# Patient Record
Sex: FEMALE | Race: WHITE | NOT HISPANIC OR LATINO | Employment: OTHER | URBAN - METROPOLITAN AREA
[De-identification: names, ages, dates, MRNs, and addresses within clinical notes are randomized per-mention and may not be internally consistent; named-entity substitution may affect disease eponyms.]

---

## 2017-01-24 DIAGNOSIS — M81.0 OP (OSTEOPOROSIS): ICD-10-CM

## 2017-01-24 RX ORDER — ALENDRONATE SODIUM 70 MG/1
TABLET ORAL
Qty: 12 TABLET | Refills: 3 | Status: SHIPPED | OUTPATIENT
Start: 2017-01-24 | End: 2017-10-17

## 2017-01-25 DIAGNOSIS — I10 ESSENTIAL HYPERTENSION: ICD-10-CM

## 2017-01-25 RX ORDER — TRIAMTERENE AND HYDROCHLOROTHIAZIDE 37.5; 25 MG/1; MG/1
CAPSULE ORAL
Qty: 90 CAPSULE | Refills: 3 | Status: SHIPPED | OUTPATIENT
Start: 2017-01-25 | End: 2018-03-14 | Stop reason: SDUPTHER

## 2017-02-12 DIAGNOSIS — G45.8 OTHER SPECIFIED TRANSIENT CEREBRAL ISCHEMIAS: ICD-10-CM

## 2017-02-12 RX ORDER — CLOPIDOGREL BISULFATE 75 MG/1
TABLET ORAL
Qty: 90 TABLET | Refills: 3 | Status: SHIPPED | OUTPATIENT
Start: 2017-02-12 | End: 2018-02-03 | Stop reason: SDUPTHER

## 2017-03-02 ENCOUNTER — OFFICE VISIT (OUTPATIENT)
Dept: INTERNAL MEDICINE | Facility: CLINIC | Age: 77
End: 2017-03-02
Payer: MEDICARE

## 2017-03-02 VITALS
DIASTOLIC BLOOD PRESSURE: 68 MMHG | HEIGHT: 62 IN | SYSTOLIC BLOOD PRESSURE: 106 MMHG | WEIGHT: 168.63 LBS | TEMPERATURE: 98 F | OXYGEN SATURATION: 97 % | BODY MASS INDEX: 31.03 KG/M2 | HEART RATE: 62 BPM

## 2017-03-02 DIAGNOSIS — J00 ACUTE CORYZA: Primary | ICD-10-CM

## 2017-03-02 LAB
FLUAV AG SPEC QL IA: NEGATIVE
FLUBV AG SPEC QL IA: NEGATIVE
SPECIMEN SOURCE: NORMAL

## 2017-03-02 PROCEDURE — 1159F MED LIST DOCD IN RCRD: CPT | Mod: S$GLB,,, | Performed by: PHYSICIAN ASSISTANT

## 2017-03-02 PROCEDURE — 87400 INFLUENZA A/B EACH AG IA: CPT | Mod: 59,PO

## 2017-03-02 PROCEDURE — 99999 PR PBB SHADOW E&M-EST. PATIENT-LVL III: CPT | Mod: PBBFAC,,, | Performed by: PHYSICIAN ASSISTANT

## 2017-03-02 PROCEDURE — 3078F DIAST BP <80 MM HG: CPT | Mod: S$GLB,,, | Performed by: PHYSICIAN ASSISTANT

## 2017-03-02 PROCEDURE — 1125F AMNT PAIN NOTED PAIN PRSNT: CPT | Mod: S$GLB,,, | Performed by: PHYSICIAN ASSISTANT

## 2017-03-02 PROCEDURE — 1157F ADVNC CARE PLAN IN RCRD: CPT | Mod: S$GLB,,, | Performed by: PHYSICIAN ASSISTANT

## 2017-03-02 PROCEDURE — 1160F RVW MEDS BY RX/DR IN RCRD: CPT | Mod: S$GLB,,, | Performed by: PHYSICIAN ASSISTANT

## 2017-03-02 PROCEDURE — 99213 OFFICE O/P EST LOW 20 MIN: CPT | Mod: S$GLB,,, | Performed by: PHYSICIAN ASSISTANT

## 2017-03-02 PROCEDURE — 3074F SYST BP LT 130 MM HG: CPT | Mod: S$GLB,,, | Performed by: PHYSICIAN ASSISTANT

## 2017-03-02 NOTE — PROGRESS NOTES
Subjective:      Patient ID: Lorenzo Ospina is a 76 y.o. female.    Chief Complaint: Influenza and Generalized Body Aches    Influenza   This is a new problem. Episode onset: 2 days. The problem occurs constantly. The problem has been gradually worsening. Associated symptoms include arthralgias, congestion, coughing, fatigue, headaches, myalgias, a sore throat, urinary symptoms (frequency) and weakness. Pertinent negatives include no abdominal pain, anorexia, change in bowel habit, chest pain, chills, diaphoresis, fever, joint swelling, nausea, neck pain, numbness, rash, swollen glands, visual change or vomiting. Nothing aggravates the symptoms. Treatments tried: Coricidin HBP. The treatment provided moderate relief.       Review of Systems   Constitutional: Positive for activity change and fatigue. Negative for appetite change, chills, diaphoresis, fever and unexpected weight change.   HENT: Positive for congestion, postnasal drip, rhinorrhea, sinus pressure and sore throat. Negative for dental problem, drooling, ear discharge, ear pain, facial swelling, hearing loss, mouth sores, nosebleeds, sneezing and trouble swallowing.    Eyes: Negative for pain, discharge, redness, itching and visual disturbance.   Respiratory: Positive for cough. Negative for chest tightness, shortness of breath and wheezing.    Cardiovascular: Negative for chest pain, palpitations and leg swelling.   Gastrointestinal: Negative for abdominal pain, anorexia, change in bowel habit, constipation, diarrhea, nausea and vomiting.   Endocrine: Negative.    Genitourinary: Negative.  Negative for difficulty urinating.   Musculoskeletal: Positive for arthralgias and myalgias. Negative for joint swelling, neck pain and neck stiffness.   Skin: Negative for rash.   Allergic/Immunologic: Negative for environmental allergies, food allergies and immunocompromised state.   Neurological: Positive for weakness and headaches. Negative for dizziness and  "numbness.     Objective:   /68  Pulse 62  Temp 97.5 °F (36.4 °C) (Tympanic)   Ht 5' 2" (1.575 m)  Wt 76.5 kg (168 lb 10.4 oz)  SpO2 97%  BMI 30.85 kg/m2    Physical Exam   Constitutional: She is oriented to person, place, and time. She appears well-developed and well-nourished.   HENT:   Head: Normocephalic and atraumatic.   Right Ear: Hearing, tympanic membrane, external ear and ear canal normal. No tenderness.   Left Ear: Hearing, tympanic membrane, external ear and ear canal normal. No tenderness.   Nose: Mucosal edema and rhinorrhea present. No sinus tenderness. Right sinus exhibits maxillary sinus tenderness and frontal sinus tenderness. Left sinus exhibits maxillary sinus tenderness and frontal sinus tenderness.   Mouth/Throat: Uvula is midline and mucous membranes are normal. No oral lesions. Normal dentition. No dental abscesses, uvula swelling or dental caries. Oropharyngeal exudate and posterior oropharyngeal erythema present. No posterior oropharyngeal edema or tonsillar abscesses. No tonsillar exudate.   Eyes: Conjunctivae and EOM are normal. Pupils are equal, round, and reactive to light. Right eye exhibits no discharge. Left eye exhibits no discharge.   Neck: Normal range of motion. Neck supple.   Cardiovascular: Normal rate, regular rhythm and normal heart sounds.  Exam reveals no gallop and no friction rub.    No murmur heard.  Pulmonary/Chest: Effort normal and breath sounds normal. No respiratory distress. She has no wheezes. She has no rales.   Lymphadenopathy:     She has no cervical adenopathy.   Neurological: She is alert and oriented to person, place, and time.   Skin: Skin is warm. No rash noted. No erythema. No pallor.   Psychiatric: She has a normal mood and affect. Her behavior is normal. Judgment and thought content normal.   Nursing note and vitals reviewed.      Assessment:     1. Acute coryza      Plan:   Acute coryza  -     Influenza antigen Nasopharyngeal Swab  -If " negative symptomatic relief. Continue coricidin HBP, do not exceed more than 12 tablets a day. Gargles, rest, fluids, soups.  -if symptoms worsen call clinic, if develop fever call clinic or RTC.     Return if symptoms worsen or fail to improve.

## 2017-03-02 NOTE — MR AVS SNAPSHOT
[facility]  [address] [facility] [place] [address]  Phone: [phone]  Fax: [phone]                  Lorenzo Ospina   3/2/2017 10:40 AM   Office Visit    Description:  Female : 1940   Provider:  Nohemi Hastings PA-C   Department:  [facility]           Reason for Visit     Influenza     Generalized Body Aches           Diagnoses this Visit        Comments    Acute coryza    -  Primary            To Do List           Goals (5 Years of Data)     None      Follow-Up and Disposition     Return if symptoms worsen or fail to improve.      [facility]     [facility] -  Assistance  Registered nurses in the [facility] provide clinical advisement, health education, appointment booking, and other advisory services.  Call for this free service at [phone].             Medications           Message regarding Medications     Verify the changes and/or additions to your medication regime listed below are the same as discussed with your clinician today.  If any of these changes or additions are incorrect, please notify your healthcare provider.             Verify that the below list of medications is an accurate representation of the medications you are currently taking.  If none reported, the list may be blank. If incorrect, please contact your healthcare provider. Carry this list with you in case of emergency.           Current Medications     alendronate (FOSAMAX) 70 MG tablet TAKE 1 TABLET (70 MG TOTAL) BY MOUTH EVERY 7 DAYS.    atorvastatin (LIPITOR) 20 MG tablet Take 1 tablet (20 mg total) by mouth once daily.    clopidogrel (PLAVIX) 75 mg tablet TAKE 1 TABLET (75 MG TOTAL) BY MOUTH ONCE DAILY.    fish oil-omega-3 fatty acids 300-1,000 mg capsule Take 2 g by mouth once daily.    MULTIVIT-IRON-MIN-FOLIC ACID 3,500-18-0.4 UNIT-MG-MG ORAL CHEW Take by mouth.    triamterene-hydrochlorothiazide 37.5-25 mg (DYAZIDE) 37.5-25  mg per capsule TAKE 1 CAPSULE BY MOUTH EVERY MORNING.           Clinical Reference Information           Your Vitals Were     BP                   106/68           Blood Pressure          Most Recent Value    BP  106/68      Allergies as of 3/2/2017     Medrol [Methylprednisolone]      Immunizations Administered on Date of Encounter - 3/2/2017     None      Orders Placed During Today's Visit      Normal Orders This Visit    Influenza antigen Nasopharyngeal Swab       [facility] Sign-Up     Activating your [facility] account is as easy as 1-2-3!     1) Visit [email or web address], select Sign Up Now, enter this activation code and your date of birth, then select Next.  [email or web address]  Expires: 4/16/2017 11:21 AM      2) Create a username and password to use when you visit [facility] in the future and select a security question in case you lose your password and select Next.    3) Enter your e-mail address and click Sign Up!    Additional Information  If you have questions, please e-mail [email or web address] or call [phone] to talk to our [facility] staff. Remember, [facility] is NOT to be used for urgent needs. For medical emergencies, dial 911.         Instructions      Adult Self-Care for Colds  Colds are caused by viruses. They cant be cured with antibiotics. However, you can relieve symptoms and support your bodys efforts to heal itself. No matter which symptoms you have, be sure to drink plenty of fluids (water or clear soup); stop smoking and drinking alcohol; and get plenty of rest.   Understand a fever  · Take your temperature several times a day. If your fever is 100.4°F (38.0°C) for more than a day, call your doctor.  · Relax, lie down. Go to bed if you want. Just get off your feet and rest. Also, drink plenty of fluids to avoid dehydration.  · Take acetaminophen or a nonsteroidal anti-inflammatory agent (NSAID), such as ibuprofen.  Treat a troubled nose kindly  · Breathe steam or heated humidified air to  open blocked nasal passages.  a hot shower or use a vaporizer. Be careful not to get burned by the steam.  · Saline nasal sprays and decongestant tablets help open a stuffy nose. Antihistamines can also help, but they can cause side effects such as drowsiness and drying of the eyes, nose, and mouth.  Soothe a sore throat and cough  · Gargle every 2 hours with 1/4 teaspoon of salt dissolved in 1/2 cup of warm water. Suck on throat lozenges and cough drops to moisten your throat.  · Cough medicines are available but it is unclear how effective they actually are.  · Take acetaminophen or an NSAID, such as ibuprofen to ease throat pain  Ease digestive problems  · Put fluid back into your body. Take frequent sips of clear liquids such as water or broth. Do not drink beverages with a lot of sugar in them, such as juices and sodas. These can make diarrhea worse. Older children and adults can drink sports drinks.  · As your appetite returns, you can resume your normal diet. Ask your doctor whether there are any foods you should avoid.     When to seek medical care  When you first notice symptoms, ask your health care provider if antiviral medications are appropriate. Antibiotics should not be taken for colds or flu. Also, call your doctor if you have any of the following symptoms or if you arent feeling better after 7 days:  · Shortness of breath  · Pain or pressure in the chest or abdomen  · Worsening symptoms, especially after a period of improvement  · Fever of 100.4°F  (38.0°C) or higher, or fever that doesnt go down with medication  · Sudden dizziness or confusion  · Severe or continued vomiting  · Signs of dehydration, including extreme thirst, dark urine, infrequent urination, dry mouth  · Spotted, red, or very sore throat   Date Last Reviewed: 6/19/2014  © 1037-3875 [employer]. [address] [place] [address], [place] [address]. All rights reserved. This information is not intended as a substitute  for professional medical care. Always follow your healthcare professional's instructions.        Understanding the Cold Virus  Colds are the most common illness that people get. Most adults get 2 or 3 colds per year, and most children get 5 to 7 colds per year. Colds may be caused by over 200 types of viruses. The most common of these are rhinoviruses (rhino refers to the nose).  What causes a cold virus?  All colds start with infection by a virus. You can be infected by more than one cold virus at a time. Infection with cold viruses happens when:  · You breathe in a virus from the air. This can happen when someone with a cold sneezes or coughs near you.  · You touch your eyes, nose, or mouth when your hand has a cold virus on it. This can happen if you touch an object that has the cold virus on it.  What are the symptoms of a cold virus?  Almost all colds involve a stuffy nose. Other common symptoms include:  · Runny nose  · Sneezing  · Sore throat  · Headache  · Cough  How is a cold treated?  Colds usually last 5 to 10 days. Treatment focuses on relieving symptoms. Treatments may include:  · Decongestant medicines. Several types of decongestants are available without prescription. These may help reduce stuffy or runny nose symptoms.  · Prescription or over-the-counter nasal sprays. These may help reduce nasal symptoms, including stuffiness.  · Prescription or over-the-counter pain medicines. These can help with headaches and sore throat.  · Self-care. This includes extra rest, using humidifiers, and drinking more fluids. These help you feel better while you are getting over a cold.  Antibiotics are not helpful for a cold. They do not make a cold shorter or relieve symptoms. Taking antibiotics when you dont need them can make them work less well when you need them for another illness.  Follow all directions for using medicines, especially when giving them to children. Contact your healthcare provider if you have  any questions about using cold medicines safely.  Can a cold be prevented?  You can help reduce the spread of cold viruses. This can help both you and others avoid getting colds. Follow these tips:  · Wash your hands well anytime you may have come into contact with cold viruses. Wash your hands for at least 20 seconds. When you cant wash with soap and water, use an alcohol-based hand .  · Dont touch your nose, eyes, or mouth, especially after touching something that may have a cold virus on it.  · Cover your mouth and nose when you cough or sneeze. Throw away tissues after using them.  · Disinfect things you touch often, such as phones and keyboards.    · Stay home when you have a cold.  What are the possible complications of a cold virus?  Colds usually go away by themselves. But its not unusual to get another type of infection while you have a cold. These can include:  · Sinus infection  · Lung infection, such as bronchitis or pneumonia  · Ear infection  If you have asthma or chronic bronchitis, a cold can make your condition worse.     When should I call my healthcare provider?  Call your healthcare provider right away if you have any of these:  · Fever of 100.4°F (38°C) or higher, or as directed  · Cough, chest pain, or shortness of breath that gets worse  · Symptoms dont get better or get worse after about 10 days  · Headache, sleepiness, or confusion that gets worse   Date Last Reviewed: 3/28/2016  © 7702-2401 [email or web address]. [address] [place] [address]. All rights reserved. This information is not intended as a substitute for professional medical care. Always follow your healthcare professional's instructions.        Viral Upper Respiratory Illness (Adult)  You have a viral upper respiratory illness (URI), which is another term for the common cold. This illness is contagious during the first few days. It is spread through the air by coughing and sneezing. It may also be  spread by direct contact (touching the sick person and then touching your own eyes, nose, or mouth). Frequent handwashing will decrease risk of spread. Most viral illnesses go away within 7 to 10 days with rest and simple home remedies. Sometimes the illness may last for several weeks. Antibiotics will not kill a virus, and they are generally not prescribed for this condition.    Home care  · If symptoms are severe, rest at home for the first 2 to 3 days. When you resume activity, don't let yourself get too tired.  · Avoid being exposed to cigarette smoke (yours or others).  · You may use acetaminophen or ibuprofen to control pain and fever, unless another medicine was prescribed. (Note: If you have chronic liver or kidney disease, have ever had a stomach ulcer or gastrointestinal bleeding, or are taking blood-thinning medicines, talk with your healthcare provider before using these medicines.) Aspirin should never be given to anyone under 18 years of age who is ill with a viral infection or fever. It may cause severe liver or brain damage.  · Your appetite may be poor, so a light diet is fine. Avoid dehydration by drinking 6 to 8 glasses of fluids per day (water, soft drinks, juices, tea, or soup). Extra fluids will help loosen secretions in the nose and lungs.  · Over-the-counter cold medicines will not shorten the length of time youre sick, but they may be helpful for the following symptoms: cough, sore throat, and nasal and sinus congestion. (Note: Do not use decongestants if you have high blood pressure.)  Follow-up care  Follow up with your healthcare provider, or as advised.  When to seek medical advice  Call your healthcare provider right away if any of these occur:  · Cough with lots of colored sputum (mucus)  · Severe headache; face, neck, or ear pain  · Difficulty swallowing due to throat pain  · Fever of 100.4°F (38°C)  Call 911, or get immediate medical care  Call emergency services right away if any  of these occur:  · Chest pain, shortness of breath, wheezing, or difficulty breathing  · Coughing up blood  · Inability to swallow due to throat pain  Date Last Reviewed: 9/13/2015  © 7923-7560 [employer] [address] [place] [address], [place] [address]. All rights reserved. This information is not intended as a substitute for professional medical care. Always follow your healthcare professional's instructions.             Language Assistance Services     ATTENTION: Language assistance services are available, free of charge. Please call [phone].      ATENCIÓN: Si habla español, tiene a rai disposición servicios gratuitos de asistencia lingüística. Llame al [phone].     CHÚ Ý: N?u b?n nói Ti?ng Vi?t, có các d?ch v? h? tr? ngôn ng? mi?n phí dành cho b?n. G?i s? [phone].         [facility] complies with applicable Federal civil rights laws and does not discriminate on the basis of race, color, national origin, age, disability, or sex.

## 2017-03-02 NOTE — PATIENT INSTRUCTIONS
Adult Self-Care for Colds  Colds are caused by viruses. They cant be cured with antibiotics. However, you can relieve symptoms and support your bodys efforts to heal itself. No matter which symptoms you have, be sure to drink plenty of fluids (water or clear soup); stop smoking and drinking alcohol; and get plenty of rest.   Understand a fever  · Take your temperature several times a day. If your fever is 100.4°F (38.0°C) for more than a day, call your doctor.  · Relax, lie down. Go to bed if you want. Just get off your feet and rest. Also, drink plenty of fluids to avoid dehydration.  · Take acetaminophen or a nonsteroidal anti-inflammatory agent (NSAID), such as ibuprofen.  Treat a troubled nose kindly  · Breathe steam or heated humidified air to open blocked nasal passages.  a hot shower or use a vaporizer. Be careful not to get burned by the steam.  · Saline nasal sprays and decongestant tablets help open a stuffy nose. Antihistamines can also help, but they can cause side effects such as drowsiness and drying of the eyes, nose, and mouth.  Soothe a sore throat and cough  · Gargle every 2 hours with 1/4 teaspoon of salt dissolved in 1/2 cup of warm water. Suck on throat lozenges and cough drops to moisten your throat.  · Cough medicines are available but it is unclear how effective they actually are.  · Take acetaminophen or an NSAID, such as ibuprofen to ease throat pain  Ease digestive problems  · Put fluid back into your body. Take frequent sips of clear liquids such as water or broth. Do not drink beverages with a lot of sugar in them, such as juices and sodas. These can make diarrhea worse. Older children and adults can drink sports drinks.  · As your appetite returns, you can resume your normal diet. Ask your doctor whether there are any foods you should avoid.     When to seek medical care  When you first notice symptoms, ask your health care provider if antiviral medications are  appropriate. Antibiotics should not be taken for colds or flu. Also, call your doctor if you have any of the following symptoms or if you arent feeling better after 7 days:  · Shortness of breath  · Pain or pressure in the chest or abdomen  · Worsening symptoms, especially after a period of improvement  · Fever of 100.4°F  (38.0°C) or higher, or fever that doesnt go down with medication  · Sudden dizziness or confusion  · Severe or continued vomiting  · Signs of dehydration, including extreme thirst, dark urine, infrequent urination, dry mouth  · Spotted, red, or very sore throat   Date Last Reviewed: 6/19/2014 © 2000-2016 [employer]. [address] [place] [address], [place] [address]. All rights reserved. This information is not intended as a substitute for professional medical care. Always follow your healthcare professional's instructions.        Understanding the Cold Virus  Colds are the most common illness that people get. Most adults get 2 or 3 colds per year, and most children get 5 to 7 colds per year. Colds may be caused by over 200 types of viruses. The most common of these are rhinoviruses (rhino refers to the nose).  What causes a cold virus?  All colds start with infection by a virus. You can be infected by more than one cold virus at a time. Infection with cold viruses happens when:  · You breathe in a virus from the air. This can happen when someone with a cold sneezes or coughs near you.  · You touch your eyes, nose, or mouth when your hand has a cold virus on it. This can happen if you touch an object that has the cold virus on it.  What are the symptoms of a cold virus?  Almost all colds involve a stuffy nose. Other common symptoms include:  · Runny nose  · Sneezing  · Sore throat  · Headache  · Cough  How is a cold treated?  Colds usually last 5 to 10 days. Treatment focuses on relieving symptoms. Treatments may include:  · Decongestant medicines. Several types of decongestants are  available without prescription. These may help reduce stuffy or runny nose symptoms.  · Prescription or over-the-counter nasal sprays. These may help reduce nasal symptoms, including stuffiness.  · Prescription or over-the-counter pain medicines. These can help with headaches and sore throat.  · Self-care. This includes extra rest, using humidifiers, and drinking more fluids. These help you feel better while you are getting over a cold.  Antibiotics are not helpful for a cold. They do not make a cold shorter or relieve symptoms. Taking antibiotics when you dont need them can make them work less well when you need them for another illness.  Follow all directions for using medicines, especially when giving them to children. Contact your healthcare provider if you have any questions about using cold medicines safely.  Can a cold be prevented?  You can help reduce the spread of cold viruses. This can help both you and others avoid getting colds. Follow these tips:  · Wash your hands well anytime you may have come into contact with cold viruses. Wash your hands for at least 20 seconds. When you cant wash with soap and water, use an alcohol-based hand .  · Dont touch your nose, eyes, or mouth, especially after touching something that may have a cold virus on it.  · Cover your mouth and nose when you cough or sneeze. Throw away tissues after using them.  · Disinfect things you touch often, such as phones and keyboards.    · Stay home when you have a cold.  What are the possible complications of a cold virus?  Colds usually go away by themselves. But its not unusual to get another type of infection while you have a cold. These can include:  · Sinus infection  · Lung infection, such as bronchitis or pneumonia  · Ear infection  If you have asthma or chronic bronchitis, a cold can make your condition worse.     When should I call my healthcare provider?  Call your healthcare provider right away if you have any of  these:  · Fever of 100.4°F (38°C) or higher, or as directed  · Cough, chest pain, or shortness of breath that gets worse  · Symptoms dont get better or get worse after about 10 days  · Headache, sleepiness, or confusion that gets worse   Date Last Reviewed: 3/28/2016  © 1818-1849 [employer]. [address] [place] [address]. All rights reserved. This information is not intended as a substitute for professional medical care. Always follow your healthcare professional's instructions.        Viral Upper Respiratory Illness (Adult)  You have a viral upper respiratory illness (URI), which is another term for the common cold. This illness is contagious during the first few days. It is spread through the air by coughing and sneezing. It may also be spread by direct contact (touching the sick person and then touching your own eyes, nose, or mouth). Frequent handwashing will decrease risk of spread. Most viral illnesses go away within 7 to 10 days with rest and simple home remedies. Sometimes the illness may last for several weeks. Antibiotics will not kill a virus, and they are generally not prescribed for this condition.    Home care  · If symptoms are severe, rest at home for the first 2 to 3 days. When you resume activity, don't let yourself get too tired.  · Avoid being exposed to cigarette smoke (yours or others).  · You may use acetaminophen or ibuprofen to control pain and fever, unless another medicine was prescribed. (Note: If you have chronic liver or kidney disease, have ever had a stomach ulcer or gastrointestinal bleeding, or are taking blood-thinning medicines, talk with your healthcare provider before using these medicines.) Aspirin should never be given to anyone under 18 years of age who is ill with a viral infection or fever. It may cause severe liver or brain damage.  · Your appetite may be poor, so a light diet is fine. Avoid dehydration by drinking 6 to 8 glasses of fluids per  day (water, soft drinks, juices, tea, or soup). Extra fluids will help loosen secretions in the nose and lungs.  · Over-the-counter cold medicines will not shorten the length of time youre sick, but they may be helpful for the following symptoms: cough, sore throat, and nasal and sinus congestion. (Note: Do not use decongestants if you have high blood pressure.)  Follow-up care  Follow up with your healthcare provider, or as advised.  When to seek medical advice  Call your healthcare provider right away if any of these occur:  · Cough with lots of colored sputum (mucus)  · Severe headache; face, neck, or ear pain  · Difficulty swallowing due to throat pain  · Fever of 100.4°F (38°C)  Call 911, or get immediate medical care  Call emergency services right away if any of these occur:  · Chest pain, shortness of breath, wheezing, or difficulty breathing  · Coughing up blood  · Inability to swallow due to throat pain  Date Last Reviewed: 9/13/2015  © 5889-4500 [employer]. [address] [place] [address], [place] [address]. All rights reserved. This information is not intended as a substitute for professional medical care. Always follow your healthcare professional's instructions.

## 2017-03-08 ENCOUNTER — OFFICE VISIT (OUTPATIENT)
Dept: INTERNAL MEDICINE | Facility: CLINIC | Age: 77
End: 2017-03-08
Payer: MEDICARE

## 2017-03-08 ENCOUNTER — HOSPITAL ENCOUNTER (OUTPATIENT)
Dept: RADIOLOGY | Facility: HOSPITAL | Age: 77
Discharge: HOME | End: 2017-03-08
Attending: INTERNAL MEDICINE
Payer: MEDICARE

## 2017-03-08 VITALS
WEIGHT: 172.38 LBS | OXYGEN SATURATION: 96 % | DIASTOLIC BLOOD PRESSURE: 90 MMHG | HEIGHT: 62 IN | TEMPERATURE: 98 F | BODY MASS INDEX: 31.72 KG/M2 | SYSTOLIC BLOOD PRESSURE: 158 MMHG | HEART RATE: 80 BPM

## 2017-03-08 DIAGNOSIS — J01.00 ACUTE NON-RECURRENT MAXILLARY SINUSITIS: ICD-10-CM

## 2017-03-08 DIAGNOSIS — E55.9 VITAMIN D DEFICIENCY: ICD-10-CM

## 2017-03-08 DIAGNOSIS — R05.9 COUGH: ICD-10-CM

## 2017-03-08 DIAGNOSIS — E83.52 HYPERCALCEMIA: Primary | ICD-10-CM

## 2017-03-08 DIAGNOSIS — E83.52 HYPERCALCEMIA: ICD-10-CM

## 2017-03-08 DIAGNOSIS — I10 ESSENTIAL HYPERTENSION: ICD-10-CM

## 2017-03-08 DIAGNOSIS — E21.3 HYPERPARATHYROIDISM: ICD-10-CM

## 2017-03-08 DIAGNOSIS — M81.0 OP (OSTEOPOROSIS): Primary | ICD-10-CM

## 2017-03-08 PROCEDURE — 99999 PR PBB SHADOW E&M-EST. PATIENT-LVL III: CPT | Mod: PBBFAC,,, | Performed by: INTERNAL MEDICINE

## 2017-03-08 PROCEDURE — 71020 XR CHEST PA AND LATERAL: CPT | Mod: TC,PO

## 2017-03-08 PROCEDURE — 1159F MED LIST DOCD IN RCRD: CPT | Mod: S$GLB,,, | Performed by: INTERNAL MEDICINE

## 2017-03-08 PROCEDURE — 71020 XR CHEST PA AND LATERAL: CPT | Mod: 26,,, | Performed by: RADIOLOGY

## 2017-03-08 PROCEDURE — 74000 XR ABDOMEN AP 1 VIEW: CPT | Mod: TC,PO

## 2017-03-08 PROCEDURE — 1160F RVW MEDS BY RX/DR IN RCRD: CPT | Mod: S$GLB,,, | Performed by: INTERNAL MEDICINE

## 2017-03-08 PROCEDURE — 99214 OFFICE O/P EST MOD 30 MIN: CPT | Mod: S$GLB,,, | Performed by: INTERNAL MEDICINE

## 2017-03-08 PROCEDURE — 1157F ADVNC CARE PLAN IN RCRD: CPT | Mod: S$GLB,,, | Performed by: INTERNAL MEDICINE

## 2017-03-08 PROCEDURE — 3077F SYST BP >= 140 MM HG: CPT | Mod: S$GLB,,, | Performed by: INTERNAL MEDICINE

## 2017-03-08 PROCEDURE — 74000 XR ABDOMEN AP 1 VIEW: CPT | Mod: 26,,, | Performed by: RADIOLOGY

## 2017-03-08 PROCEDURE — 3080F DIAST BP >= 90 MM HG: CPT | Mod: S$GLB,,, | Performed by: INTERNAL MEDICINE

## 2017-03-08 PROCEDURE — 99499 UNLISTED E&M SERVICE: CPT | Mod: S$GLB,,, | Performed by: INTERNAL MEDICINE

## 2017-03-08 RX ORDER — AMOXICILLIN 875 MG/1
875 TABLET, FILM COATED ORAL 2 TIMES DAILY
Qty: 20 TABLET | Refills: 0 | Status: SHIPPED | OUTPATIENT
Start: 2017-03-08 | End: 2017-03-18

## 2017-03-08 NOTE — MR AVS SNAPSHOT
[facility]  [address] [facility] [address]  [place] [address]  Phone: [phone]  Fax: [phone]                  Lorenzo Ospina   3/8/2017 2:40 PM   Office Visit    Description:  Female : 1940   Provider:  Lorena Fiore MD   Department:  [facility] - Internal Medicine           Reason for Visit     URI           Diagnoses this Visit        Comments    OP (osteoporosis)    -  Primary     Essential hypertension         Hypercalcemia         Hyperparathyroidism         Vitamin D deficiency         Acute non-recurrent maxillary sinusitis         Cough                To Do List           Future Appointments        Provider Department Dept Phone    3/8/2017 1:00 PM [facility] XR2 [facility] [phone]    3/8/2017 1:45 PM LAB, SAME DAY [facility] [phone]    3/8/2017 2:00 PM LABORATORY, [facility] [phone]    3/8/2017 2:40 PM Lorena Fiore MD [facility] [phone]    2017 3:00 PM Lorena Fiore MD [facility] [phone]      Goals (5 Years of Data)     None      Follow-Up and Disposition     Return in about 1 month (around 2017).    Follow-up and Disposition History       These Medications        Disp Refills Start End    amoxicillin (AMOXIL) 875 MG tablet 20 tablet 0 3/8/2017 3/18/2017    Take 1 tablet (875 mg total) by mouth 2 (two) times daily. - Oral    Pharmacy: [facility]/pharmacy #[id number] - [place], [place] - [address] AT [facility] Ph #: [phone]         [facility] On [facility]     [facility] Care Line -  Assistance  Registered nurses in the [facility] provide clinical advisement, health education, appointment booking, and other advisory services.  Call for this free service at [phone].             Medications           Message regarding Medications     Verify the changes and/or additions to your medication  "regime listed below are the same as discussed with your clinician today.  If any of these changes or additions are incorrect, please notify your healthcare provider.        START taking these NEW medications        Refills    amoxicillin (AMOXIL) 875 MG tablet 0    Sig: Take 1 tablet (875 mg total) by mouth 2 (two) times daily.    Class: Normal    Route: Oral           Verify that the below list of medications is an accurate representation of the medications you are currently taking.  If none reported, the list may be blank. If incorrect, please contact your healthcare provider. Carry this list with you in case of emergency.           Current Medications     alendronate (FOSAMAX) 70 MG tablet TAKE 1 TABLET (70 MG TOTAL) BY MOUTH EVERY 7 DAYS.    amoxicillin (AMOXIL) 875 MG tablet Take 1 tablet (875 mg total) by mouth 2 (two) times daily.    atorvastatin (LIPITOR) 20 MG tablet Take 1 tablet (20 mg total) by mouth once daily.    clopidogrel (PLAVIX) 75 mg tablet TAKE 1 TABLET (75 MG TOTAL) BY MOUTH ONCE DAILY.    fish oil-omega-3 fatty acids 300-1,000 mg capsule Take 2 g by mouth once daily.    MULTIVIT-IRON-MIN-FOLIC ACID 3,500-18-0.4 UNIT-MG-MG ORAL CHEW Take by mouth.    triamterene-hydrochlorothiazide 37.5-25 mg (DYAZIDE) 37.5-25 mg per capsule TAKE 1 CAPSULE BY MOUTH EVERY MORNING.           Clinical Reference Information           Your Vitals Were     BP Pulse Temp Height Weight SpO2    158/90 (BP Location: Right arm) 80 97.7 °F (36.5 °C) (Tympanic) 5' 2" (1.575 m) 78.2 kg (172 lb 6.4 oz) 96%    BMI                31.53 kg/m2          Blood Pressure          Most Recent Value    BP  (!)  158/90      Allergies as of 3/8/2017     Medrol [Methylprednisolone]      Immunizations Administered on Date of Encounter - 3/8/2017     None      Orders Placed During Today's Visit     Future Labs/Procedures Expected by Expires    Comprehensive metabolic panel  3/8/2017 3/8/2018    PTH, intact  3/8/2017 5/7/2018    X-Ray " Abdomen AP 1 View  3/8/2017 3/8/2018    X-Ray Chest PA And Lateral  3/8/2017 3/8/2018    Calcium, Timed Urine  As directed 3/8/2018    Vitamin D  As directed 3/8/2018      [facility] Sign-Up     Activating your [facility] account is as easy as 1-2-3!     1) Visit [email or web address], select Sign Up Now, enter this activation code and your date of birth, then select Next.  [email or web address]  Expires: 4/16/2017 11:21 AM      2) Create a username and password to use when you visit [facility] in the future and select a security question in case you lose your password and select Next.    3) Enter your e-mail address and click Sign Up!    Additional Information  If you have questions, please e-mail [email or web address] or call [phone] to talk to our [facility] staff. Remember, [facility] is NOT to be used for urgent needs. For medical emergencies, dial 911.         Language Assistance Services     ATTENTION: Language assistance services are available, free of charge. Please call [phone].      ATENCIÓN: Si habla español, tiene a rai disposición servicios gratuitos de asistencia lingüística. Llame al [phone].     CHÚ Ý: N?u b?n nói Ti?ng Vi?t, có các d?ch v? h? tr? ngôn ng? mi?n phí dành cho b?n. G?i s? [phone].         [facility] - [facility] Medicine complies with applicable Federal civil rights laws and does not discriminate on the basis of race, color, national origin, age, disability, or sex.

## 2017-03-08 NOTE — PROGRESS NOTES
"Subjective:       Patient ID: Lorenzo Ospina is a 77 y.o. female.    Chief Complaint: URI    HPI Comments: Here for follow up of medical problems.  Taking fosamax.  Not taking vit D.  Continues with aches and coughing up green material, head congestion not better with coricidin.  No n/v/d.  No cp/palp.  No sob.    HM: 10/15 fluvax, ref other vaccines, 3/15 MMG and breast doctor, 4/16 BMD rHI FX RISK rec bisphos, Cscope 4y ago.        Review of Systems   Constitutional: Negative for chills, diaphoresis and fever.   Respiratory: Negative for cough and shortness of breath.    Cardiovascular: Negative for chest pain, palpitations and leg swelling.   Gastrointestinal: Negative for blood in stool, constipation, diarrhea, nausea and vomiting.   Genitourinary: Negative for dysuria, frequency and hematuria.   Psychiatric/Behavioral: The patient is not nervous/anxious.        Objective:   BP (!) 158/90 (BP Location: Right arm)  Pulse 80  Temp 97.7 °F (36.5 °C) (Tympanic)   Ht 5' 2" (1.575 m)  Wt 78.2 kg (172 lb 6.4 oz)  SpO2 96%  BMI 31.53 kg/m2    Physical Exam   Constitutional: She is oriented to person, place, and time. She appears well-developed.   HENT:   Mouth/Throat: Oropharynx is clear and moist.   Neck: Neck supple. Carotid bruit is not present. No thyroid mass present.   Cardiovascular: Normal rate, regular rhythm and intact distal pulses.  Exam reveals no gallop and no friction rub.    No murmur heard.  Pulmonary/Chest: Effort normal and breath sounds normal. She has no wheezes. She has no rales.   Abdominal: Soft. Bowel sounds are normal. She exhibits no mass. There is no hepatosplenomegaly. There is no tenderness.   Musculoskeletal: She exhibits no edema.   Lymphadenopathy:     She has no cervical adenopathy.   Neurological: She is alert and oriented to person, place, and time.   Psychiatric: She has a normal mood and affect.       Assessment:       1. OP (osteoporosis)    2. Essential hypertension  "   3. Hypercalcemia    4. Hyperparathyroidism    5. Vitamin D deficiency    6. Acute non-recurrent maxillary sinusitis    7. Cough        Plan:       Lorenzo was seen today for uri.    Diagnoses and all orders for this visit:    OP (osteoporosis)- on fosamax- check labs now.    Essential hypertension- will follow, has been stable.    Hypercalcemia  -     X-Ray Abdomen AP 1 View; Future  -     Comprehensive metabolic panel; Future  -     PTH, intact; Future  -     Calcium, Timed Urine; Future    Hyperparathyroidism    Vitamin D deficiency  -     Vitamin D; Future    Acute non-recurrent maxillary sinusitis, r/o pneumonia.  -     amoxicillin (AMOXIL) 875 MG tablet; Take 1 tablet (875 mg total) by mouth 2 (two) times daily.  -     X-Ray Chest PA And Lateral; Future    RTC 1 mo.

## 2017-03-10 ENCOUNTER — TELEPHONE (OUTPATIENT)
Dept: INTERNAL MEDICINE | Facility: CLINIC | Age: 77
End: 2017-03-10

## 2017-03-10 DIAGNOSIS — E83.52 HYPERCALCEMIA: Primary | ICD-10-CM

## 2017-03-13 ENCOUNTER — LAB VISIT (OUTPATIENT)
Dept: LAB | Facility: HOSPITAL | Age: 77
End: 2017-03-13
Attending: INTERNAL MEDICINE
Payer: MEDICARE

## 2017-03-13 DIAGNOSIS — E83.52 HYPERCALCEMIA: Primary | ICD-10-CM

## 2017-03-13 DIAGNOSIS — E83.52 HYPERCALCEMIA: ICD-10-CM

## 2017-03-13 PROCEDURE — 82340 ASSAY OF CALCIUM IN URINE: CPT

## 2017-03-14 LAB
CALCIUM 24H UR-MRATE: 7 MG/HR
CALCIUM UR-MCNC: 7 MG/DL
CALCIUM URINE (MG/SPEC): 166 MG/SPEC
URINE COLLECTION DURATION: 24 HR
URINE VOLUME: 2375 ML

## 2017-03-15 DIAGNOSIS — E78.5 HYPERLIPIDEMIA: ICD-10-CM

## 2017-03-15 RX ORDER — ATORVASTATIN CALCIUM 20 MG/1
TABLET, FILM COATED ORAL
Qty: 90 TABLET | Refills: 3 | Status: SHIPPED | OUTPATIENT
Start: 2017-03-15 | End: 2018-03-01 | Stop reason: SDUPTHER

## 2017-03-20 ENCOUNTER — TELEPHONE (OUTPATIENT)
Dept: INTERNAL MEDICINE | Facility: CLINIC | Age: 77
End: 2017-03-20

## 2017-03-20 NOTE — TELEPHONE ENCOUNTER
PC with pt's daughter in law.  Pt has difficulty understanding English.  She said that pt reports pt is c/o sore throat and productive cough. Pt completed abx for URI last week and now the s/s are back.  She would like to see someone today to possible get a different abx.  Scheduled appt with Cinthia Thomas this afternoon./[email or web address]

## 2017-03-20 NOTE — TELEPHONE ENCOUNTER
----- Message from Rachel Santo sent at 3/20/2017 10:43 AM CDT -----  Contact: Amy (pt's daughter-in-law)   Amy called and stated she needed to speak to the nurse. She stated that the medication that the doctor prescribed las week is not working for the pt  and she needs something else called into the pharmacy. She also stated that the pt needs her test results from last week as well. She can be reached at [phone].    Thanks,  TF

## 2017-03-30 ENCOUNTER — PATIENT OUTREACH (OUTPATIENT)
Dept: ADMINISTRATIVE | Facility: HOSPITAL | Age: 77
End: 2017-03-30

## 2017-03-30 DIAGNOSIS — E78.00 PURE HYPERCHOLESTEROLEMIA: Primary | ICD-10-CM

## 2017-04-18 ENCOUNTER — TELEPHONE (OUTPATIENT)
Dept: INTERNAL MEDICINE | Facility: CLINIC | Age: 77
End: 2017-04-18

## 2017-04-18 DIAGNOSIS — E78.00 PURE HYPERCHOLESTEROLEMIA: Primary | ICD-10-CM

## 2017-04-18 DIAGNOSIS — E55.9 VITAMIN D DEFICIENCY: ICD-10-CM

## 2017-04-18 DIAGNOSIS — E21.3 HYPERPARATHYROIDISM: Primary | ICD-10-CM

## 2017-05-30 ENCOUNTER — LAB VISIT (OUTPATIENT)
Dept: LAB | Facility: HOSPITAL | Age: 77
End: 2017-05-30
Attending: INTERNAL MEDICINE
Payer: MEDICARE

## 2017-05-30 DIAGNOSIS — E21.3 HYPERPARATHYROIDISM: ICD-10-CM

## 2017-05-30 DIAGNOSIS — E55.9 VITAMIN D DEFICIENCY: ICD-10-CM

## 2017-05-30 DIAGNOSIS — E78.00 PURE HYPERCHOLESTEROLEMIA: ICD-10-CM

## 2017-05-30 LAB
25(OH)D3+25(OH)D2 SERPL-MCNC: 27 NG/ML
ANION GAP SERPL CALC-SCNC: 6 MMOL/L
BUN SERPL-MCNC: 12 MG/DL
CALCIUM SERPL-MCNC: 10.2 MG/DL
CHLORIDE SERPL-SCNC: 102 MMOL/L
CHOLEST/HDLC SERPL: 2.4 {RATIO}
CO2 SERPL-SCNC: 28 MMOL/L
CREAT SERPL-MCNC: 0.7 MG/DL
EST. GFR  (AFRICAN AMERICAN): >60 ML/MIN/1.73 M^2
EST. GFR  (NON AFRICAN AMERICAN): >60 ML/MIN/1.73 M^2
GLUCOSE SERPL-MCNC: 107 MG/DL
HDL/CHOLESTEROL RATIO: 41.6 %
HDLC SERPL-MCNC: 137 MG/DL
HDLC SERPL-MCNC: 57 MG/DL
LDLC SERPL CALC-MCNC: 60 MG/DL
NONHDLC SERPL-MCNC: 80 MG/DL
POTASSIUM SERPL-SCNC: 3.8 MMOL/L
PTH-INTACT SERPL-MCNC: 161 PG/ML
SODIUM SERPL-SCNC: 136 MMOL/L
TRIGL SERPL-MCNC: 100 MG/DL
TSH SERPL DL<=0.005 MIU/L-ACNC: 0.63 UIU/ML

## 2017-05-30 PROCEDURE — 80048 BASIC METABOLIC PNL TOTAL CA: CPT

## 2017-05-30 PROCEDURE — 84443 ASSAY THYROID STIM HORMONE: CPT

## 2017-05-30 PROCEDURE — 36415 COLL VENOUS BLD VENIPUNCTURE: CPT | Mod: PO

## 2017-05-30 PROCEDURE — 82306 VITAMIN D 25 HYDROXY: CPT

## 2017-05-30 PROCEDURE — 83970 ASSAY OF PARATHORMONE: CPT

## 2017-05-30 PROCEDURE — 80061 LIPID PANEL: CPT

## 2017-06-06 ENCOUNTER — OFFICE VISIT (OUTPATIENT)
Dept: INTERNAL MEDICINE | Facility: CLINIC | Age: 77
End: 2017-06-06
Payer: MEDICARE

## 2017-06-06 ENCOUNTER — HOSPITAL ENCOUNTER (OUTPATIENT)
Dept: RADIOLOGY | Facility: HOSPITAL | Age: 77
Discharge: HOME | End: 2017-06-06
Attending: INTERNAL MEDICINE
Payer: MEDICARE

## 2017-06-06 VITALS
BODY MASS INDEX: 31.16 KG/M2 | SYSTOLIC BLOOD PRESSURE: 138 MMHG | OXYGEN SATURATION: 97 % | WEIGHT: 169.31 LBS | DIASTOLIC BLOOD PRESSURE: 72 MMHG | HEART RATE: 76 BPM | HEIGHT: 62 IN | TEMPERATURE: 97 F

## 2017-06-06 DIAGNOSIS — E55.9 VITAMIN D DEFICIENCY: ICD-10-CM

## 2017-06-06 DIAGNOSIS — M25.552 LEFT HIP PAIN: ICD-10-CM

## 2017-06-06 DIAGNOSIS — M81.0 OSTEOPOROSIS, UNSPECIFIED OSTEOPOROSIS TYPE, UNSPECIFIED PATHOLOGICAL FRACTURE PRESENCE: ICD-10-CM

## 2017-06-06 DIAGNOSIS — Z00.00 ENCOUNTER FOR PREVENTIVE HEALTH EXAMINATION: ICD-10-CM

## 2017-06-06 DIAGNOSIS — I10 ESSENTIAL HYPERTENSION: Primary | ICD-10-CM

## 2017-06-06 DIAGNOSIS — E21.3 HYPERPARATHYROIDISM: ICD-10-CM

## 2017-06-06 DIAGNOSIS — C50.011 MALIGNANT NEOPLASM INVOLVING BOTH NIPPLE AND AREOLA OF RIGHT BREAST IN FEMALE: ICD-10-CM

## 2017-06-06 DIAGNOSIS — E78.00 PURE HYPERCHOLESTEROLEMIA: ICD-10-CM

## 2017-06-06 DIAGNOSIS — R39.15 URINARY URGENCY: ICD-10-CM

## 2017-06-06 PROCEDURE — 73502 X-RAY EXAM HIP UNI 2-3 VIEWS: CPT | Mod: TC,PO,LT

## 2017-06-06 PROCEDURE — 73502 X-RAY EXAM HIP UNI 2-3 VIEWS: CPT | Mod: 26,LT,, | Performed by: RADIOLOGY

## 2017-06-06 PROCEDURE — 99397 PER PM REEVAL EST PAT 65+ YR: CPT | Mod: 25,S$GLB,, | Performed by: INTERNAL MEDICINE

## 2017-06-06 PROCEDURE — 99499 UNLISTED E&M SERVICE: CPT | Mod: S$GLB,,, | Performed by: INTERNAL MEDICINE

## 2017-06-06 PROCEDURE — 99999 PR PBB SHADOW E&M-EST. PATIENT-LVL III: CPT | Mod: PBBFAC,,, | Performed by: INTERNAL MEDICINE

## 2017-06-06 PROCEDURE — 90670 PCV13 VACCINE IM: CPT | Mod: S$GLB,,, | Performed by: INTERNAL MEDICINE

## 2017-06-06 PROCEDURE — G0009 ADMIN PNEUMOCOCCAL VACCINE: HCPCS | Mod: S$GLB,,, | Performed by: INTERNAL MEDICINE

## 2017-06-06 RX ORDER — CHOLECALCIFEROL (VITAMIN D3) 25 MCG
2000 TABLET ORAL DAILY
COMMUNITY

## 2017-06-06 NOTE — PROGRESS NOTES
"Subjective:       Patient ID: Lorenzo Ospina is a 77 y.o. female.    Chief Complaint: Annual Exam    Here for f/u medical problems and preventive exam.  Having urinary frequency and urgency for a few months.  Tolerating fosamax weekly.  No f/c/sw/cough.  No cp/sob/palp.  BMs normal with metamucil.  Taking vit D.    HM: 10/15 fluvax ref more, 6/17 today [id number], 6/17 sched MMG/ breast Dr. Carrilol, 4/16 BMD rep 2y,  2012 Cscope sched for repeat.        Review of Systems   Constitutional: Negative for appetite change, chills, diaphoresis and fever.   HENT: Negative for congestion, ear pain, rhinorrhea, sinus pressure and sore throat.    Respiratory: Negative for cough, chest tightness and shortness of breath.    Cardiovascular: Negative for chest pain and palpitations.   Gastrointestinal: Negative for blood in stool, constipation, diarrhea, nausea and vomiting.   Genitourinary: Negative for dysuria, frequency, hematuria, menstrual problem, urgency and vaginal discharge.   Musculoskeletal: Negative for arthralgias.   Skin: Negative for rash.   Neurological: Negative for dizziness and headaches.   Psychiatric/Behavioral: Negative for sleep disturbance. The patient is not nervous/anxious.        Objective:   /72 (BP Location: Right arm)   Pulse 76   Temp 97.2 °F (36.2 °C) (Tympanic)   Ht 5' 2" (1.575 m)   Wt 76.8 kg (169 lb 5 oz)   SpO2 97%   BMI 30.97 kg/m²     Physical Exam   Constitutional: She is oriented to person, place, and time. She appears well-developed and well-nourished.   HENT:   Right Ear: External ear normal. Tympanic membrane is not injected.   Left Ear: External ear normal. Tympanic membrane is not injected.   Mouth/Throat: Oropharynx is clear and moist.   Eyes: Conjunctivae are normal.   Neck: Normal range of motion. Neck supple. No thyromegaly present.   Cardiovascular: Normal rate, regular rhythm and intact distal pulses.  Exam reveals no gallop and no friction rub.    No murmur " heard.  Pulmonary/Chest: Effort normal and breath sounds normal. She has no wheezes. She has no rales.   Abdominal: Soft. Bowel sounds are normal. She exhibits no mass. There is no tenderness.   Musculoskeletal: She exhibits no edema.        Left hip: She exhibits normal range of motion, normal strength and no tenderness.   Pain with int or ext rotation, mild.   Lymphadenopathy:     She has no cervical adenopathy.   Neurological: She is alert and oriented to person, place, and time.   Skin: Skin is warm. No rash noted.   Psychiatric: She has a normal mood and affect.       Results for orders placed or performed in visit on 05/30/17   Basic metabolic panel   Result Value Ref Range    Sodium 136 136 - 145 mmol/L    Potassium 3.8 3.5 - 5.1 mmol/L    Chloride 102 95 - 110 mmol/L    CO2 28 23 - 29 mmol/L    Glucose 107 70 - 110 mg/dL    BUN, Bld 12 8 - 23 mg/dL    Creatinine 0.7 0.5 - 1.4 mg/dL    Calcium 10.2 8.7 - 10.5 mg/dL    Anion Gap 6 (L) 8 - 16 mmol/L    eGFR if African American >60.0 >60 mL/min/1.73 m^2    eGFR if non African American >60.0 >60 mL/min/1.73 m^2   PTH, intact   Result Value Ref Range    PTH, Intact 161.0 (H) 9.0 - 77.0 pg/mL   TSH   Result Value Ref Range    TSH 0.633 0.400 - 4.000 uIU/mL   Vitamin D   Result Value Ref Range    Vit D, 25-Hydroxy 27 (L) 30 - 96 ng/mL   Lipid panel   Result Value Ref Range    Cholesterol 137 120 - 199 mg/dL    Triglycerides 100 30 - 150 mg/dL    HDL 57 40 - 75 mg/dL    LDL Cholesterol 60.0 (L) 63.0 - 159.0 mg/dL    HDL/Chol Ratio 41.6 20.0 - 50.0 %    Total Cholesterol/HDL Ratio 2.4 2.0 - 5.0    Non-HDL Cholesterol 80 mg/dL     Results for ALEXEY KENDALL (MRN [id number]) as of 6/6/2017 16:59   Ref. Range 3/13/2017 14:16   Calcium, Urine Latest Ref Range: 0.0 - 15.0 mg/dL 7.0   Calcium, 24H Urine Latest Ref Range: 4 - 12 mg/Hr 7   CA Urine (mg/Spec) Latest Units: mg/Spec 166     Assessment:       1. Essential hypertension    2. Malignant neoplasm involving both  nipple and areola of right breast in female    3. Osteoporosis, unspecified osteoporosis type, unspecified pathological fracture presence    4. Pure hypercholesterolemia    5. Encounter for preventive health examination    6. Vitamin D deficiency    7. Urinary urgency    8. Hyperparathyroidism    9. Left hip pain        Plan:       Lorenzo was seen today for annual exam.    Diagnoses and all orders for this visit:    Essential hypertension- adeq control, but may want to change rx due to urination c/o.    Malignant neoplasm involving both nipple and areola of right breast in female    Osteoporosis, unspecified osteoporosis type, unspecified pathological fracture presence- cont vit D, cont fosamax.    Pure hypercholesterolemia- stable on statin.    Encounter for preventive health examination- [id number] now.    Left hip pain, xray- cont voltaren topical, tylenol not helping.  May try nsaids orally.    Vitamin D deficiency    Urinary urgency- lab now with MN.  -     Urinalysis; Future  -     Urine culture; Future    Hyperparathyroidism- recheck 4mo.  -     Basic metabolic panel; Future  -     PTH, intact; Future

## 2017-06-07 ENCOUNTER — TELEPHONE (OUTPATIENT)
Dept: INTERNAL MEDICINE | Facility: CLINIC | Age: 77
End: 2017-06-07

## 2017-06-07 NOTE — TELEPHONE ENCOUNTER
Please inform pt's daughter that urine is clear, without infection, and hip xray is normal.  Let me know if hip pain persists and wants to see Ortho.  SM

## 2017-06-07 NOTE — TELEPHONE ENCOUNTER
Spoke with pt's son.  Informed urine and hip xray normal.  Instructed to have pt call if the hip pain persists, to see Ortho. Verbalized understanding and stated he will get the message to pt and daughter./rpr

## 2017-06-12 ENCOUNTER — TELEPHONE (OUTPATIENT)
Dept: INTERNAL MEDICINE | Facility: CLINIC | Age: 77
End: 2017-06-12

## 2017-06-12 NOTE — TELEPHONE ENCOUNTER
----- Message from Tish Moyer sent at 6/12/2017  8:28 AM CDT -----  Contact: ms kumar-daughternlaw  pneumonia shot given 5/6 is brusied up/pinkish/sore (will be here for another appt at 9:30). pls adv..[phone]

## 2017-06-12 NOTE — TELEPHONE ENCOUNTER
Called patient daughter informed her that was a normal reaction to vaccine and she can tell her to use cold compresses and she may have tylenol or iburprofen. Understanding verbalized.

## 2017-09-26 ENCOUNTER — HOSPITAL ENCOUNTER (OUTPATIENT)
Dept: RADIOLOGY | Facility: HOSPITAL | Age: 77
Discharge: HOME | End: 2017-09-26
Attending: INTERNAL MEDICINE
Payer: MEDICARE

## 2017-09-26 ENCOUNTER — TELEPHONE (OUTPATIENT)
Dept: INTERNAL MEDICINE | Facility: CLINIC | Age: 77
End: 2017-09-26

## 2017-09-26 ENCOUNTER — OFFICE VISIT (OUTPATIENT)
Dept: INTERNAL MEDICINE | Facility: CLINIC | Age: 77
End: 2017-09-26
Payer: MEDICARE

## 2017-09-26 VITALS
SYSTOLIC BLOOD PRESSURE: 126 MMHG | WEIGHT: 169.56 LBS | HEART RATE: 74 BPM | BODY MASS INDEX: 31.2 KG/M2 | TEMPERATURE: 98 F | HEIGHT: 62 IN | DIASTOLIC BLOOD PRESSURE: 74 MMHG | OXYGEN SATURATION: 97 %

## 2017-09-26 DIAGNOSIS — R05.9 COUGH: ICD-10-CM

## 2017-09-26 DIAGNOSIS — I10 ESSENTIAL HYPERTENSION: Primary | ICD-10-CM

## 2017-09-26 PROCEDURE — 1126F AMNT PAIN NOTED NONE PRSNT: CPT | Mod: S$GLB,,, | Performed by: INTERNAL MEDICINE

## 2017-09-26 PROCEDURE — 1159F MED LIST DOCD IN RCRD: CPT | Mod: S$GLB,,, | Performed by: INTERNAL MEDICINE

## 2017-09-26 PROCEDURE — 99499 UNLISTED E&M SERVICE: CPT | Mod: S$GLB,,, | Performed by: INTERNAL MEDICINE

## 2017-09-26 PROCEDURE — 71020 XR CHEST PA AND LATERAL: CPT | Mod: TC,PO

## 2017-09-26 PROCEDURE — 99999 PR PBB SHADOW E&M-EST. PATIENT-LVL III: CPT | Mod: PBBFAC,,, | Performed by: INTERNAL MEDICINE

## 2017-09-26 PROCEDURE — 3008F BODY MASS INDEX DOCD: CPT | Mod: S$GLB,,, | Performed by: INTERNAL MEDICINE

## 2017-09-26 PROCEDURE — 71020 XR CHEST PA AND LATERAL: CPT | Mod: 26,,, | Performed by: RADIOLOGY

## 2017-09-26 PROCEDURE — 99213 OFFICE O/P EST LOW 20 MIN: CPT | Mod: S$GLB,,, | Performed by: INTERNAL MEDICINE

## 2017-09-26 PROCEDURE — 3078F DIAST BP <80 MM HG: CPT | Mod: S$GLB,,, | Performed by: INTERNAL MEDICINE

## 2017-09-26 PROCEDURE — 3074F SYST BP LT 130 MM HG: CPT | Mod: S$GLB,,, | Performed by: INTERNAL MEDICINE

## 2017-09-26 RX ORDER — MONTELUKAST SODIUM 10 MG/1
TABLET ORAL
COMMUNITY
Start: 2017-09-14 | End: 2017-10-17

## 2017-09-26 RX ORDER — AZITHROMYCIN 500 MG/1
500 TABLET, FILM COATED ORAL DAILY
Qty: 5 TABLET | Refills: 0 | Status: SHIPPED | OUTPATIENT
Start: 2017-09-26 | End: 2017-10-06

## 2017-09-26 NOTE — PROGRESS NOTES
"Subjective:       Patient ID: Lorenzo Ospina is a 77 y.o. female.    Chief Complaint: Cough and Chest Congestion    Here for cough and head congestion x 2-3 weeks.  Lots thick sputum.  No f/c/n/v.  No myalgias.  No diarrhea.  2 weeks ago came to , given cefdinir.  No better.    Updated/ annual due 6/18:  HM: 10/15 fluvax ref more, 6/17 [id number], 6/17 sched MMG/ breast Dr. Carrillo, 4/16 BMD rep 2y,  2012 Cscope sched for repeat.          Review of Systems   Constitutional: Negative for chills, diaphoresis and fever.   Respiratory: Positive for cough. Negative for shortness of breath.    Cardiovascular: Negative for chest pain, palpitations and leg swelling.   Gastrointestinal: Negative for blood in stool, constipation, diarrhea, nausea and vomiting.   Genitourinary: Negative for dysuria, frequency and hematuria.   Psychiatric/Behavioral: The patient is not nervous/anxious.        Objective:   /74 (BP Location: Right arm, Patient Position: Sitting)   Pulse 74   Temp 97.5 °F (36.4 °C) (Tympanic)   Ht 5' 2" (1.575 m)   Wt 76.9 kg (169 lb 8.5 oz)   SpO2 97%   BMI 31.01 kg/m²     Physical Exam   Constitutional: She is oriented to person, place, and time. She appears well-developed.   HENT:   Right Ear: External ear normal. Tympanic membrane is not injected.   Left Ear: External ear normal. Tympanic membrane is not injected.   Mouth/Throat: Oropharynx is clear and moist.   Eyes: Conjunctivae are normal.   Neck: Neck supple. Carotid bruit is not present. No thyroid mass and no thyromegaly present.   Cardiovascular: Normal rate, regular rhythm and intact distal pulses.  Exam reveals no gallop and no friction rub.    No murmur heard.  Pulmonary/Chest: Effort normal and breath sounds normal. She has no wheezes. She has no rales.   Abdominal: Soft. Bowel sounds are normal. She exhibits no mass. There is no hepatosplenomegaly. There is no tenderness.   Musculoskeletal: She exhibits no edema.   Lymphadenopathy: "     She has no cervical adenopathy.   Neurological: She is alert and oriented to person, place, and time.   Psychiatric: She has a normal mood and affect.       Assessment:       1. Essential hypertension    2. Cough        Plan:       Lorenzo was seen today for cough and chest congestion.    Diagnoses and all orders for this visit:    Essential hypertension- stable.    Cough x 3 weeks- Amy [phone] cell for NH, daughter in law.  -     X-Ray Chest PA And Lateral; Future  -     azithromycin (ZITHROMAX) 500 MG tablet; Take 1 tablet (500 mg total) by mouth once daily.

## 2017-10-05 ENCOUNTER — TELEPHONE (OUTPATIENT)
Dept: INTERNAL MEDICINE | Facility: CLINIC | Age: 77
End: 2017-10-05

## 2017-10-05 NOTE — TELEPHONE ENCOUNTER
----- Message from Ember Blair sent at 10/5/2017  2:08 PM CDT -----  Call pt dght in law Amy at [phone]//pt still coughing and sweating but no fever and feel tired //thks ht

## 2017-10-05 NOTE — TELEPHONE ENCOUNTER
Spoke with pt daughter and informed her pt PCP is not in the office.  Pt offered another provider but declined and stated she will call back.  christine

## 2017-10-10 ENCOUNTER — LAB VISIT (OUTPATIENT)
Dept: LAB | Facility: HOSPITAL | Age: 77
End: 2017-10-10
Attending: INTERNAL MEDICINE
Payer: MEDICARE

## 2017-10-10 DIAGNOSIS — E21.3 HYPERPARATHYROIDISM: ICD-10-CM

## 2017-10-10 LAB
ANION GAP SERPL CALC-SCNC: 7 MMOL/L
BUN SERPL-MCNC: 12 MG/DL
CALCIUM SERPL-MCNC: 10.5 MG/DL
CHLORIDE SERPL-SCNC: 104 MMOL/L
CO2 SERPL-SCNC: 31 MMOL/L
CREAT SERPL-MCNC: 0.7 MG/DL
EST. GFR  (AFRICAN AMERICAN): >60 ML/MIN/1.73 M^2
EST. GFR  (NON AFRICAN AMERICAN): >60 ML/MIN/1.73 M^2
GLUCOSE SERPL-MCNC: 113 MG/DL
POTASSIUM SERPL-SCNC: 4.5 MMOL/L
PTH-INTACT SERPL-MCNC: 158 PG/ML
SODIUM SERPL-SCNC: 142 MMOL/L

## 2017-10-10 PROCEDURE — 36415 COLL VENOUS BLD VENIPUNCTURE: CPT | Mod: PO

## 2017-10-10 PROCEDURE — 80048 BASIC METABOLIC PNL TOTAL CA: CPT

## 2017-10-10 PROCEDURE — 83970 ASSAY OF PARATHORMONE: CPT

## 2017-10-17 ENCOUNTER — OFFICE VISIT (OUTPATIENT)
Dept: INTERNAL MEDICINE | Facility: CLINIC | Age: 77
End: 2017-10-17
Payer: MEDICARE

## 2017-10-17 VITALS
HEIGHT: 62 IN | SYSTOLIC BLOOD PRESSURE: 116 MMHG | WEIGHT: 169.31 LBS | OXYGEN SATURATION: 95 % | HEART RATE: 84 BPM | TEMPERATURE: 97 F | DIASTOLIC BLOOD PRESSURE: 72 MMHG | BODY MASS INDEX: 31.16 KG/M2

## 2017-10-17 DIAGNOSIS — E21.3 HYPERPARATHYROIDISM: ICD-10-CM

## 2017-10-17 DIAGNOSIS — I10 ESSENTIAL HYPERTENSION: Primary | ICD-10-CM

## 2017-10-17 DIAGNOSIS — M54.9 MUSCULOSKELETAL BACK PAIN: ICD-10-CM

## 2017-10-17 DIAGNOSIS — R05.9 COUGH: ICD-10-CM

## 2017-10-17 DIAGNOSIS — E55.9 VITAMIN D DEFICIENCY: ICD-10-CM

## 2017-10-17 PROCEDURE — 99999 PR PBB SHADOW E&M-EST. PATIENT-LVL III: CPT | Mod: PBBFAC,,, | Performed by: INTERNAL MEDICINE

## 2017-10-17 PROCEDURE — 99499 UNLISTED E&M SERVICE: CPT | Mod: S$GLB,,, | Performed by: INTERNAL MEDICINE

## 2017-10-17 PROCEDURE — 99214 OFFICE O/P EST MOD 30 MIN: CPT | Mod: S$GLB,,, | Performed by: INTERNAL MEDICINE

## 2017-10-17 NOTE — PROGRESS NOTES
"Subjective:       Patient ID: Lorenzo Ospina is a 77 y.o. female.    Chief Complaint: Follow-up and Cough    Here for follow up of medical problems.  Still coughing, spastic and can't stop coughing.  Lots nasal drainage and head fullness.  No f/c/n/v.  Getting worn out due to the coughing and feels sore abdominals.  S/p cephalosporin and zithro 500mg x 5d.  Also with morning achiness low back and upper legs.  Ca normal now, PTH still elevated.    Updated/ annual due 6/18:  HM: 10/15 fluvax ref more, 6/17 [id number], 6/17 sched MMG/ breast Dr. Carrillo, 4/16 BMD rep 2y,  2012 Cscope sched for repeat.          Review of Systems   Constitutional: Negative for chills, diaphoresis and fever.   HENT: Positive for postnasal drip and sinus pain. Negative for ear pain.    Respiratory: Positive for cough. Negative for shortness of breath.    Cardiovascular: Negative for chest pain, palpitations and leg swelling.   Gastrointestinal: Negative for blood in stool, constipation, diarrhea, nausea and vomiting.   Genitourinary: Negative for dysuria, frequency and hematuria.   Psychiatric/Behavioral: The patient is not nervous/anxious.        Objective:   /72 (BP Location: Right arm, Patient Position: Sitting)   Pulse 84   Temp 97.4 °F (36.3 °C) (Tympanic)   Ht 5' 2" (1.575 m)   Wt 76.8 kg (169 lb 5 oz)   SpO2 95%   BMI 30.97 kg/m²     Physical Exam   Constitutional: She is oriented to person, place, and time. She appears well-developed.   HENT:   Right Ear: External ear normal. Tympanic membrane is not injected.   Left Ear: External ear normal. Tympanic membrane is not injected.   Mouth/Throat: Oropharynx is clear and moist.   Eyes: Conjunctivae are normal.   Neck: Neck supple. Carotid bruit is not present. No thyroid mass and no thyromegaly present.   Cardiovascular: Normal rate, regular rhythm and intact distal pulses.  Exam reveals no gallop and no friction rub.    No murmur heard.  Pulmonary/Chest: Effort normal and " breath sounds normal. She has no wheezes. She has no rales.   Abdominal: Soft. Bowel sounds are normal. She exhibits no mass. There is no hepatosplenomegaly. There is no tenderness.   Musculoskeletal: She exhibits no edema.   Lymphadenopathy:     She has no cervical adenopathy.   Neurological: She is alert and oriented to person, place, and time.   Psychiatric: She has a normal mood and affect.       Results for orders placed or performed in visit on 10/10/17   Basic metabolic panel   Result Value Ref Range    Sodium 142 136 - 145 mmol/L    Potassium 4.5 3.5 - 5.1 mmol/L    Chloride 104 95 - 110 mmol/L    CO2 31 (H) 23 - 29 mmol/L    Glucose 113 (H) 70 - 110 mg/dL    BUN, Bld 12 8 - 23 mg/dL    Creatinine 0.7 0.5 - 1.4 mg/dL    Calcium 10.5 8.7 - 10.5 mg/dL    Anion Gap 7 (L) 8 - 16 mmol/L    eGFR if African American >60.0 >60 mL/min/1.73 m^2    eGFR if non African American >60.0 >60 mL/min/1.73 m^2   PTH, intact   Result Value Ref Range    PTH, Intact 158.0 (H) 9.0 - 77.0 pg/mL       Assessment:       1. Essential hypertension    2. Hyperparathyroidism    3. Vitamin D deficiency    4. Cough    5. Musculoskeletal back pain        Plan:       Lorenzo was seen today for follow-up and cough.    Diagnoses and all orders for this visit:    Essential hypertension- stable on rx.    Hyperparathyroidism- calcium now normal, will follow.      Vitamin D deficiency    Muscle pain likely due to bisphos.  Hold and reeval 2mo.    Cough x 6-8mo.  R/o sinusitis to treat.  -     CT Sinuses without Contrast; Future

## 2017-10-18 ENCOUNTER — TELEPHONE (OUTPATIENT)
Dept: RADIOLOGY | Facility: HOSPITAL | Age: 77
End: 2017-10-18

## 2017-10-19 ENCOUNTER — HOSPITAL ENCOUNTER (OUTPATIENT)
Dept: RADIOLOGY | Facility: HOSPITAL | Age: 77
Discharge: HOME | End: 2017-10-19
Attending: INTERNAL MEDICINE
Payer: MEDICARE

## 2017-10-19 DIAGNOSIS — R05.9 COUGH: ICD-10-CM

## 2017-10-19 PROCEDURE — 70486 CT MAXILLOFACIAL W/O DYE: CPT | Mod: 26,,, | Performed by: RADIOLOGY

## 2017-10-19 PROCEDURE — 70486 CT MAXILLOFACIAL W/O DYE: CPT | Mod: TC,PO

## 2017-10-20 ENCOUNTER — TELEPHONE (OUTPATIENT)
Dept: INTERNAL MEDICINE | Facility: CLINIC | Age: 77
End: 2017-10-20

## 2017-10-20 RX ORDER — PREDNISONE 20 MG/1
20 TABLET ORAL DAILY
Qty: 5 TABLET | Refills: 0 | Status: SHIPPED | OUTPATIENT
Start: 2017-10-20 | End: 2017-10-25

## 2017-10-20 RX ORDER — LEVOFLOXACIN 500 MG/1
500 TABLET, FILM COATED ORAL DAILY
Qty: 10 TABLET | Refills: 0 | Status: SHIPPED | OUTPATIENT
Start: 2017-10-20 | End: 2017-10-30

## 2017-10-20 NOTE — TELEPHONE ENCOUNTER
Please contact pt's daughter- look at my last note at the bottom.  Inform no severe sinus disease.  I sent in mild steroid and strong antibiotic to start taking daily.  Please schedule with Cinthia 1 week for f/u.  SM

## 2017-10-23 NOTE — TELEPHONE ENCOUNTER
----- Message from Meredith Abdi sent at 10/23/2017  2:37 PM CDT -----  Contact: Amy/daughter in law  Amy called to speak with the nurse; she is looking for CT results. She can be contacted at [phone].    Thanks,  Meredith

## 2017-10-23 NOTE — TELEPHONE ENCOUNTER
PC with pt's daughter in law, Amy.  Informed per Dr. Fiore no severe sinus disease.  A mild steroid and strong antibiotic to start taking daily has been sent to pharmacy.  F/U with Cinthia scheduled in 1 wk.  Amy stated she will give all of this information to pt./rpr

## 2017-12-20 ENCOUNTER — OFFICE VISIT (OUTPATIENT)
Dept: INTERNAL MEDICINE | Facility: CLINIC | Age: 77
End: 2017-12-20
Payer: MEDICARE

## 2017-12-20 VITALS
WEIGHT: 172.63 LBS | BODY MASS INDEX: 31.77 KG/M2 | DIASTOLIC BLOOD PRESSURE: 72 MMHG | TEMPERATURE: 97 F | HEIGHT: 62 IN | OXYGEN SATURATION: 98 % | SYSTOLIC BLOOD PRESSURE: 142 MMHG | HEART RATE: 88 BPM

## 2017-12-20 DIAGNOSIS — J30.1 ACUTE SEASONAL ALLERGIC RHINITIS DUE TO POLLEN: ICD-10-CM

## 2017-12-20 DIAGNOSIS — Z29.9 PREVENTIVE MEASURE: ICD-10-CM

## 2017-12-20 DIAGNOSIS — M81.0 AGE-RELATED OSTEOPOROSIS WITHOUT CURRENT PATHOLOGICAL FRACTURE: ICD-10-CM

## 2017-12-20 DIAGNOSIS — R05.9 COUGH: ICD-10-CM

## 2017-12-20 DIAGNOSIS — I10 ESSENTIAL HYPERTENSION: Primary | ICD-10-CM

## 2017-12-20 DIAGNOSIS — E21.3 HYPERPARATHYROIDISM: ICD-10-CM

## 2017-12-20 PROCEDURE — 99999 PR PBB SHADOW E&M-EST. PATIENT-LVL III: CPT | Mod: PBBFAC,,, | Performed by: INTERNAL MEDICINE

## 2017-12-20 PROCEDURE — 99213 OFFICE O/P EST LOW 20 MIN: CPT | Mod: S$GLB,,, | Performed by: INTERNAL MEDICINE

## 2017-12-20 PROCEDURE — 99499 UNLISTED E&M SERVICE: CPT | Mod: S$GLB,,, | Performed by: INTERNAL MEDICINE

## 2017-12-20 RX ORDER — FLUTICASONE PROPIONATE 50 MCG
2 SPRAY, SUSPENSION (ML) NASAL DAILY
Qty: 16 G | Refills: 6 | Status: SHIPPED | OUTPATIENT
Start: 2017-12-20 | End: 2018-11-20 | Stop reason: SDUPTHER

## 2017-12-20 NOTE — PROGRESS NOTES
"Subjective:       Patient ID: Lorenzo Ospina is a 77 y.o. female.    Chief Complaint: Follow-up    Here for follow up of medical problems.  Cough is much less, but c/o eyes watering and nasal drainage.  No f/c/sw.  Cough is dry.    Continues with same leg/hip pain, so bisphos wasn't causing this but pt unwilling to start.    Updated/ annual due 6/18:  HM: 10/15 fluvax ref more, 6/17 [id number], 6/17 sched MMG/ breast Dr. Carrillo, 4/16 BMD rep 2y,  2012 Cscope sched for repeat.          Review of Systems   Constitutional: Negative for chills, diaphoresis and fever.   Respiratory: Negative for cough and shortness of breath.    Cardiovascular: Negative for chest pain, palpitations and leg swelling.   Gastrointestinal: Negative for blood in stool, constipation, diarrhea, nausea and vomiting.   Genitourinary: Negative for dysuria, frequency and hematuria.   Psychiatric/Behavioral: The patient is not nervous/anxious.        Objective:   BP (!) 142/72 (BP Location: Left arm, Patient Position: Sitting)   Pulse 88   Temp 97.2 °F (36.2 °C) (Tympanic)   Ht 5' 2" (1.575 m)   Wt 78.3 kg (172 lb 9.9 oz)   SpO2 98%   BMI 31.57 kg/m²     Physical Exam   Constitutional: She is oriented to person, place, and time. She appears well-developed.   HENT:   Mouth/Throat: Oropharynx is clear and moist.   Neck: Neck supple. Carotid bruit is not present. No thyroid mass present.   Cardiovascular: Normal rate, regular rhythm and intact distal pulses.  Exam reveals no gallop and no friction rub.    No murmur heard.  Pulmonary/Chest: Effort normal and breath sounds normal. She has no wheezes. She has no rales.   Abdominal: Soft. Bowel sounds are normal. She exhibits no mass. There is no hepatosplenomegaly. There is no tenderness.   Musculoskeletal: She exhibits no edema.   Lymphadenopathy:     She has no cervical adenopathy.   Neurological: She is alert and oriented to person, place, and time.   Psychiatric: She has a normal mood and " affect.       Assessment:       1. Essential hypertension    2. Hyperparathyroidism    3. Age-related osteoporosis without current pathological fracture    4. Cough    5. Acute seasonal allergic rhinitis due to pollen    6. Preventive measure        Plan:       Lorenzo was seen today for follow-up.    Diagnoses and all orders for this visit:    Essential hypertension- adeq control.  Refuses to monitor at home.    Hyperparathyroidism- cont to follow.  -     PTH, intact; Future    Age-related osteoporosis without current pathological fracture, ref bisphos at least orally.  Will refer to specialist.  -     Vitamin D; Future    Cough likely due to seasonal allergic rhinitis due to pollen  -     fluticasone (FLONASE) 50 mcg/actuation nasal spray; 2 sprays by Each Nare route once daily.    Preventive measure- due in 6mo.  -     Comprehensive metabolic panel; Future  -     Lipid panel; Future  -     CBC auto differential; Future  -     TSH; Future  -     Vitamin D; Future  -     PTH, intact; Future

## 2018-02-03 DIAGNOSIS — G45.8 OTHER SPECIFIED TRANSIENT CEREBRAL ISCHEMIAS: ICD-10-CM

## 2018-02-04 RX ORDER — CLOPIDOGREL BISULFATE 75 MG/1
TABLET ORAL
Qty: 90 TABLET | Refills: 3 | Status: SHIPPED | OUTPATIENT
Start: 2018-02-04 | End: 2019-01-31 | Stop reason: SDUPTHER

## 2018-03-01 DIAGNOSIS — E78.5 HYPERLIPIDEMIA: ICD-10-CM

## 2018-03-01 RX ORDER — ATORVASTATIN CALCIUM 20 MG/1
TABLET, FILM COATED ORAL
Qty: 90 TABLET | Refills: 3 | Status: SHIPPED | OUTPATIENT
Start: 2018-03-01 | End: 2019-02-20 | Stop reason: SDUPTHER

## 2018-03-14 DIAGNOSIS — I10 ESSENTIAL HYPERTENSION: ICD-10-CM

## 2018-03-14 RX ORDER — TRIAMTERENE AND HYDROCHLOROTHIAZIDE 37.5; 25 MG/1; MG/1
CAPSULE ORAL
Qty: 90 CAPSULE | Refills: 3 | Status: SHIPPED | OUTPATIENT
Start: 2018-03-14 | End: 2019-03-01 | Stop reason: SDUPTHER

## 2018-04-16 DIAGNOSIS — M81.0 OP (OSTEOPOROSIS): ICD-10-CM

## 2018-04-16 RX ORDER — ALENDRONATE SODIUM 70 MG/1
TABLET ORAL
Qty: 12 TABLET | Refills: 3 | Status: SHIPPED | OUTPATIENT
Start: 2018-04-16 | End: 2019-10-01 | Stop reason: SDUPTHER

## 2018-06-20 ENCOUNTER — LAB VISIT (OUTPATIENT)
Dept: LAB | Facility: HOSPITAL | Age: 78
End: 2018-06-20
Attending: INTERNAL MEDICINE
Payer: MEDICARE

## 2018-06-20 DIAGNOSIS — M81.0 AGE-RELATED OSTEOPOROSIS WITHOUT CURRENT PATHOLOGICAL FRACTURE: ICD-10-CM

## 2018-06-20 DIAGNOSIS — E21.3 HYPERPARATHYROIDISM: ICD-10-CM

## 2018-06-20 DIAGNOSIS — Z29.9 PREVENTIVE MEASURE: ICD-10-CM

## 2018-06-20 LAB
25(OH)D3+25(OH)D2 SERPL-MCNC: 31 NG/ML
ALBUMIN SERPL BCP-MCNC: 3.8 G/DL
ALP SERPL-CCNC: 46 U/L
ALT SERPL W/O P-5'-P-CCNC: 18 U/L
ANION GAP SERPL CALC-SCNC: 4 MMOL/L
AST SERPL-CCNC: 14 U/L
BASOPHILS # BLD AUTO: 0.02 K/UL
BASOPHILS NFR BLD: 0.4 %
BILIRUB SERPL-MCNC: 0.7 MG/DL
BUN SERPL-MCNC: 15 MG/DL
CALCIUM SERPL-MCNC: 11 MG/DL
CHLORIDE SERPL-SCNC: 107 MMOL/L
CHOLEST SERPL-MCNC: 147 MG/DL
CHOLEST/HDLC SERPL: 2.6 {RATIO}
CO2 SERPL-SCNC: 30 MMOL/L
CREAT SERPL-MCNC: 0.8 MG/DL
DIFFERENTIAL METHOD: ABNORMAL
EOSINOPHIL # BLD AUTO: 0.1 K/UL
EOSINOPHIL NFR BLD: 2.1 %
ERYTHROCYTE [DISTWIDTH] IN BLOOD BY AUTOMATED COUNT: 13.5 %
EST. GFR  (AFRICAN AMERICAN): >60 ML/MIN/1.73 M^2
EST. GFR  (NON AFRICAN AMERICAN): >60 ML/MIN/1.73 M^2
GLUCOSE SERPL-MCNC: 107 MG/DL
HCT VFR BLD AUTO: 44.4 %
HDLC SERPL-MCNC: 56 MG/DL
HDLC SERPL: 38.1 %
HGB BLD-MCNC: 14.1 G/DL
IMM GRANULOCYTES # BLD AUTO: 0.01 K/UL
IMM GRANULOCYTES NFR BLD AUTO: 0.2 %
LDLC SERPL CALC-MCNC: 73.6 MG/DL
LYMPHOCYTES # BLD AUTO: 1.7 K/UL
LYMPHOCYTES NFR BLD: 33.3 %
MCH RBC QN AUTO: 28.9 PG
MCHC RBC AUTO-ENTMCNC: 31.8 G/DL
MCV RBC AUTO: 91 FL
MONOCYTES # BLD AUTO: 0.5 K/UL
MONOCYTES NFR BLD: 10.5 %
NEUTROPHILS # BLD AUTO: 2.7 K/UL
NEUTROPHILS NFR BLD: 53.5 %
NONHDLC SERPL-MCNC: 91 MG/DL
NRBC BLD-RTO: 0 /100 WBC
PLATELET # BLD AUTO: 202 K/UL
PMV BLD AUTO: 11.3 FL
POTASSIUM SERPL-SCNC: 4.6 MMOL/L
PROT SERPL-MCNC: 6.9 G/DL
PTH-INTACT SERPL-MCNC: 141 PG/ML
RBC # BLD AUTO: 4.88 M/UL
SODIUM SERPL-SCNC: 141 MMOL/L
TRIGL SERPL-MCNC: 87 MG/DL
TSH SERPL DL<=0.005 MIU/L-ACNC: 0.96 UIU/ML
WBC # BLD AUTO: 5.13 K/UL

## 2018-06-20 PROCEDURE — 85025 COMPLETE CBC W/AUTO DIFF WBC: CPT

## 2018-06-20 PROCEDURE — 84443 ASSAY THYROID STIM HORMONE: CPT

## 2018-06-20 PROCEDURE — 83970 ASSAY OF PARATHORMONE: CPT

## 2018-06-20 PROCEDURE — 80061 LIPID PANEL: CPT

## 2018-06-20 PROCEDURE — 36415 COLL VENOUS BLD VENIPUNCTURE: CPT | Mod: PO

## 2018-06-20 PROCEDURE — 82306 VITAMIN D 25 HYDROXY: CPT

## 2018-06-20 PROCEDURE — 80053 COMPREHEN METABOLIC PANEL: CPT

## 2018-06-25 NOTE — PROGRESS NOTES
"Subjective:      Patient ID: Lorenzo Ospina is a 78 y.o. female.    Chief Complaint: Follow-up      HPI  Here for f/u medical problems and preventive exam.  Active inside but no regular exercise.  No f/c/sw/cough.  No cp/sob/palp.  BMs normal.  Urine is frequent.  Canceled her BMD but says will go now.  Taking fosamax and vit D.    HM: 10/15 fluvax ref more, 6/17 [id number], refuses [id number] unknown reason even after discussion, 6/17 MMG/ wants to change from Onc Dr. Carrillo, 4/16 BMD rep 2y,  2012 Cscope saw Gastro in 2017 and says due in 2022.     Review of Systems   Constitutional: Negative for appetite change, chills, diaphoresis and fever.   HENT: Negative for congestion, ear pain, rhinorrhea, sinus pressure and sore throat.    Respiratory: Negative for cough, chest tightness and shortness of breath.    Cardiovascular: Negative for chest pain and palpitations.   Gastrointestinal: Negative for blood in stool, constipation, diarrhea, nausea and vomiting.   Genitourinary: Negative for dysuria, frequency, hematuria, menstrual problem, urgency and vaginal discharge.   Musculoskeletal: Negative for arthralgias.   Skin: Negative for rash.   Neurological: Negative for dizziness and headaches.   Psychiatric/Behavioral: Negative for sleep disturbance. The patient is not nervous/anxious.          Objective:   /78   Pulse 76   Temp 98.9 °F (37.2 °C) (Tympanic)   Ht 5' 2" (1.575 m)   Wt 78.5 kg (173 lb 1 oz)   SpO2 97%   BMI 31.65 kg/m²     Physical Exam   Constitutional: She is oriented to person, place, and time. She appears well-developed and well-nourished.   HENT:   Right Ear: External ear normal. Tympanic membrane is not injected.   Left Ear: External ear normal. Tympanic membrane is not injected.   Mouth/Throat: Oropharynx is clear and moist.   Eyes: Conjunctivae are normal.   Neck: Normal range of motion. Neck supple. No thyromegaly present.   Cardiovascular: Normal rate, regular rhythm and intact " distal pulses.  Exam reveals no gallop and no friction rub.    No murmur heard.  Pulmonary/Chest: Effort normal and breath sounds normal. She has no wheezes. She has no rales.   Abdominal: Soft. Bowel sounds are normal. She exhibits no mass. There is no tenderness.   Musculoskeletal: She exhibits no edema.   Lymphadenopathy:     She has no cervical adenopathy.   Neurological: She is alert and oriented to person, place, and time.   Skin: Skin is warm. No rash noted.   Psychiatric: She has a normal mood and affect.       Results for orders placed or performed in visit on 06/20/18   Comprehensive metabolic panel   Result Value Ref Range    Sodium 141 136 - 145 mmol/L    Potassium 4.6 3.5 - 5.1 mmol/L    Chloride 107 95 - 110 mmol/L    CO2 30 (H) 23 - 29 mmol/L    Glucose 107 70 - 110 mg/dL    BUN, Bld 15 8 - 23 mg/dL    Creatinine 0.8 0.5 - 1.4 mg/dL    Calcium 11.0 (H) 8.7 - 10.5 mg/dL    Total Protein 6.9 6.0 - 8.4 g/dL    Albumin 3.8 3.5 - 5.2 g/dL    Total Bilirubin 0.7 0.1 - 1.0 mg/dL    Alkaline Phosphatase 46 (L) 55 - 135 U/L    AST 14 10 - 40 U/L    ALT 18 10 - 44 U/L    Anion Gap 4 (L) 8 - 16 mmol/L    eGFR if African American >60.0 >60 mL/min/1.73 m^2    eGFR if non African American >60.0 >60 mL/min/1.73 m^2   Lipid panel   Result Value Ref Range    Cholesterol 147 120 - 199 mg/dL    Triglycerides 87 30 - 150 mg/dL    HDL 56 40 - 75 mg/dL    LDL Cholesterol 73.6 63.0 - 159.0 mg/dL    HDL/Chol Ratio 38.1 20.0 - 50.0 %    Total Cholesterol/HDL Ratio 2.6 2.0 - 5.0    Non-HDL Cholesterol 91 mg/dL   CBC auto differential   Result Value Ref Range    WBC 5.13 3.90 - 12.70 K/uL    RBC 4.88 4.00 - 5.40 M/uL    Hemoglobin 14.1 12.0 - 16.0 g/dL    Hematocrit 44.4 37.0 - 48.5 %    MCV 91 82 - 98 fL    MCH 28.9 27.0 - 31.0 pg    MCHC 31.8 (L) 32.0 - 36.0 g/dL    RDW 13.5 11.5 - 14.5 %    Platelets 202 150 - 350 K/uL    MPV 11.3 9.2 - 12.9 fL    Immature Granulocytes 0.2 0.0 - 0.5 %    Gran # (ANC) 2.7 1.8 - 7.7 K/uL     Immature Grans (Abs) 0.01 0.00 - 0.04 K/uL    Lymph # 1.7 1.0 - 4.8 K/uL    Mono # 0.5 0.3 - 1.0 K/uL    Eos # 0.1 0.0 - 0.5 K/uL    Baso # 0.02 0.00 - 0.20 K/uL    nRBC 0 0 /100 WBC    Gran% 53.5 38.0 - 73.0 %    Lymph% 33.3 18.0 - 48.0 %    Mono% 10.5 4.0 - 15.0 %    Eosinophil% 2.1 0.0 - 8.0 %    Basophil% 0.4 0.0 - 1.9 %    Differential Method Automated    TSH   Result Value Ref Range    TSH 0.960 0.400 - 4.000 uIU/mL   Vitamin D   Result Value Ref Range    Vit D, 25-Hydroxy 31 30 - 96 ng/mL   PTH, intact   Result Value Ref Range    PTH, Intact 141.0 (H) 9.0 - 77.0 pg/mL         Assessment:       1. Encounter for preventive health examination    2. Pure hypercholesterolemia    3. Vitamin D deficiency    4. Essential hypertension    5. Hyperparathyroidism    6. Age-related osteoporosis without current pathological fracture    7. Malignant neoplasm of nipple of right breast in female, unspecified estrogen receptor status    8. Encounter for screening mammogram for malignant neoplasm of breast     9. Urinary frequency          Plan:     Encounter for preventive health examination- utd.    Pure hypercholesterolemia- cont statin.    Vitamin D deficiency- stable on supplement, cont.    Essential hypertension- stable on rx.    Hyperparathyroidism- 24h Ca neg last year.  KUB now.  BMD.  RTC 3 mo with lab.  -     X-Ray Abdomen AP 1 View; Future; Expected date: 06/27/2018  -     Basic metabolic panel; Future; Expected date: 06/27/2018  -     Calcium, ionized; Future; Expected date: 06/27/2018  -     PTH, intact; Future; Expected date: 07/11/2018    Age-related osteoporosis without current pathological fracture  -     DXA Bone Density Spine And Hip; Future; Expected date: 06/27/2018    Malignant neoplasm of nipple of right breast in female, unspecified estrogen receptor status- 2008, needs ongoing care-  -     Ambulatory consult to Hematology / Oncology  -     Mammo Digital Screening Bilat with CAD; Future; Expected date:  06/27/2018    Urinary frequency  -     Urinalysis; Future; Expected date: 06/27/2018    Other orders- for prn muscular LBP.  -     diclofenac sodium 1 % Gel; Apply 2 g topically once daily.  Dispense: 100 g; Refill: 3

## 2018-06-27 ENCOUNTER — HOSPITAL ENCOUNTER (OUTPATIENT)
Dept: RADIOLOGY | Facility: HOSPITAL | Age: 78
Discharge: HOME | End: 2018-06-27
Attending: INTERNAL MEDICINE
Payer: MEDICARE

## 2018-06-27 ENCOUNTER — OFFICE VISIT (OUTPATIENT)
Dept: INTERNAL MEDICINE | Facility: CLINIC | Age: 78
End: 2018-06-27
Payer: MEDICARE

## 2018-06-27 VITALS
TEMPERATURE: 99 F | DIASTOLIC BLOOD PRESSURE: 78 MMHG | WEIGHT: 173.06 LBS | HEART RATE: 76 BPM | OXYGEN SATURATION: 97 % | BODY MASS INDEX: 31.85 KG/M2 | SYSTOLIC BLOOD PRESSURE: 128 MMHG | HEIGHT: 62 IN

## 2018-06-27 DIAGNOSIS — M81.0 AGE-RELATED OSTEOPOROSIS WITHOUT CURRENT PATHOLOGICAL FRACTURE: ICD-10-CM

## 2018-06-27 DIAGNOSIS — E21.3 HYPERPARATHYROIDISM: ICD-10-CM

## 2018-06-27 DIAGNOSIS — Z12.31 ENCOUNTER FOR SCREENING MAMMOGRAM FOR MALIGNANT NEOPLASM OF BREAST: ICD-10-CM

## 2018-06-27 DIAGNOSIS — Z00.00 ENCOUNTER FOR PREVENTIVE HEALTH EXAMINATION: Primary | ICD-10-CM

## 2018-06-27 DIAGNOSIS — E78.00 PURE HYPERCHOLESTEROLEMIA: ICD-10-CM

## 2018-06-27 DIAGNOSIS — R35.0 URINARY FREQUENCY: ICD-10-CM

## 2018-06-27 DIAGNOSIS — I10 ESSENTIAL HYPERTENSION: ICD-10-CM

## 2018-06-27 DIAGNOSIS — E55.9 VITAMIN D DEFICIENCY: ICD-10-CM

## 2018-06-27 DIAGNOSIS — C50.011 MALIGNANT NEOPLASM OF NIPPLE OF RIGHT BREAST IN FEMALE, UNSPECIFIED ESTROGEN RECEPTOR STATUS: ICD-10-CM

## 2018-06-27 PROCEDURE — 99499 UNLISTED E&M SERVICE: CPT | Mod: S$GLB,,, | Performed by: INTERNAL MEDICINE

## 2018-06-27 PROCEDURE — 99999 PR PBB SHADOW E&M-EST. PATIENT-LVL III: CPT | Mod: PBBFAC,,, | Performed by: INTERNAL MEDICINE

## 2018-06-27 PROCEDURE — 99214 OFFICE O/P EST MOD 30 MIN: CPT | Mod: S$GLB,,, | Performed by: INTERNAL MEDICINE

## 2018-06-27 PROCEDURE — 74018 RADEX ABDOMEN 1 VIEW: CPT | Mod: TC,FY,PO

## 2018-06-27 PROCEDURE — 3074F SYST BP LT 130 MM HG: CPT | Mod: CPTII,S$GLB,, | Performed by: INTERNAL MEDICINE

## 2018-06-27 PROCEDURE — 74018 RADEX ABDOMEN 1 VIEW: CPT | Mod: 26,,, | Performed by: RADIOLOGY

## 2018-06-27 PROCEDURE — 3078F DIAST BP <80 MM HG: CPT | Mod: CPTII,S$GLB,, | Performed by: INTERNAL MEDICINE

## 2018-06-27 RX ORDER — DICLOFENAC SODIUM 10 MG/G
2 GEL TOPICAL DAILY
Qty: 100 G | Refills: 3 | Status: SHIPPED | OUTPATIENT
Start: 2018-06-27 | End: 2019-09-27

## 2018-07-13 ENCOUNTER — TELEPHONE (OUTPATIENT)
Dept: INTERNAL MEDICINE | Facility: CLINIC | Age: 78
End: 2018-07-13

## 2018-07-13 NOTE — TELEPHONE ENCOUNTER
----- Message from Brenda Granados sent at 7/13/2018  3:57 PM CDT -----  Contact: Jordon Giraldo [phone]  Patient received a denial letter from her insurance stating that they need medical record/ las t office visit to approve the Diclofenac Sodium Gel 1%.

## 2018-08-01 ENCOUNTER — INITIAL CONSULT (OUTPATIENT)
Dept: HEMATOLOGY/ONCOLOGY | Facility: CLINIC | Age: 78
End: 2018-08-01
Payer: MEDICARE

## 2018-08-01 ENCOUNTER — HOSPITAL ENCOUNTER (OUTPATIENT)
Dept: RADIOLOGY | Facility: HOSPITAL | Age: 78
Discharge: HOME | End: 2018-08-01
Attending: INTERNAL MEDICINE
Payer: MEDICARE

## 2018-08-01 VITALS
TEMPERATURE: 98 F | BODY MASS INDEX: 31.6 KG/M2 | DIASTOLIC BLOOD PRESSURE: 80 MMHG | HEART RATE: 89 BPM | RESPIRATION RATE: 18 BRPM | WEIGHT: 171.75 LBS | OXYGEN SATURATION: 98 % | HEIGHT: 62 IN | SYSTOLIC BLOOD PRESSURE: 140 MMHG

## 2018-08-01 DIAGNOSIS — Z12.31 ENCOUNTER FOR SCREENING MAMMOGRAM FOR MALIGNANT NEOPLASM OF BREAST: ICD-10-CM

## 2018-08-01 DIAGNOSIS — C50.011 MALIGNANT NEOPLASM OF NIPPLE OF RIGHT BREAST IN FEMALE, UNSPECIFIED ESTROGEN RECEPTOR STATUS: ICD-10-CM

## 2018-08-01 DIAGNOSIS — Z85.3 HISTORY OF RIGHT BREAST CANCER: Primary | ICD-10-CM

## 2018-08-01 PROCEDURE — 99204 OFFICE O/P NEW MOD 45 MIN: CPT | Mod: S$GLB,,, | Performed by: INTERNAL MEDICINE

## 2018-08-01 PROCEDURE — 99999 PR PBB SHADOW E&M-EST. PATIENT-LVL III: CPT | Mod: PBBFAC,,, | Performed by: INTERNAL MEDICINE

## 2018-08-01 PROCEDURE — 3077F SYST BP >= 140 MM HG: CPT | Mod: CPTII,S$GLB,, | Performed by: INTERNAL MEDICINE

## 2018-08-01 PROCEDURE — 3079F DIAST BP 80-89 MM HG: CPT | Mod: CPTII,S$GLB,, | Performed by: INTERNAL MEDICINE

## 2018-08-01 NOTE — LETTER
August 1, 2018      Lorena Fiore MD  [address]  [place] [address]           [facility] - Hematology Oncology  [address] [place] [address]  Phone: [phone]  Fax: [phone]          Patient: Lorenzo Ospina   MR Number: [id number]   YOB: 1940   Date of Visit: 8/1/2018       Dear Dr. Lorena Fiore:    Thank you for referring Lorenzo Ospina to me for evaluation. Attached you will find relevant portions of my assessment and plan of care.    If you have questions, please do not hesitate to call me. I look forward to following Lorenzo Ospina along with you.    Sincerely,    Jeanmarie Lyons MD    Enclosure  CC:  No Recipients    If you would like to receive this communication electronically, please contact [email or web address] or [phone] to request more information on [employer] access.    For providers and/or their staff who would like to refer a patient to [facility], please contact us through our one-stop-shop provider referral line, [facility], at [phone].    If you feel you have received this communication in error or would no longer like to receive these types of communications, please e-mail [email or web address]

## 2018-08-01 NOTE — PROGRESS NOTES
Subjective:      Patient ID: Lorenzo Ospina is a 78 y.o. female.    Chief Complaint: Consult    The patient is a 78-year-old female who presents to the Hematology Oncology Clinic today in consultation to discuss further evaluation management recommendations for history of right breast carcinoma.  The patient was previously followed in the outpatient Hematology Oncology Clinic by Dr. Whitaker and Dr. Manning and would now like to transition her care to me as our office is much closer to her home.  I have reviewed all the patient's relevant clinical history available medical record and have utilized this in my evaluation and management recommendations today.  This includes her information in Care everywhere.  The patient has a remote history of right breast carcinoma which was treated in 2008.  This was a stage I carcinoma which was treated with lumpectomy and radiation therapy.  She did not require adjuvant chemotherapy.  She then completed 5 years of adjuvant endocrine therapy.  Her cancer was diagnosed after evaluation of an abnormal mammogram in 2008.  She did not self palpate a mass.  She has had annual mammograms since that time which have been normal.  Today she reports that overall she is doing well and has no specific complaints.  She denies any fevers, chills or night sweats.  She denies any loss of appetite or unintentional weight loss.  She denies any melena, hematochezia, hematemesis, hemoptysis or hematuria.  She denies any chest pain or shortness of breath.  She denies any bowel or urinary complaints.      Review of Systems   Constitutional: Negative for activity change, appetite change, chills, diaphoresis, fatigue, fever and unexpected weight change.   HENT: Negative for congestion, dental problem, ear discharge, ear pain, hearing loss, mouth sores, postnasal drip, sinus pressure, sore throat, tinnitus, trouble swallowing and voice change.    Eyes: Negative for photophobia, pain and visual  disturbance.   Respiratory: Negative for cough, chest tightness, shortness of breath and wheezing.    Cardiovascular: Negative for chest pain, palpitations and leg swelling.   Gastrointestinal: Negative for abdominal distention, abdominal pain, blood in stool, constipation, diarrhea, nausea and vomiting.   Endocrine: Negative for cold intolerance, heat intolerance, polydipsia, polyphagia and polyuria.   Genitourinary: Negative for difficulty urinating, dysuria, flank pain, frequency, hematuria, urgency, vaginal bleeding and vaginal discharge.   Musculoskeletal: Negative for arthralgias, back pain, gait problem, joint swelling and myalgias.   Skin: Negative for pallor and rash.   Allergic/Immunologic: Negative for immunocompromised state.   Neurological: Negative for dizziness, syncope, weakness, light-headedness, numbness and headaches.   Hematological: Negative for adenopathy. Does not bruise/bleed easily.   Psychiatric/Behavioral: Negative for agitation, confusion and dysphoric mood. The patient is not nervous/anxious.        Medication List with Changes/Refills   Current Medications    ALENDRONATE (FOSAMAX) 70 MG TABLET    TAKE 1 TABLET (70 MG TOTAL) BY MOUTH EVERY 7 DAYS.    ATORVASTATIN (LIPITOR) 20 MG TABLET    TAKE 1 TABLET (20 MG TOTAL) BY MOUTH ONCE DAILY.    CLOPIDOGREL (PLAVIX) 75 MG TABLET    TAKE 1 TABLET (75 MG TOTAL) BY MOUTH ONCE DAILY.    DICLOFENAC SODIUM 1 % GEL    Apply 2 g topically once daily.    FISH OIL-OMEGA-3 FATTY ACIDS 300-1,000 MG CAPSULE    Take 2 g by mouth once daily.    FLUTICASONE (FLONASE) 50 MCG/ACTUATION NASAL SPRAY    2 sprays by Each Nare route once daily.    MULTIVIT-IRON-MIN-FOLIC ACID 3,500-18-0.4 UNIT-MG-MG ORAL CHEW    Take by mouth.    TRIAMTERENE-HYDROCHLOROTHIAZIDE 37.5-25 MG (DYAZIDE) 37.5-25 MG PER CAPSULE    TAKE 1 CAPSULE BY MOUTH EVERY MORNING.    VITAMIN D 1000 UNITS TAB    Take 2,000 Units by mouth once daily.     Review of patient's allergies indicates:    Allergen Reactions    Medrol [methylprednisolone]        Lab: I have reviewed all of the patient's relevant lab work available in the medical record and have utilized this in my evaluation and management recommendations today    Imaging: I have reviewed all of the patient's diagnostic/imaging results available in the medical record and have utilized this in my evaluation and management recommendations today.      Objective:     Vitals:    08/01/18 0923   BP: (!) 140/80   Pulse: 89   Resp: 18   Temp: 98.2 °F (36.8 °C)       Physical Exam   Constitutional: She is oriented to person, place, and time. She appears well-developed and well-nourished. She is active and cooperative.   HENT:   Head: Normocephalic and atraumatic.   Nose: Nose normal.   Mouth/Throat: Oropharynx is clear and moist. No oropharyngeal exudate.   Eyes: Pupils are equal, round, and reactive to light. No scleral icterus.   Neck: Neck supple. No thyromegaly present.   Cardiovascular: Normal rate, regular rhythm, normal heart sounds and intact distal pulses.    No murmur heard.  Pulmonary/Chest: Effort normal and breath sounds normal. No stridor. No respiratory distress. She has no wheezes. She has no rales. Right breast exhibits no inverted nipple, no mass, no nipple discharge, no skin change and no tenderness. Left breast exhibits no inverted nipple, no mass, no nipple discharge, no skin change and no tenderness.   Abdominal: Soft. Bowel sounds are normal. She exhibits no distension, no ascites and no mass. There is no hepatosplenomegaly. There is no tenderness. There is no rigidity, no rebound and no guarding.   Musculoskeletal: She exhibits no edema or tenderness.   Lymphadenopathy:     She has no cervical adenopathy.   Neurological: She is alert and oriented to person, place, and time. No cranial nerve deficit. She exhibits normal muscle tone. Coordination normal.   Skin: Skin is warm and dry. No rash noted. No pallor.   Psychiatric: She has a  normal mood and affect. Her behavior is normal. Judgment and thought content normal.   Nursing note and vitals reviewed.      Assessment:     1. History of right breast cancer        Plan:   1.  I had a detailed discussion with the patient today with regard to her history of right breast carcinoma.  We have discussed about her excellent overall prognosis as she has now been disease free for greater than 10 years.  2.  We discussed about the importance of healthy diet and healthy lifestyle.  3.  She will continue follow-up with Dr. Lorena Fiore with regard to being up-to-date with all of her age-appropriate health screening/cancer screening.  4.  I offered the patient follow-up with our survivorship Clinic.  However she declined.  She would like to follow up with me once a year.    Follow-up in 1 year.  She knows to call sooner if any new problems or questions.  History of right breast cancer

## 2018-08-09 DIAGNOSIS — E21.3 HYPERPARATHYROIDISM: Primary | ICD-10-CM

## 2018-08-15 ENCOUNTER — HOSPITAL ENCOUNTER (OUTPATIENT)
Dept: RADIOLOGY | Facility: HOSPITAL | Age: 78
Discharge: HOME | End: 2018-08-15
Attending: INTERNAL MEDICINE
Payer: MEDICARE

## 2018-08-15 VITALS — WEIGHT: 171 LBS | HEIGHT: 62 IN | BODY MASS INDEX: 31.47 KG/M2

## 2018-08-15 DIAGNOSIS — C50.011 MALIGNANT NEOPLASM OF NIPPLE OF RIGHT BREAST IN FEMALE, UNSPECIFIED ESTROGEN RECEPTOR STATUS: ICD-10-CM

## 2018-08-15 DIAGNOSIS — Z12.31 ENCOUNTER FOR SCREENING MAMMOGRAM FOR MALIGNANT NEOPLASM OF BREAST: ICD-10-CM

## 2018-08-15 PROCEDURE — 77063 BREAST TOMOSYNTHESIS BI: CPT | Mod: TC,PO

## 2018-08-15 PROCEDURE — 77063 BREAST TOMOSYNTHESIS BI: CPT | Mod: 26,,, | Performed by: RADIOLOGY

## 2018-08-15 PROCEDURE — 77067 SCR MAMMO BI INCL CAD: CPT | Mod: 26,,, | Performed by: RADIOLOGY

## 2018-09-25 ENCOUNTER — TELEPHONE (OUTPATIENT)
Dept: INTERNAL MEDICINE | Facility: CLINIC | Age: 78
End: 2018-09-25

## 2018-09-25 NOTE — TELEPHONE ENCOUNTER
----- Message from Jessica Mcrae sent at 9/25/2018 10:44 AM CDT -----  Contact: Amy/daughter in law  States she received a letter in the mail to schedule a f/u appt in October. Nothing available in October. She already has an appt in November. Please call Amy at [phone]. Thank you

## 2018-10-31 ENCOUNTER — TELEPHONE (OUTPATIENT)
Dept: INTERNAL MEDICINE | Facility: CLINIC | Age: 78
End: 2018-10-31

## 2018-10-31 NOTE — TELEPHONE ENCOUNTER
----- Message from Madyson Doyle sent at 10/31/2018  3:10 PM CDT -----  Contact: Amy pt daughter  Caller would nurse to call her about getting an appointment scheduled 7 days after labs on 11/2/18. Please contact daughter Amy at [phone] regarding appointment follow-up.

## 2018-11-01 ENCOUNTER — TELEPHONE (OUTPATIENT)
Dept: INTERNAL MEDICINE | Facility: CLINIC | Age: 78
End: 2018-11-01

## 2018-11-01 NOTE — TELEPHONE ENCOUNTER
----- Message from Megan Shetty sent at 11/1/2018  9:21 AM CDT -----  Contact: Pt daughter in law  Caller missed call back   [phone] (home) [phone] (work)

## 2018-11-01 NOTE — TELEPHONE ENCOUNTER
Pt daughter in law aware lab appointment scheduled for tomorrow.  Pt stated she will call if she need a follow up appt until then she will wait to schedule.  christine

## 2018-11-02 ENCOUNTER — LAB VISIT (OUTPATIENT)
Dept: LAB | Facility: HOSPITAL | Age: 78
End: 2018-11-02
Attending: INTERNAL MEDICINE
Payer: MEDICARE

## 2018-11-02 DIAGNOSIS — E21.3 HYPERPARATHYROIDISM: ICD-10-CM

## 2018-11-02 LAB
ANION GAP SERPL CALC-SCNC: 4 MMOL/L
BUN SERPL-MCNC: 12 MG/DL
CA-I BLDV-SCNC: 1.45 MMOL/L
CALCIUM SERPL-MCNC: 11.2 MG/DL
CHLORIDE SERPL-SCNC: 105 MMOL/L
CO2 SERPL-SCNC: 31 MMOL/L
CREAT SERPL-MCNC: 0.8 MG/DL
EST. GFR  (AFRICAN AMERICAN): >60 ML/MIN/1.73 M^2
EST. GFR  (NON AFRICAN AMERICAN): >60 ML/MIN/1.73 M^2
GLUCOSE SERPL-MCNC: 114 MG/DL
POTASSIUM SERPL-SCNC: 4.3 MMOL/L
PTH-INTACT SERPL-MCNC: 173 PG/ML
SODIUM SERPL-SCNC: 140 MMOL/L

## 2018-11-02 PROCEDURE — 82330 ASSAY OF CALCIUM: CPT

## 2018-11-02 PROCEDURE — 83970 ASSAY OF PARATHORMONE: CPT

## 2018-11-02 PROCEDURE — 36415 COLL VENOUS BLD VENIPUNCTURE: CPT | Mod: PO

## 2018-11-02 PROCEDURE — 80048 BASIC METABOLIC PNL TOTAL CA: CPT

## 2018-11-05 ENCOUNTER — TELEPHONE (OUTPATIENT)
Dept: INTERNAL MEDICINE | Facility: CLINIC | Age: 78
End: 2018-11-05

## 2018-11-20 DIAGNOSIS — R05.9 COUGH: ICD-10-CM

## 2018-11-20 DIAGNOSIS — J30.1 ACUTE SEASONAL ALLERGIC RHINITIS DUE TO POLLEN: ICD-10-CM

## 2018-11-20 RX ORDER — FLUTICASONE PROPIONATE 50 MCG
SPRAY, SUSPENSION (ML) NASAL
Qty: 16 ML | Refills: 6 | Status: SHIPPED | OUTPATIENT
Start: 2018-11-20 | End: 2019-12-26 | Stop reason: SDUPTHER

## 2019-01-31 DIAGNOSIS — G45.8 OTHER SPECIFIED TRANSIENT CEREBRAL ISCHEMIAS: ICD-10-CM

## 2019-01-31 RX ORDER — CLOPIDOGREL BISULFATE 75 MG/1
TABLET ORAL
Qty: 90 TABLET | Refills: 3 | Status: SHIPPED | OUTPATIENT
Start: 2019-01-31 | End: 2019-09-17 | Stop reason: ALTCHOICE

## 2019-02-20 DIAGNOSIS — E78.5 HYPERLIPIDEMIA: ICD-10-CM

## 2019-02-20 RX ORDER — ATORVASTATIN CALCIUM 20 MG/1
TABLET, FILM COATED ORAL
Qty: 90 TABLET | Refills: 3 | Status: SHIPPED | OUTPATIENT
Start: 2019-02-20 | End: 2020-02-13

## 2019-03-01 DIAGNOSIS — I10 ESSENTIAL HYPERTENSION: ICD-10-CM

## 2019-03-01 RX ORDER — TRIAMTERENE AND HYDROCHLOROTHIAZIDE 37.5; 25 MG/1; MG/1
CAPSULE ORAL
Qty: 90 CAPSULE | Refills: 3 | Status: SHIPPED | OUTPATIENT
Start: 2019-03-01 | End: 2019-10-03

## 2019-07-30 DIAGNOSIS — I10 ESSENTIAL HYPERTENSION: Primary | ICD-10-CM

## 2019-09-11 ENCOUNTER — CLINICAL SUPPORT (OUTPATIENT)
Dept: CARDIOLOGY | Facility: CLINIC | Age: 79
End: 2019-09-11
Payer: MEDICARE

## 2019-09-11 ENCOUNTER — OFFICE VISIT (OUTPATIENT)
Dept: CARDIOLOGY | Facility: CLINIC | Age: 79
End: 2019-09-11
Payer: MEDICARE

## 2019-09-11 VITALS
SYSTOLIC BLOOD PRESSURE: 124 MMHG | HEART RATE: 114 BPM | BODY MASS INDEX: 31.1 KG/M2 | HEIGHT: 62 IN | DIASTOLIC BLOOD PRESSURE: 76 MMHG | WEIGHT: 169 LBS

## 2019-09-11 DIAGNOSIS — I10 ESSENTIAL HYPERTENSION: ICD-10-CM

## 2019-09-11 DIAGNOSIS — E21.3 HYPERPARATHYROIDISM: ICD-10-CM

## 2019-09-11 DIAGNOSIS — E78.00 PURE HYPERCHOLESTEROLEMIA: ICD-10-CM

## 2019-09-11 DIAGNOSIS — G45.9 TIA (TRANSIENT ISCHEMIC ATTACK): ICD-10-CM

## 2019-09-11 DIAGNOSIS — Z85.3 HISTORY OF RIGHT BREAST CANCER: ICD-10-CM

## 2019-09-11 DIAGNOSIS — R00.2 PALPITATION: ICD-10-CM

## 2019-09-11 DIAGNOSIS — I48.91 ATRIAL FIBRILLATION, UNSPECIFIED TYPE: Primary | ICD-10-CM

## 2019-09-11 DIAGNOSIS — E55.9 VITAMIN D DEFICIENCY: ICD-10-CM

## 2019-09-11 PROCEDURE — 3078F PR MOST RECENT DIASTOLIC BLOOD PRESSURE < 80 MM HG: ICD-10-PCS | Mod: CPTII,S$GLB,, | Performed by: INTERNAL MEDICINE

## 2019-09-11 PROCEDURE — 99204 PR OFFICE/OUTPT VISIT, NEW, LEVL IV, 45-59 MIN: ICD-10-PCS | Mod: S$GLB,,, | Performed by: INTERNAL MEDICINE

## 2019-09-11 PROCEDURE — 99999 PR PBB SHADOW E&M-EST. PATIENT-LVL III: ICD-10-PCS | Mod: PBBFAC,,, | Performed by: INTERNAL MEDICINE

## 2019-09-11 PROCEDURE — 93000 EKG 12-LEAD: ICD-10-PCS | Mod: S$GLB,,, | Performed by: INTERNAL MEDICINE

## 2019-09-11 PROCEDURE — 3078F DIAST BP <80 MM HG: CPT | Mod: CPTII,S$GLB,, | Performed by: INTERNAL MEDICINE

## 2019-09-11 PROCEDURE — 3074F SYST BP LT 130 MM HG: CPT | Mod: CPTII,S$GLB,, | Performed by: INTERNAL MEDICINE

## 2019-09-11 PROCEDURE — 99499 RISK ADDL DX/OHS AUDIT: ICD-10-PCS | Mod: S$GLB,,, | Performed by: INTERNAL MEDICINE

## 2019-09-11 PROCEDURE — 1101F PR PT FALLS ASSESS DOC 0-1 FALLS W/OUT INJ PAST YR: ICD-10-PCS | Mod: CPTII,S$GLB,, | Performed by: INTERNAL MEDICINE

## 2019-09-11 PROCEDURE — 1101F PT FALLS ASSESS-DOCD LE1/YR: CPT | Mod: CPTII,S$GLB,, | Performed by: INTERNAL MEDICINE

## 2019-09-11 PROCEDURE — 99999 PR PBB SHADOW E&M-EST. PATIENT-LVL III: CPT | Mod: PBBFAC,,, | Performed by: INTERNAL MEDICINE

## 2019-09-11 PROCEDURE — 99204 OFFICE O/P NEW MOD 45 MIN: CPT | Mod: S$GLB,,, | Performed by: INTERNAL MEDICINE

## 2019-09-11 PROCEDURE — 3074F PR MOST RECENT SYSTOLIC BLOOD PRESSURE < 130 MM HG: ICD-10-PCS | Mod: CPTII,S$GLB,, | Performed by: INTERNAL MEDICINE

## 2019-09-11 PROCEDURE — 99499 UNLISTED E&M SERVICE: CPT | Mod: S$GLB,,, | Performed by: INTERNAL MEDICINE

## 2019-09-11 PROCEDURE — 93000 ELECTROCARDIOGRAM COMPLETE: CPT | Mod: S$GLB,,, | Performed by: INTERNAL MEDICINE

## 2019-09-11 RX ORDER — METOPROLOL SUCCINATE 25 MG/1
25 TABLET, EXTENDED RELEASE ORAL DAILY
Qty: 30 TABLET | Refills: 11 | Status: SHIPPED | OUTPATIENT
Start: 2019-09-11 | End: 2019-09-27 | Stop reason: SDUPTHER

## 2019-09-11 NOTE — PROGRESS NOTES
Subjective:   Patient ID:  Lorenzo Ospina is a 79 y.o. female who presents for evaluation of Follow-up      HPI  A 78 yo female with  htn hlp tia 3 years ago on plavix is here to establish care she feels at times at nite that seh wakes up gasping every few months. Over the past year she ahs been decreasing her driving and  groceries and going to the mall she feels fatigued. Ha sno chest pain or shortness of breath no leg swelling. She snores.no syncope near syncope.   Past Medical History:   Diagnosis Date    Breast cancer     right, dx 2008.  Dr. Callaway follows.s/p lumpectomy and arimidex x 5y.    Hyperlipidemia     Hypertension     OP (osteoporosis)     on bisphos x 2y.    TIA (transient ischemic attack)     Toe fracture        Past Surgical History:   Procedure Laterality Date    BREAST LUMPECTOMY      right 2008    TOTAL ABDOMINAL HYSTERECTOMY      no cancer       Social History     Tobacco Use    Smoking status: Never Smoker    Smokeless tobacco: Never Used   Substance Use Topics    Alcohol use: Yes    Drug use: Not on file       History reviewed. No pertinent family history.    Current Outpatient Medications   Medication Sig    alendronate (FOSAMAX) 70 MG tablet TAKE 1 TABLET (70 MG TOTAL) BY MOUTH EVERY 7 DAYS.    atorvastatin (LIPITOR) 20 MG tablet TAKE 1 TABLET (20 MG TOTAL) BY MOUTH ONCE DAILY.    clopidogrel (PLAVIX) 75 mg tablet TAKE 1 TABLET (75 MG TOTAL) BY MOUTH ONCE DAILY.    diclofenac sodium 1 % Gel Apply 2 g topically once daily.    fish oil-omega-3 fatty acids 300-1,000 mg capsule Take 2 g by mouth once daily.    fluticasone (FLONASE) 50 mcg/actuation nasal spray TAKE 2 SPRAYS BY EACH NARE ROUTE ONCE DAILY.    MULTIVIT-IRON-MIN-FOLIC ACID 3,500-18-0.4 UNIT-MG-MG ORAL CHEW Take by mouth.    triamterene-hydrochlorothiazide 37.5-25 mg (DYAZIDE) 37.5-25 mg per capsule TAKE 1 CAPSULE BY MOUTH EVERY MORNING.    vitamin D 1000 units Tab Take 2,000 Units by mouth once daily.     No  current facility-administered medications for this visit.      Current Outpatient Medications on File Prior to Visit   Medication Sig    alendronate (FOSAMAX) 70 MG tablet TAKE 1 TABLET (70 MG TOTAL) BY MOUTH EVERY 7 DAYS.    atorvastatin (LIPITOR) 20 MG tablet TAKE 1 TABLET (20 MG TOTAL) BY MOUTH ONCE DAILY.    clopidogrel (PLAVIX) 75 mg tablet TAKE 1 TABLET (75 MG TOTAL) BY MOUTH ONCE DAILY.    diclofenac sodium 1 % Gel Apply 2 g topically once daily.    fish oil-omega-3 fatty acids 300-1,000 mg capsule Take 2 g by mouth once daily.    fluticasone (FLONASE) 50 mcg/actuation nasal spray TAKE 2 SPRAYS BY EACH NARE ROUTE ONCE DAILY.    MULTIVIT-IRON-MIN-FOLIC ACID 3,500-18-0.4 UNIT-MG-MG ORAL CHEW Take by mouth.    triamterene-hydrochlorothiazide 37.5-25 mg (DYAZIDE) 37.5-25 mg per capsule TAKE 1 CAPSULE BY MOUTH EVERY MORNING.    vitamin D 1000 units Tab Take 2,000 Units by mouth once daily.     No current facility-administered medications on file prior to visit.        Review of patient's allergies indicates:   Allergen Reactions    Medrol [methylprednisolone]        Review of Systems   Constitution: Positive for malaise/fatigue. Negative for diaphoresis and weight gain.   HENT: Negative for hoarse voice.    Eyes: Negative for double vision and visual disturbance.   Cardiovascular: Positive for palpitations. Negative for chest pain, claudication, cyanosis, dyspnea on exertion, irregular heartbeat, leg swelling, near-syncope, orthopnea, paroxysmal nocturnal dyspnea and syncope.   Respiratory: Positive for snoring. Negative for cough, hemoptysis and shortness of breath.    Hematologic/Lymphatic: Negative for bleeding problem. Does not bruise/bleed easily.   Skin: Negative for color change and poor wound healing.   Musculoskeletal: Negative for muscle cramps, muscle weakness and myalgias.   Gastrointestinal: Negative for bloating, abdominal pain, change in bowel habit, diarrhea, heartburn, hematemesis,  hematochezia, melena and nausea.   Neurological: Negative for excessive daytime sleepiness, dizziness, headaches, light-headedness, loss of balance, numbness and weakness.   Psychiatric/Behavioral: Negative for memory loss. The patient does not have insomnia.    Allergic/Immunologic: Negative for hives.       Objective:   Physical Exam   Constitutional: She is oriented to person, place, and time. She appears well-developed and well-nourished. She does not appear ill. No distress.   HENT:   Head: Normocephalic and atraumatic.   Eyes: Pupils are equal, round, and reactive to light. EOM are normal. No scleral icterus.   Neck: Normal range of motion. Neck supple. Normal carotid pulses, no hepatojugular reflux and no JVD present. Carotid bruit is not present. No tracheal deviation present. No thyromegaly present.   Cardiovascular: Normal rate, regular rhythm, normal heart sounds and normal pulses. Exam reveals no gallop and no friction rub.   No murmur heard.  Pulmonary/Chest: Effort normal and breath sounds normal. No respiratory distress. She has no wheezes. She has no rhonchi. She has no rales. She exhibits no tenderness.   Abdominal: Soft. Normal appearance, normal aorta and bowel sounds are normal. She exhibits no abdominal bruit, no ascites and no pulsatile midline mass. There is no hepatomegaly. There is no tenderness.   Musculoskeletal: She exhibits no edema.        Right shoulder: She exhibits no deformity.   Neurological: She is alert and oriented to person, place, and time. She has normal strength. No cranial nerve deficit. Coordination normal.   Skin: Skin is warm and dry. No rash noted. She is not diaphoretic. No cyanosis or erythema. Nails show no clubbing.   Psychiatric: She has a normal mood and affect. Her speech is normal and behavior is normal.   Nursing note and vitals reviewed.    Vitals:    09/11/19 1609 09/11/19 1611   BP: 128/78 124/76   BP Location: Right arm Left arm   Patient Position: Sitting  "Sitting   BP Method: Large (Manual) Large (Manual)   Pulse: (!) 114    Weight: 76.7 kg (169 lb)    Height: 5' 2" (1.575 m)      Lab Results   Component Value Date    CHOL 147 06/20/2018    CHOL 137 05/30/2017    CHOL 152 02/03/2016     Lab Results   Component Value Date    HDL 56 06/20/2018    HDL 57 05/30/2017    HDL 57 02/03/2016     Lab Results   Component Value Date    LDLCALC 73.6 06/20/2018    LDLCALC 60.0 (L) 05/30/2017    LDLCALC 74.0 02/03/2016     Lab Results   Component Value Date    TRIG 87 06/20/2018    TRIG 100 05/30/2017    TRIG 105 02/03/2016     Lab Results   Component Value Date    CHOLHDL 38.1 06/20/2018    CHOLHDL 41.6 05/30/2017    CHOLHDL 37.5 02/03/2016       Chemistry        Component Value Date/Time     11/02/2018 0931    K 4.3 11/02/2018 0931     11/02/2018 0931    CO2 31 (H) 11/02/2018 0931    BUN 12 11/02/2018 0931    CREATININE 0.8 11/02/2018 0931     (H) 11/02/2018 0931        Component Value Date/Time    CALCIUM 11.2 (H) 11/02/2018 0931    ALKPHOS 46 (L) 06/20/2018 0740    AST 14 06/20/2018 0740    ALT 18 06/20/2018 0740    BILITOT 0.7 06/20/2018 0740    ESTGFRAFRICA >60.0 11/02/2018 0931    EGFRNONAA >60.0 11/02/2018 0931          Lab Results   Component Value Date    TSH 0.960 06/20/2018     No results found for: INR, PROTIME  Lab Results   Component Value Date    WBC 5.13 06/20/2018    HGB 14.1 06/20/2018    HCT 44.4 06/20/2018    MCV 91 06/20/2018     06/20/2018     BNP  @LABRCNTIP(BNP,BNPTRIAGEBLO)@  CrCl cannot be calculated (Patient's most recent lab result is older than the maximum 7 days allowed.).  Assessment:     1. Atrial fibrillation, unspecified type    2. Pure hypercholesterolemia    3. TIA (transient ischemic attack)    4. History of right breast cancer    5. Essential hypertension    6. Hyperparathyroidism    7. Vitamin D deficiency    8. Palpitation      New onset afib of unknown duration the patient has fatigue that may be related to afib " she needs rate control and anticoagulation with eliquis. Will obtain labd to asess creatinine and also cbc tsh. And will get echo to assess lvf as well as holter to see if this paf por sustained afib.  Then will plan on cardiolite.  Plan:   Asa and plavix until decide on dose of eliquis   toprol xl 25 mg po daily starting today   Labs abd decide on eliquis dose.   echo holter lexiscan.  F/u ihn 2 weeks.

## 2019-09-13 ENCOUNTER — LAB VISIT (OUTPATIENT)
Dept: LAB | Facility: HOSPITAL | Age: 79
End: 2019-09-13
Attending: INTERNAL MEDICINE
Payer: MEDICARE

## 2019-09-13 ENCOUNTER — OFFICE VISIT (OUTPATIENT)
Dept: HEMATOLOGY/ONCOLOGY | Facility: CLINIC | Age: 79
End: 2019-09-13
Payer: MEDICARE

## 2019-09-13 VITALS
OXYGEN SATURATION: 96 % | BODY MASS INDEX: 30.79 KG/M2 | TEMPERATURE: 97 F | DIASTOLIC BLOOD PRESSURE: 58 MMHG | SYSTOLIC BLOOD PRESSURE: 104 MMHG | WEIGHT: 167.31 LBS | HEART RATE: 85 BPM | HEIGHT: 62 IN

## 2019-09-13 DIAGNOSIS — Z12.31 ENCOUNTER FOR SCREENING MAMMOGRAM FOR MALIGNANT NEOPLASM OF BREAST: ICD-10-CM

## 2019-09-13 DIAGNOSIS — R00.2 PALPITATION: ICD-10-CM

## 2019-09-13 DIAGNOSIS — Z85.3 HISTORY OF RIGHT BREAST CANCER: Primary | ICD-10-CM

## 2019-09-13 DIAGNOSIS — I48.91 ATRIAL FIBRILLATION, UNSPECIFIED TYPE: ICD-10-CM

## 2019-09-13 LAB
ALBUMIN SERPL BCP-MCNC: 4.2 G/DL (ref 3.5–5.2)
ALP SERPL-CCNC: 59 U/L (ref 55–135)
ALT SERPL W/O P-5'-P-CCNC: 14 U/L (ref 10–44)
ANION GAP SERPL CALC-SCNC: 8 MMOL/L (ref 8–16)
AST SERPL-CCNC: 15 U/L (ref 10–40)
BASOPHILS # BLD AUTO: 0.03 K/UL (ref 0–0.2)
BASOPHILS NFR BLD: 0.4 % (ref 0–1.9)
BILIRUB SERPL-MCNC: 0.9 MG/DL (ref 0.1–1)
BNP SERPL-MCNC: 253 PG/ML (ref 0–99)
BUN SERPL-MCNC: 13 MG/DL (ref 8–23)
CALCIUM SERPL-MCNC: 11 MG/DL (ref 8.7–10.5)
CHLORIDE SERPL-SCNC: 105 MMOL/L (ref 95–110)
CO2 SERPL-SCNC: 29 MMOL/L (ref 23–29)
CREAT SERPL-MCNC: 0.9 MG/DL (ref 0.5–1.4)
DIFFERENTIAL METHOD: NORMAL
EOSINOPHIL # BLD AUTO: 0.1 K/UL (ref 0–0.5)
EOSINOPHIL NFR BLD: 0.8 % (ref 0–8)
ERYTHROCYTE [DISTWIDTH] IN BLOOD BY AUTOMATED COUNT: 13.2 % (ref 11.5–14.5)
EST. GFR  (AFRICAN AMERICAN): >60 ML/MIN/1.73 M^2
EST. GFR  (NON AFRICAN AMERICAN): >60 ML/MIN/1.73 M^2
GLUCOSE SERPL-MCNC: 130 MG/DL (ref 70–110)
HCT VFR BLD AUTO: 45.8 % (ref 37–48.5)
HGB BLD-MCNC: 14.9 G/DL (ref 12–16)
IMM GRANULOCYTES # BLD AUTO: 0.02 K/UL (ref 0–0.04)
IMM GRANULOCYTES NFR BLD AUTO: 0.3 % (ref 0–0.5)
LYMPHOCYTES # BLD AUTO: 2 K/UL (ref 1–4.8)
LYMPHOCYTES NFR BLD: 25.4 % (ref 18–48)
MCH RBC QN AUTO: 29 PG (ref 27–31)
MCHC RBC AUTO-ENTMCNC: 32.5 G/DL (ref 32–36)
MCV RBC AUTO: 89 FL (ref 82–98)
MONOCYTES # BLD AUTO: 0.5 K/UL (ref 0.3–1)
MONOCYTES NFR BLD: 5.8 % (ref 4–15)
NEUTROPHILS # BLD AUTO: 5.4 K/UL (ref 1.8–7.7)
NEUTROPHILS NFR BLD: 67.3 % (ref 38–73)
NRBC BLD-RTO: 0 /100 WBC
PLATELET # BLD AUTO: 212 K/UL (ref 150–350)
PMV BLD AUTO: 11.5 FL (ref 9.2–12.9)
POTASSIUM SERPL-SCNC: 3.8 MMOL/L (ref 3.5–5.1)
PROT SERPL-MCNC: 7.2 G/DL (ref 6–8.4)
RBC # BLD AUTO: 5.13 M/UL (ref 4–5.4)
SODIUM SERPL-SCNC: 142 MMOL/L (ref 136–145)
TSH SERPL DL<=0.005 MIU/L-ACNC: 0.99 UIU/ML (ref 0.4–4)
WBC # BLD AUTO: 7.96 K/UL (ref 3.9–12.7)

## 2019-09-13 PROCEDURE — 3078F PR MOST RECENT DIASTOLIC BLOOD PRESSURE < 80 MM HG: ICD-10-PCS | Mod: CPTII,S$GLB,, | Performed by: INTERNAL MEDICINE

## 2019-09-13 PROCEDURE — 36415 COLL VENOUS BLD VENIPUNCTURE: CPT

## 2019-09-13 PROCEDURE — 99999 PR PBB SHADOW E&M-EST. PATIENT-LVL III: ICD-10-PCS | Mod: PBBFAC,,, | Performed by: INTERNAL MEDICINE

## 2019-09-13 PROCEDURE — 84443 ASSAY THYROID STIM HORMONE: CPT

## 2019-09-13 PROCEDURE — 83880 ASSAY OF NATRIURETIC PEPTIDE: CPT

## 2019-09-13 PROCEDURE — 1101F PR PT FALLS ASSESS DOC 0-1 FALLS W/OUT INJ PAST YR: ICD-10-PCS | Mod: CPTII,S$GLB,, | Performed by: INTERNAL MEDICINE

## 2019-09-13 PROCEDURE — 3078F DIAST BP <80 MM HG: CPT | Mod: CPTII,S$GLB,, | Performed by: INTERNAL MEDICINE

## 2019-09-13 PROCEDURE — 80053 COMPREHEN METABOLIC PANEL: CPT

## 2019-09-13 PROCEDURE — 3074F PR MOST RECENT SYSTOLIC BLOOD PRESSURE < 130 MM HG: ICD-10-PCS | Mod: CPTII,S$GLB,, | Performed by: INTERNAL MEDICINE

## 2019-09-13 PROCEDURE — 1101F PT FALLS ASSESS-DOCD LE1/YR: CPT | Mod: CPTII,S$GLB,, | Performed by: INTERNAL MEDICINE

## 2019-09-13 PROCEDURE — 99214 OFFICE O/P EST MOD 30 MIN: CPT | Mod: S$GLB,,, | Performed by: INTERNAL MEDICINE

## 2019-09-13 PROCEDURE — 99214 PR OFFICE/OUTPT VISIT, EST, LEVL IV, 30-39 MIN: ICD-10-PCS | Mod: S$GLB,,, | Performed by: INTERNAL MEDICINE

## 2019-09-13 PROCEDURE — 3074F SYST BP LT 130 MM HG: CPT | Mod: CPTII,S$GLB,, | Performed by: INTERNAL MEDICINE

## 2019-09-13 PROCEDURE — 99999 PR PBB SHADOW E&M-EST. PATIENT-LVL III: CPT | Mod: PBBFAC,,, | Performed by: INTERNAL MEDICINE

## 2019-09-13 PROCEDURE — 85025 COMPLETE CBC W/AUTO DIFF WBC: CPT

## 2019-09-13 NOTE — PROGRESS NOTES
Subjective:      Patient ID: Lorenzo Ospina is a 79 y.o. female.    Chief Complaint: Follow-up (Breast Cancer)    The patient is a 79-year-old female who presents to the Hematology Oncology Clinic today in consultation to discuss further evaluation management recommendations for history of right breast carcinoma.  The patient was previously followed in the outpatient Hematology Oncology Clinic by Dr. Whitaker and Dr. Manning and would now like to transition her care to me as our office is much closer to her home.  I have reviewed all the patient's relevant clinical history available medical record and have utilized this in my evaluation and management recommendations today.  This includes her information in Care everywhere.  The patient has a remote history of right breast carcinoma which was treated in 2008.  This was a stage I carcinoma which was treated with lumpectomy and radiation therapy.  She did not require adjuvant chemotherapy.  She then completed 5 years of adjuvant endocrine therapy.  Her cancer was diagnosed after evaluation of an abnormal mammogram in 2008.  She did not self palpate a mass.  She has had annual mammograms since that time which have been normal.  Today she reports that overall she is doing well and has no specific complaints.  She denies any fevers, chills or night sweats.  She denies any loss of appetite or unintentional weight loss.  She denies any melena, hematochezia, hematemesis, hemoptysis or hematuria.  She denies any chest pain or shortness of breath.  She denies any bowel or urinary complaints.    Follow-up   Pertinent negatives include no abdominal pain, arthralgias, chest pain, chills, congestion, coughing, diaphoresis, fatigue, fever, headaches, joint swelling, myalgias, nausea, numbness, rash, sore throat, vomiting or weakness.     Review of Systems   Constitutional: Negative for activity change, appetite change, chills, diaphoresis, fatigue, fever and unexpected weight  change.   HENT: Negative for congestion, dental problem, ear discharge, ear pain, hearing loss, mouth sores, postnasal drip, sinus pressure, sore throat, tinnitus, trouble swallowing and voice change.    Eyes: Negative for photophobia, pain and visual disturbance.   Respiratory: Negative for cough, chest tightness, shortness of breath and wheezing.    Cardiovascular: Negative for chest pain, palpitations and leg swelling.   Gastrointestinal: Negative for abdominal distention, abdominal pain, blood in stool, constipation, diarrhea, nausea and vomiting.   Endocrine: Negative for cold intolerance, heat intolerance, polydipsia, polyphagia and polyuria.   Genitourinary: Negative for difficulty urinating, dysuria, flank pain, frequency, hematuria, urgency, vaginal bleeding and vaginal discharge.   Musculoskeletal: Negative for arthralgias, back pain, gait problem, joint swelling and myalgias.   Skin: Negative for pallor and rash.   Allergic/Immunologic: Negative for immunocompromised state.   Neurological: Negative for dizziness, syncope, weakness, light-headedness, numbness and headaches.   Hematological: Negative for adenopathy. Does not bruise/bleed easily.   Psychiatric/Behavioral: Negative for agitation, confusion and dysphoric mood. The patient is not nervous/anxious.        Medication List with Changes/Refills   Current Medications    ALENDRONATE (FOSAMAX) 70 MG TABLET    TAKE 1 TABLET (70 MG TOTAL) BY MOUTH EVERY 7 DAYS.    ATORVASTATIN (LIPITOR) 20 MG TABLET    TAKE 1 TABLET (20 MG TOTAL) BY MOUTH ONCE DAILY.    CLOPIDOGREL (PLAVIX) 75 MG TABLET    TAKE 1 TABLET (75 MG TOTAL) BY MOUTH ONCE DAILY.    DICLOFENAC SODIUM 1 % GEL    Apply 2 g topically once daily.    FISH OIL-OMEGA-3 FATTY ACIDS 300-1,000 MG CAPSULE    Take 2 g by mouth once daily.    FLUTICASONE (FLONASE) 50 MCG/ACTUATION NASAL SPRAY    TAKE 2 SPRAYS BY EACH NARE ROUTE ONCE DAILY.    METOPROLOL SUCCINATE (TOPROL-XL) 25 MG 24 HR TABLET    Take 1  tablet (25 mg total) by mouth once daily.    MULTIVIT-IRON-MIN-FOLIC ACID 3,500-18-0.4 UNIT-MG-MG ORAL CHEW    Take by mouth.    TRIAMTERENE-HYDROCHLOROTHIAZIDE 37.5-25 MG (DYAZIDE) 37.5-25 MG PER CAPSULE    TAKE 1 CAPSULE BY MOUTH EVERY MORNING.    VITAMIN D 1000 UNITS TAB    Take 2,000 Units by mouth once daily.     Review of patient's allergies indicates:   Allergen Reactions    Medrol [methylprednisolone]        Lab: I have reviewed all of the patient's relevant lab work available in the medical record and have utilized this in my evaluation and management recommendations today    Imaging: I have reviewed all of the patient's diagnostic/imaging results available in the medical record and have utilized this in my evaluation and management recommendations today.      Objective:     Vitals:    09/13/19 1113   BP: (!) 104/58   Pulse: 85   Temp: 97 °F (36.1 °C)       Physical Exam   Constitutional: She is oriented to person, place, and time. She appears well-developed and well-nourished. She is active and cooperative.   HENT:   Head: Normocephalic and atraumatic.   Nose: Nose normal.   Mouth/Throat: Oropharynx is clear and moist. No oropharyngeal exudate.   Eyes: Pupils are equal, round, and reactive to light. No scleral icterus.   Neck: Neck supple. No thyromegaly present.   Cardiovascular: Normal rate, regular rhythm, normal heart sounds and intact distal pulses.   No murmur heard.  Pulmonary/Chest: Effort normal and breath sounds normal. No stridor. No respiratory distress. She has no wheezes. She has no rales. Right breast exhibits no inverted nipple, no mass, no nipple discharge, no skin change and no tenderness. Left breast exhibits no inverted nipple, no mass, no nipple discharge, no skin change and no tenderness.   Abdominal: Soft. Bowel sounds are normal. She exhibits no distension, no ascites and no mass. There is no hepatosplenomegaly. There is no tenderness. There is no rigidity, no rebound and no  guarding.   Musculoskeletal: She exhibits no edema or tenderness.   Lymphadenopathy:     She has no cervical adenopathy.   Neurological: She is alert and oriented to person, place, and time. No cranial nerve deficit. She exhibits normal muscle tone. Coordination normal.   Skin: Skin is warm and dry. No rash noted. No pallor.   Psychiatric: She has a normal mood and affect. Her behavior is normal. Judgment and thought content normal.   Nursing note and vitals reviewed.      Assessment:     1. History of right breast cancer    2. Encounter for screening mammogram for malignant neoplasm of breast         Plan:   1.  I had a detailed discussion with the patient today with regard to her history of right breast carcinoma.  We have discussed about her excellent overall prognosis as she has now been disease free for greater than 10 years.  2.  We discussed about the importance of healthy diet and healthy lifestyle.  3.  She will continue follow-up with Dr. Lorena Fiore with regard to being up-to-date with all of her age-appropriate health screening/cancer screening.  4.  I have previously offered the patient follow-up with our survivorship Clinic.  However she declined.  She would like to follow up with us once a year.    Follow-up in 1 year.  She knows to call sooner if any new problems or questions.  History of right breast cancer  -     Mammo Digital Screening Bilat; Future; Expected date: 09/13/2019    Encounter for screening mammogram for malignant neoplasm of breast   -     Mammo Digital Screening Bilat; Future; Expected date: 09/13/2019

## 2019-09-16 ENCOUNTER — HOSPITAL ENCOUNTER (OUTPATIENT)
Dept: RADIOLOGY | Facility: HOSPITAL | Age: 79
Discharge: HOME | End: 2019-09-16
Attending: INTERNAL MEDICINE
Payer: MEDICARE

## 2019-09-16 VITALS — HEIGHT: 62 IN | BODY MASS INDEX: 30.73 KG/M2 | WEIGHT: 167 LBS

## 2019-09-16 DIAGNOSIS — Z12.31 ENCOUNTER FOR SCREENING MAMMOGRAM FOR MALIGNANT NEOPLASM OF BREAST: ICD-10-CM

## 2019-09-16 DIAGNOSIS — Z85.3 HISTORY OF RIGHT BREAST CANCER: ICD-10-CM

## 2019-09-16 PROCEDURE — 77067 SCR MAMMO BI INCL CAD: CPT | Mod: 26,,, | Performed by: RADIOLOGY

## 2019-09-16 PROCEDURE — 77063 MAMMO DIGITAL SCREENING BILAT WITH TOMOSYNTHESIS_CAD: ICD-10-PCS | Mod: 26,,, | Performed by: RADIOLOGY

## 2019-09-16 PROCEDURE — 77063 BREAST TOMOSYNTHESIS BI: CPT | Mod: 26,,, | Performed by: RADIOLOGY

## 2019-09-16 PROCEDURE — 77067 SCR MAMMO BI INCL CAD: CPT | Mod: TC

## 2019-09-16 PROCEDURE — 77067 MAMMO DIGITAL SCREENING BILAT WITH TOMOSYNTHESIS_CAD: ICD-10-PCS | Mod: 26,,, | Performed by: RADIOLOGY

## 2019-09-17 ENCOUNTER — TELEPHONE (OUTPATIENT)
Dept: CARDIOLOGY | Facility: CLINIC | Age: 79
End: 2019-09-17

## 2019-09-17 DIAGNOSIS — I48.0 PAF (PAROXYSMAL ATRIAL FIBRILLATION): Primary | ICD-10-CM

## 2019-09-17 DIAGNOSIS — I48.91 ATRIAL FIBRILLATION, UNSPECIFIED TYPE: Primary | ICD-10-CM

## 2019-09-17 DIAGNOSIS — I48.91 ATRIAL FIBRILLATION, UNSPECIFIED TYPE: ICD-10-CM

## 2019-09-17 DIAGNOSIS — R00.2 PALPITATION: Primary | ICD-10-CM

## 2019-09-17 NOTE — TELEPHONE ENCOUNTER
Telephoned patient to review results and give medication changes. Answered questions and Sarkis KOENIG telephoned prescription to [facility] and also 30 day free trial card ID #[id number]/ Rx BIN [id number]/Asked the Pharmacist to  patient re:stopping Plavix once starting Eliquis    ----- Message from Mayi Vega MD sent at 9/14/2019  7:21 AM CDT -----  She needs eliquis 5 mg po bid . She needs to stop plavix when she starts eliquis   Make sure she has her holter scheduled before her f/u in 2 weeks

## 2019-09-19 ENCOUNTER — TELEPHONE (OUTPATIENT)
Dept: HEMATOLOGY/ONCOLOGY | Facility: CLINIC | Age: 79
End: 2019-09-19

## 2019-09-19 ENCOUNTER — TELEPHONE (OUTPATIENT)
Dept: CARDIOLOGY | Facility: CLINIC | Age: 79
End: 2019-09-19

## 2019-09-19 NOTE — TELEPHONE ENCOUNTER
All reviewed with patient and  and has started Eliquis 5 mg bid, stopped Plavix    ----- Message from Mayi Vega MD sent at 9/18/2019  8:47 PM CDT -----  She is in irregular heart beat all the time.

## 2019-09-19 NOTE — TELEPHONE ENCOUNTER
----- Message from Jeanmarie Lyons MD sent at 9/18/2019  8:31 AM CDT -----  Please let the patient know that results of mammogram looks okay.  We will repeat in 1 year.  Thank you.

## 2019-09-23 ENCOUNTER — HOSPITAL ENCOUNTER (OUTPATIENT)
Dept: RADIOLOGY | Facility: HOSPITAL | Age: 79
Discharge: HOME | End: 2019-09-23
Attending: INTERNAL MEDICINE
Payer: MEDICARE

## 2019-09-23 ENCOUNTER — CLINICAL SUPPORT (OUTPATIENT)
Dept: CARDIOLOGY | Facility: CLINIC | Age: 79
End: 2019-09-23
Attending: INTERNAL MEDICINE
Payer: MEDICARE

## 2019-09-23 DIAGNOSIS — I48.91 ATRIAL FIBRILLATION, UNSPECIFIED TYPE: ICD-10-CM

## 2019-09-23 DIAGNOSIS — R00.2 PALPITATION: ICD-10-CM

## 2019-09-23 LAB — DIASTOLIC DYSFUNCTION: NO

## 2019-09-23 PROCEDURE — A9502 TC99M TETROFOSMIN: HCPCS

## 2019-09-23 PROCEDURE — 78452 HT MUSCLE IMAGE SPECT MULT: CPT | Mod: 26,,, | Performed by: INTERNAL MEDICINE

## 2019-09-23 PROCEDURE — 93015 NM MULTI PHARM STRESS CARDIAC COMPONENT: ICD-10-PCS | Mod: S$GLB,,, | Performed by: INTERNAL MEDICINE

## 2019-09-23 PROCEDURE — 93015 CV STRESS TEST SUPVJ I&R: CPT | Mod: S$GLB,,, | Performed by: INTERNAL MEDICINE

## 2019-09-23 PROCEDURE — 78452 NM MULTI PHARM STRESS CARDIAC COMPONENT: ICD-10-PCS | Mod: 26,,, | Performed by: INTERNAL MEDICINE

## 2019-09-23 PROCEDURE — 93306 2D ECHO WITH COLOR FLOW DOPPLER: ICD-10-PCS | Mod: S$GLB,,, | Performed by: INTERNAL MEDICINE

## 2019-09-23 PROCEDURE — 93306 TTE W/DOPPLER COMPLETE: CPT | Mod: S$GLB,,, | Performed by: INTERNAL MEDICINE

## 2019-09-24 LAB
DIASTOLIC DYSFUNCTION: NO
ESTIMATED PA SYSTOLIC PRESSURE: 26.15
MITRAL VALVE REGURGITATION: NORMAL
RETIRED EF AND QEF - SEE NOTES: 55 (ref 55–65)
TRICUSPID VALVE REGURGITATION: NORMAL

## 2019-09-27 ENCOUNTER — OFFICE VISIT (OUTPATIENT)
Dept: CARDIOLOGY | Facility: CLINIC | Age: 79
End: 2019-09-27
Payer: MEDICARE

## 2019-09-27 VITALS
OXYGEN SATURATION: 98 % | HEART RATE: 87 BPM | SYSTOLIC BLOOD PRESSURE: 120 MMHG | HEIGHT: 62 IN | WEIGHT: 166.88 LBS | BODY MASS INDEX: 30.71 KG/M2 | DIASTOLIC BLOOD PRESSURE: 64 MMHG

## 2019-09-27 DIAGNOSIS — E78.00 PURE HYPERCHOLESTEROLEMIA: ICD-10-CM

## 2019-09-27 DIAGNOSIS — R00.2 PALPITATION: ICD-10-CM

## 2019-09-27 DIAGNOSIS — E21.3 HYPERPARATHYROIDISM: ICD-10-CM

## 2019-09-27 DIAGNOSIS — I10 ESSENTIAL HYPERTENSION: ICD-10-CM

## 2019-09-27 DIAGNOSIS — E55.9 VITAMIN D DEFICIENCY: ICD-10-CM

## 2019-09-27 DIAGNOSIS — G45.9 TIA (TRANSIENT ISCHEMIC ATTACK): ICD-10-CM

## 2019-09-27 DIAGNOSIS — I48.91 ATRIAL FIBRILLATION, UNSPECIFIED TYPE: Primary | ICD-10-CM

## 2019-09-27 PROCEDURE — 1101F PR PT FALLS ASSESS DOC 0-1 FALLS W/OUT INJ PAST YR: ICD-10-PCS | Mod: CPTII,S$GLB,, | Performed by: INTERNAL MEDICINE

## 2019-09-27 PROCEDURE — 99999 PR PBB SHADOW E&M-EST. PATIENT-LVL III: CPT | Mod: PBBFAC,,, | Performed by: INTERNAL MEDICINE

## 2019-09-27 PROCEDURE — 99499 RISK ADDL DX/OHS AUDIT: ICD-10-PCS | Mod: S$GLB,,, | Performed by: INTERNAL MEDICINE

## 2019-09-27 PROCEDURE — 99214 PR OFFICE/OUTPT VISIT, EST, LEVL IV, 30-39 MIN: ICD-10-PCS | Mod: S$GLB,,, | Performed by: INTERNAL MEDICINE

## 2019-09-27 PROCEDURE — 3074F SYST BP LT 130 MM HG: CPT | Mod: CPTII,S$GLB,, | Performed by: INTERNAL MEDICINE

## 2019-09-27 PROCEDURE — 99999 PR PBB SHADOW E&M-EST. PATIENT-LVL III: ICD-10-PCS | Mod: PBBFAC,,, | Performed by: INTERNAL MEDICINE

## 2019-09-27 PROCEDURE — 99499 UNLISTED E&M SERVICE: CPT | Mod: S$GLB,,, | Performed by: INTERNAL MEDICINE

## 2019-09-27 PROCEDURE — 1101F PT FALLS ASSESS-DOCD LE1/YR: CPT | Mod: CPTII,S$GLB,, | Performed by: INTERNAL MEDICINE

## 2019-09-27 PROCEDURE — 99214 OFFICE O/P EST MOD 30 MIN: CPT | Mod: S$GLB,,, | Performed by: INTERNAL MEDICINE

## 2019-09-27 PROCEDURE — 3078F DIAST BP <80 MM HG: CPT | Mod: CPTII,S$GLB,, | Performed by: INTERNAL MEDICINE

## 2019-09-27 PROCEDURE — 3074F PR MOST RECENT SYSTOLIC BLOOD PRESSURE < 130 MM HG: ICD-10-PCS | Mod: CPTII,S$GLB,, | Performed by: INTERNAL MEDICINE

## 2019-09-27 PROCEDURE — 3078F PR MOST RECENT DIASTOLIC BLOOD PRESSURE < 80 MM HG: ICD-10-PCS | Mod: CPTII,S$GLB,, | Performed by: INTERNAL MEDICINE

## 2019-09-27 RX ORDER — CLOPIDOGREL BISULFATE 75 MG/1
TABLET ORAL
COMMUNITY
Start: 2017-02-12 | End: 2021-03-02

## 2019-09-27 RX ORDER — METOPROLOL SUCCINATE 25 MG/1
25 TABLET, EXTENDED RELEASE ORAL 2 TIMES DAILY
Qty: 60 TABLET | Refills: 11 | Status: SHIPPED | OUTPATIENT
Start: 2019-09-27 | End: 2019-10-03 | Stop reason: SDUPTHER

## 2019-09-27 RX ORDER — LEVOCETIRIZINE DIHYDROCHLORIDE 5 MG/1
5 TABLET, FILM COATED ORAL EVERY MORNING
Refills: 6 | COMMUNITY
Start: 2019-06-20 | End: 2019-11-20 | Stop reason: ALTCHOICE

## 2019-09-27 NOTE — PROGRESS NOTES
Subjective:   Patient ID:  Lorenzo Ospina is a 79 y.o. female who presents for follow up of Follow-up and Atrial Fibrillation      HPI  A 80 yo female with afib unknown duration symptomatic is here for f/u she ahs been startedd on eliquis however inadvertently she stopped her b blockers. She is still feeling fatigued but better. Her lvf is normal. She has minimal ischemia on cardiolite.  Has persistent afib by holter. I think she needs her b blockers back. And take it bid and will observe for 2 weeks then make plans for cardioversion. She is still taking asa daily with eliquis. No bleeding.   Past Medical History:   Diagnosis Date    Breast cancer     right, dx .  Dr. Callaway follows.s/p lumpectomy and arimidex x 5y.    Hyperlipidemia     Hypertension     OP (osteoporosis)     on bisphos x 2y.    TIA (transient ischemic attack)     Toe fracture        Past Surgical History:   Procedure Laterality Date    BREAST LUMPECTOMY      right 2008    TOTAL ABDOMINAL HYSTERECTOMY      no cancer       Social History     Tobacco Use    Smoking status: Former Smoker     Last attempt to quit: 1994     Years since quittin.0    Smokeless tobacco: Never Used   Substance Use Topics    Alcohol use: Yes    Drug use: Not on file       History reviewed. No pertinent family history.    Current Outpatient Medications   Medication Sig    apixaban (ELIQUIS) 5 mg Tab Take 1 tablet (5 mg total) by mouth 2 (two) times daily.    apixaban (ELIQUIS) 5 mg Tab Take 5 mg by mouth 2 (two) times daily.    metoprolol succinate (TOPROL-XL) 25 MG 24 hr tablet Take 1 tablet (25 mg total) by mouth once daily.    triamterene-hydrochlorothiazide 37.5-25 mg (DYAZIDE) 37.5-25 mg per capsule TAKE 1 CAPSULE BY MOUTH EVERY MORNING.    vitamin D 1000 units Tab Take 2,000 Units by mouth once daily.    alendronate (FOSAMAX) 70 MG tablet TAKE 1 TABLET (70 MG TOTAL) BY MOUTH EVERY 7 DAYS. (Patient not taking: Reported on 2019)     atorvastatin (LIPITOR) 20 MG tablet TAKE 1 TABLET (20 MG TOTAL) BY MOUTH ONCE DAILY. (Patient not taking: Reported on 9/27/2019)    clopidogrel (PLAVIX) 75 mg tablet TAKE 1 TABLET (75 MG TOTAL) BY MOUTH ONCE DAILY.    diclofenac sodium 1 % Gel Apply 2 g topically once daily. (Patient not taking: Reported on 9/27/2019)    fish oil-omega-3 fatty acids 300-1,000 mg capsule Take 2 g by mouth once daily.    fluticasone (FLONASE) 50 mcg/actuation nasal spray TAKE 2 SPRAYS BY EACH NARE ROUTE ONCE DAILY. (Patient not taking: Reported on 9/27/2019)    levocetirizine (XYZAL) 5 MG tablet Take 5 mg by mouth every morning.    MULTIVIT-IRON-MIN-FOLIC ACID 3,500-18-0.4 UNIT-MG-MG ORAL CHEW Take by mouth.     No current facility-administered medications for this visit.      Current Outpatient Medications on File Prior to Visit   Medication Sig    apixaban (ELIQUIS) 5 mg Tab Take 1 tablet (5 mg total) by mouth 2 (two) times daily.    apixaban (ELIQUIS) 5 mg Tab Take 5 mg by mouth 2 (two) times daily.    metoprolol succinate (TOPROL-XL) 25 MG 24 hr tablet Take 1 tablet (25 mg total) by mouth once daily.    triamterene-hydrochlorothiazide 37.5-25 mg (DYAZIDE) 37.5-25 mg per capsule TAKE 1 CAPSULE BY MOUTH EVERY MORNING.    vitamin D 1000 units Tab Take 2,000 Units by mouth once daily.    alendronate (FOSAMAX) 70 MG tablet TAKE 1 TABLET (70 MG TOTAL) BY MOUTH EVERY 7 DAYS. (Patient not taking: Reported on 9/27/2019)    atorvastatin (LIPITOR) 20 MG tablet TAKE 1 TABLET (20 MG TOTAL) BY MOUTH ONCE DAILY. (Patient not taking: Reported on 9/27/2019)    clopidogrel (PLAVIX) 75 mg tablet TAKE 1 TABLET (75 MG TOTAL) BY MOUTH ONCE DAILY.    diclofenac sodium 1 % Gel Apply 2 g topically once daily. (Patient not taking: Reported on 9/27/2019)    fish oil-omega-3 fatty acids 300-1,000 mg capsule Take 2 g by mouth once daily.    fluticasone (FLONASE) 50 mcg/actuation nasal spray TAKE 2 SPRAYS BY EACH NARE ROUTE ONCE DAILY.  (Patient not taking: Reported on 9/27/2019)    levocetirizine (XYZAL) 5 MG tablet Take 5 mg by mouth every morning.    MULTIVIT-IRON-MIN-FOLIC ACID 3,500-18-0.4 UNIT-MG-MG ORAL CHEW Take by mouth.     No current facility-administered medications on file prior to visit.      Review of patient's allergies indicates:   Allergen Reactions    Medrol [methylprednisolone]      Review of Systems   Constitution: Positive for malaise/fatigue. Negative for diaphoresis and weight gain.   HENT: Negative for hoarse voice.    Eyes: Negative for double vision and visual disturbance.   Cardiovascular: Negative for chest pain, claudication, cyanosis, dyspnea on exertion, irregular heartbeat, leg swelling, near-syncope, orthopnea, palpitations, paroxysmal nocturnal dyspnea and syncope.   Respiratory: Negative for cough, hemoptysis, shortness of breath and snoring.    Hematologic/Lymphatic: Negative for bleeding problem. Does not bruise/bleed easily.   Skin: Negative for color change and poor wound healing.   Musculoskeletal: Negative for muscle cramps, muscle weakness and myalgias.   Gastrointestinal: Negative for bloating, abdominal pain, change in bowel habit, diarrhea, heartburn, hematemesis, hematochezia, melena and nausea.   Neurological: Negative for excessive daytime sleepiness, dizziness, headaches, light-headedness, loss of balance, numbness and weakness.   Psychiatric/Behavioral: Negative for memory loss. The patient does not have insomnia.    Allergic/Immunologic: Negative for hives.       Objective:   Physical Exam   Constitutional: She is oriented to person, place, and time. She appears well-developed and well-nourished. She does not appear ill. No distress.   HENT:   Head: Normocephalic and atraumatic.   Eyes: Pupils are equal, round, and reactive to light. EOM are normal. No scleral icterus.   Neck: Normal range of motion. Neck supple. Normal carotid pulses, no hepatojugular reflux and no JVD present. Carotid bruit  "is not present. No tracheal deviation present. No thyromegaly present.   Cardiovascular: Normal rate, normal heart sounds, intact distal pulses and normal pulses. An irregularly irregular rhythm present. Exam reveals no gallop and no friction rub.   No murmur heard.  Pulmonary/Chest: Effort normal and breath sounds normal. No respiratory distress. She has no wheezes. She has no rhonchi. She has no rales. She exhibits no tenderness.   Abdominal: Soft. Normal appearance, normal aorta and bowel sounds are normal. She exhibits no distension, no abdominal bruit, no ascites and no pulsatile midline mass. There is no hepatomegaly. There is no tenderness.   Musculoskeletal: She exhibits no edema.        Right shoulder: She exhibits no deformity.   Neurological: She is alert and oriented to person, place, and time. She has normal strength. No cranial nerve deficit. Coordination normal.   Skin: Skin is warm and dry. No rash noted. She is not diaphoretic. No cyanosis or erythema. Nails show no clubbing.   Psychiatric: She has a normal mood and affect. Her speech is normal and behavior is normal.   Nursing note and vitals reviewed.    Vitals:    09/27/19 1301 09/27/19 1303   BP: 120/60 120/64   BP Location: Right arm Left arm   Patient Position: Sitting    Pulse: 87    SpO2: 98%    Weight: 75.7 kg (166 lb 14.2 oz)    Height: 5' 2" (1.575 m)      Lab Results   Component Value Date    CHOL 147 06/20/2018    CHOL 137 05/30/2017    CHOL 152 02/03/2016     Lab Results   Component Value Date    HDL 56 06/20/2018    HDL 57 05/30/2017    HDL 57 02/03/2016     Lab Results   Component Value Date    LDLCALC 73.6 06/20/2018    LDLCALC 60.0 (L) 05/30/2017    LDLCALC 74.0 02/03/2016     Lab Results   Component Value Date    TRIG 87 06/20/2018    TRIG 100 05/30/2017    TRIG 105 02/03/2016     Lab Results   Component Value Date    CHOLHDL 38.1 06/20/2018    CHOLHDL 41.6 05/30/2017    CHOLHDL 37.5 02/03/2016       Chemistry        Component " Value Date/Time     09/13/2019 0951    K 3.8 09/13/2019 0951     09/13/2019 0951    CO2 29 09/13/2019 0951    BUN 13 09/13/2019 0951    CREATININE 0.9 09/13/2019 0951     (H) 09/13/2019 0951        Component Value Date/Time    CALCIUM 11.0 (H) 09/13/2019 0951    ALKPHOS 59 09/13/2019 0951    AST 15 09/13/2019 0951    ALT 14 09/13/2019 0951    BILITOT 0.9 09/13/2019 0951    ESTGFRAFRICA >60.0 09/13/2019 0951    EGFRNONAA >60.0 09/13/2019 0951          Lab Results   Component Value Date    TSH 0.993 09/13/2019     No results found for: INR, PROTIME  Lab Results   Component Value Date    WBC 7.96 09/13/2019    HGB 14.9 09/13/2019    HCT 45.8 09/13/2019    MCV 89 09/13/2019     09/13/2019     BMP  Sodium   Date Value Ref Range Status   09/13/2019 142 136 - 145 mmol/L Final     Potassium   Date Value Ref Range Status   09/13/2019 3.8 3.5 - 5.1 mmol/L Final     Chloride   Date Value Ref Range Status   09/13/2019 105 95 - 110 mmol/L Final     CO2   Date Value Ref Range Status   09/13/2019 29 23 - 29 mmol/L Final     BUN, Bld   Date Value Ref Range Status   09/13/2019 13 8 - 23 mg/dL Final     Creatinine   Date Value Ref Range Status   09/13/2019 0.9 0.5 - 1.4 mg/dL Final     Calcium   Date Value Ref Range Status   09/13/2019 11.0 (H) 8.7 - 10.5 mg/dL Final     Anion Gap   Date Value Ref Range Status   09/13/2019 8 8 - 16 mmol/L Final     eGFR if    Date Value Ref Range Status   09/13/2019 >60.0 >60 mL/min/1.73 m^2 Final     eGFR if non    Date Value Ref Range Status   09/13/2019 >60.0 >60 mL/min/1.73 m^2 Final     Comment:     Calculation used to obtain the estimated glomerular filtration  rate (eGFR) is the CKD-EPI equation.        CrCl cannot be calculated (Patient's most recent lab result is older than the maximum 7 days allowed.).    Assessment:     1. Atrial fibrillation, unspecified type    2. Pure hypercholesterolemia    3. TIA (transient ischemic attack)     4. Vitamin D deficiency    5. Essential hypertension    6. Hyperparathyroidism    7. Palpitation      Needs to be back on metoprolol long acting  25 mg po bid to assess response in 2 weeks.   Abnormal cardiolite low risk would like to treat afib first.  Plan:   F/u in 2 weeks   Blockers apixaban no asa    assess ekg in 2 weeks then make decision on cardioversion.

## 2019-10-01 ENCOUNTER — HOSPITAL ENCOUNTER (OUTPATIENT)
Dept: RADIOLOGY | Facility: HOSPITAL | Age: 79
Discharge: HOME | End: 2019-10-01
Attending: INTERNAL MEDICINE
Payer: MEDICARE

## 2019-10-01 ENCOUNTER — OFFICE VISIT (OUTPATIENT)
Dept: FAMILY MEDICINE | Facility: CLINIC | Age: 79
End: 2019-10-01
Payer: MEDICARE

## 2019-10-01 VITALS
HEART RATE: 107 BPM | TEMPERATURE: 98 F | DIASTOLIC BLOOD PRESSURE: 70 MMHG | HEIGHT: 62 IN | OXYGEN SATURATION: 97 % | WEIGHT: 166.88 LBS | BODY MASS INDEX: 30.71 KG/M2 | SYSTOLIC BLOOD PRESSURE: 138 MMHG

## 2019-10-01 DIAGNOSIS — G45.9 TIA (TRANSIENT ISCHEMIC ATTACK): ICD-10-CM

## 2019-10-01 DIAGNOSIS — Z85.3 HISTORY OF RIGHT BREAST CANCER: ICD-10-CM

## 2019-10-01 DIAGNOSIS — E78.49 OTHER HYPERLIPIDEMIA: ICD-10-CM

## 2019-10-01 DIAGNOSIS — M79.651 PAIN IN BOTH THIGHS: ICD-10-CM

## 2019-10-01 DIAGNOSIS — M79.652 PAIN IN BOTH THIGHS: ICD-10-CM

## 2019-10-01 DIAGNOSIS — L30.9 DERMATITIS: ICD-10-CM

## 2019-10-01 DIAGNOSIS — J34.89 NASAL DRAINAGE: ICD-10-CM

## 2019-10-01 DIAGNOSIS — R05.9 COUGH: ICD-10-CM

## 2019-10-01 DIAGNOSIS — E21.3 HYPERPARATHYROIDISM: ICD-10-CM

## 2019-10-01 DIAGNOSIS — E55.9 VITAMIN D DEFICIENCY: ICD-10-CM

## 2019-10-01 DIAGNOSIS — Z00.00 ENCOUNTER FOR PREVENTIVE HEALTH EXAMINATION: Primary | ICD-10-CM

## 2019-10-01 DIAGNOSIS — I48.91 ATRIAL FIBRILLATION, UNSPECIFIED TYPE: ICD-10-CM

## 2019-10-01 DIAGNOSIS — M81.0 AGE-RELATED OSTEOPOROSIS WITHOUT CURRENT PATHOLOGICAL FRACTURE: ICD-10-CM

## 2019-10-01 DIAGNOSIS — M81.0 OP (OSTEOPOROSIS): ICD-10-CM

## 2019-10-01 DIAGNOSIS — I10 ESSENTIAL HYPERTENSION: ICD-10-CM

## 2019-10-01 PROCEDURE — 3078F PR MOST RECENT DIASTOLIC BLOOD PRESSURE < 80 MM HG: ICD-10-PCS | Mod: CPTII,S$GLB,, | Performed by: INTERNAL MEDICINE

## 2019-10-01 PROCEDURE — 3075F SYST BP GE 130 - 139MM HG: CPT | Mod: CPTII,S$GLB,, | Performed by: INTERNAL MEDICINE

## 2019-10-01 PROCEDURE — 71046 X-RAY EXAM CHEST 2 VIEWS: CPT | Mod: TC,FY,PO

## 2019-10-01 PROCEDURE — 71046 XR CHEST PA AND LATERAL: ICD-10-PCS | Mod: 26,,, | Performed by: RADIOLOGY

## 2019-10-01 PROCEDURE — 71046 X-RAY EXAM CHEST 2 VIEWS: CPT | Mod: 26,,, | Performed by: RADIOLOGY

## 2019-10-01 PROCEDURE — 3075F PR MOST RECENT SYSTOLIC BLOOD PRESS GE 130-139MM HG: ICD-10-PCS | Mod: CPTII,S$GLB,, | Performed by: INTERNAL MEDICINE

## 2019-10-01 PROCEDURE — 3078F DIAST BP <80 MM HG: CPT | Mod: CPTII,S$GLB,, | Performed by: INTERNAL MEDICINE

## 2019-10-01 PROCEDURE — 99499 RISK ADDL DX/OHS AUDIT: ICD-10-PCS | Mod: S$GLB,,, | Performed by: INTERNAL MEDICINE

## 2019-10-01 PROCEDURE — 99999 PR PBB SHADOW E&M-EST. PATIENT-LVL IV: CPT | Mod: PBBFAC,,, | Performed by: INTERNAL MEDICINE

## 2019-10-01 PROCEDURE — 99397 PER PM REEVAL EST PAT 65+ YR: CPT | Mod: S$GLB,,, | Performed by: INTERNAL MEDICINE

## 2019-10-01 PROCEDURE — 99397 PR PREVENTIVE VISIT,EST,65 & OVER: ICD-10-PCS | Mod: S$GLB,,, | Performed by: INTERNAL MEDICINE

## 2019-10-01 PROCEDURE — 99999 PR PBB SHADOW E&M-EST. PATIENT-LVL IV: ICD-10-PCS | Mod: PBBFAC,,, | Performed by: INTERNAL MEDICINE

## 2019-10-01 PROCEDURE — 99499 UNLISTED E&M SERVICE: CPT | Mod: S$GLB,,, | Performed by: INTERNAL MEDICINE

## 2019-10-01 RX ORDER — ALENDRONATE SODIUM 70 MG/1
TABLET ORAL
Qty: 12 TABLET | Refills: 3 | Status: SHIPPED | OUTPATIENT
Start: 2019-10-01 | End: 2020-08-28 | Stop reason: SDUPTHER

## 2019-10-01 NOTE — PROGRESS NOTES
"Subjective:      Patient ID: Lorenzo Ospina is a 79 y.o. female.    Chief Complaint: Annual Exam and Cough (mucus coming up )      HPI  Here for f/u medical problems and preventive exam.  Recent dx AFib, feels well on new meds.    Cough each morning and night, cream colored material, for years.  Not better on flonase.  No f/c/sw.  No cp/sob/palp.  BMs normal.  Urine normal.  Taking vit D.  Didn't return for f/u hyperPTH.  Itchy rash on abdomen, comes and goes with heat.  Stopped fosamax 3 months ago.  For past 3 months, thigh pain and "tiredness", castro in bed but also during day sometimes.      HM: 10/15 fluvax ref more, 6/17 [id number], refuses [id number] unknown reason even after discussion, 9/19 MMG/ Vasirreddy follows, 8/18 BMD rep 2y,  2012 Cscope saw Gastro in 2017 and says due in 2022.     Review of Systems   Constitutional: Negative for appetite change, chills, diaphoresis and fever.   HENT: Negative for congestion, ear pain, rhinorrhea, sinus pressure and sore throat.    Respiratory: Negative for cough, chest tightness and shortness of breath.    Cardiovascular: Negative for chest pain and palpitations.   Gastrointestinal: Negative for blood in stool, constipation, diarrhea, nausea and vomiting.   Genitourinary: Negative for dysuria, frequency, hematuria, menstrual problem, urgency and vaginal discharge.   Musculoskeletal: Negative for arthralgias.   Skin: Negative for rash.   Neurological: Negative for dizziness and headaches.   Psychiatric/Behavioral: Negative for sleep disturbance. The patient is not nervous/anxious.          Objective:   /70 (BP Location: Left arm, Patient Position: Sitting, BP Method: Medium (Manual))   Pulse 107   Temp 97.5 °F (36.4 °C) (Temporal)   Ht 5' 2" (1.575 m)   Wt 75.7 kg (166 lb 14.2 oz)   SpO2 97%   BMI 30.52 kg/m²     Physical Exam   Constitutional: She is oriented to person, place, and time. She appears well-developed and well-nourished.   HENT:   Right Ear: " External ear normal. Tympanic membrane is not injected.   Left Ear: External ear normal. Tympanic membrane is not injected.   Mouth/Throat: Oropharynx is clear and moist.   Eyes: Conjunctivae are normal.   Neck: Normal range of motion. Neck supple. No thyromegaly present.   Cardiovascular: Normal rate, regular rhythm and intact distal pulses. Exam reveals no gallop and no friction rub.   No murmur heard.  Pulmonary/Chest: Effort normal and breath sounds normal. She has no wheezes. She has no rales.   Abdominal: Soft. Bowel sounds are normal. She exhibits no mass. There is no tenderness.   Musculoskeletal: She exhibits no edema.   Lymphadenopathy:     She has no cervical adenopathy.   Neurological: She is alert and oriented to person, place, and time.   Skin: Skin is warm. No rash noted.   Psychiatric: She has a normal mood and affect.         Results for SAJI KENDALL (MRN [id number]) as of 10/1/2019 14:31   Ref. Range 9/13/2019 09:51   WBC Latest Ref Range: 3.90 - 12.70 K/uL 7.96   RBC Latest Ref Range: 4.00 - 5.40 M/uL 5.13   Hemoglobin Latest Ref Range: 12.0 - 16.0 g/dL 14.9   Hematocrit Latest Ref Range: 37.0 - 48.5 % 45.8   MCV Latest Ref Range: 82 - 98 fL 89   MCH Latest Ref Range: 27.0 - 31.0 pg 29.0   MCHC Latest Ref Range: 32.0 - 36.0 g/dL 32.5   RDW Latest Ref Range: 11.5 - 14.5 % 13.2   Platelets Latest Ref Range: 150 - 350 K/uL 212   MPV Latest Ref Range: 9.2 - 12.9 fL 11.5   Gran% Latest Ref Range: 38.0 - 73.0 % 67.3   Gran # (ANC) Latest Ref Range: 1.8 - 7.7 K/uL 5.4   Lymph% Latest Ref Range: 18.0 - 48.0 % 25.4   Lymph # Latest Ref Range: 1.0 - 4.8 K/uL 2.0   Mono% Latest Ref Range: 4.0 - 15.0 % 5.8   Mono # Latest Ref Range: 0.3 - 1.0 K/uL 0.5   Eosinophil% Latest Ref Range: 0.0 - 8.0 % 0.8   Eos # Latest Ref Range: 0.0 - 0.5 K/uL 0.1   Basophil% Latest Ref Range: 0.0 - 1.9 % 0.4   Baso # Latest Ref Range: 0.00 - 0.20 K/uL 0.03   nRBC Latest Ref Range: 0 /100 WBC 0   Differential Method Unknown  Automated   Immature Grans (Abs) Latest Ref Range: 0.00 - 0.04 K/uL 0.02   Immature Granulocytes Latest Ref Range: 0.0 - 0.5 % 0.3   Sodium Latest Ref Range: 136 - 145 mmol/L 142   Potassium Latest Ref Range: 3.5 - 5.1 mmol/L 3.8   Chloride Latest Ref Range: 95 - 110 mmol/L 105   CO2 Latest Ref Range: 23 - 29 mmol/L 29   Anion Gap Latest Ref Range: 8 - 16 mmol/L 8   BUN, Bld Latest Ref Range: 8 - 23 mg/dL 13   Creatinine Latest Ref Range: 0.5 - 1.4 mg/dL 0.9   eGFR if non African American Latest Ref Range: >60 mL/min/1.73 m^2 >60.0   eGFR if African American Latest Ref Range: >60 mL/min/1.73 m^2 >60.0   Glucose Latest Ref Range: 70 - 110 mg/dL 130 (H)   Calcium Latest Ref Range: 8.7 - 10.5 mg/dL 11.0 (H)   Alkaline Phosphatase Latest Ref Range: 55 - 135 U/L 59   PROTEIN TOTAL Latest Ref Range: 6.0 - 8.4 g/dL 7.2   Albumin Latest Ref Range: 3.5 - 5.2 g/dL 4.2   BILIRUBIN TOTAL Latest Ref Range: 0.1 - 1.0 mg/dL 0.9   AST Latest Ref Range: 10 - 40 U/L 15   ALT Latest Ref Range: 10 - 44 U/L 14   BNP Latest Ref Range: 0 - 99 pg/mL 253 (H)   TSH Latest Ref Range: 0.400 - 4.000 uIU/mL 0.993     Assessment:       1. Encounter for preventive health examination    2. Other hyperlipidemia    3. TIA (transient ischemic attack)    4. Vitamin D deficiency    5. Essential hypertension    6. History of right breast cancer    7. Atrial fibrillation, unspecified type    8. Age-related osteoporosis without current pathological fracture    9. Nasal drainage    10. Hyperparathyroidism    11. Cough    12. Dermatitis    13. OP (osteoporosis)          Plan:     Encounter for preventive health examination- utd.    Other hyperlipidemia on lipitor, poss causing thigh pain- CPK, esr, crp now.  -     Lipid panel; Future; Expected date: 10/01/2019    TIA (transient ischemic attack)    Vitamin D deficiency- check lab.    Essential hypertension- stable, cont rx.    History of right breast cancer- utd screening.    Atrial fibrillation, unspecified  type- on eliquis, plavix, metoprolol.    Age-related osteoporosis without current pathological fracture- restart fosamax.    Nasal drainage/cough-  -     Ambulatory referral to ENT, CXR now.    Hyperparathyroidism- check lab now.  -     PTH, intact; Future; Expected date: 10/15/2019  -     Calcium, ionized; Future; Expected date: 10/01/2019  -     Vitamin D; Future

## 2019-10-02 ENCOUNTER — TELEPHONE (OUTPATIENT)
Dept: FAMILY MEDICINE | Facility: CLINIC | Age: 79
End: 2019-10-02

## 2019-10-02 NOTE — TELEPHONE ENCOUNTER
Informed pt labs look very good except for calcium up and high parathyroid- need Endo appt tomorrow for opinion on treatment of this. Pt verbalized understanding. -KT-

## 2019-10-02 NOTE — PROGRESS NOTES
Referring Provider:    Lorena Pablo Md  [address] [place] [address]  Subjective:   Patient: Lorenzo Ospina [id number], :1940   Visit date:10/3/2019 1:01 PM    Chief Complaint:  High calcium    HPI:  Lorenzo is a 79 y.o. female who I was asked to see in consultation for evaluation of the following issue(s):    Patient reports the following symptoms commonly associated with hyperparathyroidism (negative unless checked off)    [x] Fragile bones that easily fracture (osteoporosis)  [] Kidney stones  [] Excessive urination  [] Abdominal pain  [x] Tiring easily or weakness  [x] Depression or forgetfulness  [x] Bone and joint pain  [] Frequent complaints of illness with no apparent cause  [] Nausea, vomiting or loss of appetite      Lab Results   Component Value Date    TSH 0.993 2019    TSH 0.960 2018    TSH 0.633 2017    TSH 0.436 2016    TSH 0.864 2008    .0 (H) 10/01/2019    .0 (H) 2018    .0 (H) 2018    .0 (H) 2018    .0 (H) 10/10/2017    CALCIUM 11.0 (H) 2019    CALCIUM 11.2 (H) 2018    CALCIUM 11.0 (H) 2018    CALCIUM 11.0 (H) 2018    CALCIUM 10.5 10/10/2017           Review of Systems:  Negative unless checked off.  Gen:  []fever   [x]fatigue  HENT:  []nosebleeds  []dental problem   Eyes:  []photophobia  []visual disturbance  Resp:  []chest tightness []wheezing  Card:  []chest pain  []leg swelling  GI:  []abdominal pain []blood in stool  :  []dysuria  []hematuria  Musc:  []joint swelling  []gait problem  Skin:  []color change  []pallor  Neuro:  []seizures  []numbness  Hem:  []bruise/bleed easily  Psych:  []hallucinations  []behavioral problems  Allergy/Imm: is allergic to medrol [methylprednisolone].    Her meds, allergies, medical, surgical, social & family histories were reviewed & updated:  -     She has a current medication list which includes the following prescription(s):  alendronate, apixaban, apixaban, atorvastatin, clopidogrel, fish oil-omega-3 fatty acids, fluticasone propionate, levocetirizine, metoprolol succinate, multivit-iron-min-folic acid, triamterene-hydrochlorothiazide 37.5-25 mg, and vitamin d.  -     She  has a past medical history of Breast cancer, Hyperlipidemia, Hypertension, OP (osteoporosis), TIA (transient ischemic attack), and Toe fracture.   -     She does not have any pertinent problems on file.   -     She  has a past surgical history that includes Breast lumpectomy and Total abdominal hysterectomy.  -     She  reports that she quit smoking about 25 years ago. She has never used smokeless tobacco. She reports that she drinks alcohol.  -     Her family history is not on file.  -     She is allergic to medrol [methylprednisolone].    Objective:   Physical Exam:  Vitals:  /68   Pulse 89   Temp 98.3 °F (36.8 °C) (Tympanic)   Wt 75.1 kg (165 lb 9.1 oz)   BMI 30.28 kg/m²   General appearance:  Well developed, well nourished    Eyes:  Extraocular motions intact, PERRL    Communication:  no hoarseness, no dysphonia    Ears:  Otoscopy of external auditory canals and tympanic membranes was normal, clinical speech reception thresholds grossly intact, no mass/lesion of auricle.  Nose:  No masses/lesions of external nose, nasal mucosa, septum, and turbinates were within normal limits.  Mouth:  No mass/lesion of lips, teeth, gums, hard/soft palate, tongue, tonsils, or oropharynx.    Cardiovascular:  No pedal edema; Radial Pulses +2     Neck & Lymphatics:  No cervical lymphadenopathy, no neck mass/crepitus/ asymmetry, trachea is midline, no thyroid enlargement/tenderness/mass.    Psych: Oriented x3,  Alert with normal mood and affect.     Respiration/Chest:  Symmetric expansion during respiration, normal respiratory effort.    Skin:  Warm and intact. No ulcerations of face, scalp, neck.      ULTRASOUND:  No results found for this or any previous  visit.    SESTAMIBI:  No results found for this or any previous visit.    PATHOLOGY:  None        Assessment & Plan:   Lorenzo was seen today for other.    Diagnoses and all orders for this visit:    Primary hyperparathyroidism  -     CT Neck Parathyroid (4D); Future  -     US Soft Tissue Head Neck Thyroid; Future  -     NM Parathyroid Scan with SPECT Routine; Future          [x] Primary Hyperparathyroidism- elevated PTH & Calcium, low/normal phosphate  [] Near normal Calcium with elevated PTH- Likely Vitamin D deficiency, Vitamin D decreased activation, decreased bone resorption (bisphosphonates) or kidney disease  []  Tertiary Hyperparathyroidism- decreased kidney function, high PTH, high Calcium, high Phosphate      Patients with symptomatic primary hyperparathyroidism (PHPT) (nephrolithiasis, symptomatic hypercalcemia) should have parathyroid surgery, which is the only definitive therapy. Parathyroidectomy is an effective therapy that cures the disease, decreases the risk of kidney stones, improves bone mineral density (BMD), and may decrease fracture risk and modestly improve some quality of life measurements.      Some patients may have nonspecific symptoms that are difficult to quantify. These symptoms include fatigue, a sense of weakness, mild depression, and memory impairment. Because of the largely subjective nature of these symptoms, the distinction between asymptomatic and symptomatic PHPT is not always clear-cut. Patients may deny symptoms, whereas a family member may say the patient has been mildly symptomatic in some way. Nonspecific neuropsychiatric symptoms alone are not indications for surgery        For asymptomatic individuals who meet the Fourth International Workshop on Asymptomatic Primary Hyperparathyroidism guidelines, we suggest surgical intervention as opposed to observation. Criteria for surgery include:  ?Serum calcium concentration of 1.0 mg/dL (0.25 mmol/L) or more above the upper  limit of normal.  ?Estimated glomerular filtration rate (eGFR) <60 mL/min.  ?Bone density at the hip, lumbar spine, or distal radius that is more than 2.5 standard deviations below peak bone mass (T-score <-2.5) and/or previous asymptomatic vertebral fracture (by radiograph, computed tomography [CT], magnetic resonance imaging [MRI], or vertebral fracture assessment).  ?Twenty-four-hour urinary calcium >400 mg/day (>10 mmol/day). Some experts suggest that a stone risk profile is a useful adjunct for making a decision about surgery in those with urinary calcium excretion >400 mg/d, but there are limited to data to support this.   ?Nephrolithiasis or nephrocalcinosis by radiograph, ultrasound, or CT.  ?Age less than 50 years.  Patients with asymptomatic PHPT who do not meet surgical intervention criteria may still choose parathyroidectomy because it is the only definitive therapy    My recommendation is     4D CT  Sestamibi  Thyroid ultrasound    Follow up after imaging

## 2019-10-02 NOTE — TELEPHONE ENCOUNTER
----- Message from Lorena Fiore MD sent at 10/2/2019  9:57 AM CDT -----  Please inform pt labs look very good except for calcium up and high parathyroid- need Endo appt tomorrow for opinion on treatment of this.  SM

## 2019-10-02 NOTE — TELEPHONE ENCOUNTER
----- Message from Lorena Fiore MD sent at 10/1/2019  5:33 PM CDT -----  Please inform pt lungs are clear on CXR.  SM

## 2019-10-03 ENCOUNTER — OFFICE VISIT (OUTPATIENT)
Dept: OTOLARYNGOLOGY | Facility: CLINIC | Age: 79
End: 2019-10-03
Payer: MEDICARE

## 2019-10-03 ENCOUNTER — OFFICE VISIT (OUTPATIENT)
Dept: ENDOCRINOLOGY | Facility: CLINIC | Age: 79
End: 2019-10-03
Payer: MEDICARE

## 2019-10-03 ENCOUNTER — TELEPHONE (OUTPATIENT)
Dept: FAMILY MEDICINE | Facility: CLINIC | Age: 79
End: 2019-10-03

## 2019-10-03 VITALS
TEMPERATURE: 98 F | SYSTOLIC BLOOD PRESSURE: 121 MMHG | BODY MASS INDEX: 30.28 KG/M2 | WEIGHT: 165.56 LBS | DIASTOLIC BLOOD PRESSURE: 68 MMHG | HEART RATE: 89 BPM

## 2019-10-03 VITALS
WEIGHT: 164.69 LBS | BODY MASS INDEX: 30.31 KG/M2 | HEIGHT: 62 IN | TEMPERATURE: 98 F | SYSTOLIC BLOOD PRESSURE: 130 MMHG | DIASTOLIC BLOOD PRESSURE: 78 MMHG

## 2019-10-03 DIAGNOSIS — E21.3 HYPERPARATHYROIDISM: ICD-10-CM

## 2019-10-03 DIAGNOSIS — I48.91 ATRIAL FIBRILLATION, UNSPECIFIED TYPE: ICD-10-CM

## 2019-10-03 DIAGNOSIS — E21.0 PRIMARY HYPERPARATHYROIDISM: Primary | ICD-10-CM

## 2019-10-03 DIAGNOSIS — E83.52 HYPERCALCEMIA: Primary | ICD-10-CM

## 2019-10-03 PROCEDURE — 1101F PT FALLS ASSESS-DOCD LE1/YR: CPT | Mod: CPTII,S$GLB,, | Performed by: INTERNAL MEDICINE

## 2019-10-03 PROCEDURE — 99499 RISK ADDL DX/OHS AUDIT: ICD-10-PCS | Mod: S$GLB,,, | Performed by: INTERNAL MEDICINE

## 2019-10-03 PROCEDURE — 99999 PR PBB SHADOW E&M-EST. PATIENT-LVL III: CPT | Mod: PBBFAC,,, | Performed by: OTOLARYNGOLOGY

## 2019-10-03 PROCEDURE — 99204 OFFICE O/P NEW MOD 45 MIN: CPT | Mod: S$GLB,,, | Performed by: OTOLARYNGOLOGY

## 2019-10-03 PROCEDURE — 3078F PR MOST RECENT DIASTOLIC BLOOD PRESSURE < 80 MM HG: ICD-10-PCS | Mod: CPTII,S$GLB,, | Performed by: OTOLARYNGOLOGY

## 2019-10-03 PROCEDURE — 99999 PR PBB SHADOW E&M-EST. PATIENT-LVL III: CPT | Mod: PBBFAC,,, | Performed by: INTERNAL MEDICINE

## 2019-10-03 PROCEDURE — 99999 PR PBB SHADOW E&M-EST. PATIENT-LVL III: ICD-10-PCS | Mod: PBBFAC,,, | Performed by: OTOLARYNGOLOGY

## 2019-10-03 PROCEDURE — 3078F PR MOST RECENT DIASTOLIC BLOOD PRESSURE < 80 MM HG: ICD-10-PCS | Mod: CPTII,S$GLB,, | Performed by: INTERNAL MEDICINE

## 2019-10-03 PROCEDURE — 3075F PR MOST RECENT SYSTOLIC BLOOD PRESS GE 130-139MM HG: ICD-10-PCS | Mod: CPTII,S$GLB,, | Performed by: INTERNAL MEDICINE

## 2019-10-03 PROCEDURE — 1101F PT FALLS ASSESS-DOCD LE1/YR: CPT | Mod: CPTII,S$GLB,, | Performed by: OTOLARYNGOLOGY

## 2019-10-03 PROCEDURE — 3074F PR MOST RECENT SYSTOLIC BLOOD PRESSURE < 130 MM HG: ICD-10-PCS | Mod: CPTII,S$GLB,, | Performed by: OTOLARYNGOLOGY

## 2019-10-03 PROCEDURE — 99204 OFFICE O/P NEW MOD 45 MIN: CPT | Mod: S$GLB,,, | Performed by: INTERNAL MEDICINE

## 2019-10-03 PROCEDURE — 1101F PR PT FALLS ASSESS DOC 0-1 FALLS W/OUT INJ PAST YR: ICD-10-PCS | Mod: CPTII,S$GLB,, | Performed by: OTOLARYNGOLOGY

## 2019-10-03 PROCEDURE — 99499 UNLISTED E&M SERVICE: CPT | Mod: S$GLB,,, | Performed by: OTOLARYNGOLOGY

## 2019-10-03 PROCEDURE — 3078F DIAST BP <80 MM HG: CPT | Mod: CPTII,S$GLB,, | Performed by: INTERNAL MEDICINE

## 2019-10-03 PROCEDURE — 3078F DIAST BP <80 MM HG: CPT | Mod: CPTII,S$GLB,, | Performed by: OTOLARYNGOLOGY

## 2019-10-03 PROCEDURE — 3074F SYST BP LT 130 MM HG: CPT | Mod: CPTII,S$GLB,, | Performed by: OTOLARYNGOLOGY

## 2019-10-03 PROCEDURE — 99499 UNLISTED E&M SERVICE: CPT | Mod: S$GLB,,, | Performed by: INTERNAL MEDICINE

## 2019-10-03 PROCEDURE — 99499 RISK ADDL DX/OHS AUDIT: ICD-10-PCS | Mod: S$GLB,,, | Performed by: OTOLARYNGOLOGY

## 2019-10-03 PROCEDURE — 1101F PR PT FALLS ASSESS DOC 0-1 FALLS W/OUT INJ PAST YR: ICD-10-PCS | Mod: CPTII,S$GLB,, | Performed by: INTERNAL MEDICINE

## 2019-10-03 PROCEDURE — 3075F SYST BP GE 130 - 139MM HG: CPT | Mod: CPTII,S$GLB,, | Performed by: INTERNAL MEDICINE

## 2019-10-03 PROCEDURE — 99204 PR OFFICE/OUTPT VISIT, NEW, LEVL IV, 45-59 MIN: ICD-10-PCS | Mod: S$GLB,,, | Performed by: INTERNAL MEDICINE

## 2019-10-03 PROCEDURE — 99204 PR OFFICE/OUTPT VISIT, NEW, LEVL IV, 45-59 MIN: ICD-10-PCS | Mod: S$GLB,,, | Performed by: OTOLARYNGOLOGY

## 2019-10-03 PROCEDURE — 99999 PR PBB SHADOW E&M-EST. PATIENT-LVL III: ICD-10-PCS | Mod: PBBFAC,,, | Performed by: INTERNAL MEDICINE

## 2019-10-03 RX ORDER — METOPROLOL SUCCINATE 50 MG/1
50 TABLET, EXTENDED RELEASE ORAL 2 TIMES DAILY
Qty: 60 TABLET | Refills: 11 | Status: SHIPPED | OUTPATIENT
Start: 2019-10-03 | End: 2019-10-04 | Stop reason: SDUPTHER

## 2019-10-03 NOTE — TELEPHONE ENCOUNTER
----- Message from Prasanth Larson MD sent at 10/3/2019  5:12 PM CDT -----  Lorena,  Can you help in changing patient's blood pressure medication  to none thiazide med?  Will see if the change will help to improve the calcium level any.  Thanks a lot!    Dr. larson

## 2019-10-03 NOTE — LETTER
October 3, 2019      Lorena Fiore MD  [address] [place] [address]           [facility]  [address] [facility]  [place] [address]  Phone: [phone]  Fax: [phone]          Patient: Lorenzo Ospina   MR Number: [id number]   YOB: 1940   Date of Visit: 10/3/2019       Dear Dr. Lorena Fiore:    Thank you for referring Lorenzo Ospina to me for evaluation. Attached you will find relevant portions of my assessment and plan of care.    If you have questions, please do not hesitate to call me. I look forward to following Lorenzo Ospina along with you.    Sincerely,    Prasanth Larson MD    Enclosure  CC:  No Recipients    If you would like to receive this communication electronically, please contact [email or web address] or [phone] to request more information on [employer] Link access.    For providers and/or their staff who would like to refer a patient to [facility], please contact us through our one-stop-shop provider referral line, [facility], at [phone].    If you feel you have received this communication in error or would no longer like to receive these types of communications, please e-mail [email or web address]

## 2019-10-03 NOTE — LETTER
October 3, 2019      Lorena Fiore MD  [address] [facility]  [place] [address]           [facility]  [address] [facility]  [place] [address]  Phone: [phone]  Fax: [phone]          Patient: Lorenzo Ospina   MR Number: [id number]   YOB: 1940   Date of Visit: 10/3/2019       Dear Dr. Lorena Fiore:    Thank you for referring Lorenzo Ospina to me for evaluation. Attached you will find relevant portions of my assessment and plan of care.    If you have questions, please do not hesitate to call me. I look forward to following Lorenzo Ospina along with you.    Sincerely,    Martinez Monique MD    Enclosure  CC:  No Recipients    If you would like to receive this communication electronically, please contact [email or web address] or [phone] to request more information on [employer] Link access.    For providers and/or their staff who would like to refer a patient to [facility], please contact us through our one-stop-shop provider referral line, [facility], at [phone].    If you feel you have received this communication in error or would no longer like to receive these types of communications, please e-mail [email or web address]

## 2019-10-03 NOTE — PROGRESS NOTES
Ref. Range 3/13/2017 14:16   Calcium, Urine Latest Ref Range: 0.0 - 15.0 mg/dL 7.0   Calcium, 24H Urine Latest Ref Range: 4 - 12 mg/Hr 7   CA Urine (mg/Spec) Latest Units: mg/Spec 166      Ref. Range 10/10/2017 10:09 6/20/2018 07:40 8/1/2018 08:39 11/2/2018 09:31 9/13/2019 09:51   Calcium Latest Ref Range: 8.7 - 10.5 mg/dL 10.5 11.0 (H) 11.0 (H) 11.2 (H) 11.0 (H)   Results for SAJI KENDALL (MRN [id number]) as of 10/3/2019 13:27   Ref. Range 10/10/2017 10:09 6/20/2018 07:40 8/1/2018 08:39 11/2/2018 09:31 10/1/2019 16:25   PTH Latest Ref Range: 9.0 - 77.0 pg/mL 158.0 (H) 141.0 (H) 230.0 (H) 173.0 (H) 216.0 (H)      Ref. Range 2/3/2016 14:47 3/8/2017 12:33 5/30/2017 08:32 6/20/2018 07:40 10/1/2019 16:25   Vit D, 25-Hydroxy Latest Ref Range: 30 - 96 ng/mL 23 (L) 28 (L) 27 (L) 31 55         Referring Provider:  Lorena Fiore MD    PCP:  Lorena Fiore MD    Reason for referral:   Hyperparathyroidism    Lorenzo Kendall 79 y.o. female    CC:  Abnormal blood test.       HPI:  Lab Results   Component Value Date    .0 (H) 10/01/2019    CALCIUM 11.0 (H) 09/13/2019    CAION 1.53 (H) 10/01/2019       Pt was found to have elevated level of PTH and calcium  ? No Fracture history  + Kidney stone  Mild cough with minimal sputum for several months  Patient said she was told she had allergy but her cough is not improving  Feeling some fatigue in right leg sometimes when she is lying down  Not taking calcium or vitamin-D    No complaints of dysphagia, chest pain, shortness of breath, nausea, vomiting, or edema  On blood pressure medication, including thiazide  On blood thinner and on beta-blocker  Atrial fibrillation diagnosed recently    Fx L ankle 5 y ago    Off Fosamax x 4    Past Medical History:   Diagnosis Date    Breast cancer     right, dx 2008.  Dr. Callaway follows.s/p lumpectomy and arimidex x 5y.    Hyperlipidemia     Hypertension     OP (osteoporosis)     on bisphos x 2y.    TIA (transient ischemic  attack)     Toe fracture        Past Surgical History:   Procedure Laterality Date    BREAST LUMPECTOMY      right 2008    TOTAL ABDOMINAL HYSTERECTOMY      no cancer       Social History     Socioeconomic History    Marital status:      Spouse name: Not on file    Number of children: Not on file    Years of education: Not on file    Highest education level: Not on file   Occupational History    Not on file   Social Needs    Financial resource strain: Not on file    Food insecurity:     Worry: Not on file     Inability: Not on file    Transportation needs:     Medical: Not on file     Non-medical: Not on file   Tobacco Use    Smoking status: Former Smoker     Last attempt to quit: 1994     Years since quittin.0    Smokeless tobacco: Never Used   Substance and Sexual Activity    Alcohol use: Yes    Drug use: Not on file    Sexual activity: Not on file   Lifestyle    Physical activity:     Days per week: Not on file     Minutes per session: Not on file    Stress: Not on file   Relationships    Social connections:     Talks on phone: Not on file     Gets together: Not on file     Attends Jainism service: Not on file     Active member of club or organization: Not on file     Attends meetings of clubs or organizations: Not on file     Relationship status: Not on file   Other Topics Concern    Not on file   Social History Narrative    Not on file         ROS:   Included in HPI  ROS otherwise normal except for what is mentioned in the PMH,PSH, and HPI    PE:  Vitals:    10/03/19 1326   BP: 130/78   Temp: 98.3 °F (36.8 °C)     Alert and oriented  No acute distress  No proptosis or conjunctivitis  Nose nl  No rash on tongue; + teeth  No goitre by inspection  Thyroid gland is not palpable  No cervical lymphadenopathy  Heart reg, no gallop  Lungs cta, no wheezing  Abd soft, no tnd  No edema in lower legs  + bone tnd in lower legs  ?no  Kyphosis  No rash  No bruises  No tremor in  hands  Behavior normal  Speech normal    Body mass index is 30.12 kg/m².      Lab:    Lab Results   Component Value Date    .0 (H) 10/01/2019    CALCIUM 11.0 (H) 09/13/2019    CAION 1.53 (H) 10/01/2019     Lab Results   Component Value Date    TSH 0.993 09/13/2019      Ref. Range 3/8/2017 12:33 6/20/2018 07:40 9/13/2019 09:51   Alkaline Phosphatase Latest Ref Range: 55 - 135 U/L 51 (L) 46 (L) 59     BMP  Lab Results   Component Value Date     09/13/2019    K 3.8 09/13/2019     09/13/2019    CO2 29 09/13/2019    BUN 13 09/13/2019    CREATININE 0.9 09/13/2019    CALCIUM 11.0 (H) 09/13/2019    ANIONGAP 8 09/13/2019    ESTGFRAFRICA >60.0 09/13/2019    EGFRNONAA >60.0 09/13/2019     COMPARISON:  04/27/2016    FINDINGS:  The L1 to L4 vertebral bone mineral density is equal to 1.305 g/cm squared with a T score of 1.0.  There has been a 2.6% increase relative to the prior study.    The left femoral neck bone mineral density is equal to 0.832 g/cm squared with a T score of -1.5.  There has been  a 2.2% increase relative to the prior study.    There is a 17.2% risk of a major osteoporotic fracture and a 3.3% risk of hip fracture in the next 10 years (FRAX).      Impression       Osteopenia         A/P:  Hypercalcemia  Hyperparathyroidism  -     Calcium, Timed Urine [facility]; 24 Hours; Future  History of osteopenia  History of treatment with Fosamax for 5 years  Off Fosamax for 4 months  Benefits and risks of treatment with surgery discussed  Patient will likely benefit from surgery to remove 1 or more parathyroid gland depending on the cause of her hyperparathyroidism  If 24 hr urine calcium comes back normal and serum calcium continued to be stable, patient can wait without having surgery,  but with having increased risk of developing kidney stone and osteoporosis.  I recommend that patient blood pressure medication Dyazide be changed to none thiazide medication  So patient is advised to follow-up with her  primary care physician    Atrial fibrillation was diagnosed recently.  Patient is on beta-blocker and on blood thinner.     Pt said for us to talk to her daughter in law reg test results.  [phone]    Later in visit patient said she visited Dr. Moinque this morning, and surgery was recommended, and several imaging tests were ordered.    Appt in 4 months.    Pt understands the plan and instructions.

## 2019-10-03 NOTE — TELEPHONE ENCOUNTER
Please call pt or daughter may be better- need to change BP meds per Endocrinologist recommendation:  Stop triamterene/hydrochlorothiazide.  Increase metoprolol to 50mg bid (use up 25mg tablets by taking 2 together twice a day), then I sent in new rx for 50mg tabs.  SM

## 2019-10-04 ENCOUNTER — TELEPHONE (OUTPATIENT)
Dept: FAMILY MEDICINE | Facility: CLINIC | Age: 79
End: 2019-10-04

## 2019-10-04 DIAGNOSIS — I48.91 ATRIAL FIBRILLATION, UNSPECIFIED TYPE: ICD-10-CM

## 2019-10-04 RX ORDER — METOPROLOL SUCCINATE 100 MG/1
100 TABLET, EXTENDED RELEASE ORAL 2 TIMES DAILY
Qty: 30 TABLET | Refills: 11 | Status: ON HOLD | OUTPATIENT
Start: 2019-10-04 | End: 2019-11-05 | Stop reason: HOSPADM

## 2019-10-04 NOTE — TELEPHONE ENCOUNTER
S/w pt's daughter in law, Diane Ospina.  Informed that Dr. Fiore's recommendation from the Endocrinologist is to stop triamterene-hctz..  Start Metoprolol 50mg twice daily.  Instructed pt may take 2- of the 25mg tablets together twice daily and that a new rx for Metoprolol 50mg twice daily has been sent to the pharmacy.  Diane read back instructions and verbalized understanding./rpr

## 2019-10-04 NOTE — TELEPHONE ENCOUNTER
S/w pt's daughter regarding medications. Pt would like to know if she would completely off the triamterene-hctz. If bp gets out of control what would she take? -KT-

## 2019-10-04 NOTE — TELEPHONE ENCOUNTER
----- Message from Toan Perez sent at 10/4/2019  3:26 PM CDT -----  Contact: Diane   Would like callback in reference to conversation this morning about changing pt's medications           [phone]

## 2019-10-07 ENCOUNTER — LAB VISIT (OUTPATIENT)
Dept: LAB | Facility: HOSPITAL | Age: 79
End: 2019-10-07
Payer: MEDICARE

## 2019-10-07 DIAGNOSIS — E21.3 HYPERPARATHYROIDISM: ICD-10-CM

## 2019-10-07 LAB
CALCIUM 24H UR-MRATE: 8 MG/HR (ref 4–12)
CALCIUM UR-MCNC: 10.1 MG/DL (ref 0–15)
CALCIUM URINE (MG/SPEC): 184 MG/SPEC
URINE COLLECTION DURATION: 24 HR
URINE VOLUME: 1825 ML

## 2019-10-07 PROCEDURE — 82340 ASSAY OF CALCIUM IN URINE: CPT

## 2019-10-16 ENCOUNTER — OFFICE VISIT (OUTPATIENT)
Dept: CARDIOLOGY | Facility: CLINIC | Age: 79
End: 2019-10-16
Payer: MEDICARE

## 2019-10-16 VITALS
BODY MASS INDEX: 30.83 KG/M2 | OXYGEN SATURATION: 98 % | HEIGHT: 62 IN | HEART RATE: 80 BPM | DIASTOLIC BLOOD PRESSURE: 62 MMHG | SYSTOLIC BLOOD PRESSURE: 122 MMHG | WEIGHT: 167.56 LBS

## 2019-10-16 DIAGNOSIS — E55.9 VITAMIN D DEFICIENCY: ICD-10-CM

## 2019-10-16 DIAGNOSIS — R00.2 PALPITATION: ICD-10-CM

## 2019-10-16 DIAGNOSIS — I48.91 ATRIAL FIBRILLATION, UNSPECIFIED TYPE: Primary | ICD-10-CM

## 2019-10-16 DIAGNOSIS — G45.9 TIA (TRANSIENT ISCHEMIC ATTACK): ICD-10-CM

## 2019-10-16 DIAGNOSIS — E78.49 OTHER HYPERLIPIDEMIA: ICD-10-CM

## 2019-10-16 DIAGNOSIS — I10 ESSENTIAL HYPERTENSION: ICD-10-CM

## 2019-10-16 PROCEDURE — 99214 OFFICE O/P EST MOD 30 MIN: CPT | Mod: S$GLB,,, | Performed by: INTERNAL MEDICINE

## 2019-10-16 PROCEDURE — 1101F PT FALLS ASSESS-DOCD LE1/YR: CPT | Mod: CPTII,S$GLB,, | Performed by: INTERNAL MEDICINE

## 2019-10-16 PROCEDURE — 1101F PR PT FALLS ASSESS DOC 0-1 FALLS W/OUT INJ PAST YR: ICD-10-PCS | Mod: CPTII,S$GLB,, | Performed by: INTERNAL MEDICINE

## 2019-10-16 PROCEDURE — 3074F SYST BP LT 130 MM HG: CPT | Mod: CPTII,S$GLB,, | Performed by: INTERNAL MEDICINE

## 2019-10-16 PROCEDURE — 3078F DIAST BP <80 MM HG: CPT | Mod: CPTII,S$GLB,, | Performed by: INTERNAL MEDICINE

## 2019-10-16 PROCEDURE — 3078F PR MOST RECENT DIASTOLIC BLOOD PRESSURE < 80 MM HG: ICD-10-PCS | Mod: CPTII,S$GLB,, | Performed by: INTERNAL MEDICINE

## 2019-10-16 PROCEDURE — 99999 PR PBB SHADOW E&M-EST. PATIENT-LVL III: CPT | Mod: PBBFAC,,, | Performed by: INTERNAL MEDICINE

## 2019-10-16 PROCEDURE — 3074F PR MOST RECENT SYSTOLIC BLOOD PRESSURE < 130 MM HG: ICD-10-PCS | Mod: CPTII,S$GLB,, | Performed by: INTERNAL MEDICINE

## 2019-10-16 PROCEDURE — 99214 PR OFFICE/OUTPT VISIT, EST, LEVL IV, 30-39 MIN: ICD-10-PCS | Mod: S$GLB,,, | Performed by: INTERNAL MEDICINE

## 2019-10-16 PROCEDURE — 99999 PR PBB SHADOW E&M-EST. PATIENT-LVL III: ICD-10-PCS | Mod: PBBFAC,,, | Performed by: INTERNAL MEDICINE

## 2019-10-16 NOTE — PROGRESS NOTES
Subjective:   Patient ID:  Lorenzo Ospina is a 79 y.o. female who presents for follow up of Atrial Fibrillation (2 week f/u)      HPI  A 80 yo female with htn afib hlp is here for f/u has been evaluated by endo and ent for hyperparathyroidism. May need plans for surgery. Has no new symptoms she still feels fatigue with mopping and doing housework. Has no chest pain no leg swelling she feels fatigued.   Past Medical History:   Diagnosis Date    Breast cancer     right, dx .  Dr. Callaway follows.s/p lumpectomy and arimidex x 5y.    Hyperlipidemia     Hypertension     OP (osteoporosis)     on bisphos x 2y.    TIA (transient ischemic attack)     Toe fracture        Past Surgical History:   Procedure Laterality Date    BREAST LUMPECTOMY      right     TOTAL ABDOMINAL HYSTERECTOMY      no cancer       Social History     Tobacco Use    Smoking status: Former Smoker     Last attempt to quit: 1994     Years since quittin.0    Smokeless tobacco: Never Used   Substance Use Topics    Alcohol use: Yes    Drug use: Not on file       History reviewed. No pertinent family history.    Current Outpatient Medications   Medication Sig    alendronate (FOSAMAX) 70 MG tablet TAKE 1 TABLET (70 MG TOTAL) BY MOUTH EVERY 7 DAYS.    apixaban (ELIQUIS) 5 mg Tab Take 1 tablet (5 mg total) by mouth 2 (two) times daily.    atorvastatin (LIPITOR) 20 MG tablet TAKE 1 TABLET (20 MG TOTAL) BY MOUTH ONCE DAILY.    fish oil-omega-3 fatty acids 300-1,000 mg capsule Take 2 g by mouth once daily.    fluticasone (FLONASE) 50 mcg/actuation nasal spray TAKE 2 SPRAYS BY EACH NARE ROUTE ONCE DAILY.    levocetirizine (XYZAL) 5 MG tablet Take 5 mg by mouth every morning.    metoprolol succinate (TOPROL-XL) 100 MG 24 hr tablet Take 1 tablet (100 mg total) by mouth 2 (two) times daily.    MULTIVIT-IRON-MIN-FOLIC ACID 3,500-18-0.4 UNIT-MG-MG ORAL CHEW Take by mouth.    apixaban (ELIQUIS) 5 mg Tab Take 5 mg by mouth 2 (two)  times daily.    clopidogrel (PLAVIX) 75 mg tablet TAKE 1 TABLET (75 MG TOTAL) BY MOUTH ONCE DAILY.    vitamin D 1000 units Tab Take 2,000 Units by mouth once daily.     No current facility-administered medications for this visit.      Current Outpatient Medications on File Prior to Visit   Medication Sig    alendronate (FOSAMAX) 70 MG tablet TAKE 1 TABLET (70 MG TOTAL) BY MOUTH EVERY 7 DAYS.    apixaban (ELIQUIS) 5 mg Tab Take 1 tablet (5 mg total) by mouth 2 (two) times daily.    atorvastatin (LIPITOR) 20 MG tablet TAKE 1 TABLET (20 MG TOTAL) BY MOUTH ONCE DAILY.    fish oil-omega-3 fatty acids 300-1,000 mg capsule Take 2 g by mouth once daily.    fluticasone (FLONASE) 50 mcg/actuation nasal spray TAKE 2 SPRAYS BY EACH NARE ROUTE ONCE DAILY.    levocetirizine (XYZAL) 5 MG tablet Take 5 mg by mouth every morning.    metoprolol succinate (TOPROL-XL) 100 MG 24 hr tablet Take 1 tablet (100 mg total) by mouth 2 (two) times daily.    MULTIVIT-IRON-MIN-FOLIC ACID 3,500-18-0.4 UNIT-MG-MG ORAL CHEW Take by mouth.    apixaban (ELIQUIS) 5 mg Tab Take 5 mg by mouth 2 (two) times daily.    clopidogrel (PLAVIX) 75 mg tablet TAKE 1 TABLET (75 MG TOTAL) BY MOUTH ONCE DAILY.    vitamin D 1000 units Tab Take 2,000 Units by mouth once daily.     No current facility-administered medications on file prior to visit.      Review of patient's allergies indicates:   Allergen Reactions    Medrol [methylprednisolone]      Review of Systems   Constitution: Positive for malaise/fatigue. Negative for diaphoresis and weight gain.   HENT: Negative for hoarse voice.    Eyes: Negative for double vision and visual disturbance.   Cardiovascular: Negative for chest pain, claudication, cyanosis, dyspnea on exertion, irregular heartbeat, leg swelling, near-syncope, orthopnea, palpitations, paroxysmal nocturnal dyspnea and syncope.   Respiratory: Negative for cough, hemoptysis, shortness of breath and snoring.    Hematologic/Lymphatic:  Negative for bleeding problem. Does not bruise/bleed easily.   Skin: Negative for color change and poor wound healing.   Musculoskeletal: Negative for muscle cramps, muscle weakness and myalgias.   Gastrointestinal: Negative for bloating, abdominal pain, change in bowel habit, diarrhea, heartburn, hematemesis, hematochezia, melena and nausea.   Neurological: Negative for excessive daytime sleepiness, dizziness, headaches, light-headedness, loss of balance, numbness and weakness.   Psychiatric/Behavioral: Negative for memory loss. The patient does not have insomnia.    Allergic/Immunologic: Negative for hives.       Objective:   Physical Exam   Constitutional: She is oriented to person, place, and time. She appears well-developed and well-nourished. She does not appear ill. No distress.   HENT:   Head: Normocephalic and atraumatic.   Eyes: Pupils are equal, round, and reactive to light. EOM are normal. No scleral icterus.   Neck: Normal range of motion. Neck supple. Normal carotid pulses, no hepatojugular reflux and no JVD present. Carotid bruit is not present. No tracheal deviation present. No thyromegaly present.   Cardiovascular: Normal rate, normal heart sounds and normal pulses. An irregularly irregular rhythm present. Exam reveals no gallop and no friction rub.   No murmur heard.  Pulses:       Carotid pulses are 2+ on the right side, and 2+ on the left side.       Radial pulses are 2+ on the right side, and 2+ on the left side.        Femoral pulses are 2+ on the right side, and 2+ on the left side.       Popliteal pulses are 2+ on the right side, and 2+ on the left side.        Dorsalis pedis pulses are 2+ on the right side, and 2+ on the left side.        Posterior tibial pulses are 2+ on the right side, and 2+ on the left side.   Pulmonary/Chest: Effort normal and breath sounds normal. No respiratory distress. She has no wheezes. She has no rhonchi. She has no rales. She exhibits no tenderness.   Abdominal:  "Soft. Normal appearance, normal aorta and bowel sounds are normal. She exhibits no abdominal bruit, no ascites and no pulsatile midline mass. There is no hepatomegaly. There is no tenderness.   Musculoskeletal: She exhibits no edema.        Right shoulder: She exhibits no deformity.   Neurological: She is alert and oriented to person, place, and time. She has normal strength. No cranial nerve deficit. Coordination normal.   Skin: Skin is warm and dry. No rash noted. She is not diaphoretic. No cyanosis or erythema. Nails show no clubbing.   Psychiatric: She has a normal mood and affect. Her speech is normal and behavior is normal.   Nursing note and vitals reviewed.    Vitals:    10/16/19 1332 10/16/19 1336   BP: 120/68 122/62   BP Location: Left arm Right arm   Patient Position: Sitting Sitting   BP Method: Medium (Manual) Medium (Manual)   Pulse: 80    SpO2: 98%    Weight: 76 kg (167 lb 8.8 oz)    Height: 5' 2" (1.575 m)      Lab Results   Component Value Date    CHOL 127 10/01/2019    CHOL 147 06/20/2018    CHOL 137 05/30/2017     Lab Results   Component Value Date    HDL 46 10/01/2019    HDL 56 06/20/2018    HDL 57 05/30/2017     Lab Results   Component Value Date    LDLCALC 61.4 (L) 10/01/2019    LDLCALC 73.6 06/20/2018    LDLCALC 60.0 (L) 05/30/2017     Lab Results   Component Value Date    TRIG 98 10/01/2019    TRIG 87 06/20/2018    TRIG 100 05/30/2017     Lab Results   Component Value Date    CHOLHDL 36.2 10/01/2019    CHOLHDL 38.1 06/20/2018    CHOLHDL 41.6 05/30/2017       Chemistry        Component Value Date/Time     09/13/2019 0951    K 3.8 09/13/2019 0951     09/13/2019 0951    CO2 29 09/13/2019 0951    BUN 13 09/13/2019 0951    CREATININE 0.9 09/13/2019 0951     (H) 09/13/2019 0951        Component Value Date/Time    CALCIUM 11.0 (H) 09/13/2019 0951    ALKPHOS 59 09/13/2019 0951    AST 15 09/13/2019 0951    ALT 14 09/13/2019 0951    BILITOT 0.9 09/13/2019 0951    ESTGFRAFRICA >60.0 " 09/13/2019 0951    EGFRNONAA >60.0 09/13/2019 0951          Lab Results   Component Value Date    TSH 0.993 09/13/2019     No results found for: INR, PROTIME  Lab Results   Component Value Date    WBC 7.96 09/13/2019    HGB 14.9 09/13/2019    HCT 45.8 09/13/2019    MCV 89 09/13/2019     09/13/2019     BMP  Sodium   Date Value Ref Range Status   09/13/2019 142 136 - 145 mmol/L Final     Potassium   Date Value Ref Range Status   09/13/2019 3.8 3.5 - 5.1 mmol/L Final     Chloride   Date Value Ref Range Status   09/13/2019 105 95 - 110 mmol/L Final     CO2   Date Value Ref Range Status   09/13/2019 29 23 - 29 mmol/L Final     BUN, Bld   Date Value Ref Range Status   09/13/2019 13 8 - 23 mg/dL Final     Creatinine   Date Value Ref Range Status   09/13/2019 0.9 0.5 - 1.4 mg/dL Final     Calcium   Date Value Ref Range Status   09/13/2019 11.0 (H) 8.7 - 10.5 mg/dL Final     Anion Gap   Date Value Ref Range Status   09/13/2019 8 8 - 16 mmol/L Final     eGFR if    Date Value Ref Range Status   09/13/2019 >60.0 >60 mL/min/1.73 m^2 Final     eGFR if non    Date Value Ref Range Status   09/13/2019 >60.0 >60 mL/min/1.73 m^2 Final     Comment:     Calculation used to obtain the estimated glomerular filtration  rate (eGFR) is the CKD-EPI equation.        CrCl cannot be calculated (Patient's most recent lab result is older than the maximum 7 days allowed.).    Assessment:     1. Atrial fibrillation, unspecified type    2. Other hyperlipidemia    3. TIA (transient ischemic attack)    4. Essential hypertension    5. Vitamin D deficiency    6. Palpitation    still in afib needing betetr control agree with increase to 100 mg po bid   Will reassess in 2 weeks to see rate control.  Reviewed her cardiolite has minimal ischemia . Will observe for now and try to convert to sinus rhythm to see  If she improves otherwise will plan on angiography.  Plan:   toprol xl 100 mg bid   F/u in 2 weeks.   Await  final endocrinology verdict .  Continue eliquis.

## 2019-10-17 ENCOUNTER — TELEPHONE (OUTPATIENT)
Dept: ENDOCRINOLOGY | Facility: CLINIC | Age: 79
End: 2019-10-17

## 2019-10-17 NOTE — TELEPHONE ENCOUNTER
Left message for patient to return call        ----- Message from Prasanth Larson MD sent at 10/17/2019  8:56 AM CDT -----  Let the patient know her urine calcium was normal. She just needs to make appt for follow-up with me in 3 months.

## 2019-10-17 NOTE — TELEPHONE ENCOUNTER
Patient does not speak English so authorization checked able to speak with family member test results given with return verbalization josianemped appt up r/t was at 4 months out instead 3 as per   ----- Message from Navneet Barajas sent at 10/17/2019 10:00 AM CDT -----  Contact: pt daughter in law   Type:  Patient Returning Call    Who Called: pt daughter in law   Who Left Message for Patient: nurse   Does the patient know what this is regarding?:   Would the patient rather a call back or a response via [facility]? Call   Best Call Back Number: [phone] (home)   Additional Information:

## 2019-11-01 ENCOUNTER — OFFICE VISIT (OUTPATIENT)
Dept: CARDIOLOGY | Facility: CLINIC | Age: 79
End: 2019-11-01
Payer: MEDICARE

## 2019-11-01 VITALS
HEIGHT: 62 IN | DIASTOLIC BLOOD PRESSURE: 62 MMHG | HEART RATE: 76 BPM | BODY MASS INDEX: 30.67 KG/M2 | SYSTOLIC BLOOD PRESSURE: 116 MMHG | WEIGHT: 166.69 LBS | OXYGEN SATURATION: 96 %

## 2019-11-01 DIAGNOSIS — R00.2 PALPITATION: ICD-10-CM

## 2019-11-01 DIAGNOSIS — E55.9 VITAMIN D DEFICIENCY: ICD-10-CM

## 2019-11-01 DIAGNOSIS — Z85.3 HISTORY OF RIGHT BREAST CANCER: ICD-10-CM

## 2019-11-01 DIAGNOSIS — I48.91 ATRIAL FIBRILLATION, UNSPECIFIED TYPE: Primary | ICD-10-CM

## 2019-11-01 DIAGNOSIS — I10 ESSENTIAL HYPERTENSION: ICD-10-CM

## 2019-11-01 DIAGNOSIS — E78.49 OTHER HYPERLIPIDEMIA: ICD-10-CM

## 2019-11-01 DIAGNOSIS — G45.9 TIA (TRANSIENT ISCHEMIC ATTACK): ICD-10-CM

## 2019-11-01 PROCEDURE — 1101F PT FALLS ASSESS-DOCD LE1/YR: CPT | Mod: CPTII,S$GLB,, | Performed by: INTERNAL MEDICINE

## 2019-11-01 PROCEDURE — 99999 PR PBB SHADOW E&M-EST. PATIENT-LVL III: ICD-10-PCS | Mod: PBBFAC,,, | Performed by: INTERNAL MEDICINE

## 2019-11-01 PROCEDURE — 1101F PR PT FALLS ASSESS DOC 0-1 FALLS W/OUT INJ PAST YR: ICD-10-PCS | Mod: CPTII,S$GLB,, | Performed by: INTERNAL MEDICINE

## 2019-11-01 PROCEDURE — 3074F PR MOST RECENT SYSTOLIC BLOOD PRESSURE < 130 MM HG: ICD-10-PCS | Mod: CPTII,S$GLB,, | Performed by: INTERNAL MEDICINE

## 2019-11-01 PROCEDURE — 99999 PR PBB SHADOW E&M-EST. PATIENT-LVL III: CPT | Mod: PBBFAC,,, | Performed by: INTERNAL MEDICINE

## 2019-11-01 PROCEDURE — 3078F PR MOST RECENT DIASTOLIC BLOOD PRESSURE < 80 MM HG: ICD-10-PCS | Mod: CPTII,S$GLB,, | Performed by: INTERNAL MEDICINE

## 2019-11-01 PROCEDURE — 3074F SYST BP LT 130 MM HG: CPT | Mod: CPTII,S$GLB,, | Performed by: INTERNAL MEDICINE

## 2019-11-01 PROCEDURE — 99213 OFFICE O/P EST LOW 20 MIN: CPT | Mod: S$GLB,,, | Performed by: INTERNAL MEDICINE

## 2019-11-01 PROCEDURE — 99213 PR OFFICE/OUTPT VISIT, EST, LEVL III, 20-29 MIN: ICD-10-PCS | Mod: S$GLB,,, | Performed by: INTERNAL MEDICINE

## 2019-11-01 PROCEDURE — 3078F DIAST BP <80 MM HG: CPT | Mod: CPTII,S$GLB,, | Performed by: INTERNAL MEDICINE

## 2019-11-01 NOTE — PROGRESS NOTES
Subjective:   Patient ID:  Lorenzo Ospina is a 79 y.o. female who presents for follow up of Atrial Fibrillation (2 week f/u)      HPI  A 78 yo female with afib htnm is here for f/u feels better after adjusting dose of b blockers.no angina no htn  No chf no tia no claudication sleeping well at nite   Past Medical History:   Diagnosis Date    Breast cancer     right, dx .  Dr. Callaway follows.s/p lumpectomy and arimidex x 5y.    Hyperlipidemia     Hypertension     OP (osteoporosis)     on bisphos x 2y.    TIA (transient ischemic attack)     Toe fracture        Past Surgical History:   Procedure Laterality Date    BREAST LUMPECTOMY      right 2008    TOTAL ABDOMINAL HYSTERECTOMY      no cancer       Social History     Tobacco Use    Smoking status: Former Smoker     Last attempt to quit: 1994     Years since quittin.1    Smokeless tobacco: Never Used   Substance Use Topics    Alcohol use: Yes    Drug use: Not on file       History reviewed. No pertinent family history.    Current Outpatient Medications   Medication Sig    alendronate (FOSAMAX) 70 MG tablet TAKE 1 TABLET (70 MG TOTAL) BY MOUTH EVERY 7 DAYS.    apixaban (ELIQUIS) 5 mg Tab Take 1 tablet (5 mg total) by mouth 2 (two) times daily.    atorvastatin (LIPITOR) 20 MG tablet TAKE 1 TABLET (20 MG TOTAL) BY MOUTH ONCE DAILY.    fish oil-omega-3 fatty acids 300-1,000 mg capsule Take 2 g by mouth once daily.    fluticasone (FLONASE) 50 mcg/actuation nasal spray TAKE 2 SPRAYS BY EACH NARE ROUTE ONCE DAILY.    levocetirizine (XYZAL) 5 MG tablet Take 5 mg by mouth every morning.    metoprolol succinate (TOPROL-XL) 100 MG 24 hr tablet Take 1 tablet (100 mg total) by mouth 2 (two) times daily.    MULTIVIT-IRON-MIN-FOLIC ACID 3,500-18-0.4 UNIT-MG-MG ORAL CHEW Take by mouth.    vitamin D 1000 units Tab Take 2,000 Units by mouth once daily.    clopidogrel (PLAVIX) 75 mg tablet TAKE 1 TABLET (75 MG TOTAL) BY MOUTH ONCE DAILY.     No  current facility-administered medications for this visit.      Current Outpatient Medications on File Prior to Visit   Medication Sig    alendronate (FOSAMAX) 70 MG tablet TAKE 1 TABLET (70 MG TOTAL) BY MOUTH EVERY 7 DAYS.    apixaban (ELIQUIS) 5 mg Tab Take 1 tablet (5 mg total) by mouth 2 (two) times daily.    atorvastatin (LIPITOR) 20 MG tablet TAKE 1 TABLET (20 MG TOTAL) BY MOUTH ONCE DAILY.    fish oil-omega-3 fatty acids 300-1,000 mg capsule Take 2 g by mouth once daily.    fluticasone (FLONASE) 50 mcg/actuation nasal spray TAKE 2 SPRAYS BY EACH NARE ROUTE ONCE DAILY.    levocetirizine (XYZAL) 5 MG tablet Take 5 mg by mouth every morning.    metoprolol succinate (TOPROL-XL) 100 MG 24 hr tablet Take 1 tablet (100 mg total) by mouth 2 (two) times daily.    MULTIVIT-IRON-MIN-FOLIC ACID 3,500-18-0.4 UNIT-MG-MG ORAL CHEW Take by mouth.    vitamin D 1000 units Tab Take 2,000 Units by mouth once daily.    clopidogrel (PLAVIX) 75 mg tablet TAKE 1 TABLET (75 MG TOTAL) BY MOUTH ONCE DAILY.    [DISCONTINUED] apixaban (ELIQUIS) 5 mg Tab Take 5 mg by mouth 2 (two) times daily.     No current facility-administered medications on file prior to visit.      Review of patient's allergies indicates:   Allergen Reactions    Medrol [methylprednisolone]      Review of Systems   Constitution: Negative for diaphoresis, malaise/fatigue and weight gain.   HENT: Negative for hoarse voice.    Eyes: Negative for double vision and visual disturbance.   Cardiovascular: Negative for chest pain, claudication, cyanosis, dyspnea on exertion, irregular heartbeat, leg swelling, near-syncope, orthopnea, palpitations, paroxysmal nocturnal dyspnea and syncope.   Respiratory: Negative for cough, hemoptysis, shortness of breath and snoring.    Hematologic/Lymphatic: Negative for bleeding problem. Does not bruise/bleed easily.   Skin: Negative for color change and poor wound healing.   Musculoskeletal: Negative for muscle cramps, muscle  weakness and myalgias.   Gastrointestinal: Negative for bloating, abdominal pain, change in bowel habit, diarrhea, heartburn, hematemesis, hematochezia, melena and nausea.   Neurological: Negative for excessive daytime sleepiness, dizziness, headaches, light-headedness, loss of balance, numbness and weakness.   Psychiatric/Behavioral: Negative for memory loss. The patient does not have insomnia.    Allergic/Immunologic: Negative for hives.       Objective:   Physical Exam   Constitutional: She is oriented to person, place, and time. She appears well-developed and well-nourished. She does not appear ill. No distress.   HENT:   Head: Normocephalic and atraumatic.   Eyes: Pupils are equal, round, and reactive to light. EOM are normal. No scleral icterus.   Neck: Normal range of motion. Neck supple. Normal carotid pulses, no hepatojugular reflux and no JVD present. Carotid bruit is not present. No tracheal deviation present. No thyromegaly present.   Cardiovascular: Normal rate, normal heart sounds, intact distal pulses and normal pulses. An irregularly irregular rhythm present. Exam reveals no gallop and no friction rub.   No murmur heard.  Pulmonary/Chest: Effort normal and breath sounds normal. No respiratory distress. She has no wheezes. She has no rhonchi. She has no rales. She exhibits no tenderness.   Abdominal: Soft. Normal appearance, normal aorta and bowel sounds are normal. She exhibits no abdominal bruit, no ascites and no pulsatile midline mass. There is no hepatomegaly. There is no tenderness.   Musculoskeletal: She exhibits no edema.        Right shoulder: She exhibits no deformity.   Neurological: She is alert and oriented to person, place, and time. She has normal strength. No cranial nerve deficit. Coordination normal.   Skin: Skin is warm and dry. No rash noted. No cyanosis or erythema. Nails show no clubbing.   Psychiatric: She has a normal mood and affect. Her speech is normal and behavior is  "normal.     Vitals:    11/01/19 1414 11/01/19 1417   BP: 122/60 116/62   BP Location: Left arm Right arm   Patient Position: Sitting Sitting   BP Method: Medium (Manual) Medium (Manual)   Pulse: 76    SpO2: 96%    Weight: 75.6 kg (166 lb 10.7 oz)    Height: 5' 2" (1.575 m)      Lab Results   Component Value Date    CHOL 127 10/01/2019    CHOL 147 06/20/2018    CHOL 137 05/30/2017     Lab Results   Component Value Date    HDL 46 10/01/2019    HDL 56 06/20/2018    HDL 57 05/30/2017     Lab Results   Component Value Date    LDLCALC 61.4 (L) 10/01/2019    LDLCALC 73.6 06/20/2018    LDLCALC 60.0 (L) 05/30/2017     Lab Results   Component Value Date    TRIG 98 10/01/2019    TRIG 87 06/20/2018    TRIG 100 05/30/2017     Lab Results   Component Value Date    CHOLHDL 36.2 10/01/2019    CHOLHDL 38.1 06/20/2018    CHOLHDL 41.6 05/30/2017       Chemistry        Component Value Date/Time     09/13/2019 0951    K 3.8 09/13/2019 0951     09/13/2019 0951    CO2 29 09/13/2019 0951    BUN 13 09/13/2019 0951    CREATININE 0.9 09/13/2019 0951     (H) 09/13/2019 0951        Component Value Date/Time    CALCIUM 11.0 (H) 09/13/2019 0951    ALKPHOS 59 09/13/2019 0951    AST 15 09/13/2019 0951    ALT 14 09/13/2019 0951    BILITOT 0.9 09/13/2019 0951    ESTGFRAFRICA >60.0 09/13/2019 0951    EGFRNONAA >60.0 09/13/2019 0951        Lab Results   Component Value Date    HGBA1C 5.4 02/03/2016       Lab Results   Component Value Date    TSH 0.993 09/13/2019     No results found for: INR, PROTIME  Lab Results   Component Value Date    WBC 7.96 09/13/2019    HGB 14.9 09/13/2019    HCT 45.8 09/13/2019    MCV 89 09/13/2019     09/13/2019     BMP  Sodium   Date Value Ref Range Status   09/13/2019 142 136 - 145 mmol/L Final     Potassium   Date Value Ref Range Status   09/13/2019 3.8 3.5 - 5.1 mmol/L Final     Chloride   Date Value Ref Range Status   09/13/2019 105 95 - 110 mmol/L Final     CO2   Date Value Ref Range Status "   09/13/2019 29 23 - 29 mmol/L Final     BUN, Bld   Date Value Ref Range Status   09/13/2019 13 8 - 23 mg/dL Final     Creatinine   Date Value Ref Range Status   09/13/2019 0.9 0.5 - 1.4 mg/dL Final     Calcium   Date Value Ref Range Status   09/13/2019 11.0 (H) 8.7 - 10.5 mg/dL Final     Anion Gap   Date Value Ref Range Status   09/13/2019 8 8 - 16 mmol/L Final     eGFR if    Date Value Ref Range Status   09/13/2019 >60.0 >60 mL/min/1.73 m^2 Final     eGFR if non    Date Value Ref Range Status   09/13/2019 >60.0 >60 mL/min/1.73 m^2 Final     Comment:     Calculation used to obtain the estimated glomerular filtration  rate (eGFR) is the CKD-EPI equation.        CrCl cannot be calculated (Patient's most recent lab result is older than the maximum 7 days allowed.).    Assessment:     1. Atrial fibrillation, unspecified type    2. Other hyperlipidemia    3. History of right breast cancer    4. TIA (transient ischemic attack)    5. Essential hypertension    6. Vitamin D deficiency    7. Palpitation      Rate controlled improved symptoms I think will plan on nadine directed cardioversion.  Plan:   Will plan on nadine directed cardioversion in 1 -2 weeks.

## 2019-11-05 ENCOUNTER — ANESTHESIA (OUTPATIENT)
Dept: CARDIOLOGY | Facility: HOSPITAL | Age: 79
End: 2019-11-05
Payer: MEDICARE

## 2019-11-05 ENCOUNTER — ANESTHESIA EVENT (OUTPATIENT)
Dept: CARDIOLOGY | Facility: HOSPITAL | Age: 79
End: 2019-11-05
Payer: MEDICARE

## 2019-11-05 ENCOUNTER — HOSPITAL ENCOUNTER (OUTPATIENT)
Facility: HOSPITAL | Age: 79
Discharge: HOME | End: 2019-11-05
Attending: INTERNAL MEDICINE | Admitting: INTERNAL MEDICINE
Payer: MEDICARE

## 2019-11-05 VITALS
HEART RATE: 79 BPM | TEMPERATURE: 98 F | BODY MASS INDEX: 30.73 KG/M2 | WEIGHT: 167 LBS | HEIGHT: 62 IN | RESPIRATION RATE: 20 BRPM | DIASTOLIC BLOOD PRESSURE: 80 MMHG | SYSTOLIC BLOOD PRESSURE: 123 MMHG

## 2019-11-05 DIAGNOSIS — I48.91 A-FIB: ICD-10-CM

## 2019-11-05 DIAGNOSIS — I48.20 CHRONIC ATRIAL FIBRILLATION, UNSPECIFIED: ICD-10-CM

## 2019-11-05 LAB
DIASTOLIC DYSFUNCTION: NO
MITRAL VALVE REGURGITATION: NORMAL
RETIRED EF AND QEF - SEE NOTES: 55 (ref 55–65)

## 2019-11-05 PROCEDURE — 93312 ECHO TRANSESOPHAGEAL: CPT | Mod: 26,,, | Performed by: INTERNAL MEDICINE

## 2019-11-05 PROCEDURE — 93320 DOPPLER ECHO COMPLETE: CPT | Mod: 26,,, | Performed by: INTERNAL MEDICINE

## 2019-11-05 PROCEDURE — 93010 ELECTROCARDIOGRAM REPORT: CPT | Mod: 59,,, | Performed by: INTERNAL MEDICINE

## 2019-11-05 PROCEDURE — 93325 TRANSESOPHAGEAL ECHO: ICD-10-PCS | Mod: 26,,, | Performed by: INTERNAL MEDICINE

## 2019-11-05 PROCEDURE — 63600175 PHARM REV CODE 636 W HCPCS

## 2019-11-05 PROCEDURE — 93320 TRANSESOPHAGEAL ECHO: ICD-10-PCS | Mod: 26,,, | Performed by: INTERNAL MEDICINE

## 2019-11-05 PROCEDURE — 92960 CARDIOVERSION ELECTRIC EXT: CPT | Mod: ,,, | Performed by: INTERNAL MEDICINE

## 2019-11-05 PROCEDURE — 25000003 PHARM REV CODE 250

## 2019-11-05 PROCEDURE — 37000009 HC ANESTHESIA EA ADD 15 MINS: Performed by: INTERNAL MEDICINE

## 2019-11-05 PROCEDURE — 92960 CARDIOVERSION ELECTRIC EXT: CPT

## 2019-11-05 PROCEDURE — 93312 TRANSESOPHAGEAL ECHO: ICD-10-PCS | Mod: 26,,, | Performed by: INTERNAL MEDICINE

## 2019-11-05 PROCEDURE — 92960 CARDIOVERSION (DCCV): ICD-10-PCS | Mod: ,,, | Performed by: INTERNAL MEDICINE

## 2019-11-05 PROCEDURE — 93325 DOPPLER ECHO COLOR FLOW MAPG: CPT | Mod: 26,,, | Performed by: INTERNAL MEDICINE

## 2019-11-05 PROCEDURE — 63600175 PHARM REV CODE 636 W HCPCS: Performed by: NURSE ANESTHETIST, CERTIFIED REGISTERED

## 2019-11-05 PROCEDURE — 93010 EKG 12-LEAD: ICD-10-PCS | Mod: 59,,, | Performed by: INTERNAL MEDICINE

## 2019-11-05 PROCEDURE — 93005 ELECTROCARDIOGRAM TRACING: CPT | Mod: 59

## 2019-11-05 PROCEDURE — 37000008 HC ANESTHESIA 1ST 15 MINUTES: Performed by: INTERNAL MEDICINE

## 2019-11-05 RX ORDER — FLECAINIDE ACETATE 50 MG/1
50 TABLET ORAL EVERY 12 HOURS
Status: CANCELLED | OUTPATIENT
Start: 2019-11-05

## 2019-11-05 RX ORDER — SODIUM CHLORIDE 9 MG/ML
INJECTION, SOLUTION INTRAVENOUS CONTINUOUS PRN
Status: DISCONTINUED | OUTPATIENT
Start: 2019-11-05 | End: 2019-11-05

## 2019-11-05 RX ORDER — METOPROLOL SUCCINATE 25 MG/1
25 TABLET, EXTENDED RELEASE ORAL DAILY
Qty: 30 TABLET | Refills: 5 | Status: SHIPPED | OUTPATIENT
Start: 2019-11-05 | End: 2019-11-06 | Stop reason: DRUGHIGH

## 2019-11-05 RX ORDER — SODIUM CHLORIDE 0.9 % (FLUSH) 0.9 %
10 SYRINGE (ML) INJECTION
Status: DISCONTINUED | OUTPATIENT
Start: 2019-11-05 | End: 2019-11-05 | Stop reason: HOSPADM

## 2019-11-05 RX ORDER — PROPOFOL 10 MG/ML
VIAL (ML) INTRAVENOUS
Status: DISCONTINUED | OUTPATIENT
Start: 2019-11-05 | End: 2019-11-05

## 2019-11-05 RX ADMIN — PROPOFOL 20 MG: 10 INJECTION, EMULSION INTRAVENOUS at 10:11

## 2019-11-05 RX ADMIN — PROPOFOL 50 MG: 10 INJECTION, EMULSION INTRAVENOUS at 09:11

## 2019-11-05 RX ADMIN — SODIUM CHLORIDE: 0.9 INJECTION, SOLUTION INTRAVENOUS at 09:11

## 2019-11-05 RX ADMIN — PROPOFOL 20 MG: 10 INJECTION, EMULSION INTRAVENOUS at 09:11

## 2019-11-05 NOTE — INTERVAL H&P NOTE
The patient has been examined and the H&P has been reviewed:    I concur with the findings and no changes have occurred since H&P was written.     New onset afib , symptomatic  PLAN MEHNAZ and DCCV as indicated      Anesthesia/Surgery risks, benefits and alternative options discussed and understood by patient/family.          Active Hospital Problems    Diagnosis  POA    A-fib [I48.91]  Yes     Priority: High      Resolved Hospital Problems   No resolved problems to display.

## 2019-11-05 NOTE — DISCHARGE INSTRUCTIONS
ACTIVITY/SAFETY  Because of the aftereffect of the sedation you received, we advise you to refrain from the following activities for 24 hours.  1. Do not drive or operate machinery.  2. Do not operate appliances if alone. (stove, iron, lawnmower)  3. Do not sign legal papers or make important decisions.    Have standby assistance on stairways.  It is advised that you have someone stay with you for at least 8 hours after procedure    Comfort:  If you develop a sore throat gargle with warm salt water (1/2 tsp.of salt in 8oz of warm water) or use throat lozenges.     Some of the sedatives you received today may make you nauseated.  Begin with clear liquids and then progress to solid foods as tolerated.    Avoid eating for 1 hour after procedure due to numbing of throat before procedure.    CALL THE DOCTOR FOR:  SEVERE THROAT PAIN / DIFFICULTY SWALLOWING                                                  SEVERE ABDOMINAL OR CHEST PAIN                                                  VOMITING BLOOD.    Today: DO NOT TAKE METOPROLOL AT ALL  Tomorrow 11/6/2019 - Take your Blood pressure and Heart rate around 10 AM and call the Clinic ([facility] - [phone]) to discuss whether or not to take metoprolol. Dr Vega is out of the clinic, so she will have to forward the message to Dr. Flores.      Clinic nurse is to call you to schedule a follow up appt with Dr. Vega.

## 2019-11-05 NOTE — TRANSFER OF CARE
"Anesthesia Transfer of Care Note    Patient: Lorenzo Ospina    Procedure(s) Performed: Procedure(s) (LRB):  ECHOCARDIOGRAM, TRANSESOPHAGEAL (N/A)  CARDIOVERSION (N/A)    Patient location: Other: [facility]    Anesthesia Type: MAC    Transport from OR: Transported from OR on room air with adequate spontaneous ventilation    Post pain: adequate analgesia    Post assessment: no apparent anesthetic complications    Post vital signs: stable    Level of consciousness: awake, alert and oriented    Nausea/Vomiting: no nausea/vomiting    Complications: none    Transfer of care protocol was followed      Last vitals:   Visit Vitals  /80   Pulse 79   Temp 36.4 °C (97.5 °F) (Oral)   Resp 20   Ht 5' 2" (1.575 m)   Wt 75.8 kg (167 lb)   Breastfeeding? No   BMI 30.54 kg/m²     "

## 2019-11-05 NOTE — PROGRESS NOTES
Discharge orders received and reviewed with patient and family. Specific orders for patient to take blood pressure and heart rate in the morning and report findings to clinic. Dr Flores is to tell patient whether or not to take metoprolol or not since pt has been dany since procedure. Pt and family state their understanding. Pt asymptomatic to bradycardia, ambulates without difficulty and AAO. Tolerates liquids without N/V. IV removed, monitors removed, pt wheeled to front of hospital for discharge home with family.

## 2019-11-05 NOTE — ANESTHESIA PREPROCEDURE EVALUATION
11/05/2019  Lorenzo Ospina is a 79 y.o., female.    Anesthesia Evaluation    I have reviewed the Patient Summary Reports.    I have reviewed the Nursing Notes.   I have reviewed the Medications.     Review of Systems  Anesthesia Hx:  No problems with previous Anesthesia Denies Hx of Anesthetic complications  History of prior surgery of interest to airway management or planning: Denies Family Hx of Anesthesia complications.   Denies Personal Hx of Anesthesia complications.   Social:  Former Smoker, Social Alcohol Use    Hematology/Oncology:  Hematology Normal       -- Cancer in past history:  Breast   EENT/Dental:EENT/Dental Normal   Cardiovascular:   Hypertension Dysrhythmias atrial fibrillation ECG has been reviewed.    Pulmonary:  Pulmonary Normal    Renal/:  Renal/ Normal     Hepatic/GI:  Hepatic/GI Normal    Musculoskeletal:  Musculoskeletal Normal osteoporosis   Neurological:   TIA,    Endocrine:   hyperparathyroidism   Dermatological:  Skin Normal    Psych:  Psychiatric Normal           Physical Exam  General:  Obesity    Airway/Jaw/Neck:  Airway Findings: Mouth Opening: Normal Tongue: Normal  General Airway Assessment: Adult  Mallampati: III  TM Distance: Normal, at least 6 cm                 Anesthesia Plan  Type of Anesthesia, risks & benefits discussed:  Anesthesia Type:  MAC  Patient's Preference:   Intra-op Monitoring Plan: standard ASA monitors  Intra-op Monitoring Plan Comments:   Post Op Pain Control Plan:   Post Op Pain Control Plan Comments:   Induction:   IV  Beta Blocker:  Patient is on a Beta-Blocker and has received one dose within the past 24 hours (No further documentation required).       Informed Consent: Patient understands risks and agrees with Anesthesia plan.  Questions answered. Anesthesia consent signed with patient.  ASA Score: 3     Day of Surgery Review of History &  Physical:    H&P update referred to the provider.         Ready For Surgery From Anesthesia Perspective.

## 2019-11-05 NOTE — ANESTHESIA POSTPROCEDURE EVALUATION
Anesthesia Post Evaluation    Patient: Lorenzo Ospina    Procedure(s) Performed: Procedure(s) (LRB):  ECHOCARDIOGRAM, TRANSESOPHAGEAL (N/A)  CARDIOVERSION (N/A)    Final Anesthesia Type: MAC  Patient location during evaluation: PACU  Patient participation: Yes- Able to Participate  Level of consciousness: awake and alert  Post-procedure vital signs: reviewed and stable  Pain management: adequate  Airway patency: patent  PONV status at discharge: No PONV  Anesthetic complications: no      Cardiovascular status: hemodynamically stable  Respiratory status: spontaneous ventilation  Hydration status: euvolemic  Follow-up not needed.          Vitals Value Taken Time   /80 11/5/2019  9:19 AM   Temp 36.4 °C (97.5 °F) 11/5/2019  9:11 AM   Pulse 79 11/5/2019  9:11 AM   Resp 20 11/5/2019  9:11 AM   SpO2 96 % 11/1/2019  2:14 PM         No case tracking events are documented in the log.      Pain/Erna Score: No data recorded

## 2019-11-05 NOTE — BRIEF OP NOTE
[facility] -   Brief Operative Note     SUMMARY     Surgery Date: 11/5/2019     Surgeon(s) and Role:     * Otto Flores MD - Primary    Assisting Surgeon: None    Pre-op Diagnosis:  A-fib [I48.91]    Post-op Diagnosis:  Post-Op Diagnosis Codes:     * A-fib [I48.91]    Procedure(s) (LRB):  ECHOCARDIOGRAM, TRANSESOPHAGEAL (N/A)  CARDIOVERSION (N/A)    Anesthesia: Monitor Anesthesia Care      Procedure Description: The risks, benefits, and alternatives of the procedure expalined in detail with patient and family. The patient voices understanding and all questions have been addressed. The patient agrees to proceed as planned.   The patient was sedated by anesthesiology service. The probe was advanced via throat into esophagus smoothly. The patient tolerated the procedure well and there was no complications. The probed was withdrawal at the end of study. The patient was hemodynamically stable.   Estimated Blood Loss: none.   Findings / Operative Note:   No KARINA thrombus. Biatrial dilatation. EF 55%, moderate MR.  After MEHNAZ, DCCV x1 with 200 joules and the afib was converted to sinus dany with frequent PACs at 53 bpm  Continue Eliquis 5 mg bid and Lipitor  Hold ToprolXL now.  Check HR and pulse tomorrow and call the office. Will direct the medication for her    Ok to discharge to home once hemodynamically stable.  F/U with Dr. Vega in 1 week at cardiology clinic.  DIET DASH  ACTIVITY as tolerated          Specimens:   Specimen (12h ago, onward)    None          Discharge Note    SUMMARY     Admit Date: 11/5/2019    Discharge Date and Time:  11/05/2019 1:15 PM      Final Diagnosis: Post-Op Diagnosis Codes:     * A-fib [I48.91]    Disposition: Home or Self Care    Follow Up/Patient Instructions:     Medications:  Reconciled Home Medications:      Medication List      CHANGE how you take these medications    metoprolol succinate 25 MG 24 hr tablet  Commonly known as:  TOPROL-XL  Take 1 tablet (25 mg total) by  mouth once daily.  What changed:    · medication strength  · how much to take  · when to take this        CONTINUE taking these medications    alendronate 70 MG tablet  Commonly known as:  FOSAMAX  TAKE 1 TABLET (70 MG TOTAL) BY MOUTH EVERY 7 DAYS.     apixaban 5 mg Tab  Commonly known as:  ELIQUIS  Take 1 tablet (5 mg total) by mouth 2 (two) times daily.     atorvastatin 20 MG tablet  Commonly known as:  LIPITOR  TAKE 1 TABLET (20 MG TOTAL) BY MOUTH ONCE DAILY.     CENTRUM 3,500-18-0.4 unit-mg-mg Chew  Generic drug:  multivit-iron-min-folic acid  Take by mouth.     fluticasone propionate 50 mcg/actuation nasal spray  Commonly known as:  FLONASE  TAKE 2 SPRAYS BY EACH NARE ROUTE ONCE DAILY.     vitamin D 1000 units Tab  Commonly known as:  VITAMIN D3  Take 2,000 Units by mouth once daily.        ASK your doctor about these medications    clopidogrel 75 mg tablet  Commonly known as:  PLAVIX  TAKE 1 TABLET (75 MG TOTAL) BY MOUTH ONCE DAILY.     fish oil-omega-3 fatty acids 300-1,000 mg capsule  Take 2 g by mouth once daily.     levocetirizine 5 MG tablet  Commonly known as:  XYZAL  Take 5 mg by mouth every morning.          No discharge procedures on file.

## 2019-11-06 ENCOUNTER — TELEPHONE (OUTPATIENT)
Dept: CARDIOLOGY | Facility: CLINIC | Age: 79
End: 2019-11-06

## 2019-11-06 RX ORDER — METOPROLOL SUCCINATE 50 MG/1
50 TABLET, EXTENDED RELEASE ORAL DAILY
COMMUNITY
End: 2020-02-18

## 2019-11-06 NOTE — TELEPHONE ENCOUNTER
Received call from Amy pt's daughter with this morning's blood pressure and heart rate  142/69 pulse 74 stated she had coffee  Notified Dr. Flores and he ordered to resume Toprol 50 mg daily    Returned call to Amy and advised of medication changes and repeated back new strength in Toprol

## 2019-11-07 ENCOUNTER — TELEPHONE (OUTPATIENT)
Dept: FAMILY MEDICINE | Facility: CLINIC | Age: 79
End: 2019-11-07

## 2019-11-07 NOTE — TELEPHONE ENCOUNTER
S/w pt's daughter in law, Amy.  States pt has been having persistent productive cough x 5-6 months.  Pt has been using OTC products & Flonase with no improvement.  Scheduled to see Paul Mcgowan NP tomorrow for eval./rpr

## 2019-11-07 NOTE — PROGRESS NOTES
Subjective:       Patient ID: Lorenzo Ospina is a 79 y.o. female.    Chief Complaint   Patient presents with    Cough     x 6-8 months, nose spray not helping        HPI    Lorenzo Ospina is here today with c/o chronic cough.  She has been trying to nasal spray as ordered but not helping.  The cough has been productive of white sputum, has been constantly clearing her throat.  OTC cough med not helping.  Cough usually in AM and HS, minimal during the day.  Former smoker, quit about 30 years ago.      Review of Systems   Constitutional: Negative.    HENT: Negative.    Respiratory: Positive for cough. Negative for chest tightness, shortness of breath, wheezing and stridor.    Cardiovascular: Negative.    Neurological: Negative.          Review of patient's allergies indicates:   Allergen Reactions    Medrol [methylprednisolone]          Medication List with Changes/Refills   Current Medications    ALENDRONATE (FOSAMAX) 70 MG TABLET    TAKE 1 TABLET (70 MG TOTAL) BY MOUTH EVERY 7 DAYS.    APIXABAN (ELIQUIS) 5 MG TAB    Take 1 tablet (5 mg total) by mouth 2 (two) times daily.    ATORVASTATIN (LIPITOR) 20 MG TABLET    TAKE 1 TABLET (20 MG TOTAL) BY MOUTH ONCE DAILY.    CLOPIDOGREL (PLAVIX) 75 MG TABLET    TAKE 1 TABLET (75 MG TOTAL) BY MOUTH ONCE DAILY.    FISH OIL-OMEGA-3 FATTY ACIDS 300-1,000 MG CAPSULE    Take 2 g by mouth once daily.    FLUTICASONE (FLONASE) 50 MCG/ACTUATION NASAL SPRAY    TAKE 2 SPRAYS BY EACH NARE ROUTE ONCE DAILY.    LEVOCETIRIZINE (XYZAL) 5 MG TABLET    Take 5 mg by mouth every morning.    METOPROLOL SUCCINATE (TOPROL-XL) 50 MG 24 HR TABLET    Take 50 mg by mouth once daily.    MULTIVIT-IRON-MIN-FOLIC ACID 3,500-18-0.4 UNIT-MG-MG ORAL CHEW    Take by mouth.    VITAMIN D 1000 UNITS TAB    Take 2,000 Units by mouth once daily.       Patient Active Problem List   Diagnosis    Age-related osteoporosis without current pathological fracture    Other hyperlipidemia    History of right  "breast cancer    TIA (transient ischemic attack)    Essential hypertension    Vitamin D deficiency    Hyperparathyroidism    Palpitation    Atrial fibrillation    Hypercalcemia    A-fib         Past medical, surgical, family and social histories have been reviewed today.        Objective:     Vitals:    11/08/19 1057   BP: 134/78   Pulse: 102   Temp: 99.5 °F (37.5 °C)   TempSrc: Tympanic   SpO2: 97%   Weight: 75.7 kg (166 lb 14.2 oz)   Height: 5' 2" (1.575 m)   PainSc: 0-No pain         Physical Exam   Constitutional: She is oriented to person, place, and time. She appears well-developed and well-nourished. No distress.   HENT:   Head: Normocephalic and atraumatic.   Right Ear: External ear normal.   Left Ear: External ear normal.   Nose: Nose normal.   Mouth/Throat: Oropharynx is clear and moist. No oropharyngeal exudate.   Eyes: Pupils are equal, round, and reactive to light. Conjunctivae and EOM are normal.   Neck: Normal range of motion. Neck supple.   Cardiovascular: Normal rate and regular rhythm.   Pulmonary/Chest: Effort normal and breath sounds normal. No stridor. No respiratory distress. She has no wheezes. She has no rales. She exhibits no tenderness.   Musculoskeletal: Normal range of motion. She exhibits no edema.   Lymphadenopathy:     She has no cervical adenopathy.   Neurological: She is alert and oriented to person, place, and time. No sensory deficit. She exhibits normal muscle tone. Coordination normal.   Skin: Skin is warm and dry. Capillary refill takes less than 2 seconds. She is not diaphoretic.   Psychiatric: She has a normal mood and affect. Her behavior is normal. Judgment and thought content normal.         Diagnosis       1. Chronic cough          Assessment/ Plan     Chronic cough  -     X-Ray Chest PA And Lateral; Future; Expected date: 11/08/2019  -     omeprazole (PRILOSEC) 20 MG capsule; Take 1 capsule (20 mg total) by mouth 2 (two) times daily as needed.  Dispense: 60 " capsule; Refill: 0  -     benzonatate (TESSALON) 100 MG capsule; Take 1 capsule (100 mg total) by mouth 3 (three) times daily as needed.  Dispense: 30 capsule; Refill: 0      Update CXR today.  Start omeprazole to treat possible GERD causing chronic cough.  Cough med as ordered.  May need to see Cardiology or Pulmonary in near future.  Follow-up in clinic as needed.        Patient Care Team:  Lorena Fiore MD as PCP - General (Internal Medicine)  Moe Hwang LPN as Care Coordinator (Internal Medicine)      ISRAEL Morales  [facility]

## 2019-11-08 ENCOUNTER — HOSPITAL ENCOUNTER (OUTPATIENT)
Dept: RADIOLOGY | Facility: HOSPITAL | Age: 79
Discharge: HOME | End: 2019-11-08
Attending: REGISTERED NURSE
Payer: MEDICARE

## 2019-11-08 ENCOUNTER — OFFICE VISIT (OUTPATIENT)
Dept: FAMILY MEDICINE | Facility: CLINIC | Age: 79
End: 2019-11-08
Payer: MEDICARE

## 2019-11-08 VITALS
OXYGEN SATURATION: 97 % | HEART RATE: 102 BPM | BODY MASS INDEX: 30.71 KG/M2 | HEIGHT: 62 IN | TEMPERATURE: 100 F | WEIGHT: 166.88 LBS | DIASTOLIC BLOOD PRESSURE: 78 MMHG | SYSTOLIC BLOOD PRESSURE: 134 MMHG

## 2019-11-08 DIAGNOSIS — R05.3 CHRONIC COUGH: ICD-10-CM

## 2019-11-08 DIAGNOSIS — R05.3 CHRONIC COUGH: Primary | ICD-10-CM

## 2019-11-08 PROCEDURE — 99214 OFFICE O/P EST MOD 30 MIN: CPT | Mod: S$GLB,,, | Performed by: REGISTERED NURSE

## 2019-11-08 PROCEDURE — 71046 X-RAY EXAM CHEST 2 VIEWS: CPT | Mod: 26,,, | Performed by: RADIOLOGY

## 2019-11-08 PROCEDURE — 1101F PR PT FALLS ASSESS DOC 0-1 FALLS W/OUT INJ PAST YR: ICD-10-PCS | Mod: CPTII,S$GLB,, | Performed by: REGISTERED NURSE

## 2019-11-08 PROCEDURE — 71046 X-RAY EXAM CHEST 2 VIEWS: CPT | Mod: TC,FY,PO

## 2019-11-08 PROCEDURE — 1101F PT FALLS ASSESS-DOCD LE1/YR: CPT | Mod: CPTII,S$GLB,, | Performed by: REGISTERED NURSE

## 2019-11-08 PROCEDURE — 3078F PR MOST RECENT DIASTOLIC BLOOD PRESSURE < 80 MM HG: ICD-10-PCS | Mod: CPTII,S$GLB,, | Performed by: REGISTERED NURSE

## 2019-11-08 PROCEDURE — 71046 XR CHEST PA AND LATERAL: ICD-10-PCS | Mod: 26,,, | Performed by: RADIOLOGY

## 2019-11-08 PROCEDURE — 99999 PR PBB SHADOW E&M-EST. PATIENT-LVL IV: ICD-10-PCS | Mod: PBBFAC,,, | Performed by: REGISTERED NURSE

## 2019-11-08 PROCEDURE — 3075F SYST BP GE 130 - 139MM HG: CPT | Mod: CPTII,S$GLB,, | Performed by: REGISTERED NURSE

## 2019-11-08 PROCEDURE — 3078F DIAST BP <80 MM HG: CPT | Mod: CPTII,S$GLB,, | Performed by: REGISTERED NURSE

## 2019-11-08 PROCEDURE — 99999 PR PBB SHADOW E&M-EST. PATIENT-LVL IV: CPT | Mod: PBBFAC,,, | Performed by: REGISTERED NURSE

## 2019-11-08 PROCEDURE — 3075F PR MOST RECENT SYSTOLIC BLOOD PRESS GE 130-139MM HG: ICD-10-PCS | Mod: CPTII,S$GLB,, | Performed by: REGISTERED NURSE

## 2019-11-08 PROCEDURE — 99214 PR OFFICE/OUTPT VISIT, EST, LEVL IV, 30-39 MIN: ICD-10-PCS | Mod: S$GLB,,, | Performed by: REGISTERED NURSE

## 2019-11-08 RX ORDER — OMEPRAZOLE 20 MG/1
20 CAPSULE, DELAYED RELEASE ORAL 2 TIMES DAILY PRN
Qty: 60 CAPSULE | Refills: 0 | Status: SHIPPED | OUTPATIENT
Start: 2019-11-08 | End: 2019-11-20

## 2019-11-08 RX ORDER — BENZONATATE 100 MG/1
100 CAPSULE ORAL 3 TIMES DAILY PRN
Qty: 30 CAPSULE | Refills: 0 | Status: SHIPPED | OUTPATIENT
Start: 2019-11-08 | End: 2019-11-20 | Stop reason: SDUPTHER

## 2019-11-18 DIAGNOSIS — R05.3 CHRONIC COUGH: ICD-10-CM

## 2019-11-18 RX ORDER — BENZONATATE 100 MG/1
100 CAPSULE ORAL 3 TIMES DAILY PRN
Qty: 30 CAPSULE | Refills: 0 | OUTPATIENT
Start: 2019-11-18

## 2019-11-18 NOTE — TELEPHONE ENCOUNTER
----- Message from Alida Pimentel sent at 11/18/2019 10:07 AM CST -----  Type:  RX Refill Request    Who Called: Hourig-daughter  Refill or New Rx:refill  RX Name and Strength:benzonatate (TESSALON) 100 MG capsule  How is the patient currently taking it? (ex. 1XDay):  Is this a 30 day or 90 day RX:  Preferred Pharmacy with phone number:  [facility]/pharmacy #[id number] - [place] - [address] AT [place] [address] [place] [address]  Phone: [phone] Fax: [phone]      Local or Mail Order:local  Ordering Provider:Juan M  Would the patient rather a call back or a response via [facility]? call  Best Call Back Number: [phone]  Additional Information:

## 2019-11-18 NOTE — TELEPHONE ENCOUNTER
Medication refuse by physician. Due to patient is requesting a refill too soon. lvm for patient to call back for the message from physician.

## 2019-11-19 DIAGNOSIS — R05.3 CHRONIC COUGH: ICD-10-CM

## 2019-11-19 RX ORDER — BENZONATATE 100 MG/1
100 CAPSULE ORAL 3 TIMES DAILY PRN
Qty: 30 CAPSULE | Refills: 0 | OUTPATIENT
Start: 2019-11-19

## 2019-11-19 NOTE — TELEPHONE ENCOUNTER
Patient daughter in-law said that the patient cough has subside some,  but she has ran out of the medication and would like a refill on it. Because that's what helping her with the cough. Patient also stop taking medication prilosec 20mg because of the side effects it has on the box. And patient said she don't want to cause problems for her stomach from taking this medication.

## 2019-11-19 NOTE — TELEPHONE ENCOUNTER
----- Message from Tena Longoria sent at 11/19/2019 10:26 AM CST -----  Contact: hourig/daughter in law  Caller needs call back to get test results for patient..[phone] (work)

## 2019-11-20 ENCOUNTER — OFFICE VISIT (OUTPATIENT)
Dept: CARDIOLOGY | Facility: CLINIC | Age: 79
End: 2019-11-20
Payer: MEDICARE

## 2019-11-20 ENCOUNTER — CLINICAL SUPPORT (OUTPATIENT)
Dept: CARDIOLOGY | Facility: CLINIC | Age: 79
End: 2019-11-20
Payer: MEDICARE

## 2019-11-20 VITALS
BODY MASS INDEX: 31.08 KG/M2 | HEIGHT: 62 IN | HEART RATE: 78 BPM | WEIGHT: 168.88 LBS | SYSTOLIC BLOOD PRESSURE: 136 MMHG | DIASTOLIC BLOOD PRESSURE: 76 MMHG

## 2019-11-20 DIAGNOSIS — I48.91 ATRIAL FIBRILLATION, UNSPECIFIED TYPE: Primary | ICD-10-CM

## 2019-11-20 DIAGNOSIS — I48.91 ATRIAL FIBRILLATION, UNSPECIFIED TYPE: ICD-10-CM

## 2019-11-20 DIAGNOSIS — R05.3 CHRONIC COUGH: ICD-10-CM

## 2019-11-20 DIAGNOSIS — I10 ESSENTIAL HYPERTENSION: ICD-10-CM

## 2019-11-20 PROCEDURE — 93000 EKG 12-LEAD: ICD-10-PCS | Mod: S$GLB,,, | Performed by: INTERNAL MEDICINE

## 2019-11-20 PROCEDURE — 3075F PR MOST RECENT SYSTOLIC BLOOD PRESS GE 130-139MM HG: ICD-10-PCS | Mod: CPTII,S$GLB,, | Performed by: NURSE PRACTITIONER

## 2019-11-20 PROCEDURE — 99499 RISK ADDL DX/OHS AUDIT: ICD-10-PCS | Mod: S$GLB,,, | Performed by: NURSE PRACTITIONER

## 2019-11-20 PROCEDURE — 93000 ELECTROCARDIOGRAM COMPLETE: CPT | Mod: S$GLB,,, | Performed by: INTERNAL MEDICINE

## 2019-11-20 PROCEDURE — 99214 OFFICE O/P EST MOD 30 MIN: CPT | Mod: 25,S$GLB,, | Performed by: NURSE PRACTITIONER

## 2019-11-20 PROCEDURE — 3078F PR MOST RECENT DIASTOLIC BLOOD PRESSURE < 80 MM HG: ICD-10-PCS | Mod: CPTII,S$GLB,, | Performed by: NURSE PRACTITIONER

## 2019-11-20 PROCEDURE — 1126F AMNT PAIN NOTED NONE PRSNT: CPT | Mod: S$GLB,,, | Performed by: NURSE PRACTITIONER

## 2019-11-20 PROCEDURE — 99214 PR OFFICE/OUTPT VISIT, EST, LEVL IV, 30-39 MIN: ICD-10-PCS | Mod: 25,S$GLB,, | Performed by: NURSE PRACTITIONER

## 2019-11-20 PROCEDURE — 1101F PR PT FALLS ASSESS DOC 0-1 FALLS W/OUT INJ PAST YR: ICD-10-PCS | Mod: CPTII,S$GLB,, | Performed by: NURSE PRACTITIONER

## 2019-11-20 PROCEDURE — 3075F SYST BP GE 130 - 139MM HG: CPT | Mod: CPTII,S$GLB,, | Performed by: NURSE PRACTITIONER

## 2019-11-20 PROCEDURE — 1101F PT FALLS ASSESS-DOCD LE1/YR: CPT | Mod: CPTII,S$GLB,, | Performed by: NURSE PRACTITIONER

## 2019-11-20 PROCEDURE — 99499 UNLISTED E&M SERVICE: CPT | Mod: S$GLB,,, | Performed by: NURSE PRACTITIONER

## 2019-11-20 PROCEDURE — 1126F PR PAIN SEVERITY QUANTIFIED, NO PAIN PRESENT: ICD-10-PCS | Mod: S$GLB,,, | Performed by: NURSE PRACTITIONER

## 2019-11-20 PROCEDURE — 3078F DIAST BP <80 MM HG: CPT | Mod: CPTII,S$GLB,, | Performed by: NURSE PRACTITIONER

## 2019-11-20 PROCEDURE — 1159F PR MEDICATION LIST DOCUMENTED IN MEDICAL RECORD: ICD-10-PCS | Mod: S$GLB,,, | Performed by: NURSE PRACTITIONER

## 2019-11-20 PROCEDURE — 99999 PR PBB SHADOW E&M-EST. PATIENT-LVL III: ICD-10-PCS | Mod: PBBFAC,,, | Performed by: NURSE PRACTITIONER

## 2019-11-20 PROCEDURE — 1159F MED LIST DOCD IN RCRD: CPT | Mod: S$GLB,,, | Performed by: NURSE PRACTITIONER

## 2019-11-20 PROCEDURE — 99999 PR PBB SHADOW E&M-EST. PATIENT-LVL III: CPT | Mod: PBBFAC,,, | Performed by: NURSE PRACTITIONER

## 2019-11-20 RX ORDER — BENZONATATE 100 MG/1
100 CAPSULE ORAL 3 TIMES DAILY PRN
Qty: 30 CAPSULE | Refills: 1 | Status: ON HOLD | OUTPATIENT
Start: 2019-11-20 | End: 2020-02-04

## 2019-11-20 RX ORDER — CETIRIZINE HYDROCHLORIDE 10 MG/1
10 TABLET ORAL DAILY PRN
COMMUNITY
End: 2020-11-02

## 2019-11-20 NOTE — PROGRESS NOTES
Subjective:   Patient ID:  Azadouhi TAPAN Ospina is a 79 y.o. female who presents for follow up of No chief complaint on file.      HPI  Ms. Ospina's current medical conditions include a-fib, HTN, HLP. History TIA and breast cancer.   She presents to clinic for hospital follow up after undergoing MEHNAZ/CV for A-fib.   MEHNAZ showed No KARINA thrombus. Biatrial dilatation. EF 55%, moderate MR.  After MEHNAZ, DCCV x1 with 200 joules and the afib was converted to sinus dany with frequent PACs at 53 bpm  Eliquis 5 mg bid continued. Has no abnormal bleeding.   Patient has been taking metoprolol 50mg daily    EKG today shows A-fib with controlled rate 81 bpm.     Past Medical History:   Diagnosis Date    A-fib 2019    Breast cancer     right, dx .  Dr. Callaway follows.s/p lumpectomy and arimidex x 5y.    Hyperlipidemia     Hypertension     OP (osteoporosis)     on bisphos x 2y.    TIA (transient ischemic attack)     Toe fracture        Past Surgical History:   Procedure Laterality Date    BREAST LUMPECTOMY      right     TOTAL ABDOMINAL HYSTERECTOMY      no cancer    TRANSESOPHAGEAL ECHOCARDIOGRAPHY N/A 2019    Procedure: ECHOCARDIOGRAM, TRANSESOPHAGEAL;  Surgeon: Otto Flores MD;  Location: [facility] CATH LAB;  Service: Cardiology;  Laterality: N/A;    TREATMENT OF CARDIAC ARRHYTHMIA N/A 2019    Procedure: CARDIOVERSION;  Surgeon: Otto Flores MD;  Location: [facility] CATH LAB;  Service: Cardiology;  Laterality: N/A;    TREATMENT OF CARDIAC ARRHYTHMIA N/A 2019    Procedure: CARDIOVERSION;  Surgeon: Otto Flores MD;  Location: [facility] CATH LAB;  Service: Cardiology;  Laterality: N/A;       Social History     Tobacco Use    Smoking status: Former Smoker     Last attempt to quit: 1994     Years since quittin.1    Smokeless tobacco: Never Used   Substance Use Topics    Alcohol use: Yes     Alcohol/week: 1.0 standard drinks     Types: 1 Glasses of wine per week     Comment: daily    Drug use: Never        No family history on file.    Current Outpatient Medications   Medication Sig    alendronate (FOSAMAX) 70 MG tablet TAKE 1 TABLET (70 MG TOTAL) BY MOUTH EVERY 7 DAYS.    apixaban (ELIQUIS) 5 mg Tab Take 1 tablet (5 mg total) by mouth 2 (two) times daily.    atorvastatin (LIPITOR) 20 MG tablet TAKE 1 TABLET (20 MG TOTAL) BY MOUTH ONCE DAILY.    clopidogrel (PLAVIX) 75 mg tablet TAKE 1 TABLET (75 MG TOTAL) BY MOUTH ONCE DAILY.    fish oil-omega-3 fatty acids 300-1,000 mg capsule Take 2 g by mouth once daily.    fluticasone (FLONASE) 50 mcg/actuation nasal spray TAKE 2 SPRAYS BY EACH NARE ROUTE ONCE DAILY.    levocetirizine (XYZAL) 5 MG tablet Take 5 mg by mouth every morning.    metoprolol succinate (TOPROL-XL) 50 MG 24 hr tablet Take 50 mg by mouth once daily.    MULTIVIT-IRON-MIN-FOLIC ACID 3,500-18-0.4 UNIT-MG-MG ORAL CHEW Take by mouth.    vitamin D 1000 units Tab Take 2,000 Units by mouth once daily.    benzonatate (TESSALON) 100 MG capsule Take 1 capsule (100 mg total) by mouth 3 (three) times daily as needed.    omeprazole (PRILOSEC) 20 MG capsule Take 1 capsule (20 mg total) by mouth 2 (two) times daily as needed. (Patient not taking: Reported on 11/20/2019)     No current facility-administered medications for this visit.      Current Outpatient Medications on File Prior to Visit   Medication Sig    alendronate (FOSAMAX) 70 MG tablet TAKE 1 TABLET (70 MG TOTAL) BY MOUTH EVERY 7 DAYS.    apixaban (ELIQUIS) 5 mg Tab Take 1 tablet (5 mg total) by mouth 2 (two) times daily.    atorvastatin (LIPITOR) 20 MG tablet TAKE 1 TABLET (20 MG TOTAL) BY MOUTH ONCE DAILY.    clopidogrel (PLAVIX) 75 mg tablet TAKE 1 TABLET (75 MG TOTAL) BY MOUTH ONCE DAILY.    fish oil-omega-3 fatty acids 300-1,000 mg capsule Take 2 g by mouth once daily.    fluticasone (FLONASE) 50 mcg/actuation nasal spray TAKE 2 SPRAYS BY EACH NARE ROUTE ONCE DAILY.    levocetirizine (XYZAL) 5 MG tablet Take 5 mg by mouth every  morning.    metoprolol succinate (TOPROL-XL) 50 MG 24 hr tablet Take 50 mg by mouth once daily.    MULTIVIT-IRON-MIN-FOLIC ACID 3,500-18-0.4 UNIT-MG-MG ORAL CHEW Take by mouth.    vitamin D 1000 units Tab Take 2,000 Units by mouth once daily.    omeprazole (PRILOSEC) 20 MG capsule Take 1 capsule (20 mg total) by mouth 2 (two) times daily as needed. (Patient not taking: Reported on 11/20/2019)    [DISCONTINUED] benzonatate (TESSALON) 100 MG capsule Take 1 capsule (100 mg total) by mouth 3 (three) times daily as needed. (Patient not taking: Reported on 11/20/2019)     No current facility-administered medications on file prior to visit.        Review of Systems   Constitution: Negative for diaphoresis, malaise/fatigue and weight gain.   HENT: Negative for hoarse voice.    Eyes: Negative for double vision and visual disturbance.   Cardiovascular: Negative for chest pain, claudication, cyanosis, dyspnea on exertion, irregular heartbeat, leg swelling, near-syncope, orthopnea, palpitations, paroxysmal nocturnal dyspnea and syncope.   Respiratory: Negative for cough, hemoptysis, shortness of breath and snoring.    Hematologic/Lymphatic: Negative for bleeding problem. Does not bruise/bleed easily.   Skin: Negative for color change and poor wound healing.   Musculoskeletal: Negative for muscle cramps, muscle weakness and myalgias.   Gastrointestinal: Negative for bloating, abdominal pain, change in bowel habit, diarrhea, heartburn, hematemesis, hematochezia, melena and nausea.   Neurological: Negative for excessive daytime sleepiness, dizziness, headaches, light-headedness, loss of balance, numbness and weakness.   Psychiatric/Behavioral: Negative for memory loss. The patient does not have insomnia.    Allergic/Immunologic: Negative for hives.       Objective:   Physical Exam   Constitutional: She is oriented to person, place, and time. She appears well-developed and well-nourished. She does not appear ill. No distress.  "  HENT:   Head: Normocephalic and atraumatic.   Eyes: Pupils are equal, round, and reactive to light. EOM are normal. No scleral icterus.   Neck: Normal range of motion. Neck supple. Normal carotid pulses, no hepatojugular reflux and no JVD present. Carotid bruit is not present. No tracheal deviation present. No thyromegaly present.   Cardiovascular: Normal rate, normal heart sounds, intact distal pulses and normal pulses. An irregularly irregular rhythm present. Exam reveals no gallop and no friction rub.   No murmur heard.  Pulmonary/Chest: Effort normal and breath sounds normal. No respiratory distress. She has no wheezes. She has no rhonchi. She has no rales. She exhibits no tenderness.   Abdominal: Soft. Normal appearance, normal aorta and bowel sounds are normal. She exhibits no abdominal bruit, no ascites and no pulsatile midline mass. There is no hepatomegaly. There is no tenderness.   Musculoskeletal: She exhibits no edema.        Right shoulder: She exhibits no deformity.   Neurological: She is alert and oriented to person, place, and time. She has normal strength. No cranial nerve deficit. Coordination normal.   Skin: Skin is warm and dry. No rash noted. No cyanosis or erythema. Nails show no clubbing.   Psychiatric: She has a normal mood and affect. Her speech is normal and behavior is normal.     Vitals:    11/20/19 1518 11/20/19 1521   BP: 138/78 136/76   BP Location: Left arm Right arm   Pulse: 78    Weight: 76.6 kg (168 lb 14 oz)    Height: 5' 2" (1.575 m)      Lab Results   Component Value Date    CHOL 127 10/01/2019    CHOL 147 06/20/2018    CHOL 137 05/30/2017     Lab Results   Component Value Date    HDL 46 10/01/2019    HDL 56 06/20/2018    HDL 57 05/30/2017     Lab Results   Component Value Date    LDLCALC 61.4 (L) 10/01/2019    LDLCALC 73.6 06/20/2018    LDLCALC 60.0 (L) 05/30/2017     Lab Results   Component Value Date    TRIG 98 10/01/2019    TRIG 87 06/20/2018    TRIG 100 05/30/2017     Lab " Results   Component Value Date    CHOLHDL 36.2 10/01/2019    CHOLHDL 38.1 06/20/2018    CHOLHDL 41.6 05/30/2017       Chemistry        Component Value Date/Time     09/13/2019 0951    K 3.8 09/13/2019 0951     09/13/2019 0951    CO2 29 09/13/2019 0951    BUN 13 09/13/2019 0951    CREATININE 0.9 09/13/2019 0951     (H) 09/13/2019 0951        Component Value Date/Time    CALCIUM 11.0 (H) 09/13/2019 0951    ALKPHOS 59 09/13/2019 0951    AST 15 09/13/2019 0951    ALT 14 09/13/2019 0951    BILITOT 0.9 09/13/2019 0951    ESTGFRAFRICA >60.0 09/13/2019 0951    EGFRNONAA >60.0 09/13/2019 0951          Lab Results   Component Value Date    TSH 0.993 09/13/2019     No results found for: INR, PROTIME  Lab Results   Component Value Date    WBC 7.96 09/13/2019    HGB 14.9 09/13/2019    HCT 45.8 09/13/2019    MCV 89 09/13/2019     09/13/2019     BMP  Sodium   Date Value Ref Range Status   09/13/2019 142 136 - 145 mmol/L Final     Potassium   Date Value Ref Range Status   09/13/2019 3.8 3.5 - 5.1 mmol/L Final     Chloride   Date Value Ref Range Status   09/13/2019 105 95 - 110 mmol/L Final     CO2   Date Value Ref Range Status   09/13/2019 29 23 - 29 mmol/L Final     BUN, Bld   Date Value Ref Range Status   09/13/2019 13 8 - 23 mg/dL Final     Creatinine   Date Value Ref Range Status   09/13/2019 0.9 0.5 - 1.4 mg/dL Final     Calcium   Date Value Ref Range Status   09/13/2019 11.0 (H) 8.7 - 10.5 mg/dL Final     Anion Gap   Date Value Ref Range Status   09/13/2019 8 8 - 16 mmol/L Final     eGFR if    Date Value Ref Range Status   09/13/2019 >60.0 >60 mL/min/1.73 m^2 Final     eGFR if non    Date Value Ref Range Status   09/13/2019 >60.0 >60 mL/min/1.73 m^2 Final     Comment:     Calculation used to obtain the estimated glomerular filtration  rate (eGFR) is the CKD-EPI equation.        CrCl cannot be calculated (Patient's most recent lab result is older than the maximum 7 days  allowed.).    Assessment:     1. Atrial fibrillation, unspecified type    2. Essential hypertension        Plan:   Atrial fibrillation, unspecified type  Continue metoprolol 50mg daily   Continue Eliquis for CVA prophylaxis   Refer to EP for RFA ablation vs. Other options  She will keep a BP and pulse log and let me know if she has any issues with RVR in the event that her metoprolol needs to be increased to 100mg daily     Essential hypertension  Continue current medical therapy     RTC in 3 months or sooner if needed

## 2019-11-27 ENCOUNTER — OFFICE VISIT (OUTPATIENT)
Dept: CARDIOLOGY | Facility: CLINIC | Age: 79
End: 2019-11-27
Payer: MEDICARE

## 2019-11-27 VITALS
HEART RATE: 95 BPM | SYSTOLIC BLOOD PRESSURE: 116 MMHG | OXYGEN SATURATION: 98 % | HEIGHT: 62 IN | DIASTOLIC BLOOD PRESSURE: 70 MMHG | BODY MASS INDEX: 30.66 KG/M2 | WEIGHT: 166.63 LBS

## 2019-11-27 DIAGNOSIS — I10 ESSENTIAL HYPERTENSION: ICD-10-CM

## 2019-11-27 DIAGNOSIS — G45.9 TIA (TRANSIENT ISCHEMIC ATTACK): ICD-10-CM

## 2019-11-27 DIAGNOSIS — I48.19 PERSISTENT ATRIAL FIBRILLATION: Primary | ICD-10-CM

## 2019-11-27 PROCEDURE — 99999 PR PBB SHADOW E&M-EST. PATIENT-LVL III: ICD-10-PCS | Mod: PBBFAC,,, | Performed by: INTERNAL MEDICINE

## 2019-11-27 PROCEDURE — 3078F DIAST BP <80 MM HG: CPT | Mod: CPTII,S$GLB,, | Performed by: INTERNAL MEDICINE

## 2019-11-27 PROCEDURE — 1101F PR PT FALLS ASSESS DOC 0-1 FALLS W/OUT INJ PAST YR: ICD-10-PCS | Mod: CPTII,S$GLB,, | Performed by: INTERNAL MEDICINE

## 2019-11-27 PROCEDURE — 1101F PT FALLS ASSESS-DOCD LE1/YR: CPT | Mod: CPTII,S$GLB,, | Performed by: INTERNAL MEDICINE

## 2019-11-27 PROCEDURE — 99999 PR PBB SHADOW E&M-EST. PATIENT-LVL III: CPT | Mod: PBBFAC,,, | Performed by: INTERNAL MEDICINE

## 2019-11-27 PROCEDURE — 3074F PR MOST RECENT SYSTOLIC BLOOD PRESSURE < 130 MM HG: ICD-10-PCS | Mod: CPTII,S$GLB,, | Performed by: INTERNAL MEDICINE

## 2019-11-27 PROCEDURE — 3078F PR MOST RECENT DIASTOLIC BLOOD PRESSURE < 80 MM HG: ICD-10-PCS | Mod: CPTII,S$GLB,, | Performed by: INTERNAL MEDICINE

## 2019-11-27 PROCEDURE — 99205 OFFICE O/P NEW HI 60 MIN: CPT | Mod: S$GLB,,, | Performed by: INTERNAL MEDICINE

## 2019-11-27 PROCEDURE — 3074F SYST BP LT 130 MM HG: CPT | Mod: CPTII,S$GLB,, | Performed by: INTERNAL MEDICINE

## 2019-11-27 PROCEDURE — 99205 PR OFFICE/OUTPT VISIT, NEW, LEVL V, 60-74 MIN: ICD-10-PCS | Mod: S$GLB,,, | Performed by: INTERNAL MEDICINE

## 2019-11-27 PROCEDURE — 1159F PR MEDICATION LIST DOCUMENTED IN MEDICAL RECORD: ICD-10-PCS | Mod: S$GLB,,, | Performed by: INTERNAL MEDICINE

## 2019-11-27 PROCEDURE — 1159F MED LIST DOCD IN RCRD: CPT | Mod: S$GLB,,, | Performed by: INTERNAL MEDICINE

## 2019-11-27 NOTE — LETTER
November 27, 2019      Adriana Gonzalez, [facility]  [address] [place] [address]           [facility]  [id number] [place] [address] , [address]ND FLOOR  [place] [address]  Phone: [phone]  Fax: [phone]          Patient: Lorenzo Ospina   MR Number: [id number]   YOB: 1940   Date of Visit: 11/27/2019       Dear Adriana Gonzalez:    Thank you for referring Lorenzo Ospina to me for evaluation. Attached you will find relevant portions of my assessment and plan of care.    If you have questions, please do not hesitate to call me. I look forward to following Lorenzo Ospina along with you.    Sincerely,    Preet Wigigns MD    Enclosure  CC:  No Recipients    If you would like to receive this communication electronically, please contact [email or web address] or [phone] to request more information on [employer] Link access.    For providers and/or their staff who would like to refer a patient to [facility], please contact us through our one-stop-shop provider referral line, [facility], at [phone].    If you feel you have received this communication in error or would no longer like to receive these types of communications, please e-mail [email or web address]

## 2019-11-27 NOTE — PROGRESS NOTES
Subjective:    Patient ID:  Lorenzo Ospina is a 79 y.o. female who presents for evaluation of Atrial Fibrillation      79 yoF HTN, TIA here for AF management. She developed fatigue and COVARRUBIAS September 2019. She was found to have AF 9/11/19. She was placed on apixaban for CVA prophylaxis. She has history of TIA 3-4 years ago, no arrhythmia assessment at that time. EF normal 9/19. Normal TSH She underwent MEHNAZ/CV 11/5/19 with resultant sinus bradycardia 50 bpm. She had early return of AF 2 weeks later. She feels she was in SR a brief time. She is a very active patient until her AF diagnosis. Her symptoms have limited her QOL. No syncope or near syncope.     Echo 9/19:  CONCLUSIONS     1 - Biatrial enlargement.     2 - Concentric hypertrophy.     3 - No wall motion abnormalities.     4 - Normal left ventricular systolic function (EF 55-60%).     5 - Normal left ventricular diastolic function.     6 - Normal right ventricular systolic function .     7 - The estimated PA systolic pressure is 26 mmHg.     8 - Mild mitral regurgitation.     9 - Trivial tricuspid regurgitation.     10 - Intermediate central venous pressure.     Past Medical History:  11/5/2019: A-fib  No date: Breast cancer      Comment:  right, dx 2008.  Dr. Callaway follows.s/p lumpectomy and                arimidex x 5y.  No date: Hyperlipidemia  No date: Hypertension  No date: OP (osteoporosis)      Comment:  on bisphos x 2y.  No date: TIA (transient ischemic attack)  No date: Toe fracture    Past Surgical History:  No date: BREAST LUMPECTOMY      Comment:  right 2008  No date: TOTAL ABDOMINAL HYSTERECTOMY      Comment:  no cancer  11/5/2019: TRANSESOPHAGEAL ECHOCARDIOGRAPHY; N/A      Comment:  Procedure: ECHOCARDIOGRAM, TRANSESOPHAGEAL;  Surgeon:                Otto Flores MD;  Location: [facility] CATH LAB;  Service:                Cardiology;  Laterality: N/A;  11/5/2019: TREATMENT OF CARDIAC ARRHYTHMIA; N/A      Comment:  Procedure: CARDIOVERSION;  Surgeon:  Otto Flores MD;                 Location: [facility] CATH LAB;  Service: Cardiology;                 Laterality: N/A;  2019: TREATMENT OF CARDIAC ARRHYTHMIA; N/A      Comment:  Procedure: CARDIOVERSION;  Surgeon: Otto Flores MD;                 Location: [facility] CATH LAB;  Service: Cardiology;                 Laterality: N/A;    Social History    Socioeconomic History      Marital status:       Spouse name: Not on file      Number of children: Not on file      Years of education: Not on file      Highest education level: Not on file    Occupational History      Not on file    Social Needs      Financial resource strain: Not on file      Food insecurity:        Worry: Not on file        Inability: Not on file      Transportation needs:        Medical: Not on file        Non-medical: Not on file    Tobacco Use      Smoking status: Former Smoker        Quit date: 1994        Years since quittin.1      Smokeless tobacco: Never Used    Substance and Sexual Activity      Alcohol use: Yes        Alcohol/week: 1.0 standard drinks        Types: 1 Glasses of wine per week        Comment: daily      Drug use: Never      Sexual activity: Not on file    Lifestyle      Physical activity:        Days per week: Not on file        Minutes per session: Not on file      Stress: Not on file    Relationships      Social connections:        Talks on phone: Not on file        Gets together: Not on file        Attends Restoration service: Not on file        Active member of club or organization: Not on file        Attends meetings of clubs or organizations: Not on file        Relationship status: Not on file    Other Topics      Concerns:        Not on file    Social History Narrative      Not on file      History reviewed.  No pertinent family history.        Review of Systems   Constitution: Positive for malaise/fatigue.   HENT: Negative.    Eyes: Negative.    Cardiovascular: Negative for chest pain, dyspnea on exertion, leg  swelling, near-syncope, palpitations and syncope.   Respiratory: Positive for shortness of breath.    Endocrine: Negative.    Hematologic/Lymphatic: Negative.    Skin: Negative.    Musculoskeletal: Negative.    Gastrointestinal: Negative.    Genitourinary: Negative.    Neurological: Negative.  Negative for dizziness and light-headedness.   Psychiatric/Behavioral: Negative.    Allergic/Immunologic: Negative.         Objective:    Physical Exam   Constitutional: She is oriented to person, place, and time. She appears well-developed and well-nourished. No distress.   HENT:   Head: Normocephalic and atraumatic.   Eyes: Pupils are equal, round, and reactive to light. EOM are normal.   Neck: Normal range of motion. No JVD present. No thyromegaly present.   Cardiovascular: Normal rate, S1 normal, S2 normal and normal heart sounds. An irregularly irregular rhythm present. PMI is not displaced. Exam reveals no gallop and no friction rub.   No murmur heard.  Pulmonary/Chest: Effort normal and breath sounds normal. No respiratory distress. She has no wheezes. She has no rales.   Abdominal: Soft. Bowel sounds are normal. She exhibits no distension. There is no tenderness. There is no rebound and no guarding.   Musculoskeletal: Normal range of motion. She exhibits no edema or tenderness.   Neurological: She is alert and oriented to person, place, and time. No cranial nerve deficit.   Skin: Skin is warm and dry. No rash noted. No erythema.   Psychiatric: She has a normal mood and affect. Her behavior is normal. Judgment and thought content normal.   Vitals reviewed.        Assessment:       1. Persistent atrial fibrillation    2. Essential hypertension    3. TIA (transient ischemic attack)         Plan:       79 yoF persistent AF here for arrhythmia management. I discussed AF and its basic pathophysiology, including its health implications and treatment options with the patient. Specifically, I addressed the need for CVA  prophylaxis as well as the goal to reduce symptomatic arrhythmic episodes by pharmacologic and/or procedural methods. She has CV refractory, symptomatic, persistent AF. She has baseline SBr and is not an ideal candidate for AAD. I offered PVI for definitive therapy. I had extensive discussion with patient regarding risks and benefits of PVI/WACA, and he would like to proceed. Risks of procedure include (but are not limited to) bleeding, stroke, perforation requiring emergency draining or surgery, AV block, death, AV fistula, AE fistula, PV stenosis.    Continue anti-coagulation until day prior to procedure.    RF PVI  YAW  Anesthesia  MEHNAZ

## 2019-12-02 ENCOUNTER — TELEPHONE (OUTPATIENT)
Dept: ELECTROPHYSIOLOGY | Facility: CLINIC | Age: 79
End: 2019-12-02

## 2019-12-02 NOTE — TELEPHONE ENCOUNTER
Left message for daughter-in-law to return call to schedule PVI. Next available is 1/16/20. Call back # left.

## 2019-12-02 NOTE — TELEPHONE ENCOUNTER
----- Message from Deena Orr MA sent at 12/2/2019 12:41 PM CST -----  Contact: Daughter in law      ----- Message -----  From: Roseanne Cueva  Sent: 12/2/2019  11:16 AM CST  To: [facility] Staff    Pt calling to get the procedure schedule, if can call daughter in law to schedule.  Thanks    [phone] Casey

## 2019-12-03 ENCOUNTER — TELEPHONE (OUTPATIENT)
Dept: ELECTROPHYSIOLOGY | Facility: CLINIC | Age: 79
End: 2019-12-03

## 2019-12-03 DIAGNOSIS — I48.19 PERSISTENT ATRIAL FIBRILLATION: Primary | ICD-10-CM

## 2019-12-03 NOTE — TELEPHONE ENCOUNTER
Spoke with son and scheduled ablation for 1/17/20. Will email him the instructions, as requested. She will get her pre-op labs at [place].

## 2019-12-03 NOTE — TELEPHONE ENCOUNTER
----- Message from Jose Jeffrey MA sent at 12/3/2019  4:55 PM CST -----  Contact: patient's son Marisa Herrera Jan 13 for mom's procedure is ok to schedule at 5:30AM-but if you have anything later, they would love to take it.    ----- Message -----  From: Abigail Mcclendon  Sent: 12/3/2019   2:48 PM CST  To: [facility] Staff    The Pt's son is returning a call from [facility]. Please call him back @ [phone]. Thanks, Abigail

## 2019-12-13 ENCOUNTER — TELEPHONE (OUTPATIENT)
Dept: ELECTROPHYSIOLOGY | Facility: CLINIC | Age: 79
End: 2019-12-13

## 2019-12-24 DIAGNOSIS — R05.9 COUGH: ICD-10-CM

## 2019-12-24 DIAGNOSIS — J30.1 ACUTE SEASONAL ALLERGIC RHINITIS DUE TO POLLEN: ICD-10-CM

## 2019-12-26 RX ORDER — FLUTICASONE PROPIONATE 50 MCG
SPRAY, SUSPENSION (ML) NASAL
Qty: 16 ML | Refills: 6 | Status: SHIPPED | OUTPATIENT
Start: 2019-12-26 | End: 2021-03-02

## 2020-01-03 ENCOUNTER — TELEPHONE (OUTPATIENT)
Dept: ELECTROPHYSIOLOGY | Facility: CLINIC | Age: 80
End: 2020-01-03

## 2020-01-03 NOTE — TELEPHONE ENCOUNTER
Spoke with Amy, patient's daughter in law. Explained that patient is scheduled for an ablation, not a device insertion. She said that they want to cancel the ablation and reschedule in June d/t patient being the sole caregiver for disabled . She said that the patient is not symptomatic with her afib. I confirmed compliance of Eliquis and stressed the importance of continued compliance. I advised her that if the patient becomes symptomatic or bothered by her afib to call us back. Pt verbalized understanding and appreciates call.

## 2020-01-03 NOTE — TELEPHONE ENCOUNTER
----- Message from Richelle Campos RN sent at 1/3/2020  2:28 PM CST -----  Contact: Son      ----- Message -----  From: Roseanne Cueva  Sent: 1/3/2020  11:35 AM CST  To: Daysi Bullock RN    Pt receive e-mail regarding procedure mentioning an implant.  Pt was not aware of it & would like to cancel procdure. Son does not live near mother and would like for you to can discuss the details of the surgery with Amy.   Thanks    Daughter- in-law Amy - [phone]

## 2020-01-06 ENCOUNTER — TELEPHONE (OUTPATIENT)
Dept: ELECTROPHYSIOLOGY | Facility: CLINIC | Age: 80
End: 2020-01-06

## 2020-01-06 NOTE — TELEPHONE ENCOUNTER
Spoke with patient's daughter in law, Amy. Let her know that Dr. Wiggins would like Ms. James to have a nadine/cardioversion since they are postponing her ablation until June. She told me that her father in law is in the hospital so I advised that she call me when he is out and they are ready to be scheduled. Patient verbalized understanding and appreciates call.

## 2020-01-28 ENCOUNTER — TELEPHONE (OUTPATIENT)
Dept: CARDIOLOGY | Facility: CLINIC | Age: 80
End: 2020-01-28

## 2020-01-28 NOTE — TELEPHONE ENCOUNTER
Returned call to Daughter-in law and confirmed Cardioversion on Tuesday morning for 9am with 7:30am arrival time.  Reviewed medications and stressed continuing Eliquis daily  Will email instructions [email or web address]

## 2020-01-28 NOTE — TELEPHONE ENCOUNTER
Returned call to Daughter-in-law  And requesting another Cardioversion before Ablation with Dr. Wiggins  Advised would check with Dr. Vega last Elvis/Cv done November with Dr. Flores states  was sick and postponed procedure with Dr. Wiggins      ----- Message from Liliana Way sent at 1/28/2020 12:22 PM CST -----  Contact: Diane ( pt daughter in law )   Diane calling in to see if her Mothertequila can have another shock for her heart.  Diane can be reached at [phone]    Thanks

## 2020-02-03 ENCOUNTER — TELEPHONE (OUTPATIENT)
Dept: CARDIOLOGY | Facility: CLINIC | Age: 80
End: 2020-02-03

## 2020-02-03 NOTE — TELEPHONE ENCOUNTER
Telephoned daughter-in-law to advise of MD justin but his partner Dr. Flores will perform Cardioversion and accepted well.

## 2020-02-04 ENCOUNTER — ANESTHESIA (OUTPATIENT)
Dept: CARDIOLOGY | Facility: HOSPITAL | Age: 80
End: 2020-02-04
Payer: MEDICARE

## 2020-02-04 ENCOUNTER — ANESTHESIA EVENT (OUTPATIENT)
Dept: CARDIOLOGY | Facility: HOSPITAL | Age: 80
End: 2020-02-04
Payer: MEDICARE

## 2020-02-04 ENCOUNTER — HOSPITAL ENCOUNTER (OUTPATIENT)
Facility: HOSPITAL | Age: 80
Discharge: HOME | End: 2020-02-04
Attending: INTERNAL MEDICINE | Admitting: INTERNAL MEDICINE
Payer: MEDICARE

## 2020-02-04 VITALS
RESPIRATION RATE: 16 BRPM | HEIGHT: 62 IN | TEMPERATURE: 98 F | WEIGHT: 166 LBS | OXYGEN SATURATION: 99 % | BODY MASS INDEX: 30.55 KG/M2 | HEART RATE: 83 BPM | SYSTOLIC BLOOD PRESSURE: 121 MMHG | DIASTOLIC BLOOD PRESSURE: 57 MMHG

## 2020-02-04 DIAGNOSIS — I48.19 PERSISTENT ATRIAL FIBRILLATION: ICD-10-CM

## 2020-02-04 DIAGNOSIS — I48.20 CHRONIC ATRIAL FIBRILLATION: ICD-10-CM

## 2020-02-04 DIAGNOSIS — I48.19 OTHER PERSISTENT ATRIAL FIBRILLATION: Primary | ICD-10-CM

## 2020-02-04 DIAGNOSIS — I48.91 AF (ATRIAL FIBRILLATION): ICD-10-CM

## 2020-02-04 DIAGNOSIS — I48.91 A-FIB: ICD-10-CM

## 2020-02-04 DIAGNOSIS — I48.91 ATRIAL FIBRILLATION STATUS POST CARDIOVERSION: ICD-10-CM

## 2020-02-04 PROCEDURE — 92960 CARDIOVERSION (DCCV): ICD-10-PCS | Mod: ,,, | Performed by: INTERNAL MEDICINE

## 2020-02-04 PROCEDURE — 92960 CARDIOVERSION ELECTRIC EXT: CPT | Mod: ,,, | Performed by: INTERNAL MEDICINE

## 2020-02-04 PROCEDURE — 37000008 HC ANESTHESIA 1ST 15 MINUTES: Performed by: INTERNAL MEDICINE

## 2020-02-04 PROCEDURE — 93010 ELECTROCARDIOGRAM REPORT: CPT | Mod: ,,, | Performed by: INTERNAL MEDICINE

## 2020-02-04 PROCEDURE — 93005 ELECTROCARDIOGRAM TRACING: CPT

## 2020-02-04 PROCEDURE — 25000003 PHARM REV CODE 250

## 2020-02-04 PROCEDURE — 63600175 PHARM REV CODE 636 W HCPCS

## 2020-02-04 PROCEDURE — 92960 CARDIOVERSION ELECTRIC EXT: CPT

## 2020-02-04 PROCEDURE — 93010 EKG 12-LEAD: ICD-10-PCS | Mod: ,,, | Performed by: INTERNAL MEDICINE

## 2020-02-04 PROCEDURE — 25000003 PHARM REV CODE 250: Performed by: NURSE ANESTHETIST, CERTIFIED REGISTERED

## 2020-02-04 PROCEDURE — 63600175 PHARM REV CODE 636 W HCPCS: Performed by: NURSE ANESTHETIST, CERTIFIED REGISTERED

## 2020-02-04 RX ORDER — SODIUM CHLORIDE 0.9 % (FLUSH) 0.9 %
10 SYRINGE (ML) INJECTION
Status: DISCONTINUED | OUTPATIENT
Start: 2020-02-04 | End: 2020-02-04 | Stop reason: HOSPADM

## 2020-02-04 RX ORDER — SODIUM CHLORIDE 9 MG/ML
INJECTION, SOLUTION INTRAVENOUS ONCE
Status: COMPLETED | OUTPATIENT
Start: 2020-02-04 | End: 2020-02-04

## 2020-02-04 RX ORDER — LIDOCAINE HYDROCHLORIDE 10 MG/ML
INJECTION, SOLUTION EPIDURAL; INFILTRATION; INTRACAUDAL; PERINEURAL
Status: DISCONTINUED | OUTPATIENT
Start: 2020-02-04 | End: 2020-02-04

## 2020-02-04 RX ORDER — PROPOFOL 10 MG/ML
VIAL (ML) INTRAVENOUS
Status: DISCONTINUED | OUTPATIENT
Start: 2020-02-04 | End: 2020-02-04

## 2020-02-04 RX ADMIN — LIDOCAINE HYDROCHLORIDE 50 MG: 10 INJECTION, SOLUTION EPIDURAL; INFILTRATION; INTRACAUDAL; PERINEURAL at 08:02

## 2020-02-04 RX ADMIN — PROPOFOL 50 MG: 10 INJECTION, EMULSION INTRAVENOUS at 08:02

## 2020-02-04 RX ADMIN — SODIUM CHLORIDE: 9 INJECTION, SOLUTION INTRAVENOUS at 08:02

## 2020-02-04 NOTE — H&P
[facility]  Cardiology  History and Physical     Patient Name: Lorenzo Ospina  MRN: [id number]  Admission Date: 2/4/2020  Code Status: Full Code   Attending Provider: Otto Flores MD   Primary Care Physician: Lorena Pablo MD  Principal Problem:<principal problem not specified>    Patient information was obtained from patient and ER records.     Subjective:         HPI:  Past Medical History:   Diagnosis Date    A-fib 11/5/2019    Breast cancer     right, dx 2008.  Dr. Callaway follows.s/p lumpectomy and arimidex x 5y.    Hyperlipidemia     Hypertension     OP (osteoporosis)     on bisphos x 2y.    TIA (transient ischemic attack)     Toe fracture        Past Surgical History:   Procedure Laterality Date    BREAST LUMPECTOMY      right 2008    TOTAL ABDOMINAL HYSTERECTOMY      no cancer    TRANSESOPHAGEAL ECHOCARDIOGRAPHY N/A 11/5/2019    Procedure: ECHOCARDIOGRAM, TRANSESOPHAGEAL;  Surgeon: Otto Flores MD;  Location: [facility] CATH LAB;  Service: Cardiology;  Laterality: N/A;    TREATMENT OF CARDIAC ARRHYTHMIA N/A 11/5/2019    Procedure: CARDIOVERSION;  Surgeon: Otto Flores MD;  Location: [facility] CATH LAB;  Service: Cardiology;  Laterality: N/A;    TREATMENT OF CARDIAC ARRHYTHMIA N/A 11/5/2019    Procedure: CARDIOVERSION;  Surgeon: Otto Flores MD;  Location: [facility] CATH LAB;  Service: Cardiology;  Laterality: N/A;       History reviewed. No pertinent family history.    Social History     Socioeconomic History    Marital status:      Spouse name: Not on file    Number of children: Not on file    Years of education: Not on file    Highest education level: Not on file   Occupational History    Not on file   Social Needs    Financial resource strain: Not on file    Food insecurity:     Worry: Not on file     Inability: Not on file    Transportation needs:     Medical: Not on file     Non-medical: Not on file   Tobacco Use    Smoking status: Former Smoker     Last attempt to quit:  1994     Years since quittin.3    Smokeless tobacco: Never Used   Substance and Sexual Activity    Alcohol use: Yes     Alcohol/week: 1.0 standard drinks     Types: 1 Glasses of wine per week     Comment: daily    Drug use: Never    Sexual activity: Not on file   Lifestyle    Physical activity:     Days per week: Not on file     Minutes per session: Not on file    Stress: Not on file   Relationships    Social connections:     Talks on phone: Not on file     Gets together: Not on file     Attends Anabaptist service: Not on file     Active member of club or organization: Not on file     Attends meetings of clubs or organizations: Not on file     Relationship status: Not on file   Other Topics Concern    Not on file   Social History Narrative    Not on file       Current Facility-Administered Medications   Medication Dose Route Frequency Provider Last Rate Last Dose    0.9%  NaCl infusion   Intravenous Once Otto Flores MD        sodium chloride 0.9% flush 10 mL  10 mL Intravenous PRN Otto Flores MD           Review of patient's allergies indicates:   Allergen Reactions    Medrol [methylprednisolone]        Past Medical History:   Diagnosis Date    A-fib 2019    Breast cancer     right, dx .  Dr. Callaway follows.s/p lumpectomy and arimidex x 5y.    Hyperlipidemia     Hypertension     OP (osteoporosis)     on bisphos x 2y.    TIA (transient ischemic attack)     Toe fracture        Past Surgical History:   Procedure Laterality Date    BREAST LUMPECTOMY      right     TOTAL ABDOMINAL HYSTERECTOMY      no cancer    TRANSESOPHAGEAL ECHOCARDIOGRAPHY N/A 2019    Procedure: ECHOCARDIOGRAM, TRANSESOPHAGEAL;  Surgeon: Otto Flores MD;  Location: [facility] CATH LAB;  Service: Cardiology;  Laterality: N/A;    TREATMENT OF CARDIAC ARRHYTHMIA N/A 2019    Procedure: CARDIOVERSION;  Surgeon: Otto Flores MD;  Location: [facility] CATH LAB;  Service: Cardiology;  Laterality: N/A;    TREATMENT OF  CARDIAC ARRHYTHMIA N/A 2019    Procedure: CARDIOVERSION;  Surgeon: Otto Flores MD;  Location: [facility] CATH LAB;  Service: Cardiology;  Laterality: N/A;       Review of patient's allergies indicates:   Allergen Reactions    Medrol [methylprednisolone]        No current facility-administered medications on file prior to encounter.      Current Outpatient Medications on File Prior to Encounter   Medication Sig    alendronate (FOSAMAX) 70 MG tablet TAKE 1 TABLET (70 MG TOTAL) BY MOUTH EVERY 7 DAYS.    apixaban (ELIQUIS) 5 mg Tab Take 1 tablet (5 mg total) by mouth 2 (two) times daily.    atorvastatin (LIPITOR) 20 MG tablet TAKE 1 TABLET (20 MG TOTAL) BY MOUTH ONCE DAILY.    cetirizine (ZYRTEC) 10 MG tablet Take 10 mg by mouth once daily.    clopidogrel (PLAVIX) 75 mg tablet TAKE 1 TABLET (75 MG TOTAL) BY MOUTH ONCE DAILY.    fish oil-omega-3 fatty acids 300-1,000 mg capsule Take 2 g by mouth once daily.    fluticasone propionate (FLONASE) 50 mcg/actuation nasal spray TAKE 2 SPRAYS BY EACH NARE ROUTE ONCE DAILY.    glucosam/chond/hyalu/CF borate (MOVE FREE JOINT HEALTH ORAL) Take 1 tablet by mouth once daily.    metoprolol succinate (TOPROL-XL) 50 MG 24 hr tablet Take 50 mg by mouth once daily.    MULTIVIT-IRON-MIN-FOLIC ACID 3,500-18-0.4 UNIT-MG-MG ORAL CHEW Take by mouth.    vitamin D 1000 units Tab Take 2,000 Units by mouth once daily.    [DISCONTINUED] benzonatate (TESSALON) 100 MG capsule Take 1 capsule (100 mg total) by mouth 3 (three) times daily as needed.     Family History     None        Tobacco Use    Smoking status: Former Smoker     Last attempt to quit: 1994     Years since quittin.3    Smokeless tobacco: Never Used   Substance and Sexual Activity    Alcohol use: Yes     Alcohol/week: 1.0 standard drinks     Types: 1 Glasses of wine per week     Comment: daily    Drug use: Never    Sexual activity: Not on file     Review of Systems   Constitution: Negative for decreased  appetite, diaphoresis, fever, malaise/fatigue and night sweats.   HENT: Negative for nosebleeds.    Eyes: Negative for blurred vision and double vision.   Cardiovascular: Positive for dyspnea on exertion. Negative for chest pain, claudication, irregular heartbeat, leg swelling, near-syncope, orthopnea, palpitations, paroxysmal nocturnal dyspnea and syncope.   Respiratory: Negative for cough, shortness of breath, sleep disturbances due to breathing, snoring, sputum production and wheezing.    Endocrine: Negative for cold intolerance and polyuria.   Hematologic/Lymphatic: Does not bruise/bleed easily.   Skin: Negative for rash.   Musculoskeletal: Negative for back pain, falls, joint pain, joint swelling and neck pain.   Gastrointestinal: Negative for abdominal pain, heartburn, nausea and vomiting.   Genitourinary: Negative for dysuria, frequency and hematuria.   Neurological: Negative for difficulty with concentration, dizziness, focal weakness, headaches, light-headedness, numbness, seizures and weakness.   Psychiatric/Behavioral: Negative for depression, memory loss and substance abuse. The patient does not have insomnia.    Allergic/Immunologic: Negative for HIV exposure and hives.     Objective:     Vital Signs (Most Recent):  Temp: 98.1 °F (36.7 °C) (02/04/20 0815)  Pulse: 83 (02/04/20 0815)  Resp: 16 (02/04/20 0815)  BP: (!) 121/57 (02/04/20 0816)  SpO2: 99 % (02/04/20 0815) Vital Signs (24h Range):  Temp:  [98.1 °F (36.7 °C)] 98.1 °F (36.7 °C)  Pulse:  [83] 83  Resp:  [16] 16  SpO2:  [99 %] 99 %  BP: (121)/(57) 121/57     Weight: 75.3 kg (166 lb)  Body mass index is 30.36 kg/m².    SpO2: 99 %       No intake or output data in the 24 hours ending 02/04/20 0846    Lines/Drains/Airways     Airway                 Airway - Non-Surgical 11/05/19 0937 Nasal Cannula 90 days          Peripheral Intravenous Line                 Peripheral IV - Single Lumen 02/04/20 0819 20 G Right Hand less than 1 day                 Physical Exam   Constitutional: She is oriented to person, place, and time. She appears well-nourished.   HENT:   Head: Normocephalic.   Eyes: Pupils are equal, round, and reactive to light.   Neck: Normal carotid pulses and no JVD present. Carotid bruit is not present. No thyromegaly present.   Cardiovascular: Normal rate, normal heart sounds and normal pulses. An irregularly irregular rhythm present.  No extrasystoles are present. PMI is not displaced. Exam reveals no gallop and no S3.   No murmur heard.  Pulmonary/Chest: Breath sounds normal. No stridor. No respiratory distress.   Abdominal: Soft. Bowel sounds are normal. There is no tenderness. There is no rebound.   Musculoskeletal: Normal range of motion.   Neurological: She is alert and oriented to person, place, and time.   Skin: Skin is intact. No rash noted.   Psychiatric: Her behavior is normal.       Significant Labs: ABG: No results for input(s): PH, PCO2, HCO3, POCSATURATED, BE in the last 48 hours., Blood Culture: No results for input(s): LABBLOO in the last 48 hours., BMP: No results for input(s): GLU, NA, K, CL, CO2, BUN, CREATININE, CALCIUM, MG in the last 48 hours., CMP No results for input(s): NA, K, CL, CO2, GLU, BUN, CREATININE, CALCIUM, PROT, ALBUMIN, BILITOT, ALKPHOS, AST, ALT, ANIONGAP, ESTGFRAFRICA, EGFRNONAA in the last 48 hours., CBC No results for input(s): WBC, HGB, HCT, PLT in the last 48 hours., INR No results for input(s): INR, PROTIME in the last 48 hours., Lipid Panel No results for input(s): CHOL, HDL, LDLCALC, TRIG, CHOLHDL in the last 48 hours. and Troponin No results for input(s): TROPONINI in the last 48 hours.    Significant Imaging: EKG:      Assessment and Plan:     No notes have been filed under this hospital service.  Service: Cardiology      VTE Risk Mitigation (From admission, onward)    None          Otto Flores MD  Cardiology   [facility]

## 2020-02-04 NOTE — ANESTHESIA POSTPROCEDURE EVALUATION
Anesthesia Post Evaluation    Patient: Lorenzo Ospina    Procedure(s) Performed: Procedure(s) (LRB):  CARDIOVERSION (N/A)    Final Anesthesia Type: MAC    Patient location: [facility].  Patient participation: Yes- Able to Participate  Level of consciousness: awake and alert and oriented  Post-procedure vital signs: reviewed and stable  Pain management: adequate  Airway patency: patent    PONV status at discharge: No PONV  Anesthetic complications: no      Cardiovascular status: blood pressure returned to baseline, stable and hemodynamically stable  Respiratory status: unassisted, spontaneous ventilation and nasal cannula  Hydration status: euvolemic  Follow-up not needed.          Vitals Value Taken Time   /57 2/4/2020  8:16 AM   Temp 36.7 °C (98.1 °F) 2/4/2020  8:15 AM   Pulse 83 2/4/2020  8:15 AM   Resp 16 2/4/2020  8:15 AM   SpO2 99 % 2/4/2020  8:15 AM         No case tracking events are documented in the log.      Pain/Erna Score: No data recorded

## 2020-02-04 NOTE — PLAN OF CARE
Allergies and history reviewed, medications reviewed, consent obtained, family at bs awaiting procedure.

## 2020-02-04 NOTE — SUBJECTIVE & OBJECTIVE
Past Medical History:   Diagnosis Date    A-fib 11/5/2019    Breast cancer     right, dx 2008.  Dr. Callaway follows.s/p lumpectomy and arimidex x 5y.    Hyperlipidemia     Hypertension     OP (osteoporosis)     on bisphos x 2y.    TIA (transient ischemic attack)     Toe fracture        Past Surgical History:   Procedure Laterality Date    BREAST LUMPECTOMY      right 2008    TOTAL ABDOMINAL HYSTERECTOMY      no cancer    TRANSESOPHAGEAL ECHOCARDIOGRAPHY N/A 11/5/2019    Procedure: ECHOCARDIOGRAM, TRANSESOPHAGEAL;  Surgeon: Otto Flores MD;  Location: [facility] CATH LAB;  Service: Cardiology;  Laterality: N/A;    TREATMENT OF CARDIAC ARRHYTHMIA N/A 11/5/2019    Procedure: CARDIOVERSION;  Surgeon: Otto Flores MD;  Location: [facility] CATH LAB;  Service: Cardiology;  Laterality: N/A;    TREATMENT OF CARDIAC ARRHYTHMIA N/A 11/5/2019    Procedure: CARDIOVERSION;  Surgeon: Otto Flores MD;  Location: [facility] CATH LAB;  Service: Cardiology;  Laterality: N/A;       Review of patient's allergies indicates:   Allergen Reactions    Medrol [methylprednisolone]        No current facility-administered medications on file prior to encounter.      Current Outpatient Medications on File Prior to Encounter   Medication Sig    alendronate (FOSAMAX) 70 MG tablet TAKE 1 TABLET (70 MG TOTAL) BY MOUTH EVERY 7 DAYS.    apixaban (ELIQUIS) 5 mg Tab Take 1 tablet (5 mg total) by mouth 2 (two) times daily.    atorvastatin (LIPITOR) 20 MG tablet TAKE 1 TABLET (20 MG TOTAL) BY MOUTH ONCE DAILY.    cetirizine (ZYRTEC) 10 MG tablet Take 10 mg by mouth once daily.    clopidogrel (PLAVIX) 75 mg tablet TAKE 1 TABLET (75 MG TOTAL) BY MOUTH ONCE DAILY.    fish oil-omega-3 fatty acids 300-1,000 mg capsule Take 2 g by mouth once daily.    fluticasone propionate (FLONASE) 50 mcg/actuation nasal spray TAKE 2 SPRAYS BY EACH NARE ROUTE ONCE DAILY.    glucosam/chond/hyalu/CF borate (MOVE FREE JOINT [facility] ORAL) Take 1 tablet by mouth once daily.     metoprolol succinate (TOPROL-XL) 50 MG 24 hr tablet Take 50 mg by mouth once daily.    MULTIVIT-IRON-MIN-FOLIC ACID 3,500-18-0.4 UNIT-MG-MG ORAL CHEW Take by mouth.    vitamin D 1000 units Tab Take 2,000 Units by mouth once daily.    [DISCONTINUED] benzonatate (TESSALON) 100 MG capsule Take 1 capsule (100 mg total) by mouth 3 (three) times daily as needed.     Family History     None        Tobacco Use    Smoking status: Former Smoker     Last attempt to quit: 1994     Years since quittin.3    Smokeless tobacco: Never Used   Substance and Sexual Activity    Alcohol use: Yes     Alcohol/week: 1.0 standard drinks     Types: 1 Glasses of wine per week     Comment: daily    Drug use: Never    Sexual activity: Not on file     Review of Systems   Constitution: Negative for decreased appetite, diaphoresis, fever, malaise/fatigue and night sweats.   HENT: Negative for nosebleeds.    Eyes: Negative for blurred vision and double vision.   Cardiovascular: Positive for dyspnea on exertion. Negative for chest pain, claudication, irregular heartbeat, leg swelling, near-syncope, orthopnea, palpitations, paroxysmal nocturnal dyspnea and syncope.   Respiratory: Negative for cough, shortness of breath, sleep disturbances due to breathing, snoring, sputum production and wheezing.    Endocrine: Negative for cold intolerance and polyuria.   Hematologic/Lymphatic: Does not bruise/bleed easily.   Skin: Negative for rash.   Musculoskeletal: Negative for back pain, falls, joint pain, joint swelling and neck pain.   Gastrointestinal: Negative for abdominal pain, heartburn, nausea and vomiting.   Genitourinary: Negative for dysuria, frequency and hematuria.   Neurological: Negative for difficulty with concentration, dizziness, focal weakness, headaches, light-headedness, numbness, seizures and weakness.   Psychiatric/Behavioral: Negative for depression, memory loss and substance abuse. The patient does not have insomnia.     Allergic/Immunologic: Negative for HIV exposure and hives.     Objective:     Vital Signs (Most Recent):  Temp: 98.1 °F (36.7 °C) (02/04/20 0815)  Pulse: 83 (02/04/20 0815)  Resp: 16 (02/04/20 0815)  BP: (!) 121/57 (02/04/20 0816)  SpO2: 99 % (02/04/20 0815) Vital Signs (24h Range):  Temp:  [98.1 °F (36.7 °C)] 98.1 °F (36.7 °C)  Pulse:  [83] 83  Resp:  [16] 16  SpO2:  [99 %] 99 %  BP: (121)/(57) 121/57     Weight: 75.3 kg (166 lb)  Body mass index is 30.36 kg/m².    SpO2: 99 %       No intake or output data in the 24 hours ending 02/04/20 0846    Lines/Drains/Airways     Airway                 Airway - Non-Surgical 11/05/19 0937 Nasal Cannula 90 days          Peripheral Intravenous Line                 Peripheral IV - Single Lumen 02/04/20 0819 20 G Right Hand less than 1 day                Physical Exam   Constitutional: She is oriented to person, place, and time. She appears well-nourished.   HENT:   Head: Normocephalic.   Eyes: Pupils are equal, round, and reactive to light.   Neck: Normal carotid pulses and no JVD present. Carotid bruit is not present. No thyromegaly present.   Cardiovascular: Normal rate, normal heart sounds and normal pulses. An irregularly irregular rhythm present.  No extrasystoles are present. PMI is not displaced. Exam reveals no gallop and no S3.   No murmur heard.  Pulmonary/Chest: Breath sounds normal. No stridor. No respiratory distress.   Abdominal: Soft. Bowel sounds are normal. There is no tenderness. There is no rebound.   Musculoskeletal: Normal range of motion.   Neurological: She is alert and oriented to person, place, and time.   Skin: Skin is intact. No rash noted.   Psychiatric: Her behavior is normal.       Significant Labs: ABG: No results for input(s): PH, PCO2, HCO3, POCSATURATED, BE in the last 48 hours., Blood Culture: No results for input(s): LABBLOO in the last 48 hours., BMP: No results for input(s): GLU, NA, K, CL, CO2, BUN, CREATININE, CALCIUM, MG in the last 48  hours., CMP No results for input(s): NA, K, CL, CO2, GLU, BUN, CREATININE, CALCIUM, PROT, ALBUMIN, BILITOT, ALKPHOS, AST, ALT, ANIONGAP, ESTGFRAFRICA, EGFRNONAA in the last 48 hours., CBC No results for input(s): WBC, HGB, HCT, PLT in the last 48 hours., INR No results for input(s): INR, PROTIME in the last 48 hours., Lipid Panel No results for input(s): CHOL, HDL, LDLCALC, TRIG, CHOLHDL in the last 48 hours. and Troponin No results for input(s): TROPONINI in the last 48 hours.    Significant Imaging: EKG:

## 2020-02-04 NOTE — TRANSFER OF CARE
"Anesthesia Transfer of Care Note    Patient: Lorenzo Ospina    Procedure(s) Performed: Procedure(s) (LRB):  CARDIOVERSION (N/A)    Patient location: [facility].    Anesthesia Type: MAC    Transport from OR: Transported from OR on room air with adequate spontaneous ventilation    Post pain: adequate analgesia    Post assessment: no apparent anesthetic complications    Post vital signs: stable    Level of consciousness: awake, alert and oriented    Nausea/Vomiting: no nausea/vomiting    Complications: none    Transfer of care protocol was followed      Last vitals:   Visit Vitals  BP (!) 121/57   Pulse 83   Temp 36.7 °C (98.1 °F) (Temporal)   Resp 16   Ht 5' 2" (1.575 m)   Wt 75.3 kg (166 lb)   SpO2 99%   Breastfeeding? No   BMI 30.36 kg/m²     "

## 2020-02-04 NOTE — ANESTHESIA PREPROCEDURE EVALUATION
02/04/2020  Lorenzo Ospina is a 79 y.o., female.    Anesthesia Evaluation    I have reviewed the Patient Summary Reports.    I have reviewed the Nursing Notes.   I have reviewed the Medications.     Review of Systems  Anesthesia Hx:  No problems with previous Anesthesia  History of prior surgery of interest to airway management or planning: Denies Family Hx of Anesthesia complications.   Denies Personal Hx of Anesthesia complications.   Social:  Former Smoker 1.0 standard drinks per week   Hematology/Oncology:  Hematology Normal   Oncology Normal     EENT/Dental:EENT/Dental Normal   Cardiovascular:   Hypertension Dysrhythmias atrial fibrillation ECG has been reviewed.    Pulmonary:  Pulmonary Normal    Renal/:  Renal/ Normal     Hepatic/GI:  Hepatic/GI Normal    Musculoskeletal:  Musculoskeletal Normal    Neurological:   TIA,    Endocrine:   Hyperparathyroidism with hypercalcemia   Dermatological:  Skin Normal    Psych:  Psychiatric Normal           Physical Exam  General:  Obesity    Airway/Jaw/Neck:  Airway Findings: Mouth Opening: Normal Tongue: Normal  Pre-Existing Airway Tube(s): Oral Endotracheal tube  General Airway Assessment: Adult  Mallampati: III  TM Distance: Normal, at least 6 cm                 Anesthesia Plan  Type of Anesthesia, risks & benefits discussed:  Anesthesia Type:  MAC  Patient's Preference:   Intra-op Monitoring Plan: standard ASA monitors  Intra-op Monitoring Plan Comments:   Post Op Pain Control Plan:   Post Op Pain Control Plan Comments:   Induction:   IV  Beta Blocker:  Patient is on a Beta-Blocker and has received one dose within the past 24 hours (No further documentation required).       Informed Consent: Patient understands risks and agrees with Anesthesia plan.  Questions answered. Anesthesia consent signed with patient.  ASA Score: 3     Day of Surgery Review of  History & Physical: I have interviewed and examined the patient. I have reviewed the patient's H&P dated:    H&P update referred to the provider.         Ready For Surgery From Anesthesia Perspective.

## 2020-02-04 NOTE — HPI
Past Medical History:   Diagnosis Date    A-fib 2019    Breast cancer     right, dx .  Dr. Callaway follows.s/p lumpectomy and arimidex x 5y.    Hyperlipidemia     Hypertension     OP (osteoporosis)     on bisphos x 2y.    TIA (transient ischemic attack)     Toe fracture        Past Surgical History:   Procedure Laterality Date    BREAST LUMPECTOMY      right 2008    TOTAL ABDOMINAL HYSTERECTOMY      no cancer    TRANSESOPHAGEAL ECHOCARDIOGRAPHY N/A 2019    Procedure: ECHOCARDIOGRAM, TRANSESOPHAGEAL;  Surgeon: Otto Flores MD;  Location: [facility] CATH LAB;  Service: Cardiology;  Laterality: N/A;    TREATMENT OF CARDIAC ARRHYTHMIA N/A 2019    Procedure: CARDIOVERSION;  Surgeon: Otto Flores MD;  Location: [facility] CATH LAB;  Service: Cardiology;  Laterality: N/A;    TREATMENT OF CARDIAC ARRHYTHMIA N/A 2019    Procedure: CARDIOVERSION;  Surgeon: Otto Flores MD;  Location: [facility] CATH LAB;  Service: Cardiology;  Laterality: N/A;       History reviewed. No pertinent family history.    Social History     Socioeconomic History    Marital status:      Spouse name: Not on file    Number of children: Not on file    Years of education: Not on file    Highest education level: Not on file   Occupational History    Not on file   Social Needs    Financial resource strain: Not on file    Food insecurity:     Worry: Not on file     Inability: Not on file    Transportation needs:     Medical: Not on file     Non-medical: Not on file   Tobacco Use    Smoking status: Former Smoker     Last attempt to quit: 1994     Years since quittin.3    Smokeless tobacco: Never Used   Substance and Sexual Activity    Alcohol use: Yes     Alcohol/week: 1.0 standard drinks     Types: 1 Glasses of wine per week     Comment: daily    Drug use: Never    Sexual activity: Not on file   Lifestyle    Physical activity:     Days per week: Not on file     Minutes per session: Not on file    Stress: Not on file    Relationships    Social connections:     Talks on phone: Not on file     Gets together: Not on file     Attends Scientologist service: Not on file     Active member of club or organization: Not on file     Attends meetings of clubs or organizations: Not on file     Relationship status: Not on file   Other Topics Concern    Not on file   Social History Narrative    Not on file       Current Facility-Administered Medications   Medication Dose Route Frequency Provider Last Rate Last Dose    0.9%  NaCl infusion   Intravenous Once Otto Flores MD        sodium chloride 0.9% flush 10 mL  10 mL Intravenous PRN Otto Flores MD           Review of patient's allergies indicates:   Allergen Reactions    Medrol [methylprednisolone]

## 2020-02-04 NOTE — BRIEF OP NOTE
[facility]  Discharge and Brief Operative Note    Surgery Date: 2/4/2020     Surgeon(s) and Role:     * Otto Flores MD - Primary    Assisting Surgeon: None    Pre-op Diagnosis:  Persistent atrial fibrillation [I48.19]    Post-op Diagnosis:  Post-Op Diagnosis Codes:     * Persistent atrial fibrillation [I48.19]    Procedure(s) (LRB):  CARDIOVERSION (N/A)    Anesthesia: Monitor Anesthesia Care      Procedure Description: The risks, benefits, and alternatives of the procedure expalined in detail with patient and family. The patient voices understanding and all questions have been addressed. The patient agrees to proceed as planned.   The patient was sedated by anesthesiology service. The patient tolerated the procedure well and there was no complications. The patient was hemodynamically stable.   Estimated Blood Loss: none.   Findings / Operative Note:   DCCV x1 with 200 joules and afib was converted to sinus at 60 bpm  Continue Eliquis 5 mg bid, plavix 75 mg and Metoprolol    Ok to discharge to home once hemodynamically stable.  F/U with Dr. Vega in 2 weeks at cardiology clinic.  DIET DASH  ACTIVITY as tolerated      Estimated Blood Loss: * No values recorded between 2/4/2020 12:00 AM and 2/4/2020 11:12 AM *         Specimens:   Specimen (12h ago, onward)    None            Discharge Note    OUTCOME: Patient tolerated treatment/procedure well without complication and is now ready for discharge.    DISPOSITION: Home or Self Care    FINAL DIAGNOSIS:  <principal problem not specified>    FOLLOWUP: In clinic

## 2020-02-04 NOTE — PLAN OF CARE
0945 awake and oriented.  Discharge instructions reviewed and copy given.  0950 iv removed.  1000 discharged home.  To exit via w/c.

## 2020-02-12 ENCOUNTER — HOSPITAL ENCOUNTER (OUTPATIENT)
Dept: CARDIOLOGY | Facility: HOSPITAL | Age: 80
Discharge: HOME | End: 2020-02-12
Payer: MEDICARE

## 2020-02-12 ENCOUNTER — OFFICE VISIT (OUTPATIENT)
Dept: CARDIOLOGY | Facility: CLINIC | Age: 80
End: 2020-02-12
Payer: MEDICARE

## 2020-02-12 VITALS
SYSTOLIC BLOOD PRESSURE: 142 MMHG | HEIGHT: 62 IN | DIASTOLIC BLOOD PRESSURE: 80 MMHG | HEART RATE: 70 BPM | OXYGEN SATURATION: 97 % | WEIGHT: 165 LBS | BODY MASS INDEX: 30.36 KG/M2

## 2020-02-12 DIAGNOSIS — E83.52 HYPERCALCEMIA: ICD-10-CM

## 2020-02-12 DIAGNOSIS — G45.9 TIA (TRANSIENT ISCHEMIC ATTACK): ICD-10-CM

## 2020-02-12 DIAGNOSIS — I48.19 OTHER PERSISTENT ATRIAL FIBRILLATION: ICD-10-CM

## 2020-02-12 DIAGNOSIS — R00.2 PALPITATION: ICD-10-CM

## 2020-02-12 DIAGNOSIS — I48.91 ATRIAL FIBRILLATION, UNSPECIFIED TYPE: ICD-10-CM

## 2020-02-12 DIAGNOSIS — I48.19 PERSISTENT ATRIAL FIBRILLATION: Primary | ICD-10-CM

## 2020-02-12 DIAGNOSIS — E78.49 OTHER HYPERLIPIDEMIA: ICD-10-CM

## 2020-02-12 PROCEDURE — 93010 ELECTROCARDIOGRAM REPORT: CPT | Mod: ,,, | Performed by: INTERNAL MEDICINE

## 2020-02-12 PROCEDURE — 1101F PR PT FALLS ASSESS DOC 0-1 FALLS W/OUT INJ PAST YR: ICD-10-PCS | Mod: CPTII,S$GLB,, | Performed by: INTERNAL MEDICINE

## 2020-02-12 PROCEDURE — 99214 PR OFFICE/OUTPT VISIT, EST, LEVL IV, 30-39 MIN: ICD-10-PCS | Mod: 25,S$GLB,, | Performed by: INTERNAL MEDICINE

## 2020-02-12 PROCEDURE — 99499 RISK ADDL DX/OHS AUDIT: ICD-10-PCS | Mod: S$GLB,,, | Performed by: INTERNAL MEDICINE

## 2020-02-12 PROCEDURE — 99499 UNLISTED E&M SERVICE: CPT | Mod: S$GLB,,, | Performed by: INTERNAL MEDICINE

## 2020-02-12 PROCEDURE — 3079F DIAST BP 80-89 MM HG: CPT | Mod: CPTII,S$GLB,, | Performed by: INTERNAL MEDICINE

## 2020-02-12 PROCEDURE — 99999 PR PBB SHADOW E&M-EST. PATIENT-LVL III: CPT | Mod: PBBFAC,,, | Performed by: INTERNAL MEDICINE

## 2020-02-12 PROCEDURE — 1159F MED LIST DOCD IN RCRD: CPT | Mod: S$GLB,,, | Performed by: INTERNAL MEDICINE

## 2020-02-12 PROCEDURE — 93005 ELECTROCARDIOGRAM TRACING: CPT

## 2020-02-12 PROCEDURE — 1101F PT FALLS ASSESS-DOCD LE1/YR: CPT | Mod: CPTII,S$GLB,, | Performed by: INTERNAL MEDICINE

## 2020-02-12 PROCEDURE — 99214 OFFICE O/P EST MOD 30 MIN: CPT | Mod: 25,S$GLB,, | Performed by: INTERNAL MEDICINE

## 2020-02-12 PROCEDURE — 3074F PR MOST RECENT SYSTOLIC BLOOD PRESSURE < 130 MM HG: ICD-10-PCS | Mod: CPTII,S$GLB,, | Performed by: INTERNAL MEDICINE

## 2020-02-12 PROCEDURE — 3074F SYST BP LT 130 MM HG: CPT | Mod: CPTII,S$GLB,, | Performed by: INTERNAL MEDICINE

## 2020-02-12 PROCEDURE — 3079F PR MOST RECENT DIASTOLIC BLOOD PRESSURE 80-89 MM HG: ICD-10-PCS | Mod: CPTII,S$GLB,, | Performed by: INTERNAL MEDICINE

## 2020-02-12 PROCEDURE — 99999 PR PBB SHADOW E&M-EST. PATIENT-LVL III: ICD-10-PCS | Mod: PBBFAC,,, | Performed by: INTERNAL MEDICINE

## 2020-02-12 PROCEDURE — 93010 EKG 12-LEAD: ICD-10-PCS | Mod: ,,, | Performed by: INTERNAL MEDICINE

## 2020-02-12 PROCEDURE — 1159F PR MEDICATION LIST DOCUMENTED IN MEDICAL RECORD: ICD-10-PCS | Mod: S$GLB,,, | Performed by: INTERNAL MEDICINE

## 2020-02-12 RX ORDER — AMIODARONE HYDROCHLORIDE 200 MG/1
200 TABLET ORAL 2 TIMES DAILY
Qty: 60 TABLET | Refills: 11 | Status: ON HOLD | OUTPATIENT
Start: 2020-02-12 | End: 2020-03-02 | Stop reason: SDUPTHER

## 2020-02-12 RX ORDER — TRIAMTERENE AND HYDROCHLOROTHIAZIDE 37.5; 25 MG/1; MG/1
CAPSULE ORAL
COMMUNITY
Start: 2019-12-01 | End: 2021-03-02

## 2020-02-12 NOTE — H&P (VIEW-ONLY)
Subjective:   Patient ID:  Azadouhi TAPAN Ospina is a 79 y.o. female who presents for follow up of Atrial Fibrillation (unsuccessful cardioversion 20)      HPI  A 80 yo female with htn hlp mildly abnormal cardiolite is here for f/u had a second attempt at cardioversion not successful still in afib she has dyspnea on exertion and fatigue. Has no other issues clinically with chf angina. Takes meds regularily.has no chf  No tia . sdiscussed the raquel ewith DR GAMING WILL START AMIODARONE 200 MG PO BID AND WILL ATTEM,PT ANOTHER RCARDIOVERSION IN 2 WEEKS.   Past Medical History:   Diagnosis Date    A-fib 2019    Breast cancer     right, dx .  Dr. Callaway follows.s/p lumpectomy and arimidex x 5y.    Hyperlipidemia     Hypertension     OP (osteoporosis)     on bisphos x 2y.    TIA (transient ischemic attack)     Toe fracture        Past Surgical History:   Procedure Laterality Date    BREAST LUMPECTOMY      right     TOTAL ABDOMINAL HYSTERECTOMY      no cancer    TRANSESOPHAGEAL ECHOCARDIOGRAPHY N/A 2019    Procedure: ECHOCARDIOGRAM, TRANSESOPHAGEAL;  Surgeon: Otto Flores MD;  Location: [facility] CATH LAB;  Service: Cardiology;  Laterality: N/A;    TREATMENT OF CARDIAC ARRHYTHMIA N/A 2019    Procedure: CARDIOVERSION;  Surgeon: Otto Flores MD;  Location: [facility] CATH LAB;  Service: Cardiology;  Laterality: N/A;    TREATMENT OF CARDIAC ARRHYTHMIA N/A 2019    Procedure: CARDIOVERSION;  Surgeon: Otto Flores MD;  Location: [facility] CATH LAB;  Service: Cardiology;  Laterality: N/A;    TREATMENT OF CARDIAC ARRHYTHMIA N/A 2020    Procedure: CARDIOVERSION;  Surgeon: Otto Flores MD;  Location: [facility] CATH LAB;  Service: Cardiology;  Laterality: N/A;       Social History     Tobacco Use    Smoking status: Former Smoker     Last attempt to quit: 1994     Years since quittin.3    Smokeless tobacco: Never Used   Substance Use Topics    Alcohol use: Not Currently     Alcohol/week: 1.0 standard  drinks     Types: 1 Glasses of wine per week     Comment: daily    Drug use: Never       History reviewed. No pertinent family history.    Current Outpatient Medications   Medication Sig    alendronate (FOSAMAX) 70 MG tablet TAKE 1 TABLET (70 MG TOTAL) BY MOUTH EVERY 7 DAYS.    apixaban (ELIQUIS) 5 mg Tab Take 1 tablet (5 mg total) by mouth 2 (two) times daily.    atorvastatin (LIPITOR) 20 MG tablet TAKE 1 TABLET (20 MG TOTAL) BY MOUTH ONCE DAILY.    cetirizine (ZYRTEC) 10 MG tablet Take 10 mg by mouth daily as needed.     fish oil-omega-3 fatty acids 300-1,000 mg capsule Take 2 g by mouth once daily.    fluticasone propionate (FLONASE) 50 mcg/actuation nasal spray TAKE 2 SPRAYS BY EACH NARE ROUTE ONCE DAILY.    glucosam/chond/hyalu/CF borate (MOVE FREE JOINT HEALTH ORAL) Take 1 tablet by mouth once daily.    metoprolol succinate (TOPROL-XL) 50 MG 24 hr tablet Take 50 mg by mouth once daily.    MULTIVIT-IRON-MIN-FOLIC ACID 3,500-18-0.4 UNIT-MG-MG ORAL CHEW Take by mouth.    vitamin D 1000 units Tab Take 2,000 Units by mouth once daily.    clopidogrel (PLAVIX) 75 mg tablet TAKE 1 TABLET (75 MG TOTAL) BY MOUTH ONCE DAILY.    triamterene-hydrochlorothiazide 37.5-25 mg (DYAZIDE) 37.5-25 mg per capsule      No current facility-administered medications for this visit.      Current Outpatient Medications on File Prior to Visit   Medication Sig    alendronate (FOSAMAX) 70 MG tablet TAKE 1 TABLET (70 MG TOTAL) BY MOUTH EVERY 7 DAYS.    apixaban (ELIQUIS) 5 mg Tab Take 1 tablet (5 mg total) by mouth 2 (two) times daily.    atorvastatin (LIPITOR) 20 MG tablet TAKE 1 TABLET (20 MG TOTAL) BY MOUTH ONCE DAILY.    cetirizine (ZYRTEC) 10 MG tablet Take 10 mg by mouth daily as needed.     fish oil-omega-3 fatty acids 300-1,000 mg capsule Take 2 g by mouth once daily.    fluticasone propionate (FLONASE) 50 mcg/actuation nasal spray TAKE 2 SPRAYS BY EACH NARE ROUTE ONCE DAILY.    glucosam/chond/hyalu/CF borate  (MOVE FREE JOINT HEALTH ORAL) Take 1 tablet by mouth once daily.    metoprolol succinate (TOPROL-XL) 50 MG 24 hr tablet Take 50 mg by mouth once daily.    MULTIVIT-IRON-MIN-FOLIC ACID 3,500-18-0.4 UNIT-MG-MG ORAL CHEW Take by mouth.    vitamin D 1000 units Tab Take 2,000 Units by mouth once daily.    clopidogrel (PLAVIX) 75 mg tablet TAKE 1 TABLET (75 MG TOTAL) BY MOUTH ONCE DAILY.    triamterene-hydrochlorothiazide 37.5-25 mg (DYAZIDE) 37.5-25 mg per capsule      No current facility-administered medications on file prior to visit.      Review of patient's allergies indicates:   Allergen Reactions    Medrol [methylprednisolone]      Review of Systems   Constitution: Negative for malaise/fatigue.   Eyes: Negative for blurred vision.   Cardiovascular: Positive for dyspnea on exertion. Negative for chest pain, claudication, cyanosis, irregular heartbeat, leg swelling, near-syncope, orthopnea, palpitations and paroxysmal nocturnal dyspnea.   Respiratory: Positive for shortness of breath. Negative for cough and hemoptysis.    Hematologic/Lymphatic: Negative for bleeding problem. Does not bruise/bleed easily.   Skin: Negative for dry skin and itching.   Musculoskeletal: Negative for falls, muscle weakness and myalgias.   Gastrointestinal: Negative for abdominal pain, diarrhea, heartburn, hematemesis, hematochezia and melena.   Genitourinary: Negative for flank pain and hematuria.   Neurological: Negative for dizziness, focal weakness, headaches, light-headedness, numbness, paresthesias, seizures and weakness.   Psychiatric/Behavioral: Negative for altered mental status and memory loss. The patient is not nervous/anxious.    Allergic/Immunologic: Negative for hives.       Objective:   Physical Exam   Constitutional: She is oriented to person, place, and time. She appears well-developed and well-nourished. She does not appear ill. No distress.   HENT:   Head: Normocephalic and atraumatic.   Eyes: Pupils are equal,  "round, and reactive to light. EOM are normal. No scleral icterus.   Neck: Normal range of motion. Neck supple. Normal carotid pulses, no hepatojugular reflux and no JVD present. Carotid bruit is not present. No tracheal deviation present. No thyromegaly present.   Cardiovascular: Normal rate, normal heart sounds, intact distal pulses and normal pulses. An irregularly irregular rhythm present. Exam reveals no gallop and no friction rub.   No murmur heard.  Pulmonary/Chest: Effort normal and breath sounds normal. No respiratory distress. She has no wheezes. She has no rhonchi. She has no rales. She exhibits no tenderness.   Abdominal: Soft. Normal appearance, normal aorta and bowel sounds are normal. She exhibits no distension, no abdominal bruit, no ascites and no pulsatile midline mass. There is no hepatomegaly. There is no tenderness.   Musculoskeletal: She exhibits no edema.        Right shoulder: She exhibits no deformity.   Neurological: She is alert and oriented to person, place, and time. She has normal strength. No cranial nerve deficit. Coordination normal.   Skin: Skin is warm and dry. No rash noted. She is not diaphoretic. No cyanosis or erythema. Nails show no clubbing.   Psychiatric: She has a normal mood and affect. Her speech is normal and behavior is normal.   Nursing note and vitals reviewed.    Vitals:    02/12/20 1546 02/12/20 1549   BP: 134/84 (!) 142/80   BP Location: Right arm Left arm   Patient Position: Sitting Sitting   BP Method: Small (Manual) Small (Manual)   Pulse: 70    SpO2: 97%    Weight: 74.8 kg (165 lb)    Height: 5' 2" (1.575 m)      Lab Results   Component Value Date    CHOL 127 10/01/2019    CHOL 147 06/20/2018    CHOL 137 05/30/2017     Lab Results   Component Value Date    HDL 46 10/01/2019    HDL 56 06/20/2018    HDL 57 05/30/2017     Lab Results   Component Value Date    LDLCALC 61.4 (L) 10/01/2019    LDLCALC 73.6 06/20/2018    LDLCALC 60.0 (L) 05/30/2017     Lab Results "   Component Value Date    TRIG 98 10/01/2019    TRIG 87 06/20/2018    TRIG 100 05/30/2017     Lab Results   Component Value Date    CHOLHDL 36.2 10/01/2019    CHOLHDL 38.1 06/20/2018    CHOLHDL 41.6 05/30/2017       Chemistry        Component Value Date/Time     09/13/2019 0951    K 3.8 09/13/2019 0951     09/13/2019 0951    CO2 29 09/13/2019 0951    BUN 13 09/13/2019 0951    CREATININE 0.9 09/13/2019 0951     (H) 09/13/2019 0951        Component Value Date/Time    CALCIUM 11.0 (H) 09/13/2019 0951    ALKPHOS 59 09/13/2019 0951    AST 15 09/13/2019 0951    ALT 14 09/13/2019 0951    BILITOT 0.9 09/13/2019 0951    ESTGFRAFRICA >60.0 09/13/2019 0951    EGFRNONAA >60.0 09/13/2019 0951          Lab Results   Component Value Date    TSH 0.993 09/13/2019     No results found for: INR, PROTIME  Lab Results   Component Value Date    WBC 7.96 09/13/2019    HGB 14.9 09/13/2019    HCT 45.8 09/13/2019    MCV 89 09/13/2019     09/13/2019     BMP  Sodium   Date Value Ref Range Status   09/13/2019 142 136 - 145 mmol/L Final     Potassium   Date Value Ref Range Status   09/13/2019 3.8 3.5 - 5.1 mmol/L Final     Chloride   Date Value Ref Range Status   09/13/2019 105 95 - 110 mmol/L Final     CO2   Date Value Ref Range Status   09/13/2019 29 23 - 29 mmol/L Final     BUN, Bld   Date Value Ref Range Status   09/13/2019 13 8 - 23 mg/dL Final     Creatinine   Date Value Ref Range Status   09/13/2019 0.9 0.5 - 1.4 mg/dL Final     Calcium   Date Value Ref Range Status   09/13/2019 11.0 (H) 8.7 - 10.5 mg/dL Final     Anion Gap   Date Value Ref Range Status   09/13/2019 8 8 - 16 mmol/L Final     eGFR if    Date Value Ref Range Status   09/13/2019 >60.0 >60 mL/min/1.73 m^2 Final     eGFR if non    Date Value Ref Range Status   09/13/2019 >60.0 >60 mL/min/1.73 m^2 Final     Comment:     Calculation used to obtain the estimated glomerular filtration  rate (eGFR) is the CKD-EPI equation.         CrCl cannot be calculated (Patient's most recent lab result is older than the maximum 7 days allowed.).    Assessment:     1. Persistent atrial fibrillation    2. TIA (transient ischemic attack)    3. Other persistent atrial fibrillation    4. Palpitation    5. Other hyperlipidemia    6. Hypercalcemia      START AMIODARONE FOR SYMPTOMSTIIC AFIB 200 MG PO BID AND ATTEMPT CARDIOVERSION.   Plan:     AS PER ABOVE./

## 2020-02-12 NOTE — PROGRESS NOTES
Subjective:   Patient ID:  Azadouhi TAPAN Ospina is a 79 y.o. female who presents for follow up of Atrial Fibrillation (unsuccessful cardioversion 20)      HPI  A 78 yo female with htn hlp mildly abnormal cardiolite is here for f/u had a second attempt at cardioversion not successful still in afib she has dyspnea on exertion and fatigue. Has no other issues clinically with chf angina. Takes meds regularily.has no chf  No tia . sdiscussed the raquel ewith DR GAMING WILL START AMIODARONE 200 MG PO BID AND WILL ATTEM,PT ANOTHER RCARDIOVERSION IN 2 WEEKS.   Past Medical History:   Diagnosis Date    A-fib 2019    Breast cancer     right, dx .  Dr. Callaway follows.s/p lumpectomy and arimidex x 5y.    Hyperlipidemia     Hypertension     OP (osteoporosis)     on bisphos x 2y.    TIA (transient ischemic attack)     Toe fracture        Past Surgical History:   Procedure Laterality Date    BREAST LUMPECTOMY      right     TOTAL ABDOMINAL HYSTERECTOMY      no cancer    TRANSESOPHAGEAL ECHOCARDIOGRAPHY N/A 2019    Procedure: ECHOCARDIOGRAM, TRANSESOPHAGEAL;  Surgeon: Otto Flores MD;  Location: [facility] CATH LAB;  Service: Cardiology;  Laterality: N/A;    TREATMENT OF CARDIAC ARRHYTHMIA N/A 2019    Procedure: CARDIOVERSION;  Surgeon: Otto Flores MD;  Location: [facility] CATH LAB;  Service: Cardiology;  Laterality: N/A;    TREATMENT OF CARDIAC ARRHYTHMIA N/A 2019    Procedure: CARDIOVERSION;  Surgeon: Otto Flores MD;  Location: [facility] CATH LAB;  Service: Cardiology;  Laterality: N/A;    TREATMENT OF CARDIAC ARRHYTHMIA N/A 2020    Procedure: CARDIOVERSION;  Surgeon: Otto Flores MD;  Location: [facility] CATH LAB;  Service: Cardiology;  Laterality: N/A;       Social History     Tobacco Use    Smoking status: Former Smoker     Last attempt to quit: 1994     Years since quittin.3    Smokeless tobacco: Never Used   Substance Use Topics    Alcohol use: Not Currently     Alcohol/week: 1.0 standard  drinks     Types: 1 Glasses of wine per week     Comment: daily    Drug use: Never       History reviewed. No pertinent family history.    Current Outpatient Medications   Medication Sig    alendronate (FOSAMAX) 70 MG tablet TAKE 1 TABLET (70 MG TOTAL) BY MOUTH EVERY 7 DAYS.    apixaban (ELIQUIS) 5 mg Tab Take 1 tablet (5 mg total) by mouth 2 (two) times daily.    atorvastatin (LIPITOR) 20 MG tablet TAKE 1 TABLET (20 MG TOTAL) BY MOUTH ONCE DAILY.    cetirizine (ZYRTEC) 10 MG tablet Take 10 mg by mouth daily as needed.     fish oil-omega-3 fatty acids 300-1,000 mg capsule Take 2 g by mouth once daily.    fluticasone propionate (FLONASE) 50 mcg/actuation nasal spray TAKE 2 SPRAYS BY EACH NARE ROUTE ONCE DAILY.    glucosam/chond/hyalu/CF borate (MOVE FREE JOINT HEALTH ORAL) Take 1 tablet by mouth once daily.    metoprolol succinate (TOPROL-XL) 50 MG 24 hr tablet Take 50 mg by mouth once daily.    MULTIVIT-IRON-MIN-FOLIC ACID 3,500-18-0.4 UNIT-MG-MG ORAL CHEW Take by mouth.    vitamin D 1000 units Tab Take 2,000 Units by mouth once daily.    clopidogrel (PLAVIX) 75 mg tablet TAKE 1 TABLET (75 MG TOTAL) BY MOUTH ONCE DAILY.    triamterene-hydrochlorothiazide 37.5-25 mg (DYAZIDE) 37.5-25 mg per capsule      No current facility-administered medications for this visit.      Current Outpatient Medications on File Prior to Visit   Medication Sig    alendronate (FOSAMAX) 70 MG tablet TAKE 1 TABLET (70 MG TOTAL) BY MOUTH EVERY 7 DAYS.    apixaban (ELIQUIS) 5 mg Tab Take 1 tablet (5 mg total) by mouth 2 (two) times daily.    atorvastatin (LIPITOR) 20 MG tablet TAKE 1 TABLET (20 MG TOTAL) BY MOUTH ONCE DAILY.    cetirizine (ZYRTEC) 10 MG tablet Take 10 mg by mouth daily as needed.     fish oil-omega-3 fatty acids 300-1,000 mg capsule Take 2 g by mouth once daily.    fluticasone propionate (FLONASE) 50 mcg/actuation nasal spray TAKE 2 SPRAYS BY EACH NARE ROUTE ONCE DAILY.    glucosam/chond/hyalu/CF borate  (MOVE FREE JOINT HEALTH ORAL) Take 1 tablet by mouth once daily.    metoprolol succinate (TOPROL-XL) 50 MG 24 hr tablet Take 50 mg by mouth once daily.    MULTIVIT-IRON-MIN-FOLIC ACID 3,500-18-0.4 UNIT-MG-MG ORAL CHEW Take by mouth.    vitamin D 1000 units Tab Take 2,000 Units by mouth once daily.    clopidogrel (PLAVIX) 75 mg tablet TAKE 1 TABLET (75 MG TOTAL) BY MOUTH ONCE DAILY.    triamterene-hydrochlorothiazide 37.5-25 mg (DYAZIDE) 37.5-25 mg per capsule      No current facility-administered medications on file prior to visit.      Review of patient's allergies indicates:   Allergen Reactions    Medrol [methylprednisolone]      Review of Systems   Constitution: Negative for malaise/fatigue.   Eyes: Negative for blurred vision.   Cardiovascular: Positive for dyspnea on exertion. Negative for chest pain, claudication, cyanosis, irregular heartbeat, leg swelling, near-syncope, orthopnea, palpitations and paroxysmal nocturnal dyspnea.   Respiratory: Positive for shortness of breath. Negative for cough and hemoptysis.    Hematologic/Lymphatic: Negative for bleeding problem. Does not bruise/bleed easily.   Skin: Negative for dry skin and itching.   Musculoskeletal: Negative for falls, muscle weakness and myalgias.   Gastrointestinal: Negative for abdominal pain, diarrhea, heartburn, hematemesis, hematochezia and melena.   Genitourinary: Negative for flank pain and hematuria.   Neurological: Negative for dizziness, focal weakness, headaches, light-headedness, numbness, paresthesias, seizures and weakness.   Psychiatric/Behavioral: Negative for altered mental status and memory loss. The patient is not nervous/anxious.    Allergic/Immunologic: Negative for hives.       Objective:   Physical Exam   Constitutional: She is oriented to person, place, and time. She appears well-developed and well-nourished. She does not appear ill. No distress.   HENT:   Head: Normocephalic and atraumatic.   Eyes: Pupils are equal,  "round, and reactive to light. EOM are normal. No scleral icterus.   Neck: Normal range of motion. Neck supple. Normal carotid pulses, no hepatojugular reflux and no JVD present. Carotid bruit is not present. No tracheal deviation present. No thyromegaly present.   Cardiovascular: Normal rate, normal heart sounds, intact distal pulses and normal pulses. An irregularly irregular rhythm present. Exam reveals no gallop and no friction rub.   No murmur heard.  Pulmonary/Chest: Effort normal and breath sounds normal. No respiratory distress. She has no wheezes. She has no rhonchi. She has no rales. She exhibits no tenderness.   Abdominal: Soft. Normal appearance, normal aorta and bowel sounds are normal. She exhibits no distension, no abdominal bruit, no ascites and no pulsatile midline mass. There is no hepatomegaly. There is no tenderness.   Musculoskeletal: She exhibits no edema.        Right shoulder: She exhibits no deformity.   Neurological: She is alert and oriented to person, place, and time. She has normal strength. No cranial nerve deficit. Coordination normal.   Skin: Skin is warm and dry. No rash noted. She is not diaphoretic. No cyanosis or erythema. Nails show no clubbing.   Psychiatric: She has a normal mood and affect. Her speech is normal and behavior is normal.   Nursing note and vitals reviewed.    Vitals:    02/12/20 1546 02/12/20 1549   BP: 134/84 (!) 142/80   BP Location: Right arm Left arm   Patient Position: Sitting Sitting   BP Method: Small (Manual) Small (Manual)   Pulse: 70    SpO2: 97%    Weight: 74.8 kg (165 lb)    Height: 5' 2" (1.575 m)      Lab Results   Component Value Date    CHOL 127 10/01/2019    CHOL 147 06/20/2018    CHOL 137 05/30/2017     Lab Results   Component Value Date    HDL 46 10/01/2019    HDL 56 06/20/2018    HDL 57 05/30/2017     Lab Results   Component Value Date    LDLCALC 61.4 (L) 10/01/2019    LDLCALC 73.6 06/20/2018    LDLCALC 60.0 (L) 05/30/2017     Lab Results "   Component Value Date    TRIG 98 10/01/2019    TRIG 87 06/20/2018    TRIG 100 05/30/2017     Lab Results   Component Value Date    CHOLHDL 36.2 10/01/2019    CHOLHDL 38.1 06/20/2018    CHOLHDL 41.6 05/30/2017       Chemistry        Component Value Date/Time     09/13/2019 0951    K 3.8 09/13/2019 0951     09/13/2019 0951    CO2 29 09/13/2019 0951    BUN 13 09/13/2019 0951    CREATININE 0.9 09/13/2019 0951     (H) 09/13/2019 0951        Component Value Date/Time    CALCIUM 11.0 (H) 09/13/2019 0951    ALKPHOS 59 09/13/2019 0951    AST 15 09/13/2019 0951    ALT 14 09/13/2019 0951    BILITOT 0.9 09/13/2019 0951    ESTGFRAFRICA >60.0 09/13/2019 0951    EGFRNONAA >60.0 09/13/2019 0951          Lab Results   Component Value Date    TSH 0.993 09/13/2019     No results found for: INR, PROTIME  Lab Results   Component Value Date    WBC 7.96 09/13/2019    HGB 14.9 09/13/2019    HCT 45.8 09/13/2019    MCV 89 09/13/2019     09/13/2019     BMP  Sodium   Date Value Ref Range Status   09/13/2019 142 136 - 145 mmol/L Final     Potassium   Date Value Ref Range Status   09/13/2019 3.8 3.5 - 5.1 mmol/L Final     Chloride   Date Value Ref Range Status   09/13/2019 105 95 - 110 mmol/L Final     CO2   Date Value Ref Range Status   09/13/2019 29 23 - 29 mmol/L Final     BUN, Bld   Date Value Ref Range Status   09/13/2019 13 8 - 23 mg/dL Final     Creatinine   Date Value Ref Range Status   09/13/2019 0.9 0.5 - 1.4 mg/dL Final     Calcium   Date Value Ref Range Status   09/13/2019 11.0 (H) 8.7 - 10.5 mg/dL Final     Anion Gap   Date Value Ref Range Status   09/13/2019 8 8 - 16 mmol/L Final     eGFR if    Date Value Ref Range Status   09/13/2019 >60.0 >60 mL/min/1.73 m^2 Final     eGFR if non    Date Value Ref Range Status   09/13/2019 >60.0 >60 mL/min/1.73 m^2 Final     Comment:     Calculation used to obtain the estimated glomerular filtration  rate (eGFR) is the CKD-EPI equation.         CrCl cannot be calculated (Patient's most recent lab result is older than the maximum 7 days allowed.).    Assessment:     1. Persistent atrial fibrillation    2. TIA (transient ischemic attack)    3. Other persistent atrial fibrillation    4. Palpitation    5. Other hyperlipidemia    6. Hypercalcemia      START AMIODARONE FOR SYMPTOMSTIIC AFIB 200 MG PO BID AND ATTEMPT CARDIOVERSION.   Plan:     AS PER ABOVE./

## 2020-02-13 ENCOUNTER — TELEPHONE (OUTPATIENT)
Dept: CARDIOLOGY | Facility: CLINIC | Age: 80
End: 2020-02-13

## 2020-02-13 DIAGNOSIS — E78.5 HYPERLIPIDEMIA: ICD-10-CM

## 2020-02-13 RX ORDER — ATORVASTATIN CALCIUM 20 MG/1
TABLET, FILM COATED ORAL
Qty: 90 TABLET | Refills: 3 | Status: SHIPPED | OUTPATIENT
Start: 2020-02-13 | End: 2020-09-02

## 2020-02-18 RX ORDER — METOPROLOL SUCCINATE 50 MG/1
TABLET, EXTENDED RELEASE ORAL
Qty: 60 TABLET | Refills: 11 | Status: ON HOLD | OUTPATIENT
Start: 2020-02-18 | End: 2020-03-02 | Stop reason: SDUPTHER

## 2020-03-02 ENCOUNTER — HOSPITAL ENCOUNTER (OUTPATIENT)
Facility: HOSPITAL | Age: 80
Discharge: HOME | End: 2020-03-02
Attending: INTERNAL MEDICINE | Admitting: INTERNAL MEDICINE
Payer: MEDICARE

## 2020-03-02 ENCOUNTER — ANESTHESIA EVENT (OUTPATIENT)
Dept: CARDIOLOGY | Facility: HOSPITAL | Age: 80
End: 2020-03-02
Payer: MEDICARE

## 2020-03-02 ENCOUNTER — TELEPHONE (OUTPATIENT)
Dept: FAMILY MEDICINE | Facility: CLINIC | Age: 80
End: 2020-03-02

## 2020-03-02 ENCOUNTER — ANESTHESIA (OUTPATIENT)
Dept: CARDIOLOGY | Facility: HOSPITAL | Age: 80
End: 2020-03-02
Payer: MEDICARE

## 2020-03-02 VITALS
BODY MASS INDEX: 30.36 KG/M2 | HEIGHT: 62 IN | WEIGHT: 165 LBS | HEART RATE: 58 BPM | TEMPERATURE: 98 F | RESPIRATION RATE: 13 BRPM | DIASTOLIC BLOOD PRESSURE: 51 MMHG | SYSTOLIC BLOOD PRESSURE: 123 MMHG | OXYGEN SATURATION: 99 %

## 2020-03-02 DIAGNOSIS — I48.19 OTHER PERSISTENT ATRIAL FIBRILLATION: Primary | ICD-10-CM

## 2020-03-02 DIAGNOSIS — I48.0 PAROXYSMAL ATRIAL FIBRILLATION: ICD-10-CM

## 2020-03-02 DIAGNOSIS — I48.21 PERMANENT ATRIAL FIBRILLATION: ICD-10-CM

## 2020-03-02 DIAGNOSIS — I48.19 ATRIAL FIBRILLATION, PERSISTENT: ICD-10-CM

## 2020-03-02 DIAGNOSIS — I48.19 PERSISTENT ATRIAL FIBRILLATION: ICD-10-CM

## 2020-03-02 PROCEDURE — 93010 ELECTROCARDIOGRAM REPORT: CPT | Mod: 76,,, | Performed by: INTERNAL MEDICINE

## 2020-03-02 PROCEDURE — 92960 CARDIOVERSION ELECTRIC EXT: CPT | Mod: ,,, | Performed by: INTERNAL MEDICINE

## 2020-03-02 PROCEDURE — 63600175 PHARM REV CODE 636 W HCPCS

## 2020-03-02 PROCEDURE — 93005 ELECTROCARDIOGRAM TRACING: CPT

## 2020-03-02 PROCEDURE — 63600175 PHARM REV CODE 636 W HCPCS: Performed by: NURSE ANESTHETIST, CERTIFIED REGISTERED

## 2020-03-02 PROCEDURE — 25000003 PHARM REV CODE 250

## 2020-03-02 PROCEDURE — 37000009 HC ANESTHESIA EA ADD 15 MINS: Performed by: INTERNAL MEDICINE

## 2020-03-02 PROCEDURE — 37000008 HC ANESTHESIA 1ST 15 MINUTES: Performed by: INTERNAL MEDICINE

## 2020-03-02 PROCEDURE — 93010 EKG 12-LEAD: ICD-10-PCS | Mod: ,,, | Performed by: INTERNAL MEDICINE

## 2020-03-02 PROCEDURE — 92960 CARDIOVERSION ELECTRIC EXT: CPT | Performed by: INTERNAL MEDICINE

## 2020-03-02 PROCEDURE — 25000003 PHARM REV CODE 250: Performed by: NURSE ANESTHETIST, CERTIFIED REGISTERED

## 2020-03-02 PROCEDURE — 92960 PR CARDIOVERSION, ELECTIVE;EXTERN: ICD-10-PCS | Mod: ,,, | Performed by: INTERNAL MEDICINE

## 2020-03-02 RX ORDER — LIDOCAINE HYDROCHLORIDE 20 MG/ML
INJECTION INTRAVENOUS
Status: DISCONTINUED | OUTPATIENT
Start: 2020-03-02 | End: 2020-03-02

## 2020-03-02 RX ORDER — EPHEDRINE SULFATE 50 MG/ML
INJECTION, SOLUTION INTRAVENOUS
Status: DISCONTINUED | OUTPATIENT
Start: 2020-03-02 | End: 2020-03-02

## 2020-03-02 RX ORDER — PROPOFOL 10 MG/ML
VIAL (ML) INTRAVENOUS
Status: DISCONTINUED | OUTPATIENT
Start: 2020-03-02 | End: 2020-03-02

## 2020-03-02 RX ORDER — METOPROLOL SUCCINATE 50 MG/1
50 TABLET, EXTENDED RELEASE ORAL DAILY
Qty: 60 TABLET | Refills: 11 | Status: SHIPPED | OUTPATIENT
Start: 2020-03-02 | End: 2021-07-11

## 2020-03-02 RX ORDER — ATROPINE SULFATE 1 MG/ML
INJECTION, SOLUTION INTRAMUSCULAR; INTRAVENOUS; SUBCUTANEOUS
Status: DISCONTINUED | OUTPATIENT
Start: 2020-03-02 | End: 2020-03-02

## 2020-03-02 RX ORDER — SODIUM CHLORIDE 9 MG/ML
INJECTION, SOLUTION INTRAVENOUS CONTINUOUS PRN
Status: DISCONTINUED | OUTPATIENT
Start: 2020-03-02 | End: 2020-03-02

## 2020-03-02 RX ORDER — SODIUM CHLORIDE 0.9 % (FLUSH) 0.9 %
10 SYRINGE (ML) INJECTION
Status: DISCONTINUED | OUTPATIENT
Start: 2020-03-02 | End: 2020-03-02 | Stop reason: HOSPADM

## 2020-03-02 RX ORDER — AMIODARONE HYDROCHLORIDE 200 MG/1
200 TABLET ORAL DAILY
Qty: 30 TABLET | Refills: 11 | Status: SHIPPED | OUTPATIENT
Start: 2020-03-02 | End: 2021-03-02

## 2020-03-02 RX ADMIN — EPHEDRINE SULFATE 10 MG: 50 INJECTION INTRAMUSCULAR; INTRAVENOUS; SUBCUTANEOUS at 08:03

## 2020-03-02 RX ADMIN — Medication 100 MG: at 08:03

## 2020-03-02 RX ADMIN — SODIUM CHLORIDE: 0.9 INJECTION, SOLUTION INTRAVENOUS at 08:03

## 2020-03-02 RX ADMIN — ATROPINE SULFATE 0.5 MG: 1 INJECTION, SOLUTION INTRAMUSCULAR; INTRAVENOUS; SUBCUTANEOUS at 08:03

## 2020-03-02 RX ADMIN — PROPOFOL 100 MG: 10 INJECTION, EMULSION INTRAVENOUS at 08:03

## 2020-03-02 NOTE — ANESTHESIA POSTPROCEDURE EVALUATION
Anesthesia Post Evaluation    Patient: Lorenzo Ospina    Procedure(s) Performed: Procedure(s) (LRB):  CARDIOVERSION (N/A)    Final Anesthesia Type: MAC    Patient location: [facility].  Patient participation: Yes- Able to Participate  Level of consciousness: awake  Post-procedure vital signs: reviewed and stable  Pain management: adequate  Airway patency: patent    PONV status at discharge: No PONV  Anesthetic complications: no      Cardiovascular status: blood pressure returned to baseline and hemodynamically stable  Respiratory status: unassisted, spontaneous ventilation and nasal cannula  Hydration status: euvolemic  Follow-up not needed.          Vitals Value Taken Time   /49 3/2/2020  7:31 AM   Temp 36.6 °C (97.9 °F) 3/2/2020  7:31 AM   Pulse 69 3/2/2020  7:31 AM   Resp 16 3/2/2020  7:31 AM   SpO2 99 % 3/2/2020  7:31 AM         No case tracking events are documented in the log.      Pain/Erna Score: Erna Score: 10 (3/2/2020  7:45 AM)

## 2020-03-02 NOTE — OP NOTE
INPATIENT Operative Note         SUMMARY     Surgery Date: 3/2/2020     Surgeon(s) and Role:     * Mayi Vega MD - Primary    ASSISTANT:none    Pre-op Diagnosis:  Persistent atrial fibrillation [I48.19]      Post-op Diagnosis:   svere sinus bradycardia    Procedure(s) (LRB):  CARDIOVERSION (N/A)    COMPLICATION:none    Anesthesia: Monitor Anesthesia Care    Findings/Key Components:  afib converted to sinus dany cardia     Estimated Blood Loss: < 50 ML.         SPECIMEN: NONE    Devices/Prostetics: None    PLAN:  Observe post cardioversion see if hr improved.

## 2020-03-02 NOTE — INTERVAL H&P NOTE
The patient has been examined and the H&P has been reviewed:    I concur with the findings and no changes have occurred since H&P was written.    Anesthesia/Surgery risks, benefits and alternative options discussed and understood by patient/family.  I have explained the risks, benefits , and alternatives of the procedure in detail.the patient voices understanding and all questions have been answered.the patient agrees to proceed as planned.          Active Hospital Problems    Diagnosis  POA    Atrial fibrillation [I48.91]  Yes     Priority: Low      Resolved Hospital Problems   No resolved problems to display.

## 2020-03-02 NOTE — ANESTHESIA PREPROCEDURE EVALUATION
03/02/2020  Lorenzo Ospina is a 79 y.o., female.    Anesthesia Evaluation    I have reviewed the Patient Summary Reports.    I have reviewed the Nursing Notes.   I have reviewed the Medications.     Review of Systems  Anesthesia Hx:  No problems with previous Anesthesia  Denies Family Hx of Anesthesia complications.   Denies Personal Hx of Anesthesia complications.   Social:  Former Smoker, No Alcohol Use    Hematology/Oncology:  Hematology Normal      Oncology Comments: breast    Cardiovascular:   Hypertension Denies MI.  Dysrhythmias atrial fibrillation     hyperlipidemia Echo 11/2019:   1 - Biatrial enlargement.     2 - Left atrium is enlarged with no visualized thrombus and KARINA no visualized thrombus.     3 - Normal left ventricular systolic function (EF 55-60%).     4 - Normal left ventricular diastolic function.     5 - Mild to moderate mitral regurgitation.    Pulmonary:   Denies COPD.  Denies Asthma.  Denies Sleep Apnea.    Renal/:  Renal/ Normal     Hepatic/GI:   Denies GERD. Denies Liver Disease.  Denies Hepatitis.    Musculoskeletal:   osteoporosis   Neurological:   TIA, Denies CVA. Denies Seizures.    Endocrine:   Denies Diabetes. Denies Hypothyroidism. Denies Hyperthyroidism. Hyperparathyroidism       Physical Exam  General:  Obesity    Airway/Jaw/Neck:  Airway Findings: Mouth Opening: Normal Tongue: Normal  General Airway Assessment: Adult  Mallampati: II      Dental:  Dental Findings: Upper Dentures, Lower Dentures   Chest/Lungs:  Chest/Lungs Findings: Clear to auscultation, Normal Respiratory Rate     Heart/Vascular:  Heart Findings: Rate: Normal  Rhythm: Irregularly Irregular             Anesthesia Plan  Type of Anesthesia, risks & benefits discussed:  Anesthesia Type:  MAC  Patient's Preference:   Intra-op Monitoring Plan: standard ASA monitors  Intra-op Monitoring Plan Comments:    Post Op Pain Control Plan:   Post Op Pain Control Plan Comments:   Induction:   IV  Beta Blocker:  Patient is on a Beta-Blocker and has received one dose within the past 24 hours (No further documentation required).       Informed Consent: Patient understands risks and agrees with Anesthesia plan.  Questions answered. Anesthesia consent signed with patient.  ASA Score: 3     Day of Surgery Review of History & Physical: I have interviewed and examined the patient. I have reviewed the patient's H&P dated:  There are no significant changes.  H&P update referred to the surgeon.         Ready For Surgery From Anesthesia Perspective.

## 2020-03-02 NOTE — TRANSFER OF CARE
"Anesthesia Transfer of Care Note    Patient: Lorenzo Ospina    Procedure(s) Performed: Procedure(s) (LRB):  CARDIOVERSION (N/A)    Patient location: Other: [facility]    Anesthesia Type: MAC    Transport from OR: Transported from OR on 2-3 L/min O2 by NC with adequate spontaneous ventilation    Post pain: adequate analgesia    Post assessment: tolerated procedure well and no apparent anesthetic complications    Post vital signs: stable    Level of consciousness: awake    Nausea/Vomiting: no nausea/vomiting    Complications: none    Transfer of care protocol was followed      Last vitals:   Visit Vitals  BP (!) 136/49 (BP Location: Left arm, Patient Position: Lying)   Pulse 69   Temp 36.6 °C (97.9 °F) (Temporal)   Resp 16   Ht 5' 2" (1.575 m)   Wt 74.8 kg (165 lb)   SpO2 99%   Breastfeeding? No   BMI 30.18 kg/m²     "

## 2020-03-02 NOTE — DISCHARGE SUMMARY
Discharge Note  Short Stay      SUMMARY     Admit Date: 3/2/2020    Attending Physician: Mayi Vega MD     Discharge Physician: Dennys Vega MD     Discharge Date: 3/2/2020    Final Diagnosis: afib successfully converted to sinus rythm    Outcome of Stay:patient tolerated procedure well. Had successful cardioversion to sinus bradycardia that recovered to sinus rhythm .she was deemed stable for discharge.she will follow in clinic in 1 week with ekg.her toprol and amiodarone were dropped to once daily.    Disposition: Home or Self Care    Patient Instructions:   Current Discharge Medication List      CONTINUE these medications which have CHANGED    Details   amiodarone (PACERONE) 200 MG Tab Take 1 tablet (200 mg total) by mouth once daily.  Qty: 30 tablet, Refills: 11    Associated Diagnoses: Persistent atrial fibrillation      metoprolol succinate (TOPROL-XL) 50 MG 24 hr tablet Take 1 tablet (50 mg total) by mouth once daily.  Qty: 60 tablet, Refills: 11    Comments: DX Code Needed  PT TAKING 50MG PER DAY.         CONTINUE these medications which have NOT CHANGED    Details   apixaban (ELIQUIS) 5 mg Tab Take 1 tablet (5 mg total) by mouth 2 (two) times daily.  Qty: 60 tablet, Refills: 11    Associated Diagnoses: Atrial fibrillation, unspecified type      atorvastatin (LIPITOR) 20 MG tablet TAKE 1 TABLET (20 MG TOTAL) BY MOUTH ONCE DAILY.  Qty: 90 tablet, Refills: 3    Associated Diagnoses: Hyperlipidemia      clopidogrel (PLAVIX) 75 mg tablet TAKE 1 TABLET (75 MG TOTAL) BY MOUTH ONCE DAILY.      fish oil-omega-3 fatty acids 300-1,000 mg capsule Take 2 g by mouth once daily.      fluticasone propionate (FLONASE) 50 mcg/actuation nasal spray TAKE 2 SPRAYS BY EACH NARE ROUTE ONCE DAILY.  Qty: 16 mL, Refills: 6    Associated Diagnoses: Cough; Acute seasonal allergic rhinitis due to pollen      glucosam/chond/hyalu/CF borate (MOVE FREE JOINT [facility] ORAL) Take 1 tablet by mouth once daily.       MULTIVIT-IRON-MIN-FOLIC ACID 3,500-18-0.4 UNIT-MG-MG ORAL CHEW Take by mouth.      triamterene-hydrochlorothiazide 37.5-25 mg (DYAZIDE) 37.5-25 mg per capsule       vitamin D 1000 units Tab Take 2,000 Units by mouth once daily.      alendronate (FOSAMAX) 70 MG tablet TAKE 1 TABLET (70 MG TOTAL) BY MOUTH EVERY 7 DAYS.  Qty: 12 tablet, Refills: 3    Associated Diagnoses: Age-related osteoporosis without current pathological fracture      cetirizine (ZYRTEC) 10 MG tablet Take 10 mg by mouth daily as needed.              Discharge Procedure Orders (must include Diet, Follow-up, Activity)   Discharge Procedure Orders (must include Diet, Follow-up, Activity)   Diet general     Call MD for:  temperature >100.4     Call MD for:  persistent nausea and vomiting     Call MD for:  severe uncontrolled pain     Call MD for:  difficulty breathing, headache or visual disturbances     Call MD for:  redness, tenderness, or signs of infection (pain, swelling, redness, odor or green/yellow discharge around incision site)     Call MD for:  hives     Call MD for:  persistent dizziness or light-headedness     Call MD for:  extreme fatigue     No dressing needed     Follow-up Information     Dennys Vega MD In 1 week.    Specialty:  Cardiology  Contact information:  [address] [facility] [place] [address] [phone]

## 2020-03-02 NOTE — TELEPHONE ENCOUNTER
S/w Amy.  States pt c/o non productive cough, clear runny nose, scratchy throat x 2-3 days.  No fever, N/V/D.  Amy advised pt to try Mucinex & Zyrtec.    Would like to be advised if she should try anything else.  Please advise./rpr

## 2020-03-02 NOTE — TELEPHONE ENCOUNTER
----- Message from Kali Spear sent at 3/2/2020 12:50 PM CST -----  Contact: PT's daughter in law-Amy  Type:  Needs Medical Advice    Who Called: PT's daughter in law-Amy  Symptoms (please be specific): congestion/runny nose/ sore throat/cough  How long has patient had these symptoms:  Last few days   Pharmacy name and phone #:    [id number] - [place] - [address] AT [place] [address] [place] [address]  Phone: [phone] Fax: [phone]  Would the patient rather a call back or a response via [facility]? Call back   Best Call Back Number: [phone](cell)  Additional Information: n/a

## 2020-03-02 NOTE — NURSING
Discharge orders received and reviewed with patient and family. Medication changes discussed with patient and daughter in law. VSS, pt ambulates at baseline. IV removed, monitor removed, pt wheeled to front of hospital for discharge home with family.

## 2020-03-09 ENCOUNTER — PATIENT OUTREACH (OUTPATIENT)
Dept: ADMINISTRATIVE | Facility: OTHER | Age: 80
End: 2020-03-09

## 2020-03-10 ENCOUNTER — OFFICE VISIT (OUTPATIENT)
Dept: CARDIOLOGY | Facility: CLINIC | Age: 80
End: 2020-03-10
Payer: MEDICARE

## 2020-03-10 ENCOUNTER — HOSPITAL ENCOUNTER (OUTPATIENT)
Dept: CARDIOLOGY | Facility: HOSPITAL | Age: 80
Discharge: HOME | End: 2020-03-10
Payer: MEDICARE

## 2020-03-10 VITALS
HEART RATE: 71 BPM | WEIGHT: 166.44 LBS | DIASTOLIC BLOOD PRESSURE: 64 MMHG | HEIGHT: 62 IN | BODY MASS INDEX: 30.63 KG/M2 | OXYGEN SATURATION: 97 % | SYSTOLIC BLOOD PRESSURE: 130 MMHG

## 2020-03-10 DIAGNOSIS — Z85.3 HISTORY OF RIGHT BREAST CANCER: ICD-10-CM

## 2020-03-10 DIAGNOSIS — I48.19 OTHER PERSISTENT ATRIAL FIBRILLATION: Primary | ICD-10-CM

## 2020-03-10 DIAGNOSIS — I10 ESSENTIAL HYPERTENSION: ICD-10-CM

## 2020-03-10 DIAGNOSIS — I48.19 PERSISTENT ATRIAL FIBRILLATION: ICD-10-CM

## 2020-03-10 DIAGNOSIS — Z79.01 ON CONTINUOUS ORAL ANTICOAGULATION: ICD-10-CM

## 2020-03-10 DIAGNOSIS — E78.49 OTHER HYPERLIPIDEMIA: ICD-10-CM

## 2020-03-10 DIAGNOSIS — G45.9 TIA (TRANSIENT ISCHEMIC ATTACK): ICD-10-CM

## 2020-03-10 DIAGNOSIS — I48.19 PERSISTENT ATRIAL FIBRILLATION: Primary | ICD-10-CM

## 2020-03-10 PROCEDURE — 3075F SYST BP GE 130 - 139MM HG: CPT | Mod: CPTII,S$GLB,, | Performed by: PHYSICIAN ASSISTANT

## 2020-03-10 PROCEDURE — 1159F MED LIST DOCD IN RCRD: CPT | Mod: S$GLB,,, | Performed by: PHYSICIAN ASSISTANT

## 2020-03-10 PROCEDURE — 99214 PR OFFICE/OUTPT VISIT, EST, LEVL IV, 30-39 MIN: ICD-10-PCS | Mod: 25,S$GLB,, | Performed by: PHYSICIAN ASSISTANT

## 2020-03-10 PROCEDURE — 3078F DIAST BP <80 MM HG: CPT | Mod: CPTII,S$GLB,, | Performed by: PHYSICIAN ASSISTANT

## 2020-03-10 PROCEDURE — 99999 PR PBB SHADOW E&M-EST. PATIENT-LVL IV: ICD-10-PCS | Mod: PBBFAC,,, | Performed by: PHYSICIAN ASSISTANT

## 2020-03-10 PROCEDURE — 1126F PR PAIN SEVERITY QUANTIFIED, NO PAIN PRESENT: ICD-10-PCS | Mod: S$GLB,,, | Performed by: PHYSICIAN ASSISTANT

## 2020-03-10 PROCEDURE — 1101F PT FALLS ASSESS-DOCD LE1/YR: CPT | Mod: CPTII,S$GLB,, | Performed by: PHYSICIAN ASSISTANT

## 2020-03-10 PROCEDURE — 1126F AMNT PAIN NOTED NONE PRSNT: CPT | Mod: S$GLB,,, | Performed by: PHYSICIAN ASSISTANT

## 2020-03-10 PROCEDURE — 99999 PR PBB SHADOW E&M-EST. PATIENT-LVL IV: CPT | Mod: PBBFAC,,, | Performed by: PHYSICIAN ASSISTANT

## 2020-03-10 PROCEDURE — 93010 ELECTROCARDIOGRAM REPORT: CPT | Mod: ,,, | Performed by: INTERNAL MEDICINE

## 2020-03-10 PROCEDURE — 93005 ELECTROCARDIOGRAM TRACING: CPT

## 2020-03-10 PROCEDURE — 93010 EKG 12-LEAD: ICD-10-PCS | Mod: ,,, | Performed by: INTERNAL MEDICINE

## 2020-03-10 PROCEDURE — 99214 OFFICE O/P EST MOD 30 MIN: CPT | Mod: 25,S$GLB,, | Performed by: PHYSICIAN ASSISTANT

## 2020-03-10 PROCEDURE — 3075F PR MOST RECENT SYSTOLIC BLOOD PRESS GE 130-139MM HG: ICD-10-PCS | Mod: CPTII,S$GLB,, | Performed by: PHYSICIAN ASSISTANT

## 2020-03-10 PROCEDURE — 3078F PR MOST RECENT DIASTOLIC BLOOD PRESSURE < 80 MM HG: ICD-10-PCS | Mod: CPTII,S$GLB,, | Performed by: PHYSICIAN ASSISTANT

## 2020-03-10 PROCEDURE — 1101F PR PT FALLS ASSESS DOC 0-1 FALLS W/OUT INJ PAST YR: ICD-10-PCS | Mod: CPTII,S$GLB,, | Performed by: PHYSICIAN ASSISTANT

## 2020-03-10 PROCEDURE — 1159F PR MEDICATION LIST DOCUMENTED IN MEDICAL RECORD: ICD-10-PCS | Mod: S$GLB,,, | Performed by: PHYSICIAN ASSISTANT

## 2020-03-10 NOTE — PROGRESS NOTES
Subjective:    Patient ID:  Azadouhi TAPAN Ospina is a 80 y.o. female who presents for follow-up of hospital follow-up      HPI   Mr. Ospina is a 80 year old female patient whose current medical conditions include HTN, hyperlipidemia, mildly abnormal cardiolite, and PAF (on Eliquis) who presents today for hospital f/u. Patient recently underwent MEHNAZ/DCCV after po amiodarone was on board. She returns today and states she is doing well overall. Feels less fatigued. No palpitations. No chest pain. Denies SOB/COVARRUBIAS. No lightheadedness, dizziness, near syncope, or syncope. States she is able to do her household chores/daily ADL's without any issues. Stays active. BP controlled. Patient reports compliance with her medications. Remains on Eliquis 5 mg BID. No bleeding issues. No s/s of TIA/CVA.     EKG today shows rate-controlled afib.      Review of Systems   Constitution: Negative for chills, decreased appetite, fever and malaise/fatigue.   HENT: Negative for congestion, hoarse voice and sore throat.    Eyes: Negative for blurred vision and discharge.   Cardiovascular: Negative for chest pain, claudication, cyanosis, dyspnea on exertion, irregular heartbeat, leg swelling, near-syncope, orthopnea, palpitations and paroxysmal nocturnal dyspnea.   Respiratory: Negative for cough, hemoptysis, shortness of breath, snoring, sputum production and wheezing.    Endocrine: Negative for cold intolerance and heat intolerance.   Hematologic/Lymphatic: Negative for bleeding problem. Does not bruise/bleed easily.   Skin: Negative for rash.   Musculoskeletal: Positive for arthritis and joint pain. Negative for back pain, joint swelling, muscle cramps, muscle weakness and myalgias.   Gastrointestinal: Negative for abdominal pain, constipation, diarrhea, heartburn, melena and nausea.   Genitourinary: Negative for hematuria.   Neurological: Negative for dizziness, focal weakness, headaches, light-headedness, loss of balance, numbness,  "paresthesias, seizures and weakness.   Psychiatric/Behavioral: Negative for memory loss. The patient does not have insomnia.    Allergic/Immunologic: Negative for hives.     /64 (BP Location: Right arm, Patient Position: Sitting, BP Method: Large (Manual))   Pulse 71   Ht 5' 2" (1.575 m)   Wt 75.5 kg (166 lb 7.2 oz)   SpO2 97%   BMI 30.44 kg/m²     Objective:    Physical Exam   Constitutional: She is oriented to person, place, and time. She appears well-developed and well-nourished. No distress.   HENT:   Head: Normocephalic and atraumatic.   Eyes: Pupils are equal, round, and reactive to light. Right eye exhibits no discharge. Left eye exhibits no discharge.   Neck: Neck supple. No JVD present.   Cardiovascular: Normal rate, S1 normal, S2 normal and normal heart sounds. An irregularly irregular rhythm present.   No murmur heard.  Pulmonary/Chest: Effort normal and breath sounds normal. No respiratory distress. She has no wheezes. She has no rales.   Abdominal: Soft. She exhibits no distension.   Musculoskeletal: She exhibits no edema.   Neurological: She is alert and oriented to person, place, and time.   Skin: Skin is warm and dry. She is not diaphoretic. No erythema.   Psychiatric: She has a normal mood and affect. Her behavior is normal. Thought content normal.   Nursing note and vitals reviewed.      Assessment:       1. Other persistent atrial fibrillation    2. TIA (transient ischemic attack)    3. Essential hypertension    4. Other hyperlipidemia    5. History of right breast cancer    6. On continuous oral anticoagulation      Patient presents for f/u. Doing ok-but remains in afib, refractory to multiple DCCV. Feels ok today in office. At this point she would like to continue current therapy. Will re-discuss PVI in future with Dr. Vega.  Plan:   -Continue current medical management and risk factor modification  -Cardiac, low salt diet  -Continue Eliquis for CVA prophylaxis  -RTC  4-6 weeks, will " re-discuss PVI at that time

## 2020-03-11 ENCOUNTER — TELEPHONE (OUTPATIENT)
Dept: FAMILY MEDICINE | Facility: CLINIC | Age: 80
End: 2020-03-11

## 2020-03-11 NOTE — TELEPHONE ENCOUNTER
Pt's daughter in law states that pt has been having rt side back pain > 2months.  She saw Cardiology yesterday and was told to see a PCP.  Explained that Dr. Fiore does not have an available appointment this week.  Pt will only see MD.  Scheduled to see Dr. Arce tomorrow.  Confirmed time and location.  Instructed to go to ER if pain becomes unbearable.  Verbalized understanding./rpr

## 2020-03-11 NOTE — TELEPHONE ENCOUNTER
----- Message from Toan Perez sent at 3/11/2020  9:44 AM CDT -----  Contact: nikolai ( daughter in law   .Type:  Same Day Appointment Request    Caller is requesting a same day appointment.  Caller declined first available appointment listed below.    Name of Caller: Nikolai ( Daughter in law   When is the first available appointment? 6/30   Symptoms: back pain/ unable to bend at waist   Best Call Back Number: [phone]   Additional Information:

## 2020-03-30 ENCOUNTER — TELEPHONE (OUTPATIENT)
Dept: CARDIOLOGY | Facility: HOSPITAL | Age: 80
End: 2020-03-30

## 2020-03-30 DIAGNOSIS — I48.19 OTHER PERSISTENT ATRIAL FIBRILLATION: Primary | ICD-10-CM

## 2020-03-30 NOTE — TELEPHONE ENCOUNTER
Please phone patient. Discussed with Dr. Vega. Continue same meds/mgmmt. She needs EP appt with Dr. Wiggins, in next 2-3 months, please arrange    Thanks

## 2020-04-13 ENCOUNTER — TELEPHONE (OUTPATIENT)
Dept: CARDIOLOGY | Facility: CLINIC | Age: 80
End: 2020-04-13

## 2020-04-13 NOTE — TELEPHONE ENCOUNTER
clarification given on amiodarone and toprol dosage based on d/c summary from 3/2 as most recent med changes.

## 2020-04-13 NOTE — TELEPHONE ENCOUNTER
----- Message from Dorys Johns sent at 4/13/2020  3:31 PM CDT -----  Contact: Son  Wants to know if the pt should take one or two of her  Amiodarone HCL 200mg Tab, can be reached at [phone]///thxMW

## 2020-04-20 ENCOUNTER — PATIENT OUTREACH (OUTPATIENT)
Dept: ADMINISTRATIVE | Facility: OTHER | Age: 80
End: 2020-04-20

## 2020-04-21 ENCOUNTER — OFFICE VISIT (OUTPATIENT)
Dept: CARDIOLOGY | Facility: CLINIC | Age: 80
End: 2020-04-21
Payer: MEDICARE

## 2020-04-21 VITALS — DIASTOLIC BLOOD PRESSURE: 70 MMHG | SYSTOLIC BLOOD PRESSURE: 148 MMHG | HEART RATE: 66 BPM

## 2020-04-21 DIAGNOSIS — G45.9 TIA (TRANSIENT ISCHEMIC ATTACK): ICD-10-CM

## 2020-04-21 DIAGNOSIS — I10 ESSENTIAL HYPERTENSION: ICD-10-CM

## 2020-04-21 DIAGNOSIS — E55.9 VITAMIN D DEFICIENCY: ICD-10-CM

## 2020-04-21 DIAGNOSIS — R00.2 PALPITATION: ICD-10-CM

## 2020-04-21 DIAGNOSIS — E83.52 HYPERCALCEMIA: ICD-10-CM

## 2020-04-21 DIAGNOSIS — Z85.3 HISTORY OF RIGHT BREAST CANCER: ICD-10-CM

## 2020-04-21 DIAGNOSIS — I48.19 OTHER PERSISTENT ATRIAL FIBRILLATION: Primary | ICD-10-CM

## 2020-04-21 DIAGNOSIS — E78.49 OTHER HYPERLIPIDEMIA: ICD-10-CM

## 2020-04-21 DIAGNOSIS — E21.3 HYPERPARATHYROIDISM: ICD-10-CM

## 2020-04-21 PROCEDURE — 3077F PR MOST RECENT SYSTOLIC BLOOD PRESSURE >= 140 MM HG: ICD-10-PCS | Mod: CPTII,95,, | Performed by: INTERNAL MEDICINE

## 2020-04-21 PROCEDURE — 1101F PR PT FALLS ASSESS DOC 0-1 FALLS W/OUT INJ PAST YR: ICD-10-PCS | Mod: CPTII,95,, | Performed by: INTERNAL MEDICINE

## 2020-04-21 PROCEDURE — 99442 PR PHYSICIAN TELEPHONE EVALUATION 11-20 MIN: ICD-10-PCS | Mod: 95,,, | Performed by: INTERNAL MEDICINE

## 2020-04-21 PROCEDURE — 1101F PT FALLS ASSESS-DOCD LE1/YR: CPT | Mod: CPTII,95,, | Performed by: INTERNAL MEDICINE

## 2020-04-21 PROCEDURE — 99442 PR PHYSICIAN TELEPHONE EVALUATION 11-20 MIN: CPT | Mod: 95,,, | Performed by: INTERNAL MEDICINE

## 2020-04-21 PROCEDURE — 3078F PR MOST RECENT DIASTOLIC BLOOD PRESSURE < 80 MM HG: ICD-10-PCS | Mod: CPTII,95,, | Performed by: INTERNAL MEDICINE

## 2020-04-21 PROCEDURE — 1159F PR MEDICATION LIST DOCUMENTED IN MEDICAL RECORD: ICD-10-PCS | Mod: 95,,, | Performed by: INTERNAL MEDICINE

## 2020-04-21 PROCEDURE — 3077F SYST BP >= 140 MM HG: CPT | Mod: CPTII,95,, | Performed by: INTERNAL MEDICINE

## 2020-04-21 PROCEDURE — 1159F MED LIST DOCD IN RCRD: CPT | Mod: 95,,, | Performed by: INTERNAL MEDICINE

## 2020-04-21 PROCEDURE — 3078F DIAST BP <80 MM HG: CPT | Mod: CPTII,95,, | Performed by: INTERNAL MEDICINE

## 2020-04-21 NOTE — PROGRESS NOTES
Subjective:   Patient ID:  Azadouhi TAPAN Ospina is a 80 y.o. female who presents for follow up of No chief complaint on file.    The patient location is: HOME DUE TO COVID 19  The chief complaint leading to consultation is: AFIB F/U  Visit type: audio only  Total time spent with patient: 12 MINUTES  Each patient to whom he or she provides medical services by telemedicine is:  (1) informed of the relationship between the physician and patient and the respective role of any other health care provider with respect to management of the patient; and (2) notified that he or she may decline to receive medical services by telemedicine and may withdraw from such care at any time.    Notes:   HPI  A 81 YO FEMALE WITH HTN AFIB S/P MULTIPLE FAILED CARDIOVERSIONS ON AMIO AND B BLOCKERS FOR RATE CONTROL IS FOLLOWING UP ON AFIB. SHE IS LIMTED BY FATIGUE WHEN SHE TRIES TO GO UPHILL UNCHANGED FROM PREVIOUS. WHEN SHE WAS IN SINUS RHYTHM SHE FEELS NORMAL NO LIMITATION. SHE TRIES TO WALK FOR EXERCISE NO LEG SWELLING ORTHOPNEA PND SYNCOPE NEAR SYNCOPE NO PALPITATION. SHE IS COMPLIANT WITH MEDS. NO C\BLEEDING CONCERNS WITH ELIQUIS. SHE IS NEEDING BETETR COMPLIANCE WITH SALT. HER BP READINGS ARE A LITTLE ELEVATED HOWEVER THIS IS BEFORE SHE TAKES HER MEDS.NO NOCTURNAL SYMPTOMS SHE IS STILL ACTIVE TAKES CARE OF HER HOUSE.   Past Medical History:   Diagnosis Date    A-fib 11/5/2019    Breast cancer     right, dx 2008.  Dr. Callaway follows.s/p lumpectomy and arimidex x 5y.    Hyperlipidemia     Hypertension     OP (osteoporosis)     on bisphos x 2y.    TIA (transient ischemic attack)     Toe fracture        Past Surgical History:   Procedure Laterality Date    BREAST LUMPECTOMY      right 2008    TOTAL ABDOMINAL HYSTERECTOMY      no cancer    TRANSESOPHAGEAL ECHOCARDIOGRAPHY N/A 11/5/2019    Procedure: ECHOCARDIOGRAM, TRANSESOPHAGEAL;  Surgeon: Otto Flores MD;  Location: [facility] CATH LAB;  Service: Cardiology;  Laterality: N/A;     TREATMENT OF CARDIAC ARRHYTHMIA N/A 2019    Procedure: CARDIOVERSION;  Surgeon: Otto Flores MD;  Location: [facility] CATH LAB;  Service: Cardiology;  Laterality: N/A;    TREATMENT OF CARDIAC ARRHYTHMIA N/A 2019    Procedure: CARDIOVERSION;  Surgeon: Otto Flores MD;  Location: [facility] CATH LAB;  Service: Cardiology;  Laterality: N/A;    TREATMENT OF CARDIAC ARRHYTHMIA N/A 2020    Procedure: CARDIOVERSION;  Surgeon: Otto Flores MD;  Location: [facility] CATH LAB;  Service: Cardiology;  Laterality: N/A;    TREATMENT OF CARDIAC ARRHYTHMIA N/A 3/2/2020    Procedure: CARDIOVERSION;  Surgeon: Mayi Beltran MD;  Location: [facility] CATH LAB;  Service: Cardiology;  Laterality: N/A;  830 start per Dr. beltran       Social History     Tobacco Use    Smoking status: Former Smoker     Last attempt to quit: 1994     Years since quittin.5    Smokeless tobacco: Never Used   Substance Use Topics    Alcohol use: Not Currently     Alcohol/week: 1.0 standard drinks     Types: 1 Glasses of wine per week     Comment: daily    Drug use: Never       No family history on file.    Current Outpatient Medications   Medication Sig    alendronate (FOSAMAX) 70 MG tablet TAKE 1 TABLET (70 MG TOTAL) BY MOUTH EVERY 7 DAYS.    amiodarone (PACERONE) 200 MG Tab Take 1 tablet (200 mg total) by mouth once daily.    apixaban (ELIQUIS) 5 mg Tab Take 1 tablet (5 mg total) by mouth 2 (two) times daily.    atorvastatin (LIPITOR) 20 MG tablet TAKE 1 TABLET (20 MG TOTAL) BY MOUTH ONCE DAILY.    cetirizine (ZYRTEC) 10 MG tablet Take 10 mg by mouth daily as needed.     fish oil-omega-3 fatty acids 300-1,000 mg capsule Take 2 g by mouth once daily.    fluticasone propionate (FLONASE) 50 mcg/actuation nasal spray TAKE 2 SPRAYS BY EACH NARE ROUTE ONCE DAILY.    glucosam/chond/hyalu/CF borate (MOVE FREE [facility] ORAL) Take 1 tablet by mouth once daily.    metoprolol succinate (TOPROL-XL) 50 MG 24 hr tablet Take 1 tablet (50 mg total) by mouth  once daily.    MULTIVIT-IRON-MIN-FOLIC ACID 3,500-18-0.4 UNIT-MG-MG ORAL CHEW Take by mouth.    triamterene-hydrochlorothiazide 37.5-25 mg (DYAZIDE) 37.5-25 mg per capsule     vitamin D 1000 units Tab Take 2,000 Units by mouth once daily.    clopidogrel (PLAVIX) 75 mg tablet TAKE 1 TABLET (75 MG TOTAL) BY MOUTH ONCE DAILY.     No current facility-administered medications for this visit.      Current Outpatient Medications on File Prior to Visit   Medication Sig    alendronate (FOSAMAX) 70 MG tablet TAKE 1 TABLET (70 MG TOTAL) BY MOUTH EVERY 7 DAYS.    amiodarone (PACERONE) 200 MG Tab Take 1 tablet (200 mg total) by mouth once daily.    apixaban (ELIQUIS) 5 mg Tab Take 1 tablet (5 mg total) by mouth 2 (two) times daily.    atorvastatin (LIPITOR) 20 MG tablet TAKE 1 TABLET (20 MG TOTAL) BY MOUTH ONCE DAILY.    cetirizine (ZYRTEC) 10 MG tablet Take 10 mg by mouth daily as needed.     fish oil-omega-3 fatty acids 300-1,000 mg capsule Take 2 g by mouth once daily.    fluticasone propionate (FLONASE) 50 mcg/actuation nasal spray TAKE 2 SPRAYS BY EACH NARE ROUTE ONCE DAILY.    glucosam/chond/hyalu/CF borate (MOVE FREE [facility] ORAL) Take 1 tablet by mouth once daily.    metoprolol succinate (TOPROL-XL) 50 MG 24 hr tablet Take 1 tablet (50 mg total) by mouth once daily.    MULTIVIT-IRON-MIN-FOLIC ACID 3,500-18-0.4 UNIT-MG-MG ORAL CHEW Take by mouth.    triamterene-hydrochlorothiazide 37.5-25 mg (DYAZIDE) 37.5-25 mg per capsule     vitamin D 1000 units Tab Take 2,000 Units by mouth once daily.    clopidogrel (PLAVIX) 75 mg tablet TAKE 1 TABLET (75 MG TOTAL) BY MOUTH ONCE DAILY.     No current facility-administered medications on file prior to visit.      Review of patient's allergies indicates:   Allergen Reactions    Medrol [methylprednisolone]      Review of Systems   Constitution: Positive for malaise/fatigue. Negative for diaphoresis and weight gain.   HENT: Negative for hoarse voice.    Eyes:  Negative for double vision and visual disturbance.   Cardiovascular: Negative for chest pain, claudication, cyanosis, dyspnea on exertion, irregular heartbeat, leg swelling, near-syncope, orthopnea, palpitations, paroxysmal nocturnal dyspnea and syncope.   Respiratory: Positive for shortness of breath. Negative for cough, hemoptysis and snoring.    Hematologic/Lymphatic: Negative for bleeding problem. Does not bruise/bleed easily.   Skin: Negative for color change and poor wound healing.   Musculoskeletal: Negative for muscle cramps, muscle weakness and myalgias.   Gastrointestinal: Negative for bloating, abdominal pain, change in bowel habit, diarrhea, heartburn, hematemesis, hematochezia, melena and nausea.   Neurological: Negative for excessive daytime sleepiness, dizziness, headaches, light-headedness, loss of balance, numbness and weakness.   Psychiatric/Behavioral: Negative for memory loss. The patient does not have insomnia.    Allergic/Immunologic: Negative for hives.       Objective:   Physical Exam  Vitals:    04/21/20 1110   BP: (!) 148/70   Pulse: 66     Lab Results   Component Value Date    CHOL 127 10/01/2019    CHOL 147 06/20/2018    CHOL 137 05/30/2017     Lab Results   Component Value Date    HDL 46 10/01/2019    HDL 56 06/20/2018    HDL 57 05/30/2017     Lab Results   Component Value Date    LDLCALC 61.4 (L) 10/01/2019    LDLCALC 73.6 06/20/2018    LDLCALC 60.0 (L) 05/30/2017     Lab Results   Component Value Date    TRIG 98 10/01/2019    TRIG 87 06/20/2018    TRIG 100 05/30/2017     Lab Results   Component Value Date    CHOLHDL 36.2 10/01/2019    CHOLHDL 38.1 06/20/2018    CHOLHDL 41.6 05/30/2017       Chemistry        Component Value Date/Time     09/13/2019 0951    K 3.8 09/13/2019 0951     09/13/2019 0951    CO2 29 09/13/2019 0951    BUN 13 09/13/2019 0951    CREATININE 0.9 09/13/2019 0951     (H) 09/13/2019 0951        Component Value Date/Time    CALCIUM 11.0 (H) 09/13/2019  0951    ALKPHOS 59 09/13/2019 0951    AST 15 09/13/2019 0951    ALT 14 09/13/2019 0951    BILITOT 0.9 09/13/2019 0951    ESTGFRAFRICA >60.0 09/13/2019 0951    EGFRNONAA >60.0 09/13/2019 0951          Lab Results   Component Value Date    TSH 0.993 09/13/2019     No results found for: INR, PROTIME  Lab Results   Component Value Date    WBC 7.96 09/13/2019    HGB 14.9 09/13/2019    HCT 45.8 09/13/2019    MCV 89 09/13/2019     09/13/2019     BMP  Sodium   Date Value Ref Range Status   09/13/2019 142 136 - 145 mmol/L Final     Potassium   Date Value Ref Range Status   09/13/2019 3.8 3.5 - 5.1 mmol/L Final     Chloride   Date Value Ref Range Status   09/13/2019 105 95 - 110 mmol/L Final     CO2   Date Value Ref Range Status   09/13/2019 29 23 - 29 mmol/L Final     BUN, Bld   Date Value Ref Range Status   09/13/2019 13 8 - 23 mg/dL Final     Creatinine   Date Value Ref Range Status   09/13/2019 0.9 0.5 - 1.4 mg/dL Final     Calcium   Date Value Ref Range Status   09/13/2019 11.0 (H) 8.7 - 10.5 mg/dL Final     Anion Gap   Date Value Ref Range Status   09/13/2019 8 8 - 16 mmol/L Final     eGFR if    Date Value Ref Range Status   09/13/2019 >60.0 >60 mL/min/1.73 m^2 Final     eGFR if non    Date Value Ref Range Status   09/13/2019 >60.0 >60 mL/min/1.73 m^2 Final     Comment:     Calculation used to obtain the estimated glomerular filtration  rate (eGFR) is the CKD-EPI equation.        CrCl cannot be calculated (Patient's most recent lab result is older than the maximum 7 days allowed.).    Assessment:     1. Other persistent atrial fibrillation    2. Other hyperlipidemia    3. History of right breast cancer    4. TIA (transient ischemic attack)    5. Essential hypertension    6. Vitamin D deficiency    7. Hyperparathyroidism    8. Palpitation    9. Hypercalcemia      HAS SYMPTOMATIC AFIB WITH MODDRATE EXERTION RATE CONTROLLED WITH B BLCOKERS AMIO AS WELL CVA PROPHYLAXIS WITH  APIXABAN.  SHE WILL BE REEVALUATED WITH DR GAMING TO ADDRESS AFIB ABLATION IN ORDER TO RESTORE SINUS RHYTHM.  HTN FAIR CONTROL COUNSELED ABOUT LOW SALT DIET EXERCISE NA DCOMPLINACE.  Plan:   KEEP APPOINTMENT WITH DR GAMING  Continue current therapy  Cardiac low salt diet.  Risk factor modification and excercise program  F/u in 3 MONTHS  with lipid cmp .TSH

## 2020-05-08 NOTE — TELEPHONE ENCOUNTER
PC with Marisa (emergency contact).  Advised cxr shows no pneumonia and that Dr. Fiore will discuss the other results at her next appt.  He stated that he will get the information to pt./rpr  
Please try to communicate to pt or daughter that no pneumonia on cxr.  Will discuss all other results next visit.  SM    
---

## 2020-06-24 ENCOUNTER — TELEPHONE (OUTPATIENT)
Dept: ELECTROPHYSIOLOGY | Facility: CLINIC | Age: 80
End: 2020-06-24

## 2020-06-24 NOTE — TELEPHONE ENCOUNTER
----- Message from Elaine Martell RN sent at 6/24/2020  2:04 PM CDT -----  Regarding: FW: returning a call  Contact: Patient's daughter in law Amy  Looks as if her daughter in law is returning your call to r/s ablation  ----- Message -----  From: Abigail Mcclendon  Sent: 6/24/2020   1:28 PM CDT  To: [facility] Staff  Subject: returning a call                                 The Pt's daughter in law is returning a call. Please call her back @ [phone]. Thanks, Abigail

## 2020-06-24 NOTE — TELEPHONE ENCOUNTER
Spoke with Amy, Ms. Ospina's daughter in law and reviewed the conversation that I had with the patient. She said that the patient is still reluctant to schedule and would like to discuss it with Dr. Wiggins at their clinic visit on 7/8.

## 2020-06-24 NOTE — TELEPHONE ENCOUNTER
Spoke with patient about rescheduling her ablation procedure with Dr. Wiggins. She said that she was still worried about COVID 19 and did not want to schedule. I explained the hospital's measures for keeping patients, visitors, and staff safe. She said that she didn't understand all that I was explaining so she was going to have her daughter in law call me back.

## 2020-07-27 ENCOUNTER — PATIENT OUTREACH (OUTPATIENT)
Dept: ADMINISTRATIVE | Facility: OTHER | Age: 80
End: 2020-07-27

## 2020-08-04 ENCOUNTER — TELEPHONE (OUTPATIENT)
Dept: CARDIOLOGY | Facility: CLINIC | Age: 80
End: 2020-08-04

## 2020-08-04 NOTE — TELEPHONE ENCOUNTER
Called in Xanax 0.25mg once every evening by mouth for 15 days - no refills as instructed per Dr. Vega to [facility] [address] and notified - daughter in law notified also informed Daughter in law to continue to monitor BP and to call us back if it doesn't start going down after starting the xanax - verbalized understanding

## 2020-08-04 NOTE — TELEPHONE ENCOUNTER
Was wondering if DR. Vega would order something to help pt sleep at night - lost her  about 2 weeks ago and has not been sleeping - also wanted Dr. Vega to know that BP has been increasing over the last week - started 150s, then 160s, and now the last 3 days 170s   Please advise   Daughter in law - Chasidy [phone](understands if they need to call PCP but pt wanted to ask you first)

## 2020-08-04 NOTE — TELEPHONE ENCOUNTER
----- Message from Kali Spear sent at 8/4/2020  1:42 PM CDT -----  Regarding: Pt advice  Type:  Needs Medical Advice    Who Called: Pt's daughter in law-stacy   Symptoms (please be specific):  unable to sleep at night due to the patient's  recently passed away   How long has patient had these symptoms:last couple of weeks   Pharmacy name and phone #:    [id number] [place] - [address] AT [address] [address] [place] [address]  Phone: [phone] Fax: [phone]  Would the patient rather a call back or a response via [facility]? Call back   Best Call Back Number: [phone] (home)   Additional Information: n/a

## 2020-08-21 ENCOUNTER — LAB VISIT (OUTPATIENT)
Dept: LAB | Facility: HOSPITAL | Age: 80
End: 2020-08-21
Attending: INTERNAL MEDICINE
Payer: MEDICARE

## 2020-08-21 DIAGNOSIS — I48.19 OTHER PERSISTENT ATRIAL FIBRILLATION: ICD-10-CM

## 2020-08-21 DIAGNOSIS — E78.49 OTHER HYPERLIPIDEMIA: ICD-10-CM

## 2020-08-21 LAB
ALBUMIN SERPL BCP-MCNC: 3.8 G/DL (ref 3.5–5.2)
ALP SERPL-CCNC: 57 U/L (ref 55–135)
ALT SERPL W/O P-5'-P-CCNC: 20 U/L (ref 10–44)
ANION GAP SERPL CALC-SCNC: 6 MMOL/L (ref 8–16)
AST SERPL-CCNC: 21 U/L (ref 10–40)
BILIRUB SERPL-MCNC: 0.8 MG/DL (ref 0.1–1)
BUN SERPL-MCNC: 9 MG/DL (ref 8–23)
CALCIUM SERPL-MCNC: 10.1 MG/DL (ref 8.7–10.5)
CHLORIDE SERPL-SCNC: 109 MMOL/L (ref 95–110)
CHOLEST SERPL-MCNC: 126 MG/DL (ref 120–199)
CHOLEST/HDLC SERPL: 2.1 {RATIO} (ref 2–5)
CO2 SERPL-SCNC: 27 MMOL/L (ref 23–29)
CREAT SERPL-MCNC: 0.8 MG/DL (ref 0.5–1.4)
EST. GFR  (AFRICAN AMERICAN): >60 ML/MIN/1.73 M^2
EST. GFR  (NON AFRICAN AMERICAN): >60 ML/MIN/1.73 M^2
GLUCOSE SERPL-MCNC: 105 MG/DL (ref 70–110)
HDLC SERPL-MCNC: 59 MG/DL (ref 40–75)
HDLC SERPL: 46.8 % (ref 20–50)
LDLC SERPL CALC-MCNC: 55.4 MG/DL (ref 63–159)
NONHDLC SERPL-MCNC: 67 MG/DL
POTASSIUM SERPL-SCNC: 4.4 MMOL/L (ref 3.5–5.1)
PROT SERPL-MCNC: 7 G/DL (ref 6–8.4)
SODIUM SERPL-SCNC: 142 MMOL/L (ref 136–145)
TRIGL SERPL-MCNC: 58 MG/DL (ref 30–150)
TSH SERPL DL<=0.005 MIU/L-ACNC: 1.24 UIU/ML (ref 0.4–4)

## 2020-08-21 PROCEDURE — 80061 LIPID PANEL: CPT

## 2020-08-21 PROCEDURE — 80053 COMPREHEN METABOLIC PANEL: CPT

## 2020-08-21 PROCEDURE — 84443 ASSAY THYROID STIM HORMONE: CPT

## 2020-08-21 PROCEDURE — 36415 COLL VENOUS BLD VENIPUNCTURE: CPT

## 2020-08-28 ENCOUNTER — PATIENT OUTREACH (OUTPATIENT)
Dept: ADMINISTRATIVE | Facility: OTHER | Age: 80
End: 2020-08-28

## 2020-08-28 ENCOUNTER — HOSPITAL ENCOUNTER (OUTPATIENT)
Dept: CARDIOLOGY | Facility: HOSPITAL | Age: 80
Discharge: HOME | End: 2020-08-28
Attending: INTERNAL MEDICINE
Payer: MEDICARE

## 2020-08-28 ENCOUNTER — OFFICE VISIT (OUTPATIENT)
Dept: CARDIOLOGY | Facility: CLINIC | Age: 80
End: 2020-08-28
Payer: MEDICARE

## 2020-08-28 VITALS
BODY MASS INDEX: 29.41 KG/M2 | SYSTOLIC BLOOD PRESSURE: 150 MMHG | DIASTOLIC BLOOD PRESSURE: 78 MMHG | WEIGHT: 159.81 LBS | OXYGEN SATURATION: 97 % | HEIGHT: 62 IN | HEART RATE: 73 BPM

## 2020-08-28 DIAGNOSIS — E78.49 OTHER HYPERLIPIDEMIA: ICD-10-CM

## 2020-08-28 DIAGNOSIS — I48.19 OTHER PERSISTENT ATRIAL FIBRILLATION: ICD-10-CM

## 2020-08-28 DIAGNOSIS — E55.9 VITAMIN D DEFICIENCY: ICD-10-CM

## 2020-08-28 DIAGNOSIS — I10 ESSENTIAL HYPERTENSION: ICD-10-CM

## 2020-08-28 DIAGNOSIS — E83.52 HYPERCALCEMIA: ICD-10-CM

## 2020-08-28 DIAGNOSIS — R00.2 PALPITATION: ICD-10-CM

## 2020-08-28 DIAGNOSIS — I48.19 PERSISTENT ATRIAL FIBRILLATION: Primary | ICD-10-CM

## 2020-08-28 DIAGNOSIS — I48.19 OTHER PERSISTENT ATRIAL FIBRILLATION: Primary | ICD-10-CM

## 2020-08-28 DIAGNOSIS — G45.9 TIA (TRANSIENT ISCHEMIC ATTACK): ICD-10-CM

## 2020-08-28 PROCEDURE — 1101F PT FALLS ASSESS-DOCD LE1/YR: CPT | Mod: CPTII,S$GLB,, | Performed by: INTERNAL MEDICINE

## 2020-08-28 PROCEDURE — 99999 PR PBB SHADOW E&M-EST. PATIENT-LVL IV: CPT | Mod: PBBFAC,,, | Performed by: INTERNAL MEDICINE

## 2020-08-28 PROCEDURE — 3078F PR MOST RECENT DIASTOLIC BLOOD PRESSURE < 80 MM HG: ICD-10-PCS | Mod: CPTII,S$GLB,, | Performed by: INTERNAL MEDICINE

## 2020-08-28 PROCEDURE — 99214 PR OFFICE/OUTPT VISIT, EST, LEVL IV, 30-39 MIN: ICD-10-PCS | Mod: S$GLB,,, | Performed by: INTERNAL MEDICINE

## 2020-08-28 PROCEDURE — 93010 EKG 12-LEAD: ICD-10-PCS | Mod: ,,, | Performed by: INTERNAL MEDICINE

## 2020-08-28 PROCEDURE — 3077F PR MOST RECENT SYSTOLIC BLOOD PRESSURE >= 140 MM HG: ICD-10-PCS | Mod: CPTII,S$GLB,, | Performed by: INTERNAL MEDICINE

## 2020-08-28 PROCEDURE — 93010 ELECTROCARDIOGRAM REPORT: CPT | Mod: ,,, | Performed by: INTERNAL MEDICINE

## 2020-08-28 PROCEDURE — 1159F MED LIST DOCD IN RCRD: CPT | Mod: S$GLB,,, | Performed by: INTERNAL MEDICINE

## 2020-08-28 PROCEDURE — 1126F AMNT PAIN NOTED NONE PRSNT: CPT | Mod: S$GLB,,, | Performed by: INTERNAL MEDICINE

## 2020-08-28 PROCEDURE — 1126F PR PAIN SEVERITY QUANTIFIED, NO PAIN PRESENT: ICD-10-PCS | Mod: S$GLB,,, | Performed by: INTERNAL MEDICINE

## 2020-08-28 PROCEDURE — 1101F PR PT FALLS ASSESS DOC 0-1 FALLS W/OUT INJ PAST YR: ICD-10-PCS | Mod: CPTII,S$GLB,, | Performed by: INTERNAL MEDICINE

## 2020-08-28 PROCEDURE — 93005 ELECTROCARDIOGRAM TRACING: CPT

## 2020-08-28 PROCEDURE — 3077F SYST BP >= 140 MM HG: CPT | Mod: CPTII,S$GLB,, | Performed by: INTERNAL MEDICINE

## 2020-08-28 PROCEDURE — 1159F PR MEDICATION LIST DOCUMENTED IN MEDICAL RECORD: ICD-10-PCS | Mod: S$GLB,,, | Performed by: INTERNAL MEDICINE

## 2020-08-28 PROCEDURE — 99214 OFFICE O/P EST MOD 30 MIN: CPT | Mod: S$GLB,,, | Performed by: INTERNAL MEDICINE

## 2020-08-28 PROCEDURE — 99999 PR PBB SHADOW E&M-EST. PATIENT-LVL IV: ICD-10-PCS | Mod: PBBFAC,,, | Performed by: INTERNAL MEDICINE

## 2020-08-28 PROCEDURE — 3078F DIAST BP <80 MM HG: CPT | Mod: CPTII,S$GLB,, | Performed by: INTERNAL MEDICINE

## 2020-08-28 RX ORDER — LOSARTAN POTASSIUM 25 MG/1
25 TABLET ORAL DAILY
Qty: 30 TABLET | Refills: 6 | Status: SHIPPED | OUTPATIENT
Start: 2020-08-28 | End: 2021-03-10

## 2020-08-28 NOTE — PROGRESS NOTES
Subjective:   Patient ID:  Lorenzo Ospina is a 80 y.o. female who presents for follow up of Atrial Fibrillation      HPI  A 81 yo female with htn afib failed cardioversion was scheduled to have BLATION HAD CANCELED DUE TO FAMILY ISSUES AND NOW COVID. SHE IS NOT COMPLAINING OF CHEST PAIN SHORTNESS OF BREATH TAKES MEDS REGULARILY HAS IMPROVED EATING HABITS AS FAR AS SALT IS CONCERNED.SHE WALKS BETWEEN 15-30 MINUTES DAILY WEATHER PERMITS BP IS IMPROVED WITHE XERCISE. HA SNO LEG SWELLING NO CHF SYMPTOMS. LIPIDS ARE ON TARGET  Past Medical History:   Diagnosis Date    A-fib 11/5/2019    Breast cancer     right, dx 2008.  Dr. Callaway follows.s/p lumpectomy and arimidex x 5y.    Hyperlipidemia     Hypertension     OP (osteoporosis)     on bisphos x 2y.    TIA (transient ischemic attack)     Toe fracture        Past Surgical History:   Procedure Laterality Date    BREAST LUMPECTOMY      right 2008    TOTAL ABDOMINAL HYSTERECTOMY      no cancer    TRANSESOPHAGEAL ECHOCARDIOGRAPHY N/A 11/5/2019    Procedure: ECHOCARDIOGRAM, TRANSESOPHAGEAL;  Surgeon: Otto Flores MD;  Location: [facility] CATH LAB;  Service: Cardiology;  Laterality: N/A;    TREATMENT OF CARDIAC ARRHYTHMIA N/A 11/5/2019    Procedure: CARDIOVERSION;  Surgeon: Otto Flores MD;  Location: [facility] CATH LAB;  Service: Cardiology;  Laterality: N/A;    TREATMENT OF CARDIAC ARRHYTHMIA N/A 11/5/2019    Procedure: CARDIOVERSION;  Surgeon: Otto Flores MD;  Location: [facility] CATH LAB;  Service: Cardiology;  Laterality: N/A;    TREATMENT OF CARDIAC ARRHYTHMIA N/A 2/4/2020    Procedure: CARDIOVERSION;  Surgeon: Otto Flores MD;  Location: [facility] CATH LAB;  Service: Cardiology;  Laterality: N/A;    TREATMENT OF CARDIAC ARRHYTHMIA N/A 3/2/2020    Procedure: CARDIOVERSION;  Surgeon: Mayi Vega MD;  Location: [facility] CATH LAB;  Service: Cardiology;  Laterality: N/A;  830 start per Dr. vega       Social History     Tobacco Use    Smoking status: Former Smoker     Quit date:  1994     Years since quittin.9    Smokeless tobacco: Never Used   Substance Use Topics    Alcohol use: Not Currently     Alcohol/week: 1.0 standard drinks     Types: 1 Glasses of wine per week     Comment: daily    Drug use: Never       History reviewed. No pertinent family history.    Current Outpatient Medications   Medication Sig    alendronate (FOSAMAX) 70 MG tablet TAKE 1 TABLET BY MOUTH ONE TIME PER WEEK    amiodarone (PACERONE) 200 MG Tab Take 1 tablet (200 mg total) by mouth once daily.    apixaban (ELIQUIS) 5 mg Tab Take 1 tablet (5 mg total) by mouth 2 (two) times daily.    atorvastatin (LIPITOR) 20 MG tablet TAKE 1 TABLET (20 MG TOTAL) BY MOUTH ONCE DAILY.    cetirizine (ZYRTEC) 10 MG tablet Take 10 mg by mouth daily as needed.     fish oil-omega-3 fatty acids 300-1,000 mg capsule Take 2 g by mouth once daily.    fluticasone propionate (FLONASE) 50 mcg/actuation nasal spray TAKE 2 SPRAYS BY EACH NARE ROUTE ONCE DAILY.    glucosam/chond/hyalu/CF borate (MOVE FREE [facility] HEALTH ORAL) Take 1 tablet by mouth once daily.    metoprolol succinate (TOPROL-XL) 50 MG 24 hr tablet Take 1 tablet (50 mg total) by mouth once daily.    MULTIVIT-IRON-MIN-FOLIC ACID 3,500-18-0.4 UNIT-MG-MG ORAL CHEW Take by mouth.    vitamin D 1000 units Tab Take 2,000 Units by mouth once daily.    clopidogrel (PLAVIX) 75 mg tablet TAKE 1 TABLET (75 MG TOTAL) BY MOUTH ONCE DAILY.    triamterene-hydrochlorothiazide 37.5-25 mg (DYAZIDE) 37.5-25 mg per capsule      No current facility-administered medications for this visit.      Current Outpatient Medications on File Prior to Visit   Medication Sig    amiodarone (PACERONE) 200 MG Tab Take 1 tablet (200 mg total) by mouth once daily.    apixaban (ELIQUIS) 5 mg Tab Take 1 tablet (5 mg total) by mouth 2 (two) times daily.    atorvastatin (LIPITOR) 20 MG tablet TAKE 1 TABLET (20 MG TOTAL) BY MOUTH ONCE DAILY.    cetirizine (ZYRTEC) 10 MG tablet Take 10 mg by mouth  daily as needed.     fish oil-omega-3 fatty acids 300-1,000 mg capsule Take 2 g by mouth once daily.    fluticasone propionate (FLONASE) 50 mcg/actuation nasal spray TAKE 2 SPRAYS BY EACH NARE ROUTE ONCE DAILY.    glucosam/chond/hyalu/CF borate (MOVE FREE [facility] HEALTH ORAL) Take 1 tablet by mouth once daily.    metoprolol succinate (TOPROL-XL) 50 MG 24 hr tablet Take 1 tablet (50 mg total) by mouth once daily.    MULTIVIT-IRON-MIN-FOLIC ACID 3,500-18-0.4 UNIT-MG-MG ORAL CHEW Take by mouth.    vitamin D 1000 units Tab Take 2,000 Units by mouth once daily.    clopidogrel (PLAVIX) 75 mg tablet TAKE 1 TABLET (75 MG TOTAL) BY MOUTH ONCE DAILY.    triamterene-hydrochlorothiazide 37.5-25 mg (DYAZIDE) 37.5-25 mg per capsule     [DISCONTINUED] alendronate (FOSAMAX) 70 MG tablet TAKE 1 TABLET (70 MG TOTAL) BY MOUTH EVERY 7 DAYS.     No current facility-administered medications on file prior to visit.        Review of Systems   Constitution: Negative for diaphoresis, malaise/fatigue and weight gain.   HENT: Negative for hoarse voice.    Eyes: Negative for double vision and visual disturbance.   Cardiovascular: Negative for chest pain, claudication, cyanosis, dyspnea on exertion, irregular heartbeat, leg swelling, near-syncope, orthopnea, palpitations, paroxysmal nocturnal dyspnea and syncope.   Respiratory: Negative for cough, hemoptysis, shortness of breath and snoring.    Hematologic/Lymphatic: Negative for bleeding problem. Does not bruise/bleed easily.   Skin: Negative for color change and poor wound healing.   Musculoskeletal: Negative for muscle cramps, muscle weakness and myalgias.   Gastrointestinal: Negative for bloating, abdominal pain, change in bowel habit, diarrhea, heartburn, hematemesis, hematochezia, melena and nausea.   Neurological: Negative for excessive daytime sleepiness, dizziness, headaches, light-headedness, loss of balance, numbness and weakness.   Psychiatric/Behavioral: Negative for memory  loss. The patient does not have insomnia.    Allergic/Immunologic: Negative for hives.       Objective:   Physical Exam   Constitutional: She is oriented to person, place, and time. She appears well-developed and well-nourished. She does not appear ill. No distress.   HENT:   Head: Normocephalic and atraumatic.   Eyes: Pupils are equal, round, and reactive to light. EOM are normal. No scleral icterus.   Neck: Normal range of motion. Neck supple. Normal carotid pulses, no hepatojugular reflux and no JVD present. Carotid bruit is not present. No tracheal deviation present. No thyromegaly present.   Cardiovascular: Normal rate, normal heart sounds, intact distal pulses and normal pulses. An irregularly irregular rhythm present. Exam reveals no gallop and no friction rub.   No murmur heard.  Pulses:       Carotid pulses are 2+ on the right side and 2+ on the left side.       Radial pulses are 2+ on the right side and 2+ on the left side.        Femoral pulses are 2+ on the right side and 2+ on the left side.       Popliteal pulses are 2+ on the right side and 2+ on the left side.        Dorsalis pedis pulses are 2+ on the right side and 2+ on the left side.        Posterior tibial pulses are 2+ on the right side and 2+ on the left side.   Pulmonary/Chest: Effort normal and breath sounds normal. No respiratory distress. She has no wheezes. She has no rhonchi. She has no rales. She exhibits no tenderness.   Abdominal: Soft. Normal appearance, normal aorta and bowel sounds are normal. She exhibits no abdominal bruit, no ascites and no pulsatile midline mass. There is no hepatomegaly. There is no abdominal tenderness.   Musculoskeletal:         General: No edema.      Right shoulder: She exhibits no deformity.   Neurological: She is alert and oriented to person, place, and time. She has normal strength. No cranial nerve deficit. Coordination normal.   Skin: Skin is warm and dry. No rash noted. She is not diaphoretic. No  "cyanosis or erythema. Nails show no clubbing.   Psychiatric: She has a normal mood and affect. Her speech is normal and behavior is normal.   Nursing note and vitals reviewed.    Vitals:    08/28/20 1620 08/28/20 1627   BP: (!) 156/78 (!) 150/78   BP Location: Left arm Right arm   Patient Position: Sitting Sitting   BP Method: Small (Manual) Small (Manual)   Pulse: 73    SpO2: 97%    Weight: 72.5 kg (159 lb 13.3 oz)    Height: 5' 2" (1.575 m)      Lab Results   Component Value Date    CHOL 126 08/21/2020    CHOL 127 10/01/2019    CHOL 147 06/20/2018     Lab Results   Component Value Date    HDL 59 08/21/2020    HDL 46 10/01/2019    HDL 56 06/20/2018     Lab Results   Component Value Date    LDLCALC 55.4 (L) 08/21/2020    LDLCALC 61.4 (L) 10/01/2019    LDLCALC 73.6 06/20/2018     Lab Results   Component Value Date    TRIG 58 08/21/2020    TRIG 98 10/01/2019    TRIG 87 06/20/2018     Lab Results   Component Value Date    CHOLHDL 46.8 08/21/2020    CHOLHDL 36.2 10/01/2019    CHOLHDL 38.1 06/20/2018       Chemistry        Component Value Date/Time     08/21/2020 0855    K 4.4 08/21/2020 0855     08/21/2020 0855    CO2 27 08/21/2020 0855    BUN 9 08/21/2020 0855    CREATININE 0.8 08/21/2020 0855     08/21/2020 0855        Component Value Date/Time    CALCIUM 10.1 08/21/2020 0855    ALKPHOS 57 08/21/2020 0855    AST 21 08/21/2020 0855    ALT 20 08/21/2020 0855    BILITOT 0.8 08/21/2020 0855    ESTGFRAFRICA >60.0 08/21/2020 0855    EGFRNONAA >60.0 08/21/2020 0855          Lab Results   Component Value Date    TSH 1.237 08/21/2020     No results found for: INR, PROTIME  Lab Results   Component Value Date    WBC 7.96 09/13/2019    HGB 14.9 09/13/2019    HCT 45.8 09/13/2019    MCV 89 09/13/2019     09/13/2019     BMP  Sodium   Date Value Ref Range Status   08/21/2020 142 136 - 145 mmol/L Final     Potassium   Date Value Ref Range Status   08/21/2020 4.4 3.5 - 5.1 mmol/L Final     Chloride   Date " Value Ref Range Status   08/21/2020 109 95 - 110 mmol/L Final     CO2   Date Value Ref Range Status   08/21/2020 27 23 - 29 mmol/L Final     BUN, Bld   Date Value Ref Range Status   08/21/2020 9 8 - 23 mg/dL Final     Creatinine   Date Value Ref Range Status   08/21/2020 0.8 0.5 - 1.4 mg/dL Final     Calcium   Date Value Ref Range Status   08/21/2020 10.1 8.7 - 10.5 mg/dL Final     Anion Gap   Date Value Ref Range Status   08/21/2020 6 (L) 8 - 16 mmol/L Final     eGFR if    Date Value Ref Range Status   08/21/2020 >60.0 >60 mL/min/1.73 m^2 Final     eGFR if non    Date Value Ref Range Status   08/21/2020 >60.0 >60 mL/min/1.73 m^2 Final     Comment:     Calculation used to obtain the estimated glomerular filtration  rate (eGFR) is the CKD-EPI equation.        CrCl cannot be calculated (Patient's most recent lab result is older than the maximum 7 days allowed.).    Assessment:     1. Persistent atrial fibrillation    2. TIA (transient ischemic attack)    3. Other persistent atrial fibrillation    4. Other hyperlipidemia    5. Palpitation    6. Hypercalcemia    7. Vitamin D deficiency      NEEDS BETETR BP CONTROL. WILL ADD LOSARTAN ENCOURAGED CONTINUED EXERCISE COMPLIANCE WITH SALT.  AFIB DOING WELL CLINICALLY ON NOAC AND RATE CONTROLLED. WILL DISCUSS OPTIONS WITH DR GAMING. SHE DOES NOT WANT TO GO TO [place] FOR ABLATION.  Plan:   LOSARTAN 25 MG PO DAILY   Continue current therapy  Cardiac low salt diet.  Risk factor modification and excercise program.  F/u in 1  months

## 2020-08-29 ENCOUNTER — PATIENT OUTREACH (OUTPATIENT)
Dept: ADMINISTRATIVE | Facility: OTHER | Age: 80
End: 2020-08-29

## 2020-09-02 ENCOUNTER — OFFICE VISIT (OUTPATIENT)
Dept: FAMILY MEDICINE | Facility: CLINIC | Age: 80
End: 2020-09-02
Payer: MEDICARE

## 2020-09-02 VITALS
BODY MASS INDEX: 29.49 KG/M2 | OXYGEN SATURATION: 97 % | HEIGHT: 62 IN | SYSTOLIC BLOOD PRESSURE: 152 MMHG | TEMPERATURE: 98 F | WEIGHT: 160.25 LBS | HEART RATE: 78 BPM | DIASTOLIC BLOOD PRESSURE: 56 MMHG

## 2020-09-02 DIAGNOSIS — E55.9 VITAMIN D DEFICIENCY: ICD-10-CM

## 2020-09-02 DIAGNOSIS — I48.20 CHRONIC ATRIAL FIBRILLATION: ICD-10-CM

## 2020-09-02 DIAGNOSIS — I10 ESSENTIAL HYPERTENSION: Primary | ICD-10-CM

## 2020-09-02 DIAGNOSIS — Z00.00 ENCOUNTER FOR PREVENTIVE HEALTH EXAMINATION: ICD-10-CM

## 2020-09-02 DIAGNOSIS — E21.3 HYPERPARATHYROIDISM: ICD-10-CM

## 2020-09-02 DIAGNOSIS — M81.0 AGE-RELATED OSTEOPOROSIS WITHOUT CURRENT PATHOLOGICAL FRACTURE: ICD-10-CM

## 2020-09-02 DIAGNOSIS — M79.602 LEFT ARM PAIN: ICD-10-CM

## 2020-09-02 DIAGNOSIS — E78.49 OTHER HYPERLIPIDEMIA: ICD-10-CM

## 2020-09-02 DIAGNOSIS — E78.5 HYPERLIPIDEMIA: ICD-10-CM

## 2020-09-02 PROCEDURE — 1101F PR PT FALLS ASSESS DOC 0-1 FALLS W/OUT INJ PAST YR: ICD-10-PCS | Mod: CPTII,S$GLB,, | Performed by: INTERNAL MEDICINE

## 2020-09-02 PROCEDURE — 99999 PR PBB SHADOW E&M-EST. PATIENT-LVL IV: CPT | Mod: PBBFAC,,, | Performed by: INTERNAL MEDICINE

## 2020-09-02 PROCEDURE — 3078F PR MOST RECENT DIASTOLIC BLOOD PRESSURE < 80 MM HG: ICD-10-PCS | Mod: CPTII,S$GLB,, | Performed by: INTERNAL MEDICINE

## 2020-09-02 PROCEDURE — 1126F AMNT PAIN NOTED NONE PRSNT: CPT | Mod: S$GLB,,, | Performed by: INTERNAL MEDICINE

## 2020-09-02 PROCEDURE — 99214 OFFICE O/P EST MOD 30 MIN: CPT | Mod: S$GLB,,, | Performed by: INTERNAL MEDICINE

## 2020-09-02 PROCEDURE — 99499 RISK ADDL DX/OHS AUDIT: ICD-10-PCS | Mod: S$GLB,,, | Performed by: INTERNAL MEDICINE

## 2020-09-02 PROCEDURE — 3077F PR MOST RECENT SYSTOLIC BLOOD PRESSURE >= 140 MM HG: ICD-10-PCS | Mod: CPTII,S$GLB,, | Performed by: INTERNAL MEDICINE

## 2020-09-02 PROCEDURE — 1101F PT FALLS ASSESS-DOCD LE1/YR: CPT | Mod: CPTII,S$GLB,, | Performed by: INTERNAL MEDICINE

## 2020-09-02 PROCEDURE — 3077F SYST BP >= 140 MM HG: CPT | Mod: CPTII,S$GLB,, | Performed by: INTERNAL MEDICINE

## 2020-09-02 PROCEDURE — 99999 PR PBB SHADOW E&M-EST. PATIENT-LVL IV: ICD-10-PCS | Mod: PBBFAC,,, | Performed by: INTERNAL MEDICINE

## 2020-09-02 PROCEDURE — 1126F PR PAIN SEVERITY QUANTIFIED, NO PAIN PRESENT: ICD-10-PCS | Mod: S$GLB,,, | Performed by: INTERNAL MEDICINE

## 2020-09-02 PROCEDURE — 99214 PR OFFICE/OUTPT VISIT, EST, LEVL IV, 30-39 MIN: ICD-10-PCS | Mod: S$GLB,,, | Performed by: INTERNAL MEDICINE

## 2020-09-02 PROCEDURE — 1159F PR MEDICATION LIST DOCUMENTED IN MEDICAL RECORD: ICD-10-PCS | Mod: S$GLB,,, | Performed by: INTERNAL MEDICINE

## 2020-09-02 PROCEDURE — 1159F MED LIST DOCD IN RCRD: CPT | Mod: S$GLB,,, | Performed by: INTERNAL MEDICINE

## 2020-09-02 PROCEDURE — 99499 UNLISTED E&M SERVICE: CPT | Mod: S$GLB,,, | Performed by: INTERNAL MEDICINE

## 2020-09-02 PROCEDURE — 3078F DIAST BP <80 MM HG: CPT | Mod: CPTII,S$GLB,, | Performed by: INTERNAL MEDICINE

## 2020-09-02 RX ORDER — GABAPENTIN 100 MG/1
100 CAPSULE ORAL 3 TIMES DAILY
Qty: 90 CAPSULE | Refills: 11 | Status: SHIPPED | OUTPATIENT
Start: 2020-09-02 | End: 2021-03-31

## 2020-09-02 RX ORDER — ATORVASTATIN CALCIUM 20 MG/1
20 TABLET, FILM COATED ORAL DAILY
Qty: 90 TABLET | Refills: 3 | Status: SHIPPED | OUTPATIENT
Start: 2020-09-02 | End: 2021-09-16 | Stop reason: SDUPTHER

## 2020-09-02 NOTE — PROGRESS NOTES
"Subjective:      Patient ID: Lorenzo Ospina is a 80 y.o. female.    Chief Complaint: Follow-up      HPI  Here for f/u medical problems and preventive exam.  Dr. Vega started low dose losartan, BPs at home 125-152/50s.  Walking for exercise 15-30min daily.   Energy good.  No f/c/sw/cough.  No cp/sob/palp.  BMs normal.  Urine normal.  Taking vit D.  4 days on and off left arm pain/tingling.    at home 35d ago.  Notices a heliotrope rash left eye after crying in past month.    HM: 10/15 fluvax ref more,  [id number], refuses [id number] unknown reason even after discussion, ref tetanus,  MMG/ Vasirreddy follows,  BMD rep 2y,   Cscope saw Gastro in  and says due in .     Review of Systems   Constitutional: Negative for appetite change, chills, diaphoresis and fever.   HENT: Negative for congestion, ear pain, rhinorrhea, sinus pressure and sore throat.    Respiratory: Negative for cough, chest tightness and shortness of breath.    Cardiovascular: Negative for chest pain and palpitations.   Gastrointestinal: Negative for blood in stool, constipation, diarrhea, nausea and vomiting.   Genitourinary: Negative for dysuria, frequency, hematuria, menstrual problem, urgency and vaginal discharge.   Musculoskeletal: Negative for arthralgias.   Skin: Negative for rash.   Neurological: Negative for dizziness and headaches.   Psychiatric/Behavioral: Negative for sleep disturbance. The patient is not nervous/anxious.          Objective:   BP (!) 152/56   Pulse 78   Temp 97.7 °F (36.5 °C)   Ht 5' 2" (1.575 m)   Wt 72.7 kg (160 lb 4.4 oz)   SpO2 97%   BMI 29.31 kg/m²     Physical Exam  Constitutional:       Appearance: She is well-developed.   HENT:      Right Ear: External ear normal. Tympanic membrane is not injected.      Left Ear: External ear normal. Tympanic membrane is not injected.   Eyes:      Conjunctiva/sclera: Conjunctivae normal.   Neck:      Musculoskeletal: Normal range of motion " and neck supple.      Thyroid: No thyromegaly.   Cardiovascular:      Rate and Rhythm: Normal rate and regular rhythm.      Heart sounds: No murmur. No friction rub. No gallop.    Pulmonary:      Effort: Pulmonary effort is normal.      Breath sounds: Normal breath sounds. No wheezing or rales.   Abdominal:      General: Bowel sounds are normal.      Palpations: Abdomen is soft. There is no mass.      Tenderness: There is no abdominal tenderness.   Lymphadenopathy:      Cervical: No cervical adenopathy.   Skin:     General: Skin is warm.      Findings: No rash.   Neurological:      Mental Status: She is alert and oriented to person, place, and time.      Sensory: Sensation is intact.      Motor: Motor function is intact.      Deep Tendon Reflexes:      Reflex Scores:       Bicep reflexes are 2+ on the right side and 2+ on the left side.       Brachioradialis reflexes are 2+ on the right side and 2+ on the left side.  Psychiatric:         Mood and Affect: Mood is depressed.           Results for orders placed or performed in visit on 08/21/20   Comprehensive metabolic panel   Result Value Ref Range    Sodium 142 136 - 145 mmol/L    Potassium 4.4 3.5 - 5.1 mmol/L    Chloride 109 95 - 110 mmol/L    CO2 27 23 - 29 mmol/L    Glucose 105 70 - 110 mg/dL    BUN, Bld 9 8 - 23 mg/dL    Creatinine 0.8 0.5 - 1.4 mg/dL    Calcium 10.1 8.7 - 10.5 mg/dL    Total Protein 7.0 6.0 - 8.4 g/dL    Albumin 3.8 3.5 - 5.2 g/dL    Total Bilirubin 0.8 0.1 - 1.0 mg/dL    Alkaline Phosphatase 57 55 - 135 U/L    AST 21 10 - 40 U/L    ALT 20 10 - 44 U/L    Anion Gap 6 (L) 8 - 16 mmol/L    eGFR if African American >60.0 >60 mL/min/1.73 m^2    eGFR if non African American >60.0 >60 mL/min/1.73 m^2   Lipid panel   Result Value Ref Range    Cholesterol 126 120 - 199 mg/dL    Triglycerides 58 30 - 150 mg/dL    HDL 59 40 - 75 mg/dL    LDL Cholesterol 55.4 (L) 63.0 - 159.0 mg/dL    Hdl/Cholesterol Ratio 46.8 20.0 - 50.0 %    Total Cholesterol/HDL Ratio  2.1 2.0 - 5.0    Non-HDL Cholesterol 67 mg/dL   TSH   Result Value Ref Range    TSH 1.237 0.400 - 4.000 uIU/mL       Assessment:       1. Essential hypertension    2. Chronic atrial fibrillation    3. Age-related osteoporosis without current pathological fracture    4. Other hyperlipidemia    5. Vitamin D deficiency    6. Hyperparathyroidism    7. Encounter for preventive health examination    8. Left arm pain          Plan:     Essential hypertension- cont to monitor.  RTC 1mo, f/u and follow depression sx.    Chronic atrial fibrillation- controlled rate, and anticoag.    Age-related osteoporosis without current pathological fracture- on fosamax.    Other hyperlipidemia- good control.    Vitamin D deficiency    Hyperparathyroidism- calcium normal. Now.    Encounter for preventive health examination    Left arm pain, doesn't want steroid pack-  -     gabapentin (NEURONTIN) 100 MG capsule; Take 1 capsule (100 mg total) by mouth 3 (three) times daily.  Dispense: 90 capsule; Refill: 11

## 2020-09-08 ENCOUNTER — TELEPHONE (OUTPATIENT)
Dept: CARDIOLOGY | Facility: CLINIC | Age: 80
End: 2020-09-08

## 2020-09-08 NOTE — TELEPHONE ENCOUNTER
Placed call to patients daughter  In law Amy and gave her the name for Dr. Clark at [facility]. She will call patients insurance to make the visit will be covered and then call Dr. Clark's office to make an appointment.    Mayi Vega MD  You 1 minute ago (4:15 PM)     She cna see DR Clark at the [facility].     Message text       You  Mayi Vega MD 37 minutes ago (3:39 PM)     Please advise    Routing comment       You  Lorenzo Ospina 41 minutes ago (3:34 PM)      You 41 minutes ago (3:34 PM)        Patients daughter in law wants to know if you can refer to another Physcian even if it's outside of [facility] that can do the procedure that was discussed because he mother in law is still having arm pain and the medication did not work. They do not want to go to [place] for Dr. Nathan to do the procedure if they don't have too.        ----- Message from Megan Shetty sent at 9/8/2020  1:39 PM CDT -----  Regarding: request call back  .Type:  Needs Medical Advice     Who Called: pts daughter in law / Amy   Symptoms (please be specific): arm pain from neck to fingers and b/p still up medication did not help   How long has patient had these symptoms:  n/a  Pharmacy name and phone #:  n/a  Would the patient rather a call back or a response via [facility]? Call back   Best Call Back Number:  [phone]   Additional Information:  request call back regarding pt wanting heart procedure done, wants a doctor in [place] instead of going to [place]                   Documentation      to Amy daughter in law

## 2020-09-08 NOTE — TELEPHONE ENCOUNTER
Patients daughter in law wants to know if you can refer to another Physcian even if it's outside of [facility] that can do the procedure that was discussed because he mother in law is still having arm pain and the medication did not work. They do not want to go to [place] for Dr. Nathan to do the procedure if they don't have too.      ----- Message from Megan Shetty sent at 9/8/2020  1:39 PM CDT -----  Regarding: request call back  .Type:  Needs Medical Advice    Who Called: pts daughter in law / Amy   Symptoms (please be specific): arm pain from neck to fingers and b/p still up medication did not help   How long has patient had these symptoms:  n/a  Pharmacy name and phone #:  n/a  Would the patient rather a call back or a response via [facility]? Call back   Best Call Back Number:  [phone]   Additional Information:  request call back regarding pt wanting heart procedure done, wants a doctor in [place] instead of going to [place]

## 2020-09-21 ENCOUNTER — OFFICE VISIT (OUTPATIENT)
Dept: HEMATOLOGY/ONCOLOGY | Facility: CLINIC | Age: 80
End: 2020-09-21
Payer: MEDICARE

## 2020-09-21 ENCOUNTER — HOSPITAL ENCOUNTER (OUTPATIENT)
Dept: RADIOLOGY | Facility: HOSPITAL | Age: 80
Discharge: HOME | End: 2020-09-21
Attending: INTERNAL MEDICINE
Payer: MEDICARE

## 2020-09-21 VITALS
SYSTOLIC BLOOD PRESSURE: 142 MMHG | BODY MASS INDEX: 31.73 KG/M2 | WEIGHT: 161.63 LBS | DIASTOLIC BLOOD PRESSURE: 73 MMHG | HEIGHT: 60 IN | HEART RATE: 74 BPM | TEMPERATURE: 97 F | OXYGEN SATURATION: 99 %

## 2020-09-21 DIAGNOSIS — Z85.3 HISTORY OF RIGHT BREAST CANCER: ICD-10-CM

## 2020-09-21 DIAGNOSIS — Z12.31 ENCOUNTER FOR SCREENING MAMMOGRAM FOR MALIGNANT NEOPLASM OF BREAST: ICD-10-CM

## 2020-09-21 DIAGNOSIS — M85.80 OSTEOPENIA, UNSPECIFIED LOCATION: Primary | ICD-10-CM

## 2020-09-21 PROCEDURE — 3078F DIAST BP <80 MM HG: CPT | Mod: CPTII,S$GLB,, | Performed by: INTERNAL MEDICINE

## 2020-09-21 PROCEDURE — 99214 OFFICE O/P EST MOD 30 MIN: CPT | Mod: S$GLB,,, | Performed by: INTERNAL MEDICINE

## 2020-09-21 PROCEDURE — 77063 BREAST TOMOSYNTHESIS BI: CPT | Mod: 26,,, | Performed by: RADIOLOGY

## 2020-09-21 PROCEDURE — 1159F PR MEDICATION LIST DOCUMENTED IN MEDICAL RECORD: ICD-10-PCS | Mod: S$GLB,,, | Performed by: INTERNAL MEDICINE

## 2020-09-21 PROCEDURE — 1101F PT FALLS ASSESS-DOCD LE1/YR: CPT | Mod: CPTII,S$GLB,, | Performed by: INTERNAL MEDICINE

## 2020-09-21 PROCEDURE — 1101F PR PT FALLS ASSESS DOC 0-1 FALLS W/OUT INJ PAST YR: ICD-10-PCS | Mod: CPTII,S$GLB,, | Performed by: INTERNAL MEDICINE

## 2020-09-21 PROCEDURE — 77067 MAMMO DIGITAL SCREENING BILAT WITH TOMO: ICD-10-PCS | Mod: 26,,, | Performed by: RADIOLOGY

## 2020-09-21 PROCEDURE — 3077F SYST BP >= 140 MM HG: CPT | Mod: CPTII,S$GLB,, | Performed by: INTERNAL MEDICINE

## 2020-09-21 PROCEDURE — 99214 PR OFFICE/OUTPT VISIT, EST, LEVL IV, 30-39 MIN: ICD-10-PCS | Mod: S$GLB,,, | Performed by: INTERNAL MEDICINE

## 2020-09-21 PROCEDURE — 1159F MED LIST DOCD IN RCRD: CPT | Mod: S$GLB,,, | Performed by: INTERNAL MEDICINE

## 2020-09-21 PROCEDURE — 77063 MAMMO DIGITAL SCREENING BILAT WITH TOMO: ICD-10-PCS | Mod: 26,,, | Performed by: RADIOLOGY

## 2020-09-21 PROCEDURE — 3078F PR MOST RECENT DIASTOLIC BLOOD PRESSURE < 80 MM HG: ICD-10-PCS | Mod: CPTII,S$GLB,, | Performed by: INTERNAL MEDICINE

## 2020-09-21 PROCEDURE — 99499 UNLISTED E&M SERVICE: CPT | Mod: S$GLB,,, | Performed by: INTERNAL MEDICINE

## 2020-09-21 PROCEDURE — 77067 SCR MAMMO BI INCL CAD: CPT | Mod: TC

## 2020-09-21 PROCEDURE — 77067 SCR MAMMO BI INCL CAD: CPT | Mod: 26,,, | Performed by: RADIOLOGY

## 2020-09-21 PROCEDURE — 99999 PR PBB SHADOW E&M-EST. PATIENT-LVL V: ICD-10-PCS | Mod: PBBFAC,,, | Performed by: INTERNAL MEDICINE

## 2020-09-21 PROCEDURE — 1126F AMNT PAIN NOTED NONE PRSNT: CPT | Mod: S$GLB,,, | Performed by: INTERNAL MEDICINE

## 2020-09-21 PROCEDURE — 99999 PR PBB SHADOW E&M-EST. PATIENT-LVL V: CPT | Mod: PBBFAC,,, | Performed by: INTERNAL MEDICINE

## 2020-09-21 PROCEDURE — 3077F PR MOST RECENT SYSTOLIC BLOOD PRESSURE >= 140 MM HG: ICD-10-PCS | Mod: CPTII,S$GLB,, | Performed by: INTERNAL MEDICINE

## 2020-09-21 PROCEDURE — 1126F PR PAIN SEVERITY QUANTIFIED, NO PAIN PRESENT: ICD-10-PCS | Mod: S$GLB,,, | Performed by: INTERNAL MEDICINE

## 2020-09-21 PROCEDURE — 99499 RISK ADDL DX/OHS AUDIT: ICD-10-PCS | Mod: S$GLB,,, | Performed by: INTERNAL MEDICINE

## 2020-09-21 NOTE — PROGRESS NOTES
"  Subjective:      DATE OF VISIT: 9/21/20     ?  Patient ID:?Lorenzo Ospina is a 80 y.o. female.?? MR#: [id number]   ?   REFERRING PROVIDER: No referring provider defined for this encounter.     ? Primary Care Providers:  Lorena Fiore MD, MD (General)     CHIEF COMPLAINT:  Follow-up for history of breast cancer????   ?   ONCOLOGIC DIAGNOSIS:  Stage I right breast invasive carcinoma, ERPR positive HER2 negative, low Oncotype  ?   CURRENT TREATMENT:  Surveillance    PAST TREATMENT:  Lumpectomy, adjuvant radiation, 5 years aromatase inhibitor  ?   ONCOLOGIC HISTORY:   ?     I had the pleasure meeting for the 1st time Ms. Ospina, who follows for surveillance in history of early right breast cancer.  She had been seen at our [facility] by Dr. Manning, per her clinic notes:    "July 2008: Breast cancer with lumpectomy (Dr. Kaufman), radiation (Dr. Brown), Aromasin (Dr. Whitaker); less than 2 cm, ER WA positive, HER-2 negative, low Oncotype, node negative "    Do not have original pathology reports available to me.    She notes receiving adjuvant radiation and aromatase inhibitor completing 5 years of therapy tolerating well. She continues on yearly mammogram surveillance without abnormal findings.      She presents today with her daughter denies any concerns.  She does take alendronate for history of osteopenia along with calcium and vitamin-D.  She does take apixaban for atrial fibrillation and denies any bleeding or bruising.  She denies fracture history or bony or other pain. No fevers, chills, night sweats or unintentional weight loss.    Review of Systems    ?   A comprehensive 14-point review of systems was reviewed with patient and was negative other than as specified above.   ?   PAST MEDICAL HISTORY:   Past Medical History:   Diagnosis Date    A-fib 11/5/2019    Breast cancer     right, dx 2008.  Dr. Callaway follows.s/p lumpectomy and arimidex x 5y.    Hyperlipidemia     Hypertension     OP " (osteoporosis)     on bisphos x 2y.    TIA (transient ischemic attack)     Toe fracture     ?     PAST SURGICAL HISTORY:   Past Surgical History:   Procedure Laterality Date    BREAST LUMPECTOMY      right 2008    TOTAL ABDOMINAL HYSTERECTOMY      no cancer    TRANSESOPHAGEAL ECHOCARDIOGRAPHY N/A 11/5/2019    Procedure: ECHOCARDIOGRAM, TRANSESOPHAGEAL;  Surgeon: Otto Flores MD;  Location: [facility] CATH LAB;  Service: Cardiology;  Laterality: N/A;    TREATMENT OF CARDIAC ARRHYTHMIA N/A 11/5/2019    Procedure: CARDIOVERSION;  Surgeon: Otto Flores MD;  Location: [facility] CATH LAB;  Service: Cardiology;  Laterality: N/A;    TREATMENT OF CARDIAC ARRHYTHMIA N/A 11/5/2019    Procedure: CARDIOVERSION;  Surgeon: Otto Flores MD;  Location: [facility] CATH LAB;  Service: Cardiology;  Laterality: N/A;    TREATMENT OF CARDIAC ARRHYTHMIA N/A 2/4/2020    Procedure: CARDIOVERSION;  Surgeon: Otto Flores MD;  Location: [facility] CATH LAB;  Service: Cardiology;  Laterality: N/A;    TREATMENT OF CARDIAC ARRHYTHMIA N/A 3/2/2020    Procedure: CARDIOVERSION;  Surgeon: Mayi Beltran MD;  Location: [facility] CATH LAB;  Service: Cardiology;  Laterality: N/A;  830 start per Dr. beltran      ?   ALLERGIES:   Allergies as of 09/21/2020 - Reviewed 09/21/2020   Allergen Reaction Noted    Medrol [methylprednisolone]  03/02/2017      ?   MEDICATIONS:?   Outpatient Medications Marked as Taking for the 9/21/20 encounter (Office Visit) with Leeanna Addison MD   Medication Sig Dispense Refill    alendronate (FOSAMAX) 70 MG tablet TAKE 1 TABLET BY MOUTH ONE TIME PER WEEK 12 tablet 3    amiodarone (PACERONE) 200 MG Tab Take 1 tablet (200 mg total) by mouth once daily. 30 tablet 11    apixaban (ELIQUIS) 5 mg Tab Take 1 tablet (5 mg total) by mouth 2 (two) times daily. 60 tablet 11    atorvastatin (LIPITOR) 20 MG tablet Take 1 tablet (20 mg total) by mouth once daily. 90 tablet 3    cetirizine (ZYRTEC) 10 MG tablet Take 10 mg by mouth daily as needed.        clopidogrel (PLAVIX) 75 mg tablet TAKE 1 TABLET (75 MG TOTAL) BY MOUTH ONCE DAILY.      fish oil-omega-3 fatty acids 300-1,000 mg capsule Take 2 g by mouth once daily.      fluticasone propionate (FLONASE) 50 mcg/actuation nasal spray TAKE 2 SPRAYS BY EACH NARE ROUTE ONCE DAILY. 16 mL 6    gabapentin (NEURONTIN) 100 MG capsule Take 1 capsule (100 mg total) by mouth 3 (three) times daily. 90 capsule 11    glucosam/chond/hyalu/CF borate (MOVE FREE JOINT HEALTH ORAL) Take 1 tablet by mouth once daily.      losartan (COZAAR) 25 MG tablet Take 1 tablet (25 mg total) by mouth once daily. 30 tablet 6    metoprolol succinate (TOPROL-XL) 50 MG 24 hr tablet Take 1 tablet (50 mg total) by mouth once daily. 60 tablet 11    MULTIVIT-IRON-MIN-FOLIC ACID 3,500-18-0.4 UNIT-MG-MG ORAL CHEW Take by mouth.      triamterene-hydrochlorothiazide 37.5-25 mg (DYAZIDE) 37.5-25 mg per capsule       vitamin D 1000 units Tab Take 2,000 Units by mouth once daily.        ?   SOCIAL HISTORY:?   Social History     Tobacco Use    Smoking status: Former Smoker     Quit date: 1994     Years since quittin.0    Smokeless tobacco: Never Used   Substance Use Topics    Alcohol use: Not Currently     Alcohol/week: 1.0 standard drinks     Types: 1 Glasses of wine per week     Comment: daily      ?      ?   FAMILY HISTORY:   family history is not on file.   ?        Objective:      Physical Exam      ?   Vitals:    20 1013   BP: (!) 142/73   Pulse: 74   Temp: 97.2 °F (36.2 °C)      ?   ECOG:?0   General appearance: Generally well appearing, in no acute distress.   Head, eyes, ears, nose, and throat: Pupils round, moist mucous membranes.   Breast:  Well-healed surgical scar, no palpable nodule/mass, skin change, nipple abnormality or discharge.  Lymph: No palpable cervical, supraclavicular or axillary lymphadenopathy.   Cardiovascular: Regular rate and rhythm, S1, S2, no audible murmurs.   Respiratory: Lungs clear to auscultation  bilaterally.   Abdomen:  nontender, nondistended  Extremities: Warm, without edema.   Neurologic: Alert and oriented. Grossly normal strength, coordination, and gait.   Skin: No rashes, ecchymoses or petechial lesion.   Psychiatric:  Normal mood and affect  ?   Laboratory:  ?   No visits with results within 1 Day(s) from this visit.   Latest known visit with results is:   Lab Visit on 08/21/2020   Component Date Value Ref Range Status    Sodium 08/21/2020 142  136 - 145 mmol/L Final    Potassium 08/21/2020 4.4  3.5 - 5.1 mmol/L Final    Chloride 08/21/2020 109  95 - 110 mmol/L Final    CO2 08/21/2020 27  23 - 29 mmol/L Final    Glucose 08/21/2020 105  70 - 110 mg/dL Final    BUN, Bld 08/21/2020 9  8 - 23 mg/dL Final    Creatinine 08/21/2020 0.8  0.5 - 1.4 mg/dL Final    Calcium 08/21/2020 10.1  8.7 - 10.5 mg/dL Final    Total Protein 08/21/2020 7.0  6.0 - 8.4 g/dL Final    Albumin 08/21/2020 3.8  3.5 - 5.2 g/dL Final    Total Bilirubin 08/21/2020 0.8  0.1 - 1.0 mg/dL Final    Alkaline Phosphatase 08/21/2020 57  55 - 135 U/L Final    AST 08/21/2020 21  10 - 40 U/L Final    ALT 08/21/2020 20  10 - 44 U/L Final    Anion Gap 08/21/2020 6* 8 - 16 mmol/L Final    eGFR if African American 08/21/2020 >60.0  >60 mL/min/1.73 m^2 Final    eGFR if non African American 08/21/2020 >60.0  >60 mL/min/1.73 m^2 Final    Cholesterol 08/21/2020 126  120 - 199 mg/dL Final    Triglycerides 08/21/2020 58  30 - 150 mg/dL Final    HDL 08/21/2020 59  40 - 75 mg/dL Final    LDL Cholesterol 08/21/2020 55.4* 63.0 - 159.0 mg/dL Final    Hdl/Cholesterol Ratio 08/21/2020 46.8  20.0 - 50.0 % Final    Total Cholesterol/HDL Ratio 08/21/2020 2.1  2.0 - 5.0 Final    Non-HDL Cholesterol 08/21/2020 67  mg/dL Final    TSH 08/21/2020 1.237  0.400 - 4.000 uIU/mL Final      ?     ?   ?       ?   Assessment/Plan:   Osteopenia, unspecified location    History of right breast cancer  -     Mammo Digital Screening Bilat w/ Manny; Future;  Expected date: 09/21/2020  -     Mammo Digital Screening Bilat w/ Manny; Future; Expected date: 09/21/2021    Encounter for screening mammogram for malignant neoplasm of breast   -     Mammo Digital Screening Bilat w/ Manny; Future; Expected date: 09/21/2020  -     Mammo Digital Screening Bilat w/ Manny; Future; Expected date: 09/21/2021       1. Osteopenia, unspecified location    2. History of right breast cancer    3. Encounter for screening mammogram for malignant neoplasm of breast           Plan:     # history of right breast cancer:  I do not have available original pathology/surgical report however per prior notes inpatient history stage I ERPR positive HER2 negative invasive right breast carcinoma status post lumpectomy adjuvant radiation and aromatase inhibitor for 5 years.  Low Oncotype in no adjuvant chemotherapy recommended.  She follows in surveillance in reinforce importance of yearly mammogram, last 2019 and ordered repeat today.  Discussed importance of maintaining good control of chronic medical conditions, routine PCP follow-up, healthy balanced diet and follow-up for osteopenia particularly with history of aromatase inhibitor, she does note DEXA scan repeat pending.        Follow-Up:   Patient Instructions   Mammogram needed  RV in 1 year with mammogram prior

## 2020-09-29 ENCOUNTER — APPOINTMENT (OUTPATIENT)
Dept: RADIOLOGY | Facility: HOSPITAL | Age: 80
End: 2020-09-29
Attending: INTERNAL MEDICINE
Payer: MEDICARE

## 2020-09-29 DIAGNOSIS — M81.0 AGE-RELATED OSTEOPOROSIS WITHOUT CURRENT PATHOLOGICAL FRACTURE: ICD-10-CM

## 2020-09-29 PROCEDURE — 77080 DXA BONE DENSITY AXIAL: CPT | Mod: TC

## 2020-09-29 PROCEDURE — 77080 DXA BONE DENSITY AXIAL: CPT | Mod: 26,,, | Performed by: RADIOLOGY

## 2020-09-29 PROCEDURE — 77080 DEXA BONE DENSITY SPINE HIP: ICD-10-PCS | Mod: 26,,, | Performed by: RADIOLOGY

## 2020-10-06 ENCOUNTER — PATIENT OUTREACH (OUTPATIENT)
Dept: ADMINISTRATIVE | Facility: OTHER | Age: 80
End: 2020-10-06

## 2020-10-07 ENCOUNTER — OFFICE VISIT (OUTPATIENT)
Dept: CARDIOLOGY | Facility: CLINIC | Age: 80
End: 2020-10-07
Payer: MEDICARE

## 2020-10-07 VITALS
SYSTOLIC BLOOD PRESSURE: 134 MMHG | OXYGEN SATURATION: 98 % | HEIGHT: 60 IN | BODY MASS INDEX: 31.77 KG/M2 | HEART RATE: 88 BPM | WEIGHT: 161.81 LBS | DIASTOLIC BLOOD PRESSURE: 78 MMHG

## 2020-10-07 DIAGNOSIS — I10 ESSENTIAL HYPERTENSION: ICD-10-CM

## 2020-10-07 DIAGNOSIS — G45.9 TIA (TRANSIENT ISCHEMIC ATTACK): ICD-10-CM

## 2020-10-07 DIAGNOSIS — R00.2 PALPITATION: ICD-10-CM

## 2020-10-07 DIAGNOSIS — E78.49 OTHER HYPERLIPIDEMIA: ICD-10-CM

## 2020-10-07 DIAGNOSIS — E83.52 HYPERCALCEMIA: ICD-10-CM

## 2020-10-07 DIAGNOSIS — E21.3 HYPERPARATHYROIDISM: ICD-10-CM

## 2020-10-07 DIAGNOSIS — E55.9 VITAMIN D DEFICIENCY: ICD-10-CM

## 2020-10-07 DIAGNOSIS — I48.20 CHRONIC ATRIAL FIBRILLATION: Primary | ICD-10-CM

## 2020-10-07 PROCEDURE — 99214 PR OFFICE/OUTPT VISIT, EST, LEVL IV, 30-39 MIN: ICD-10-PCS | Mod: S$GLB,,, | Performed by: INTERNAL MEDICINE

## 2020-10-07 PROCEDURE — 1126F AMNT PAIN NOTED NONE PRSNT: CPT | Mod: S$GLB,,, | Performed by: INTERNAL MEDICINE

## 2020-10-07 PROCEDURE — 99999 PR PBB SHADOW E&M-EST. PATIENT-LVL IV: ICD-10-PCS | Mod: PBBFAC,,, | Performed by: INTERNAL MEDICINE

## 2020-10-07 PROCEDURE — 99999 PR PBB SHADOW E&M-EST. PATIENT-LVL IV: CPT | Mod: PBBFAC,,, | Performed by: INTERNAL MEDICINE

## 2020-10-07 PROCEDURE — 1101F PR PT FALLS ASSESS DOC 0-1 FALLS W/OUT INJ PAST YR: ICD-10-PCS | Mod: CPTII,S$GLB,, | Performed by: INTERNAL MEDICINE

## 2020-10-07 PROCEDURE — 3078F PR MOST RECENT DIASTOLIC BLOOD PRESSURE < 80 MM HG: ICD-10-PCS | Mod: CPTII,S$GLB,, | Performed by: INTERNAL MEDICINE

## 2020-10-07 PROCEDURE — 1159F PR MEDICATION LIST DOCUMENTED IN MEDICAL RECORD: ICD-10-PCS | Mod: S$GLB,,, | Performed by: INTERNAL MEDICINE

## 2020-10-07 PROCEDURE — 1159F MED LIST DOCD IN RCRD: CPT | Mod: S$GLB,,, | Performed by: INTERNAL MEDICINE

## 2020-10-07 PROCEDURE — 99214 OFFICE O/P EST MOD 30 MIN: CPT | Mod: S$GLB,,, | Performed by: INTERNAL MEDICINE

## 2020-10-07 PROCEDURE — 3078F DIAST BP <80 MM HG: CPT | Mod: CPTII,S$GLB,, | Performed by: INTERNAL MEDICINE

## 2020-10-07 PROCEDURE — 1126F PR PAIN SEVERITY QUANTIFIED, NO PAIN PRESENT: ICD-10-PCS | Mod: S$GLB,,, | Performed by: INTERNAL MEDICINE

## 2020-10-07 PROCEDURE — 3075F PR MOST RECENT SYSTOLIC BLOOD PRESS GE 130-139MM HG: ICD-10-PCS | Mod: CPTII,S$GLB,, | Performed by: INTERNAL MEDICINE

## 2020-10-07 PROCEDURE — 1101F PT FALLS ASSESS-DOCD LE1/YR: CPT | Mod: CPTII,S$GLB,, | Performed by: INTERNAL MEDICINE

## 2020-10-07 PROCEDURE — 3075F SYST BP GE 130 - 139MM HG: CPT | Mod: CPTII,S$GLB,, | Performed by: INTERNAL MEDICINE

## 2020-10-07 NOTE — PROGRESS NOTES
Subjective:   Patient ID:  Lorenzo Ospina is a 80 y.o. female who presents for follow up of No chief complaint on file.      HPI   8/28/2020  A 81 yo female with htn afib failed cardioversion was scheduled to have BLATION HAD CANCELED DUE TO FAMILY ISSUES AND NOW COVID. SHE IS NOT COMPLAINING OF CHEST PAIN SHORTNESS OF BREATH TAKES MEDS REGULARILY HAS IMPROVED EATING HABITS AS FAR AS SALT IS CONCERNED.SHE WALKS BETWEEN 15-30 MINUTES DAILY WEATHER PERMITS BP IS IMPROVED WITHE XERCISE. HAS NO LEG SWELLING NO CHF SYMPTOMS. LIPIDS ARE ON TARGET    TODAY IS HERE FOR F/U SHE FEELS WELL SHE IS EXERCISING HAS NOT BEEN COMPLAINING OF CHEST PAIN OR SHORTNESS OF BREATH HAS NO HEADACHE NO BLURRED VISION HAS BEEN ON NOAC NO BLEEDING. SAW DR MORA TODAY STOPPED HER AMIODARONE  AND KEPT HER ON B BLOCKERS. HER BP RECORDING FROM HOME ARE ON TARGET AND WELL CONTROLLED SHE TRIES TO EXERCISE REGULARILY OTHERWISE HER BOP IS ELEVATED.   Past Medical History:   Diagnosis Date    A-fib 11/5/2019    Breast cancer     right, dx 2008.  Dr. Callaway follows.s/p lumpectomy and arimidex x 5y.    Hyperlipidemia     Hypertension     OP (osteoporosis)     on bisphos x 2y.    TIA (transient ischemic attack)     Toe fracture        Past Surgical History:   Procedure Laterality Date    BREAST LUMPECTOMY      right 2008    TOTAL ABDOMINAL HYSTERECTOMY      no cancer    TRANSESOPHAGEAL ECHOCARDIOGRAPHY N/A 11/5/2019    Procedure: ECHOCARDIOGRAM, TRANSESOPHAGEAL;  Surgeon: Otto Flores MD;  Location: [facility] [place] CATH LAB;  Service: Cardiology;  Laterality: N/A;    TREATMENT OF CARDIAC ARRHYTHMIA N/A 11/5/2019    Procedure: CARDIOVERSION;  Surgeon: Otto Flores MD;  Location: [facility] [place] CATH LAB;  Service: Cardiology;  Laterality: N/A;    TREATMENT OF CARDIAC ARRHYTHMIA N/A 11/5/2019    Procedure: CARDIOVERSION;  Surgeon: Otto Flores MD;  Location: [facility] [place] CATH LAB;  Service: Cardiology;  Laterality: N/A;    TREATMENT OF CARDIAC ARRHYTHMIA N/A 2/4/2020     Procedure: CARDIOVERSION;  Surgeon: Otto Flores MD;  Location: [facility] CATH LAB;  Service: Cardiology;  Laterality: N/A;    TREATMENT OF CARDIAC ARRHYTHMIA N/A 3/2/2020    Procedure: CARDIOVERSION;  Surgeon: Mayi Beltran MD;  Location: [facility] CATH LAB;  Service: Cardiology;  Laterality: N/A;  830 start per Dr. beltran       Social History     Tobacco Use    Smoking status: Former Smoker     Quit date: 1994     Years since quittin.0    Smokeless tobacco: Never Used   Substance Use Topics    Alcohol use: Not Currently     Alcohol/week: 1.0 standard drinks     Types: 1 Glasses of wine per week     Comment: daily    Drug use: Never       History reviewed. No pertinent family history.    Current Outpatient Medications   Medication Sig    alendronate (FOSAMAX) 70 MG tablet TAKE 1 TABLET BY MOUTH ONE TIME PER WEEK    amiodarone (PACERONE) 200 MG Tab Take 1 tablet (200 mg total) by mouth once daily.    apixaban (ELIQUIS) 5 mg Tab Take 1 tablet (5 mg total) by mouth 2 (two) times daily.    atorvastatin (LIPITOR) 20 MG tablet Take 1 tablet (20 mg total) by mouth once daily.    fish oil-omega-3 fatty acids 300-1,000 mg capsule Take 2 g by mouth once daily.    fluticasone propionate (FLONASE) 50 mcg/actuation nasal spray TAKE 2 SPRAYS BY EACH NARE ROUTE ONCE DAILY.    gabapentin (NEURONTIN) 100 MG capsule Take 1 capsule (100 mg total) by mouth 3 (three) times daily.    glucosam/chond/hyalu/CF borate (MOVE FREE JOINT HEALTH ORAL) Take 1 tablet by mouth once daily.    losartan (COZAAR) 25 MG tablet Take 1 tablet (25 mg total) by mouth once daily.    metoprolol succinate (TOPROL-XL) 50 MG 24 hr tablet Take 1 tablet (50 mg total) by mouth once daily.    MULTIVIT-IRON-MIN-FOLIC ACID 3,500-18-0.4 UNIT-MG-MG ORAL CHEW Take by mouth.    vitamin D 1000 units Tab Take 2,000 Units by mouth once daily.    cetirizine (ZYRTEC) 10 MG tablet Take 10 mg by mouth daily as needed.     clopidogrel (PLAVIX) 75 mg tablet TAKE  1 TABLET (75 MG TOTAL) BY MOUTH ONCE DAILY.    triamterene-hydrochlorothiazide 37.5-25 mg (DYAZIDE) 37.5-25 mg per capsule      No current facility-administered medications for this visit.      Current Outpatient Medications on File Prior to Visit   Medication Sig    alendronate (FOSAMAX) 70 MG tablet TAKE 1 TABLET BY MOUTH ONE TIME PER WEEK    amiodarone (PACERONE) 200 MG Tab Take 1 tablet (200 mg total) by mouth once daily.    apixaban (ELIQUIS) 5 mg Tab Take 1 tablet (5 mg total) by mouth 2 (two) times daily.    atorvastatin (LIPITOR) 20 MG tablet Take 1 tablet (20 mg total) by mouth once daily.    fish oil-omega-3 fatty acids 300-1,000 mg capsule Take 2 g by mouth once daily.    fluticasone propionate (FLONASE) 50 mcg/actuation nasal spray TAKE 2 SPRAYS BY EACH NARE ROUTE ONCE DAILY.    gabapentin (NEURONTIN) 100 MG capsule Take 1 capsule (100 mg total) by mouth 3 (three) times daily.    glucosam/chond/hyalu/CF borate (MOVE FREE JOINT HEALTH ORAL) Take 1 tablet by mouth once daily.    losartan (COZAAR) 25 MG tablet Take 1 tablet (25 mg total) by mouth once daily.    metoprolol succinate (TOPROL-XL) 50 MG 24 hr tablet Take 1 tablet (50 mg total) by mouth once daily.    MULTIVIT-IRON-MIN-FOLIC ACID 3,500-18-0.4 UNIT-MG-MG ORAL CHEW Take by mouth.    vitamin D 1000 units Tab Take 2,000 Units by mouth once daily.    cetirizine (ZYRTEC) 10 MG tablet Take 10 mg by mouth daily as needed.     clopidogrel (PLAVIX) 75 mg tablet TAKE 1 TABLET (75 MG TOTAL) BY MOUTH ONCE DAILY.    triamterene-hydrochlorothiazide 37.5-25 mg (DYAZIDE) 37.5-25 mg per capsule     [DISCONTINUED] alendronate (FOSAMAX) 70 MG tablet TAKE 1 TABLET (70 MG TOTAL) BY MOUTH EVERY 7 DAYS.     No current facility-administered medications on file prior to visit.      Review of patient's allergies indicates:   Allergen Reactions    Medrol [methylprednisolone]      Review of Systems   Constitution: Negative for diaphoresis, malaise/fatigue  and weight gain.   HENT: Negative for hoarse voice.    Eyes: Negative for double vision and visual disturbance.   Cardiovascular: Negative for chest pain, claudication, cyanosis, dyspnea on exertion, irregular heartbeat, leg swelling, near-syncope, orthopnea, palpitations, paroxysmal nocturnal dyspnea and syncope.   Respiratory: Negative for cough, hemoptysis, shortness of breath and snoring.    Hematologic/Lymphatic: Negative for bleeding problem. Does not bruise/bleed easily.   Skin: Negative for color change and poor wound healing.   Musculoskeletal: Negative for muscle cramps, muscle weakness and myalgias.   Gastrointestinal: Negative for bloating, abdominal pain, change in bowel habit, diarrhea, heartburn, hematemesis, hematochezia, melena and nausea.   Neurological: Negative for excessive daytime sleepiness, dizziness, headaches, light-headedness, loss of balance, numbness and weakness.   Psychiatric/Behavioral: Negative for memory loss. The patient does not have insomnia.    Allergic/Immunologic: Negative for hives.       Objective:   Physical Exam   Constitutional: She is oriented to person, place, and time. She appears well-developed and well-nourished. She does not appear ill. No distress.   HENT:   Head: Normocephalic and atraumatic.   Eyes: Pupils are equal, round, and reactive to light. EOM are normal. No scleral icterus.   Neck: Normal range of motion. Neck supple. Normal carotid pulses, no hepatojugular reflux and no JVD present. Carotid bruit is not present. No tracheal deviation present. No thyromegaly present.   Cardiovascular: Normal rate, normal heart sounds, intact distal pulses and normal pulses. An irregularly irregular rhythm present. Exam reveals no gallop and no friction rub.   No murmur heard.  Pulses:       Carotid pulses are 2+ on the right side and 2+ on the left side.       Radial pulses are 2+ on the right side and 2+ on the left side.        Femoral pulses are 2+ on the right side  and 2+ on the left side.       Popliteal pulses are 2+ on the right side and 2+ on the left side.        Dorsalis pedis pulses are 2+ on the right side and 2+ on the left side.        Posterior tibial pulses are 2+ on the right side and 2+ on the left side.   Pulmonary/Chest: Effort normal and breath sounds normal. No respiratory distress. She has no wheezes. She has no rhonchi. She has no rales. She exhibits no tenderness.   Abdominal: Soft. Normal appearance, normal aorta and bowel sounds are normal. She exhibits no distension, no abdominal bruit, no ascites and no pulsatile midline mass. There is no hepatomegaly. There is no abdominal tenderness.   Musculoskeletal:         General: No edema.      Right shoulder: She exhibits no deformity.   Neurological: She is alert and oriented to person, place, and time. She has normal strength. No cranial nerve deficit. Coordination normal.   Skin: Skin is warm and dry. No rash noted. No cyanosis or erythema. Nails show no clubbing.   Psychiatric: She has a normal mood and affect. Her speech is normal and behavior is normal.   Nursing note and vitals reviewed.    Vitals:    10/07/20 1456 10/07/20 1458 10/07/20 1533   BP: (!) 152/80 (!) 154/84 134/78   BP Location: Right arm Left arm    Patient Position: Sitting Sitting    BP Method: Medium (Manual) Medium (Manual)    Pulse: 88     SpO2: 98%     Weight: 73.4 kg (161 lb 13.1 oz)     Height: 5' (1.524 m)       Lab Results   Component Value Date    CHOL 126 08/21/2020    CHOL 127 10/01/2019    CHOL 147 06/20/2018     Lab Results   Component Value Date    HDL 59 08/21/2020    HDL 46 10/01/2019    HDL 56 06/20/2018     Lab Results   Component Value Date    LDLCALC 55.4 (L) 08/21/2020    LDLCALC 61.4 (L) 10/01/2019    LDLCALC 73.6 06/20/2018     Lab Results   Component Value Date    TRIG 58 08/21/2020    TRIG 98 10/01/2019    TRIG 87 06/20/2018     Lab Results   Component Value Date    CHOLHDL 46.8 08/21/2020    CHOLHDL 36.2  10/01/2019    CHOLHDL 38.1 06/20/2018       Chemistry        Component Value Date/Time     08/21/2020 0855    K 4.4 08/21/2020 0855     08/21/2020 0855    CO2 27 08/21/2020 0855    BUN 9 08/21/2020 0855    CREATININE 0.8 08/21/2020 0855     08/21/2020 0855        Component Value Date/Time    CALCIUM 10.1 08/21/2020 0855    ALKPHOS 57 08/21/2020 0855    AST 21 08/21/2020 0855    ALT 20 08/21/2020 0855    BILITOT 0.8 08/21/2020 0855    ESTGFRAFRICA >60.0 08/21/2020 0855    EGFRNONAA >60.0 08/21/2020 0855          Lab Results   Component Value Date    TSH 1.237 08/21/2020     No results found for: INR, PROTIME  Lab Results   Component Value Date    WBC 7.96 09/13/2019    HGB 14.9 09/13/2019    HCT 45.8 09/13/2019    MCV 89 09/13/2019     09/13/2019     BMP  Sodium   Date Value Ref Range Status   08/21/2020 142 136 - 145 mmol/L Final     Potassium   Date Value Ref Range Status   08/21/2020 4.4 3.5 - 5.1 mmol/L Final     Chloride   Date Value Ref Range Status   08/21/2020 109 95 - 110 mmol/L Final     CO2   Date Value Ref Range Status   08/21/2020 27 23 - 29 mmol/L Final     BUN, Bld   Date Value Ref Range Status   08/21/2020 9 8 - 23 mg/dL Final     Creatinine   Date Value Ref Range Status   08/21/2020 0.8 0.5 - 1.4 mg/dL Final     Calcium   Date Value Ref Range Status   08/21/2020 10.1 8.7 - 10.5 mg/dL Final     Anion Gap   Date Value Ref Range Status   08/21/2020 6 (L) 8 - 16 mmol/L Final     eGFR if    Date Value Ref Range Status   08/21/2020 >60.0 >60 mL/min/1.73 m^2 Final     eGFR if non    Date Value Ref Range Status   08/21/2020 >60.0 >60 mL/min/1.73 m^2 Final     Comment:     Calculation used to obtain the estimated glomerular filtration  rate (eGFR) is the CKD-EPI equation.        CrCl cannot be calculated (Patient's most recent lab result is older than the maximum 7 days allowed.).    Assessment:     1. Chronic atrial fibrillation    2. Other  hyperlipidemia    3. TIA (transient ischemic attack)    4. Essential hypertension    5. Palpitation    6. Hypercalcemia    7. Vitamin D deficiency    8. Hyperparathyroidism      HTN CONTROLLED  LIPIDS ON TARGET   AFIB ON NOAC RATE CONTROLLED OFF AMIODARONE.   Plan:     Continue current therapy  Cardiac low salt diet.  Risk factor modification and excercise program./WEIGHT LOSS .  F/u in 6 months with lipid cmp

## 2020-10-28 ENCOUNTER — TELEPHONE (OUTPATIENT)
Dept: FAMILY MEDICINE | Facility: CLINIC | Age: 80
End: 2020-10-28

## 2020-10-28 RX ORDER — CLINDAMYCIN PHOSPHATE 10 MG/G
GEL TOPICAL
COMMUNITY
Start: 2020-09-04 | End: 2021-03-02

## 2020-10-28 RX ORDER — TRIAMCINOLONE ACETONIDE 0.25 MG/G
CREAM TOPICAL
COMMUNITY
Start: 2020-09-04 | End: 2021-03-02

## 2020-10-28 NOTE — TELEPHONE ENCOUNTER
----- Message from Concepcion Ocampo sent at 10/28/2020 11:10 AM CDT -----  Pt would like to be called back regarding  reschedule appt    Pt can be reached at [phone]

## 2020-11-02 ENCOUNTER — OFFICE VISIT (OUTPATIENT)
Dept: FAMILY MEDICINE | Facility: CLINIC | Age: 80
End: 2020-11-02
Payer: MEDICARE

## 2020-11-02 ENCOUNTER — LAB VISIT (OUTPATIENT)
Dept: LAB | Facility: HOSPITAL | Age: 80
End: 2020-11-02
Attending: INTERNAL MEDICINE
Payer: MEDICARE

## 2020-11-02 VITALS
WEIGHT: 165.38 LBS | HEART RATE: 86 BPM | SYSTOLIC BLOOD PRESSURE: 130 MMHG | BODY MASS INDEX: 32.47 KG/M2 | TEMPERATURE: 98 F | DIASTOLIC BLOOD PRESSURE: 78 MMHG | OXYGEN SATURATION: 96 % | HEIGHT: 60 IN

## 2020-11-02 DIAGNOSIS — L30.9 DERMATITIS: ICD-10-CM

## 2020-11-02 DIAGNOSIS — I48.91 ATRIAL FIBRILLATION, UNSPECIFIED TYPE: ICD-10-CM

## 2020-11-02 DIAGNOSIS — L30.9 DERMATITIS: Primary | ICD-10-CM

## 2020-11-02 DIAGNOSIS — E21.3 HYPERPARATHYROIDISM: ICD-10-CM

## 2020-11-02 DIAGNOSIS — I10 ESSENTIAL HYPERTENSION: ICD-10-CM

## 2020-11-02 DIAGNOSIS — M81.0 AGE-RELATED OSTEOPOROSIS WITHOUT CURRENT PATHOLOGICAL FRACTURE: ICD-10-CM

## 2020-11-02 PROCEDURE — 1101F PR PT FALLS ASSESS DOC 0-1 FALLS W/OUT INJ PAST YR: ICD-10-PCS | Mod: CPTII,S$GLB,, | Performed by: INTERNAL MEDICINE

## 2020-11-02 PROCEDURE — 3078F DIAST BP <80 MM HG: CPT | Mod: CPTII,S$GLB,, | Performed by: INTERNAL MEDICINE

## 2020-11-02 PROCEDURE — 85652 RBC SED RATE AUTOMATED: CPT

## 2020-11-02 PROCEDURE — 80048 BASIC METABOLIC PNL TOTAL CA: CPT

## 2020-11-02 PROCEDURE — 99999 PR PBB SHADOW E&M-EST. PATIENT-LVL IV: ICD-10-PCS | Mod: PBBFAC,,, | Performed by: INTERNAL MEDICINE

## 2020-11-02 PROCEDURE — 1159F PR MEDICATION LIST DOCUMENTED IN MEDICAL RECORD: ICD-10-PCS | Mod: S$GLB,,, | Performed by: INTERNAL MEDICINE

## 2020-11-02 PROCEDURE — 86038 ANTINUCLEAR ANTIBODIES: CPT

## 2020-11-02 PROCEDURE — 3075F PR MOST RECENT SYSTOLIC BLOOD PRESS GE 130-139MM HG: ICD-10-PCS | Mod: CPTII,S$GLB,, | Performed by: INTERNAL MEDICINE

## 2020-11-02 PROCEDURE — 83970 ASSAY OF PARATHORMONE: CPT

## 2020-11-02 PROCEDURE — 1126F PR PAIN SEVERITY QUANTIFIED, NO PAIN PRESENT: ICD-10-PCS | Mod: S$GLB,,, | Performed by: INTERNAL MEDICINE

## 2020-11-02 PROCEDURE — 36415 COLL VENOUS BLD VENIPUNCTURE: CPT | Mod: PO

## 2020-11-02 PROCEDURE — 99214 PR OFFICE/OUTPT VISIT, EST, LEVL IV, 30-39 MIN: ICD-10-PCS | Mod: S$GLB,,, | Performed by: INTERNAL MEDICINE

## 2020-11-02 PROCEDURE — 1126F AMNT PAIN NOTED NONE PRSNT: CPT | Mod: S$GLB,,, | Performed by: INTERNAL MEDICINE

## 2020-11-02 PROCEDURE — 3078F PR MOST RECENT DIASTOLIC BLOOD PRESSURE < 80 MM HG: ICD-10-PCS | Mod: CPTII,S$GLB,, | Performed by: INTERNAL MEDICINE

## 2020-11-02 PROCEDURE — 99999 PR PBB SHADOW E&M-EST. PATIENT-LVL IV: CPT | Mod: PBBFAC,,, | Performed by: INTERNAL MEDICINE

## 2020-11-02 PROCEDURE — 1101F PT FALLS ASSESS-DOCD LE1/YR: CPT | Mod: CPTII,S$GLB,, | Performed by: INTERNAL MEDICINE

## 2020-11-02 PROCEDURE — 1159F MED LIST DOCD IN RCRD: CPT | Mod: S$GLB,,, | Performed by: INTERNAL MEDICINE

## 2020-11-02 PROCEDURE — 99214 OFFICE O/P EST MOD 30 MIN: CPT | Mod: S$GLB,,, | Performed by: INTERNAL MEDICINE

## 2020-11-02 PROCEDURE — 86140 C-REACTIVE PROTEIN: CPT

## 2020-11-02 PROCEDURE — 3075F SYST BP GE 130 - 139MM HG: CPT | Mod: CPTII,S$GLB,, | Performed by: INTERNAL MEDICINE

## 2020-11-02 NOTE — PROGRESS NOTES
Subjective:      Patient ID: Lorenzo Ospina is a 80 y.o. female.    Chief Complaint: Follow-up      HPI  Here for follow up of medical problems.  On fosamax over 5 years.  Does not want any injections for her bones.    Doing better with depression, since loss of .  2 sons visit weekly.  No more arm tingling.  No more rash around eyes, but gets red patches on chest on and off, since she had EKG stickers placed a few weeks ago.  No f/c/sw/cough.  No cp/sob/palp.  BMs normal, no black or blood.    Updated/ annual due 9/21:  HM: 10/15 fluvax ref more, 6/17 [id number], refuses [id number] unknown reason even after discussion, ref tetanus, 9/20 MMG/ Vasirreddy follows, 9/20 BMD rep 2y,  2012 Cscope saw Gastro in 2017 and says due in 2022.     Review of Systems   Constitutional: Negative for chills, diaphoresis and fever.   Respiratory: Negative for cough and shortness of breath.    Cardiovascular: Negative for chest pain, palpitations and leg swelling.   Gastrointestinal: Negative for blood in stool, constipation, diarrhea, nausea and vomiting.   Genitourinary: Negative for dysuria, frequency and hematuria.   Psychiatric/Behavioral: The patient is not nervous/anxious.          Objective:   /78   Pulse 86   Temp 97.9 °F (36.6 °C) (Temporal)   Ht 5' (1.524 m)   Wt 75 kg (165 lb 5.5 oz)   SpO2 96%   BMI 32.29 kg/m²     Physical Exam  Constitutional:       Appearance: She is well-developed.   Neck:      Musculoskeletal: Neck supple.      Thyroid: No thyroid mass.      Vascular: No carotid bruit.   Cardiovascular:      Rate and Rhythm: Normal rate and regular rhythm.      Heart sounds: No murmur. No friction rub. No gallop.    Pulmonary:      Effort: Pulmonary effort is normal.      Breath sounds: Normal breath sounds. No wheezing or rales.   Abdominal:      General: Bowel sounds are normal.      Palpations: Abdomen is soft. There is no mass.      Tenderness: There is no abdominal tenderness.    Lymphadenopathy:      Cervical: No cervical adenopathy.   Neurological:      Mental Status: She is alert and oriented to person, place, and time.             Assessment:       1. Dermatitis    2. Essential hypertension    3. Atrial fibrillation, unspecified type    4. Age-related osteoporosis without current pathological fracture    5. Hyperparathyroidism          Plan:     Dermatitis  -     SPENSER Screen w/Reflex; Future; Expected date: 11/02/2020  -     C-Reactive Protein; Future; Expected date: 11/02/2020  -     Sedimentation rate; Future; Expected date: 11/02/2020    Essential hypertension- stable, cont rx.    Atrial fibrillation, unspecified type- cont meds per Dr. Vega.    Age-related osteoporosis without current pathological fracture- stop fosamax due to being treated over 5 years.  Does not want prolia or other injection.    Hyperparathyroidism, recheck now.  -     PTH, Intact; Future; Expected date: 11/16/2020  -     Basic Metabolic Panel; Future; Expected date: 11/02/2020    Depression sx much improved today.  RTC 4 mo.

## 2020-11-03 LAB
ANION GAP SERPL CALC-SCNC: 13 MMOL/L (ref 8–16)
BUN SERPL-MCNC: 12 MG/DL (ref 8–23)
CALCIUM SERPL-MCNC: 10.1 MG/DL (ref 8.7–10.5)
CHLORIDE SERPL-SCNC: 108 MMOL/L (ref 95–110)
CO2 SERPL-SCNC: 21 MMOL/L (ref 23–29)
CREAT SERPL-MCNC: 0.9 MG/DL (ref 0.5–1.4)
CRP SERPL-MCNC: 1.6 MG/L (ref 0–8.2)
ERYTHROCYTE [SEDIMENTATION RATE] IN BLOOD BY WESTERGREN METHOD: 12 MM/HR (ref 0–36)
EST. GFR  (AFRICAN AMERICAN): >60 ML/MIN/1.73 M^2
EST. GFR  (NON AFRICAN AMERICAN): >60 ML/MIN/1.73 M^2
GLUCOSE SERPL-MCNC: 126 MG/DL (ref 70–110)
POTASSIUM SERPL-SCNC: 4.7 MMOL/L (ref 3.5–5.1)
PTH-INTACT SERPL-MCNC: 222 PG/ML (ref 9–77)
SODIUM SERPL-SCNC: 142 MMOL/L (ref 136–145)

## 2020-11-04 ENCOUNTER — TELEPHONE (OUTPATIENT)
Dept: FAMILY MEDICINE | Facility: CLINIC | Age: 80
End: 2020-11-04

## 2020-11-04 DIAGNOSIS — E21.3 HYPERPARATHYROIDISM: Primary | ICD-10-CM

## 2020-11-04 LAB — ANA SER QL IF: NORMAL

## 2021-03-02 ENCOUNTER — TELEPHONE (OUTPATIENT)
Dept: FAMILY MEDICINE | Facility: CLINIC | Age: 81
End: 2021-03-02

## 2021-03-02 ENCOUNTER — HOSPITAL ENCOUNTER (OUTPATIENT)
Dept: RADIOLOGY | Facility: HOSPITAL | Age: 81
Discharge: HOME | End: 2021-03-02
Attending: INTERNAL MEDICINE
Payer: MEDICARE

## 2021-03-02 ENCOUNTER — OFFICE VISIT (OUTPATIENT)
Dept: FAMILY MEDICINE | Facility: CLINIC | Age: 81
End: 2021-03-02
Payer: MEDICARE

## 2021-03-02 VITALS
OXYGEN SATURATION: 95 % | BODY MASS INDEX: 33.46 KG/M2 | SYSTOLIC BLOOD PRESSURE: 128 MMHG | DIASTOLIC BLOOD PRESSURE: 86 MMHG | WEIGHT: 170.44 LBS | HEIGHT: 60 IN | HEART RATE: 87 BPM | TEMPERATURE: 98 F

## 2021-03-02 DIAGNOSIS — R53.83 MALAISE AND FATIGUE: ICD-10-CM

## 2021-03-02 DIAGNOSIS — I10 ESSENTIAL HYPERTENSION: Primary | ICD-10-CM

## 2021-03-02 DIAGNOSIS — Z79.01 CHRONIC ANTICOAGULATION: ICD-10-CM

## 2021-03-02 DIAGNOSIS — R93.89 ABNORMAL CXR: Primary | ICD-10-CM

## 2021-03-02 DIAGNOSIS — I48.0 PAROXYSMAL ATRIAL FIBRILLATION: ICD-10-CM

## 2021-03-02 DIAGNOSIS — R06.83 SNORING: ICD-10-CM

## 2021-03-02 DIAGNOSIS — R53.81 MALAISE AND FATIGUE: ICD-10-CM

## 2021-03-02 DIAGNOSIS — E21.3 HYPERPARATHYROIDISM: ICD-10-CM

## 2021-03-02 PROCEDURE — 3079F DIAST BP 80-89 MM HG: CPT | Mod: CPTII,S$GLB,, | Performed by: INTERNAL MEDICINE

## 2021-03-02 PROCEDURE — 99214 OFFICE O/P EST MOD 30 MIN: CPT | Mod: S$GLB,,, | Performed by: INTERNAL MEDICINE

## 2021-03-02 PROCEDURE — 1101F PT FALLS ASSESS-DOCD LE1/YR: CPT | Mod: CPTII,S$GLB,, | Performed by: INTERNAL MEDICINE

## 2021-03-02 PROCEDURE — 3074F SYST BP LT 130 MM HG: CPT | Mod: CPTII,S$GLB,, | Performed by: INTERNAL MEDICINE

## 2021-03-02 PROCEDURE — 1126F AMNT PAIN NOTED NONE PRSNT: CPT | Mod: S$GLB,,, | Performed by: INTERNAL MEDICINE

## 2021-03-02 PROCEDURE — 3288F FALL RISK ASSESSMENT DOCD: CPT | Mod: CPTII,S$GLB,, | Performed by: INTERNAL MEDICINE

## 2021-03-02 PROCEDURE — 3288F PR FALLS RISK ASSESSMENT DOCUMENTED: ICD-10-PCS | Mod: CPTII,S$GLB,, | Performed by: INTERNAL MEDICINE

## 2021-03-02 PROCEDURE — 3079F PR MOST RECENT DIASTOLIC BLOOD PRESSURE 80-89 MM HG: ICD-10-PCS | Mod: CPTII,S$GLB,, | Performed by: INTERNAL MEDICINE

## 2021-03-02 PROCEDURE — 1126F PR PAIN SEVERITY QUANTIFIED, NO PAIN PRESENT: ICD-10-PCS | Mod: S$GLB,,, | Performed by: INTERNAL MEDICINE

## 2021-03-02 PROCEDURE — 1159F PR MEDICATION LIST DOCUMENTED IN MEDICAL RECORD: ICD-10-PCS | Mod: S$GLB,,, | Performed by: INTERNAL MEDICINE

## 2021-03-02 PROCEDURE — 3074F PR MOST RECENT SYSTOLIC BLOOD PRESSURE < 130 MM HG: ICD-10-PCS | Mod: CPTII,S$GLB,, | Performed by: INTERNAL MEDICINE

## 2021-03-02 PROCEDURE — 71046 X-RAY EXAM CHEST 2 VIEWS: CPT | Mod: TC,FY,PO

## 2021-03-02 PROCEDURE — 99499 UNLISTED E&M SERVICE: CPT | Mod: S$GLB,,, | Performed by: INTERNAL MEDICINE

## 2021-03-02 PROCEDURE — 99999 PR PBB SHADOW E&M-EST. PATIENT-LVL IV: ICD-10-PCS | Mod: PBBFAC,,, | Performed by: INTERNAL MEDICINE

## 2021-03-02 PROCEDURE — 1101F PR PT FALLS ASSESS DOC 0-1 FALLS W/OUT INJ PAST YR: ICD-10-PCS | Mod: CPTII,S$GLB,, | Performed by: INTERNAL MEDICINE

## 2021-03-02 PROCEDURE — 71046 XR CHEST PA AND LATERAL: ICD-10-PCS | Mod: 26,,, | Performed by: RADIOLOGY

## 2021-03-02 PROCEDURE — 99499 RISK ADDL DX/OHS AUDIT: ICD-10-PCS | Mod: S$GLB,,, | Performed by: INTERNAL MEDICINE

## 2021-03-02 PROCEDURE — 99999 PR PBB SHADOW E&M-EST. PATIENT-LVL IV: CPT | Mod: PBBFAC,,, | Performed by: INTERNAL MEDICINE

## 2021-03-02 PROCEDURE — 99214 PR OFFICE/OUTPT VISIT, EST, LEVL IV, 30-39 MIN: ICD-10-PCS | Mod: S$GLB,,, | Performed by: INTERNAL MEDICINE

## 2021-03-02 PROCEDURE — 1159F MED LIST DOCD IN RCRD: CPT | Mod: S$GLB,,, | Performed by: INTERNAL MEDICINE

## 2021-03-02 PROCEDURE — 71046 X-RAY EXAM CHEST 2 VIEWS: CPT | Mod: 26,,, | Performed by: RADIOLOGY

## 2021-03-03 ENCOUNTER — TELEPHONE (OUTPATIENT)
Dept: FAMILY MEDICINE | Facility: CLINIC | Age: 81
End: 2021-03-03

## 2021-03-08 ENCOUNTER — HOSPITAL ENCOUNTER (OUTPATIENT)
Dept: RADIOLOGY | Facility: HOSPITAL | Age: 81
Discharge: HOME | End: 2021-03-08
Attending: INTERNAL MEDICINE
Payer: MEDICARE

## 2021-03-08 DIAGNOSIS — R93.89 ABNORMAL CXR: ICD-10-CM

## 2021-03-08 PROCEDURE — 25500020 PHARM REV CODE 255: Performed by: INTERNAL MEDICINE

## 2021-03-08 PROCEDURE — 71260 CT THORAX DX C+: CPT | Mod: 26,,, | Performed by: RADIOLOGY

## 2021-03-08 PROCEDURE — 71260 CT CHEST WITH CONTRAST: ICD-10-PCS | Mod: 26,,, | Performed by: RADIOLOGY

## 2021-03-08 PROCEDURE — 71260 CT THORAX DX C+: CPT | Mod: TC

## 2021-03-08 RX ADMIN — IOHEXOL 75 ML: 350 INJECTION, SOLUTION INTRAVENOUS at 04:03

## 2021-03-09 ENCOUNTER — TELEPHONE (OUTPATIENT)
Dept: FAMILY MEDICINE | Facility: CLINIC | Age: 81
End: 2021-03-09

## 2021-03-09 DIAGNOSIS — J18.9 PNEUMONIA, UNSPECIFIED ORGANISM: Primary | ICD-10-CM

## 2021-03-09 RX ORDER — ALBUTEROL SULFATE 90 UG/1
2 AEROSOL, METERED RESPIRATORY (INHALATION) EVERY 6 HOURS PRN
Qty: 18 G | Refills: 1 | Status: SHIPPED | OUTPATIENT
Start: 2021-03-09 | End: 2021-07-07 | Stop reason: SDUPTHER

## 2021-03-09 RX ORDER — AZITHROMYCIN 500 MG/1
500 TABLET, FILM COATED ORAL DAILY
Qty: 5 TABLET | Refills: 0 | Status: SHIPPED | OUTPATIENT
Start: 2021-03-09 | End: 2021-03-14

## 2021-03-16 ENCOUNTER — HOSPITAL ENCOUNTER (OUTPATIENT)
Dept: RADIOLOGY | Facility: HOSPITAL | Age: 81
Discharge: HOME | End: 2021-03-16
Attending: INTERNAL MEDICINE
Payer: MEDICARE

## 2021-03-16 DIAGNOSIS — J18.9 PNEUMONIA, UNSPECIFIED ORGANISM: ICD-10-CM

## 2021-03-16 PROCEDURE — 71046 X-RAY EXAM CHEST 2 VIEWS: CPT | Mod: 26,,, | Performed by: RADIOLOGY

## 2021-03-16 PROCEDURE — 71046 XR CHEST PA AND LATERAL: ICD-10-PCS | Mod: 26,,, | Performed by: RADIOLOGY

## 2021-03-16 PROCEDURE — 71046 X-RAY EXAM CHEST 2 VIEWS: CPT | Mod: TC,FY,PO

## 2021-03-17 ENCOUNTER — OFFICE VISIT (OUTPATIENT)
Dept: FAMILY MEDICINE | Facility: CLINIC | Age: 81
End: 2021-03-17
Payer: MEDICARE

## 2021-03-17 VITALS
DIASTOLIC BLOOD PRESSURE: 78 MMHG | HEART RATE: 100 BPM | OXYGEN SATURATION: 97 % | TEMPERATURE: 98 F | WEIGHT: 165.38 LBS | SYSTOLIC BLOOD PRESSURE: 118 MMHG | BODY MASS INDEX: 32.47 KG/M2 | HEIGHT: 60 IN

## 2021-03-17 DIAGNOSIS — I10 ESSENTIAL HYPERTENSION: ICD-10-CM

## 2021-03-17 DIAGNOSIS — J18.9 PNEUMONIA, UNSPECIFIED ORGANISM: Primary | ICD-10-CM

## 2021-03-17 DIAGNOSIS — Z79.01 CHRONIC ANTICOAGULATION: ICD-10-CM

## 2021-03-17 DIAGNOSIS — I48.91 ATRIAL FIBRILLATION, UNSPECIFIED TYPE: ICD-10-CM

## 2021-03-17 PROCEDURE — 3288F PR FALLS RISK ASSESSMENT DOCUMENTED: ICD-10-PCS | Mod: CPTII,S$GLB,, | Performed by: INTERNAL MEDICINE

## 2021-03-17 PROCEDURE — 1159F MED LIST DOCD IN RCRD: CPT | Mod: S$GLB,,, | Performed by: INTERNAL MEDICINE

## 2021-03-17 PROCEDURE — 1126F PR PAIN SEVERITY QUANTIFIED, NO PAIN PRESENT: ICD-10-PCS | Mod: S$GLB,,, | Performed by: INTERNAL MEDICINE

## 2021-03-17 PROCEDURE — 3078F PR MOST RECENT DIASTOLIC BLOOD PRESSURE < 80 MM HG: ICD-10-PCS | Mod: CPTII,S$GLB,, | Performed by: INTERNAL MEDICINE

## 2021-03-17 PROCEDURE — 1159F PR MEDICATION LIST DOCUMENTED IN MEDICAL RECORD: ICD-10-PCS | Mod: S$GLB,,, | Performed by: INTERNAL MEDICINE

## 2021-03-17 PROCEDURE — 3288F FALL RISK ASSESSMENT DOCD: CPT | Mod: CPTII,S$GLB,, | Performed by: INTERNAL MEDICINE

## 2021-03-17 PROCEDURE — 3078F DIAST BP <80 MM HG: CPT | Mod: CPTII,S$GLB,, | Performed by: INTERNAL MEDICINE

## 2021-03-17 PROCEDURE — 1101F PR PT FALLS ASSESS DOC 0-1 FALLS W/OUT INJ PAST YR: ICD-10-PCS | Mod: CPTII,S$GLB,, | Performed by: INTERNAL MEDICINE

## 2021-03-17 PROCEDURE — 3074F SYST BP LT 130 MM HG: CPT | Mod: CPTII,S$GLB,, | Performed by: INTERNAL MEDICINE

## 2021-03-17 PROCEDURE — 99214 PR OFFICE/OUTPT VISIT, EST, LEVL IV, 30-39 MIN: ICD-10-PCS | Mod: S$GLB,,, | Performed by: INTERNAL MEDICINE

## 2021-03-17 PROCEDURE — 3074F PR MOST RECENT SYSTOLIC BLOOD PRESSURE < 130 MM HG: ICD-10-PCS | Mod: CPTII,S$GLB,, | Performed by: INTERNAL MEDICINE

## 2021-03-17 PROCEDURE — 1101F PT FALLS ASSESS-DOCD LE1/YR: CPT | Mod: CPTII,S$GLB,, | Performed by: INTERNAL MEDICINE

## 2021-03-17 PROCEDURE — 1126F AMNT PAIN NOTED NONE PRSNT: CPT | Mod: S$GLB,,, | Performed by: INTERNAL MEDICINE

## 2021-03-17 PROCEDURE — 99999 PR PBB SHADOW E&M-EST. PATIENT-LVL IV: ICD-10-PCS | Mod: PBBFAC,,, | Performed by: INTERNAL MEDICINE

## 2021-03-17 PROCEDURE — 99999 PR PBB SHADOW E&M-EST. PATIENT-LVL IV: CPT | Mod: PBBFAC,,, | Performed by: INTERNAL MEDICINE

## 2021-03-17 PROCEDURE — 99214 OFFICE O/P EST MOD 30 MIN: CPT | Mod: S$GLB,,, | Performed by: INTERNAL MEDICINE

## 2021-03-17 RX ORDER — EXEMESTANE 25 MG/1
TABLET ORAL
COMMUNITY
End: 2021-03-17

## 2021-03-17 RX ORDER — AMOXICILLIN AND CLAVULANATE POTASSIUM 875; 125 MG/1; MG/1
1 TABLET, FILM COATED ORAL 2 TIMES DAILY
Qty: 20 TABLET | Refills: 0 | Status: SHIPPED | OUTPATIENT
Start: 2021-03-17 | End: 2021-03-31

## 2021-03-17 RX ORDER — GATIFLOXACIN 5 MG/ML
SOLUTION/ DROPS OPHTHALMIC
COMMUNITY
End: 2021-03-17

## 2021-03-26 ENCOUNTER — LAB VISIT (OUTPATIENT)
Dept: LAB | Facility: HOSPITAL | Age: 81
End: 2021-03-26
Attending: INTERNAL MEDICINE
Payer: MEDICARE

## 2021-03-26 DIAGNOSIS — E78.49 OTHER HYPERLIPIDEMIA: ICD-10-CM

## 2021-03-26 DIAGNOSIS — E55.9 VITAMIN D DEFICIENCY: ICD-10-CM

## 2021-03-26 DIAGNOSIS — I10 ESSENTIAL HYPERTENSION: ICD-10-CM

## 2021-03-26 LAB
ALBUMIN SERPL BCP-MCNC: 3.5 G/DL (ref 3.5–5.2)
ALP SERPL-CCNC: 54 U/L (ref 55–135)
ALT SERPL W/O P-5'-P-CCNC: 14 U/L (ref 10–44)
ANION GAP SERPL CALC-SCNC: 8 MMOL/L (ref 8–16)
AST SERPL-CCNC: 18 U/L (ref 10–40)
BILIRUB SERPL-MCNC: 0.7 MG/DL (ref 0.1–1)
BUN SERPL-MCNC: 12 MG/DL (ref 8–23)
CALCIUM SERPL-MCNC: 10 MG/DL (ref 8.7–10.5)
CHLORIDE SERPL-SCNC: 107 MMOL/L (ref 95–110)
CHOLEST SERPL-MCNC: 114 MG/DL (ref 120–199)
CHOLEST/HDLC SERPL: 2.5 {RATIO} (ref 2–5)
CO2 SERPL-SCNC: 26 MMOL/L (ref 23–29)
CREAT SERPL-MCNC: 0.8 MG/DL (ref 0.5–1.4)
EST. GFR  (AFRICAN AMERICAN): >60 ML/MIN/1.73 M^2
EST. GFR  (NON AFRICAN AMERICAN): >60 ML/MIN/1.73 M^2
GLUCOSE SERPL-MCNC: 94 MG/DL (ref 70–110)
HDLC SERPL-MCNC: 46 MG/DL (ref 40–75)
HDLC SERPL: 40.4 % (ref 20–50)
LDLC SERPL CALC-MCNC: 52.2 MG/DL (ref 63–159)
NONHDLC SERPL-MCNC: 68 MG/DL
POTASSIUM SERPL-SCNC: 4.3 MMOL/L (ref 3.5–5.1)
PROT SERPL-MCNC: 6.7 G/DL (ref 6–8.4)
SODIUM SERPL-SCNC: 141 MMOL/L (ref 136–145)
TRIGL SERPL-MCNC: 79 MG/DL (ref 30–150)

## 2021-03-26 PROCEDURE — 80061 LIPID PANEL: CPT | Performed by: INTERNAL MEDICINE

## 2021-03-26 PROCEDURE — 36415 COLL VENOUS BLD VENIPUNCTURE: CPT | Performed by: INTERNAL MEDICINE

## 2021-03-26 PROCEDURE — 80053 COMPREHEN METABOLIC PANEL: CPT | Performed by: INTERNAL MEDICINE

## 2021-03-29 ENCOUNTER — PATIENT OUTREACH (OUTPATIENT)
Dept: ADMINISTRATIVE | Facility: OTHER | Age: 81
End: 2021-03-29

## 2021-03-31 ENCOUNTER — OFFICE VISIT (OUTPATIENT)
Dept: FAMILY MEDICINE | Facility: CLINIC | Age: 81
End: 2021-03-31
Payer: MEDICARE

## 2021-03-31 ENCOUNTER — HOSPITAL ENCOUNTER (OUTPATIENT)
Dept: RADIOLOGY | Facility: HOSPITAL | Age: 81
Discharge: HOME | End: 2021-03-31
Attending: INTERNAL MEDICINE
Payer: MEDICARE

## 2021-03-31 ENCOUNTER — OFFICE VISIT (OUTPATIENT)
Dept: CARDIOLOGY | Facility: CLINIC | Age: 81
End: 2021-03-31
Payer: MEDICARE

## 2021-03-31 ENCOUNTER — HOSPITAL ENCOUNTER (OUTPATIENT)
Dept: CARDIOLOGY | Facility: HOSPITAL | Age: 81
Discharge: HOME | End: 2021-03-31
Attending: INTERNAL MEDICINE
Payer: MEDICARE

## 2021-03-31 VITALS
OXYGEN SATURATION: 96 % | TEMPERATURE: 98 F | SYSTOLIC BLOOD PRESSURE: 120 MMHG | HEIGHT: 60 IN | BODY MASS INDEX: 32.85 KG/M2 | WEIGHT: 167.31 LBS | HEART RATE: 91 BPM | DIASTOLIC BLOOD PRESSURE: 76 MMHG

## 2021-03-31 VITALS
WEIGHT: 166.88 LBS | OXYGEN SATURATION: 96 % | BODY MASS INDEX: 32.76 KG/M2 | RESPIRATION RATE: 16 BRPM | HEART RATE: 81 BPM | SYSTOLIC BLOOD PRESSURE: 142 MMHG | DIASTOLIC BLOOD PRESSURE: 76 MMHG | HEIGHT: 60 IN

## 2021-03-31 DIAGNOSIS — R53.83 MALAISE AND FATIGUE: ICD-10-CM

## 2021-03-31 DIAGNOSIS — R06.89 DYSPNEA AND RESPIRATORY ABNORMALITIES: ICD-10-CM

## 2021-03-31 DIAGNOSIS — M79.602 LEFT ARM PAIN: ICD-10-CM

## 2021-03-31 DIAGNOSIS — R53.81 MALAISE AND FATIGUE: ICD-10-CM

## 2021-03-31 DIAGNOSIS — I48.91 ATRIAL FIBRILLATION, UNSPECIFIED TYPE: ICD-10-CM

## 2021-03-31 DIAGNOSIS — Z79.01 CHRONIC ANTICOAGULATION: ICD-10-CM

## 2021-03-31 DIAGNOSIS — I48.91 ATRIAL FIBRILLATION, UNSPECIFIED TYPE: Primary | ICD-10-CM

## 2021-03-31 DIAGNOSIS — I10 ESSENTIAL HYPERTENSION: ICD-10-CM

## 2021-03-31 DIAGNOSIS — R06.00 DYSPNEA AND RESPIRATORY ABNORMALITIES: ICD-10-CM

## 2021-03-31 DIAGNOSIS — E55.9 VITAMIN D DEFICIENCY: ICD-10-CM

## 2021-03-31 DIAGNOSIS — R94.31 ABNORMAL EKG: ICD-10-CM

## 2021-03-31 DIAGNOSIS — E78.49 OTHER HYPERLIPIDEMIA: ICD-10-CM

## 2021-03-31 DIAGNOSIS — J18.9 PNEUMONIA, UNSPECIFIED ORGANISM: Primary | ICD-10-CM

## 2021-03-31 DIAGNOSIS — R00.2 PALPITATION: ICD-10-CM

## 2021-03-31 DIAGNOSIS — J18.9 PNEUMONIA, UNSPECIFIED ORGANISM: ICD-10-CM

## 2021-03-31 PROCEDURE — 1159F PR MEDICATION LIST DOCUMENTED IN MEDICAL RECORD: ICD-10-PCS | Mod: S$GLB,,, | Performed by: INTERNAL MEDICINE

## 2021-03-31 PROCEDURE — 1101F PR PT FALLS ASSESS DOC 0-1 FALLS W/OUT INJ PAST YR: ICD-10-PCS | Mod: CPTII,S$GLB,, | Performed by: INTERNAL MEDICINE

## 2021-03-31 PROCEDURE — 99999 PR PBB SHADOW E&M-EST. PATIENT-LVL III: ICD-10-PCS | Mod: PBBFAC,,, | Performed by: INTERNAL MEDICINE

## 2021-03-31 PROCEDURE — 1126F PR PAIN SEVERITY QUANTIFIED, NO PAIN PRESENT: ICD-10-PCS | Mod: S$GLB,,, | Performed by: INTERNAL MEDICINE

## 2021-03-31 PROCEDURE — 99214 PR OFFICE/OUTPT VISIT, EST, LEVL IV, 30-39 MIN: ICD-10-PCS | Mod: S$GLB,,, | Performed by: INTERNAL MEDICINE

## 2021-03-31 PROCEDURE — 93010 ELECTROCARDIOGRAM REPORT: CPT | Mod: ,,, | Performed by: INTERNAL MEDICINE

## 2021-03-31 PROCEDURE — 71046 X-RAY EXAM CHEST 2 VIEWS: CPT | Mod: TC,FY,PO

## 2021-03-31 PROCEDURE — 3078F PR MOST RECENT DIASTOLIC BLOOD PRESSURE < 80 MM HG: ICD-10-PCS | Mod: CPTII,S$GLB,, | Performed by: INTERNAL MEDICINE

## 2021-03-31 PROCEDURE — 71046 X-RAY EXAM CHEST 2 VIEWS: CPT | Mod: 26,,, | Performed by: RADIOLOGY

## 2021-03-31 PROCEDURE — 93010 EKG 12-LEAD: ICD-10-PCS | Mod: ,,, | Performed by: INTERNAL MEDICINE

## 2021-03-31 PROCEDURE — 93005 ELECTROCARDIOGRAM TRACING: CPT

## 2021-03-31 PROCEDURE — 1101F PT FALLS ASSESS-DOCD LE1/YR: CPT | Mod: CPTII,S$GLB,, | Performed by: INTERNAL MEDICINE

## 2021-03-31 PROCEDURE — 3288F PR FALLS RISK ASSESSMENT DOCUMENTED: ICD-10-PCS | Mod: CPTII,S$GLB,, | Performed by: INTERNAL MEDICINE

## 2021-03-31 PROCEDURE — 71046 XR CHEST PA AND LATERAL: ICD-10-PCS | Mod: 26,,, | Performed by: RADIOLOGY

## 2021-03-31 PROCEDURE — 1126F AMNT PAIN NOTED NONE PRSNT: CPT | Mod: S$GLB,,, | Performed by: INTERNAL MEDICINE

## 2021-03-31 PROCEDURE — 3074F PR MOST RECENT SYSTOLIC BLOOD PRESSURE < 130 MM HG: ICD-10-PCS | Mod: CPTII,S$GLB,, | Performed by: INTERNAL MEDICINE

## 2021-03-31 PROCEDURE — 99213 PR OFFICE/OUTPT VISIT, EST, LEVL III, 20-29 MIN: ICD-10-PCS | Mod: S$GLB,,, | Performed by: INTERNAL MEDICINE

## 2021-03-31 PROCEDURE — 1159F MED LIST DOCD IN RCRD: CPT | Mod: S$GLB,,, | Performed by: INTERNAL MEDICINE

## 2021-03-31 PROCEDURE — 99999 PR PBB SHADOW E&M-EST. PATIENT-LVL III: CPT | Mod: PBBFAC,,, | Performed by: INTERNAL MEDICINE

## 2021-03-31 PROCEDURE — 99499 RISK ADDL DX/OHS AUDIT: ICD-10-PCS | Mod: S$GLB,,, | Performed by: INTERNAL MEDICINE

## 2021-03-31 PROCEDURE — 99213 OFFICE O/P EST LOW 20 MIN: CPT | Mod: S$GLB,,, | Performed by: INTERNAL MEDICINE

## 2021-03-31 PROCEDURE — 99214 OFFICE O/P EST MOD 30 MIN: CPT | Mod: S$GLB,,, | Performed by: INTERNAL MEDICINE

## 2021-03-31 PROCEDURE — 3074F SYST BP LT 130 MM HG: CPT | Mod: CPTII,S$GLB,, | Performed by: INTERNAL MEDICINE

## 2021-03-31 PROCEDURE — 3078F DIAST BP <80 MM HG: CPT | Mod: CPTII,S$GLB,, | Performed by: INTERNAL MEDICINE

## 2021-03-31 PROCEDURE — 99499 UNLISTED E&M SERVICE: CPT | Mod: S$GLB,,, | Performed by: INTERNAL MEDICINE

## 2021-03-31 PROCEDURE — 3288F FALL RISK ASSESSMENT DOCD: CPT | Mod: CPTII,S$GLB,, | Performed by: INTERNAL MEDICINE

## 2021-03-31 RX ORDER — GABAPENTIN 100 MG/1
100 CAPSULE ORAL 3 TIMES DAILY PRN
Qty: 90 CAPSULE | Refills: 11 | Status: SHIPPED | OUTPATIENT
Start: 2021-03-31 | End: 2021-12-13

## 2021-03-31 RX ORDER — FUROSEMIDE 20 MG/1
20 TABLET ORAL DAILY
Qty: 30 TABLET | Refills: 1 | Status: SHIPPED | OUTPATIENT
Start: 2021-03-31 | End: 2021-04-22

## 2021-03-31 RX ORDER — FUROSEMIDE 20 MG/1
20 TABLET ORAL DAILY
COMMUNITY
Start: 2021-03-22 | End: 2021-03-31

## 2021-03-31 RX ORDER — RANOLAZINE 500 MG/1
500 TABLET, EXTENDED RELEASE ORAL 2 TIMES DAILY
Qty: 60 TABLET | Refills: 11 | Status: SHIPPED | OUTPATIENT
Start: 2021-03-31 | End: 2022-01-24

## 2021-04-07 ENCOUNTER — TELEPHONE (OUTPATIENT)
Dept: FAMILY MEDICINE | Facility: CLINIC | Age: 81
End: 2021-04-07

## 2021-04-08 ENCOUNTER — OFFICE VISIT (OUTPATIENT)
Dept: FAMILY MEDICINE | Facility: CLINIC | Age: 81
End: 2021-04-08
Payer: MEDICARE

## 2021-04-08 VITALS
SYSTOLIC BLOOD PRESSURE: 112 MMHG | BODY MASS INDEX: 32.59 KG/M2 | HEART RATE: 65 BPM | HEIGHT: 60 IN | TEMPERATURE: 98 F | OXYGEN SATURATION: 97 % | RESPIRATION RATE: 18 BRPM | WEIGHT: 166 LBS | DIASTOLIC BLOOD PRESSURE: 70 MMHG

## 2021-04-08 DIAGNOSIS — R20.0 BILATERAL LEG NUMBNESS: Primary | ICD-10-CM

## 2021-04-08 DIAGNOSIS — I83.93 VARICOSE VEINS OF BOTH LOWER EXTREMITIES, UNSPECIFIED WHETHER COMPLICATED: ICD-10-CM

## 2021-04-08 PROCEDURE — 99214 PR OFFICE/OUTPT VISIT, EST, LEVL IV, 30-39 MIN: ICD-10-PCS | Mod: S$GLB,,, | Performed by: REGISTERED NURSE

## 2021-04-08 PROCEDURE — 1126F PR PAIN SEVERITY QUANTIFIED, NO PAIN PRESENT: ICD-10-PCS | Mod: S$GLB,,, | Performed by: REGISTERED NURSE

## 2021-04-08 PROCEDURE — 99999 PR PBB SHADOW E&M-EST. PATIENT-LVL IV: CPT | Mod: PBBFAC,,, | Performed by: REGISTERED NURSE

## 2021-04-08 PROCEDURE — 1159F PR MEDICATION LIST DOCUMENTED IN MEDICAL RECORD: ICD-10-PCS | Mod: S$GLB,,, | Performed by: REGISTERED NURSE

## 2021-04-08 PROCEDURE — 3288F PR FALLS RISK ASSESSMENT DOCUMENTED: ICD-10-PCS | Mod: CPTII,S$GLB,, | Performed by: REGISTERED NURSE

## 2021-04-08 PROCEDURE — 99999 PR PBB SHADOW E&M-EST. PATIENT-LVL IV: ICD-10-PCS | Mod: PBBFAC,,, | Performed by: REGISTERED NURSE

## 2021-04-08 PROCEDURE — 99214 OFFICE O/P EST MOD 30 MIN: CPT | Mod: S$GLB,,, | Performed by: REGISTERED NURSE

## 2021-04-08 PROCEDURE — 1159F MED LIST DOCD IN RCRD: CPT | Mod: S$GLB,,, | Performed by: REGISTERED NURSE

## 2021-04-08 PROCEDURE — 3288F FALL RISK ASSESSMENT DOCD: CPT | Mod: CPTII,S$GLB,, | Performed by: REGISTERED NURSE

## 2021-04-08 PROCEDURE — 1101F PT FALLS ASSESS-DOCD LE1/YR: CPT | Mod: CPTII,S$GLB,, | Performed by: REGISTERED NURSE

## 2021-04-08 PROCEDURE — 1101F PR PT FALLS ASSESS DOC 0-1 FALLS W/OUT INJ PAST YR: ICD-10-PCS | Mod: CPTII,S$GLB,, | Performed by: REGISTERED NURSE

## 2021-04-08 PROCEDURE — 1126F AMNT PAIN NOTED NONE PRSNT: CPT | Mod: S$GLB,,, | Performed by: REGISTERED NURSE

## 2021-04-09 ENCOUNTER — HOSPITAL ENCOUNTER (OUTPATIENT)
Dept: RADIOLOGY | Facility: HOSPITAL | Age: 81
Discharge: HOME | End: 2021-04-09
Attending: REGISTERED NURSE
Payer: MEDICARE

## 2021-04-09 DIAGNOSIS — I83.93 VARICOSE VEINS OF BOTH LOWER EXTREMITIES, UNSPECIFIED WHETHER COMPLICATED: ICD-10-CM

## 2021-04-09 DIAGNOSIS — R20.0 BILATERAL LEG NUMBNESS: ICD-10-CM

## 2021-04-09 PROCEDURE — 93970 EXTREMITY STUDY: CPT | Mod: TC

## 2021-04-09 PROCEDURE — 93970 US LOWER EXTREMITY VEINS BILATERAL: ICD-10-PCS | Mod: 26,,, | Performed by: RADIOLOGY

## 2021-04-09 PROCEDURE — 93970 EXTREMITY STUDY: CPT | Mod: 26,,, | Performed by: RADIOLOGY

## 2021-04-13 ENCOUNTER — TELEPHONE (OUTPATIENT)
Dept: FAMILY MEDICINE | Facility: CLINIC | Age: 81
End: 2021-04-13

## 2021-04-22 ENCOUNTER — HOSPITAL ENCOUNTER (OUTPATIENT)
Dept: RADIOLOGY | Facility: HOSPITAL | Age: 81
Discharge: HOME | End: 2021-04-22
Attending: INTERNAL MEDICINE
Payer: MEDICARE

## 2021-04-22 ENCOUNTER — HOSPITAL ENCOUNTER (OUTPATIENT)
Dept: CARDIOLOGY | Facility: HOSPITAL | Age: 81
Discharge: HOME | End: 2021-04-22
Attending: INTERNAL MEDICINE
Payer: MEDICARE

## 2021-04-22 VITALS
WEIGHT: 166 LBS | HEIGHT: 60 IN | DIASTOLIC BLOOD PRESSURE: 70 MMHG | BODY MASS INDEX: 32.59 KG/M2 | SYSTOLIC BLOOD PRESSURE: 112 MMHG

## 2021-04-22 DIAGNOSIS — R94.31 ABNORMAL EKG: ICD-10-CM

## 2021-04-22 DIAGNOSIS — I10 ESSENTIAL HYPERTENSION: ICD-10-CM

## 2021-04-22 DIAGNOSIS — R06.00 DYSPNEA AND RESPIRATORY ABNORMALITIES: ICD-10-CM

## 2021-04-22 DIAGNOSIS — I48.91 ATRIAL FIBRILLATION, UNSPECIFIED TYPE: ICD-10-CM

## 2021-04-22 DIAGNOSIS — R06.89 DYSPNEA AND RESPIRATORY ABNORMALITIES: ICD-10-CM

## 2021-04-22 LAB
AORTIC ROOT ANNULUS: 3.12 CM
AV INDEX (PROSTH): 0.64
AV MEAN GRADIENT: 2 MMHG
AV PEAK GRADIENT: 4 MMHG
AV VALVE AREA: 2.01 CM2
AV VELOCITY RATIO: 0.71
BSA FOR ECHO PROCEDURE: 1.79 M2
CV ECHO LV RWT: 0.48 CM
CV STRESS BASE HR: 78 BPM
DIASTOLIC BLOOD PRESSURE: 83 MMHG
DOP CALC AO PEAK VEL: 0.94 M/S
DOP CALC AO VTI: 21.49 CM
DOP CALC LVOT AREA: 3.1 CM2
DOP CALC LVOT DIAMETER: 2 CM
DOP CALC LVOT PEAK VEL: 0.67 M/S
DOP CALC LVOT STROKE VOLUME: 43.11 CM3
DOP CALC RVOT PEAK VEL: 0.55 M/S
DOP CALC RVOT VTI: 12.31 CM
DOP CALCLVOT PEAK VEL VTI: 13.73 CM
E WAVE DECELERATION TIME: 170.61 MSEC
E/A RATIO: 2.46
E/E' RATIO: 18.15 M/S
ECHO LV POSTERIOR WALL: 1.29 CM (ref 0.6–1.1)
EJECTION FRACTION: 55 %
FRACTIONAL SHORTENING: 33 % (ref 28–44)
INTERVENTRICULAR SEPTUM: 0.98 CM (ref 0.6–1.1)
IVRT: 65.65 MSEC
LA MAJOR: 6.57 CM
LA MINOR: 6.58 CM
LA WIDTH: 4.43 CM
LEFT ATRIUM SIZE: 4.65 CM
LEFT ATRIUM VOLUME INDEX: 66.9 ML/M2
LEFT ATRIUM VOLUME: 115.13 CM3
LEFT INTERNAL DIMENSION IN SYSTOLE: 3.65 CM (ref 2.1–4)
LEFT VENTRICLE DIASTOLIC VOLUME INDEX: 82.62 ML/M2
LEFT VENTRICLE DIASTOLIC VOLUME: 142.1 ML
LEFT VENTRICLE MASS INDEX: 143 G/M2
LEFT VENTRICLE SYSTOLIC VOLUME INDEX: 32.7 ML/M2
LEFT VENTRICLE SYSTOLIC VOLUME: 56.32 ML
LEFT VENTRICULAR INTERNAL DIMENSION IN DIASTOLE: 5.41 CM (ref 3.5–6)
LEFT VENTRICULAR MASS: 245.74 G
LV LATERAL E/E' RATIO: 16.86 M/S
LV SEPTAL E/E' RATIO: 19.67 M/S
MV PEAK A VEL: 0.48 M/S
MV PEAK E VEL: 1.18 M/S
MV STENOSIS PRESSURE HALF TIME: 49.48 MS
MV VALVE AREA P 1/2 METHOD: 4.45 CM2
NUC REST EJECTION FRACTION: 78
NUC STRESS EJECTION FRACTION: 72 %
OHS CV CPX 85 PERCENT MAX PREDICTED HEART RATE MALE: 116
OHS CV CPX ESTIMATED METS: 1
OHS CV CPX MAX PREDICTED HEART RATE: 136
OHS CV CPX PATIENT IS FEMALE: 1
OHS CV CPX PATIENT IS MALE: 0
OHS CV CPX PEAK DIASTOLIC BLOOD PRESSURE: 77 MMHG
OHS CV CPX PEAK HEAR RATE: 109 BPM
OHS CV CPX PEAK RATE PRESSURE PRODUCT: NORMAL
OHS CV CPX PEAK SYSTOLIC BLOOD PRESSURE: 139 MMHG
OHS CV CPX PERCENT MAX PREDICTED HEART RATE ACHIEVED: 80
OHS CV CPX RATE PRESSURE PRODUCT PRESENTING: NORMAL
PISA TR MAX VEL: 2.78 M/S
PV MEAN GRADIENT: 1 MMHG
PV PEAK VELOCITY: 0.71 CM/S
RA MAJOR: 6.15 CM
RA PRESSURE: 3 MMHG
RA WIDTH: 4.3 CM
RIGHT VENTRICULAR END-DIASTOLIC DIMENSION: 3.88 CM
SINUS: 3.34 CM
STJ: 3.29 CM
STRESS ECHO POST EXERCISE DUR MIN: 1 MINUTES
STRESS ECHO POST EXERCISE DUR SEC: 10 SECONDS
SYSTOLIC BLOOD PRESSURE: 145 MMHG
TDI LATERAL: 0.07 M/S
TDI SEPTAL: 0.06 M/S
TDI: 0.07 M/S
TR MAX PG: 31 MMHG
TV REST PULMONARY ARTERY PRESSURE: 34 MMHG

## 2021-04-22 PROCEDURE — 93306 TTE W/DOPPLER COMPLETE: CPT | Mod: 26,,, | Performed by: INTERNAL MEDICINE

## 2021-04-22 PROCEDURE — 93016 CV STRESS TEST SUPVJ ONLY: CPT | Mod: ,,, | Performed by: INTERNAL MEDICINE

## 2021-04-22 PROCEDURE — 78452 HT MUSCLE IMAGE SPECT MULT: CPT

## 2021-04-22 PROCEDURE — 63600175 PHARM REV CODE 636 W HCPCS: Performed by: INTERNAL MEDICINE

## 2021-04-22 PROCEDURE — 78452 HT MUSCLE IMAGE SPECT MULT: CPT | Mod: 26,,, | Performed by: INTERNAL MEDICINE

## 2021-04-22 PROCEDURE — 78452 STRESS TEST WITH MYOCARDIAL PERFUSION (CUPID ONLY): ICD-10-PCS | Mod: 26,,, | Performed by: INTERNAL MEDICINE

## 2021-04-22 PROCEDURE — A9502 TC99M TETROFOSMIN: HCPCS

## 2021-04-22 PROCEDURE — 93018 STRESS TEST WITH MYOCARDIAL PERFUSION (CUPID ONLY): ICD-10-PCS | Mod: ,,, | Performed by: INTERNAL MEDICINE

## 2021-04-22 PROCEDURE — 93017 CV STRESS TEST TRACING ONLY: CPT

## 2021-04-22 PROCEDURE — 93018 CV STRESS TEST I&R ONLY: CPT | Mod: ,,, | Performed by: INTERNAL MEDICINE

## 2021-04-22 PROCEDURE — 93306 TTE W/DOPPLER COMPLETE: CPT

## 2021-04-22 PROCEDURE — 93016 STRESS TEST WITH MYOCARDIAL PERFUSION (CUPID ONLY): ICD-10-PCS | Mod: ,,, | Performed by: INTERNAL MEDICINE

## 2021-04-22 PROCEDURE — 93306 ECHO (CUPID ONLY): ICD-10-PCS | Mod: 26,,, | Performed by: INTERNAL MEDICINE

## 2021-04-22 RX ORDER — REGADENOSON 0.08 MG/ML
0.4 INJECTION, SOLUTION INTRAVENOUS ONCE
Status: COMPLETED | OUTPATIENT
Start: 2021-04-22 | End: 2021-04-22

## 2021-04-22 RX ORDER — FUROSEMIDE 20 MG/1
TABLET ORAL
Qty: 30 TABLET | Refills: 1 | Status: SHIPPED | OUTPATIENT
Start: 2021-04-22 | End: 2021-06-20

## 2021-04-22 RX ORDER — AMINOPHYLLINE 25 MG/ML
75 INJECTION, SOLUTION INTRAVENOUS ONCE
Status: COMPLETED | OUTPATIENT
Start: 2021-04-22 | End: 2021-04-22

## 2021-04-22 RX ADMIN — REGADENOSON 0.4 MG: 0.08 INJECTION, SOLUTION INTRAVENOUS at 09:04

## 2021-04-22 RX ADMIN — AMINOPHYLLINE 75 MG: 25 INJECTION, SOLUTION INTRAVENOUS at 09:04

## 2021-04-26 ENCOUNTER — TELEPHONE (OUTPATIENT)
Dept: CARDIOLOGY | Facility: CLINIC | Age: 81
End: 2021-04-26

## 2021-04-29 ENCOUNTER — TELEPHONE (OUTPATIENT)
Dept: FAMILY MEDICINE | Facility: CLINIC | Age: 81
End: 2021-04-29

## 2021-04-29 ENCOUNTER — OFFICE VISIT (OUTPATIENT)
Dept: PULMONOLOGY | Facility: CLINIC | Age: 81
End: 2021-04-29
Payer: MEDICARE

## 2021-04-29 ENCOUNTER — OFFICE VISIT (OUTPATIENT)
Dept: FAMILY MEDICINE | Facility: CLINIC | Age: 81
End: 2021-04-29
Payer: MEDICARE

## 2021-04-29 ENCOUNTER — HOSPITAL ENCOUNTER (OUTPATIENT)
Dept: RADIOLOGY | Facility: HOSPITAL | Age: 81
Discharge: HOME | End: 2021-04-29
Attending: INTERNAL MEDICINE
Payer: MEDICARE

## 2021-04-29 VITALS
HEART RATE: 77 BPM | BODY MASS INDEX: 32.76 KG/M2 | DIASTOLIC BLOOD PRESSURE: 76 MMHG | WEIGHT: 166.88 LBS | TEMPERATURE: 98 F | OXYGEN SATURATION: 98 % | SYSTOLIC BLOOD PRESSURE: 118 MMHG | HEIGHT: 60 IN

## 2021-04-29 VITALS
HEIGHT: 60 IN | WEIGHT: 165.13 LBS | HEART RATE: 85 BPM | SYSTOLIC BLOOD PRESSURE: 112 MMHG | OXYGEN SATURATION: 96 % | BODY MASS INDEX: 32.42 KG/M2 | DIASTOLIC BLOOD PRESSURE: 76 MMHG | RESPIRATION RATE: 18 BRPM

## 2021-04-29 DIAGNOSIS — J41.0 SIMPLE CHRONIC BRONCHITIS: ICD-10-CM

## 2021-04-29 DIAGNOSIS — R93.89 ABNORMAL CXR: ICD-10-CM

## 2021-04-29 DIAGNOSIS — J18.9 PNEUMONIA, UNSPECIFIED ORGANISM: Primary | ICD-10-CM

## 2021-04-29 DIAGNOSIS — J18.9 ATYPICAL PNEUMONIA: Primary | ICD-10-CM

## 2021-04-29 DIAGNOSIS — I10 ESSENTIAL HYPERTENSION: ICD-10-CM

## 2021-04-29 DIAGNOSIS — R93.89 ABNORMAL CXR: Primary | ICD-10-CM

## 2021-04-29 DIAGNOSIS — J18.9 PNEUMONIA, UNSPECIFIED ORGANISM: ICD-10-CM

## 2021-04-29 DIAGNOSIS — I48.91 ATRIAL FIBRILLATION, UNSPECIFIED TYPE: ICD-10-CM

## 2021-04-29 DIAGNOSIS — Z79.01 CHRONIC ANTICOAGULATION: ICD-10-CM

## 2021-04-29 PROCEDURE — 1159F MED LIST DOCD IN RCRD: CPT | Mod: S$GLB,,, | Performed by: INTERNAL MEDICINE

## 2021-04-29 PROCEDURE — 99205 PR OFFICE/OUTPT VISIT, NEW, LEVL V, 60-74 MIN: ICD-10-PCS | Mod: S$GLB,,, | Performed by: INTERNAL MEDICINE

## 2021-04-29 PROCEDURE — 1101F PR PT FALLS ASSESS DOC 0-1 FALLS W/OUT INJ PAST YR: ICD-10-PCS | Mod: CPTII,S$GLB,, | Performed by: INTERNAL MEDICINE

## 2021-04-29 PROCEDURE — 71046 X-RAY EXAM CHEST 2 VIEWS: CPT | Mod: 26,,, | Performed by: RADIOLOGY

## 2021-04-29 PROCEDURE — 1159F PR MEDICATION LIST DOCUMENTED IN MEDICAL RECORD: ICD-10-PCS | Mod: S$GLB,,, | Performed by: INTERNAL MEDICINE

## 2021-04-29 PROCEDURE — 1101F PT FALLS ASSESS-DOCD LE1/YR: CPT | Mod: CPTII,S$GLB,, | Performed by: INTERNAL MEDICINE

## 2021-04-29 PROCEDURE — 3288F PR FALLS RISK ASSESSMENT DOCUMENTED: ICD-10-PCS | Mod: CPTII,S$GLB,, | Performed by: INTERNAL MEDICINE

## 2021-04-29 PROCEDURE — 99999 PR PBB SHADOW E&M-EST. PATIENT-LVL IV: ICD-10-PCS | Mod: PBBFAC,,, | Performed by: INTERNAL MEDICINE

## 2021-04-29 PROCEDURE — 99214 OFFICE O/P EST MOD 30 MIN: CPT | Mod: S$GLB,,, | Performed by: INTERNAL MEDICINE

## 2021-04-29 PROCEDURE — 99999 PR PBB SHADOW E&M-EST. PATIENT-LVL IV: CPT | Mod: PBBFAC,,, | Performed by: INTERNAL MEDICINE

## 2021-04-29 PROCEDURE — 99499 UNLISTED E&M SERVICE: CPT | Mod: S$GLB,,, | Performed by: INTERNAL MEDICINE

## 2021-04-29 PROCEDURE — 3288F FALL RISK ASSESSMENT DOCD: CPT | Mod: CPTII,S$GLB,, | Performed by: INTERNAL MEDICINE

## 2021-04-29 PROCEDURE — 99499 RISK ADDL DX/OHS AUDIT: ICD-10-PCS | Mod: S$GLB,,, | Performed by: INTERNAL MEDICINE

## 2021-04-29 PROCEDURE — 71046 X-RAY EXAM CHEST 2 VIEWS: CPT | Mod: TC,FY,PO

## 2021-04-29 PROCEDURE — 99205 OFFICE O/P NEW HI 60 MIN: CPT | Mod: S$GLB,,, | Performed by: INTERNAL MEDICINE

## 2021-04-29 PROCEDURE — 99214 PR OFFICE/OUTPT VISIT, EST, LEVL IV, 30-39 MIN: ICD-10-PCS | Mod: S$GLB,,, | Performed by: INTERNAL MEDICINE

## 2021-04-29 PROCEDURE — 1126F AMNT PAIN NOTED NONE PRSNT: CPT | Mod: S$GLB,,, | Performed by: INTERNAL MEDICINE

## 2021-04-29 PROCEDURE — 71046 XR CHEST PA AND LATERAL: ICD-10-PCS | Mod: 26,,, | Performed by: RADIOLOGY

## 2021-04-29 PROCEDURE — 1126F PR PAIN SEVERITY QUANTIFIED, NO PAIN PRESENT: ICD-10-PCS | Mod: S$GLB,,, | Performed by: INTERNAL MEDICINE

## 2021-04-29 RX ORDER — ALBUTEROL SULFATE 0.83 MG/ML
2.5 SOLUTION RESPIRATORY (INHALATION)
Qty: 270 ML | Refills: 11 | Status: SHIPPED | OUTPATIENT
Start: 2021-04-29 | End: 2021-12-13

## 2021-04-30 ENCOUNTER — LAB VISIT (OUTPATIENT)
Dept: LAB | Facility: HOSPITAL | Age: 81
End: 2021-04-30
Attending: INTERNAL MEDICINE
Payer: MEDICARE

## 2021-04-30 DIAGNOSIS — J18.9 ATYPICAL PNEUMONIA: ICD-10-CM

## 2021-04-30 DIAGNOSIS — R93.89 ABNORMAL CXR: ICD-10-CM

## 2021-04-30 LAB
CREAT SERPL-MCNC: 0.9 MG/DL (ref 0.5–1.4)
ERYTHROCYTE [SEDIMENTATION RATE] IN BLOOD BY WESTERGREN METHOD: 13 MM/HR (ref 0–36)
EST. GFR  (AFRICAN AMERICAN): >60 ML/MIN/1.73 M^2
EST. GFR  (NON AFRICAN AMERICAN): >60 ML/MIN/1.73 M^2
RHEUMATOID FACT SERPL-ACNC: <10 IU/ML (ref 0–15)

## 2021-04-30 PROCEDURE — 82565 ASSAY OF CREATININE: CPT | Performed by: INTERNAL MEDICINE

## 2021-04-30 PROCEDURE — 82164 ANGIOTENSIN I ENZYME TEST: CPT | Performed by: INTERNAL MEDICINE

## 2021-04-30 PROCEDURE — 36415 COLL VENOUS BLD VENIPUNCTURE: CPT | Performed by: INTERNAL MEDICINE

## 2021-04-30 PROCEDURE — 86038 ANTINUCLEAR ANTIBODIES: CPT | Performed by: INTERNAL MEDICINE

## 2021-04-30 PROCEDURE — 86431 RHEUMATOID FACTOR QUANT: CPT | Performed by: INTERNAL MEDICINE

## 2021-04-30 PROCEDURE — 85652 RBC SED RATE AUTOMATED: CPT | Performed by: INTERNAL MEDICINE

## 2021-04-30 PROCEDURE — 86480 TB TEST CELL IMMUN MEASURE: CPT | Performed by: INTERNAL MEDICINE

## 2021-05-03 LAB
ACE SERPL-CCNC: 47 U/L (ref 16–85)
ANA SER QL IF: NORMAL

## 2021-05-04 LAB
GAMMA INTERFERON BACKGROUND BLD IA-ACNC: 0.03 IU/ML
M TB IFN-G CD4+ BCKGRND COR BLD-ACNC: 0.14 IU/ML
MITOGEN IGNF BCKGRD COR BLD-ACNC: 8.55 IU/ML
TB GOLD PLUS: NEGATIVE
TB2 - NIL: 0.15 IU/ML

## 2021-07-06 ENCOUNTER — OFFICE VISIT (OUTPATIENT)
Dept: PULMONOLOGY | Facility: CLINIC | Age: 81
End: 2021-07-06
Payer: MEDICARE

## 2021-07-06 ENCOUNTER — HOSPITAL ENCOUNTER (OUTPATIENT)
Dept: RADIOLOGY | Facility: HOSPITAL | Age: 81
Discharge: HOME | End: 2021-07-06
Attending: INTERNAL MEDICINE
Payer: MEDICARE

## 2021-07-06 VITALS
SYSTOLIC BLOOD PRESSURE: 134 MMHG | DIASTOLIC BLOOD PRESSURE: 80 MMHG | HEART RATE: 87 BPM | HEIGHT: 60 IN | OXYGEN SATURATION: 98 % | BODY MASS INDEX: 33.5 KG/M2 | WEIGHT: 170.63 LBS | RESPIRATION RATE: 17 BRPM

## 2021-07-06 DIAGNOSIS — R06.02 SHORTNESS OF BREATH: ICD-10-CM

## 2021-07-06 DIAGNOSIS — J84.10 PULMONARY FIBROSIS, POSTINFLAMMATORY: Primary | ICD-10-CM

## 2021-07-06 DIAGNOSIS — J18.9 PNEUMONIA, UNSPECIFIED ORGANISM: ICD-10-CM

## 2021-07-06 DIAGNOSIS — J18.9 ATYPICAL PNEUMONIA: ICD-10-CM

## 2021-07-06 PROCEDURE — 1159F MED LIST DOCD IN RCRD: CPT | Mod: S$GLB,,, | Performed by: INTERNAL MEDICINE

## 2021-07-06 PROCEDURE — 99215 PR OFFICE/OUTPT VISIT, EST, LEVL V, 40-54 MIN: ICD-10-PCS | Mod: S$GLB,,, | Performed by: INTERNAL MEDICINE

## 2021-07-06 PROCEDURE — 3288F PR FALLS RISK ASSESSMENT DOCUMENTED: ICD-10-PCS | Mod: CPTII,S$GLB,, | Performed by: INTERNAL MEDICINE

## 2021-07-06 PROCEDURE — 99499 UNLISTED E&M SERVICE: CPT | Mod: S$GLB,,, | Performed by: INTERNAL MEDICINE

## 2021-07-06 PROCEDURE — 71048 X-RAY EXAM CHEST 4+ VIEWS: CPT | Mod: TC

## 2021-07-06 PROCEDURE — 1101F PR PT FALLS ASSESS DOC 0-1 FALLS W/OUT INJ PAST YR: ICD-10-PCS | Mod: CPTII,S$GLB,, | Performed by: INTERNAL MEDICINE

## 2021-07-06 PROCEDURE — 1159F PR MEDICATION LIST DOCUMENTED IN MEDICAL RECORD: ICD-10-PCS | Mod: S$GLB,,, | Performed by: INTERNAL MEDICINE

## 2021-07-06 PROCEDURE — 99999 PR PBB SHADOW E&M-EST. PATIENT-LVL IV: ICD-10-PCS | Mod: PBBFAC,,, | Performed by: INTERNAL MEDICINE

## 2021-07-06 PROCEDURE — 3288F FALL RISK ASSESSMENT DOCD: CPT | Mod: CPTII,S$GLB,, | Performed by: INTERNAL MEDICINE

## 2021-07-06 PROCEDURE — 99999 PR PBB SHADOW E&M-EST. PATIENT-LVL IV: CPT | Mod: PBBFAC,,, | Performed by: INTERNAL MEDICINE

## 2021-07-06 PROCEDURE — 1101F PT FALLS ASSESS-DOCD LE1/YR: CPT | Mod: CPTII,S$GLB,, | Performed by: INTERNAL MEDICINE

## 2021-07-06 PROCEDURE — 99215 OFFICE O/P EST HI 40 MIN: CPT | Mod: S$GLB,,, | Performed by: INTERNAL MEDICINE

## 2021-07-06 PROCEDURE — 71048 X-RAY EXAM CHEST 4+ VIEWS: CPT | Mod: 26,,, | Performed by: RADIOLOGY

## 2021-07-06 PROCEDURE — 71048 XR CHEST 4 OR MORE VIEW: ICD-10-PCS | Mod: 26,,, | Performed by: RADIOLOGY

## 2021-07-06 PROCEDURE — 99499 RISK ADDL DX/OHS AUDIT: ICD-10-PCS | Mod: S$GLB,,, | Performed by: INTERNAL MEDICINE

## 2021-07-07 DIAGNOSIS — J18.9 PNEUMONIA, UNSPECIFIED ORGANISM: ICD-10-CM

## 2021-07-07 RX ORDER — ALBUTEROL SULFATE 90 UG/1
2 AEROSOL, METERED RESPIRATORY (INHALATION) EVERY 6 HOURS PRN
Qty: 18 G | Refills: 11 | Status: SHIPPED | OUTPATIENT
Start: 2021-07-07 | End: 2021-12-13

## 2021-09-16 DIAGNOSIS — E78.5 HYPERLIPIDEMIA: ICD-10-CM

## 2021-09-16 RX ORDER — ATORVASTATIN CALCIUM 20 MG/1
20 TABLET, FILM COATED ORAL DAILY
Qty: 90 TABLET | Refills: 3 | Status: SHIPPED | OUTPATIENT
Start: 2021-09-16 | End: 2022-09-12

## 2021-10-11 NOTE — TELEPHONE ENCOUNTER
----- Message from Hodan Gomez sent at 11/7/2019 10:39 AM CST -----  Contact: Amy-daughter in law  .Type:  Sooner Apoointment Request    Caller is requesting a sooner appointment.  Caller declined first available appointment listed below.  Caller will not accept being placed on the waitlist and is requesting a message be sent to doctor.  Name of Caller:Amy  When is the first available appointment?2/11/19  Symptoms:bad cough  Would the patient rather a call back or a response via [facility]? Call back  Best Call Back Number:[phone]  Additional Information:      Apply bacitracin ointment to all abrassions    Keep the Aquacel dressings to pelvis in place until removed in clinic by the orthopedic surgeon.     Dry gauze dressing change daily and as needed if soiled to right shoulder    Keep surgical incision to right knee open to air.

## 2021-10-22 ENCOUNTER — TELEPHONE (OUTPATIENT)
Dept: PULMONOLOGY | Facility: CLINIC | Age: 81
End: 2021-10-22

## 2021-10-25 ENCOUNTER — TELEPHONE (OUTPATIENT)
Dept: PULMONOLOGY | Facility: CLINIC | Age: 81
End: 2021-10-25
Payer: MEDICARE

## 2021-11-04 ENCOUNTER — TELEPHONE (OUTPATIENT)
Dept: FAMILY MEDICINE | Facility: CLINIC | Age: 81
End: 2021-11-04
Payer: MEDICARE

## 2021-11-04 ENCOUNTER — TELEPHONE (OUTPATIENT)
Dept: CARDIOLOGY | Facility: CLINIC | Age: 81
End: 2021-11-04
Payer: MEDICARE

## 2021-11-04 DIAGNOSIS — Z12.31 ENCOUNTER FOR SCREENING MAMMOGRAM FOR MALIGNANT NEOPLASM OF BREAST: Primary | ICD-10-CM

## 2021-11-12 ENCOUNTER — OFFICE VISIT (OUTPATIENT)
Dept: CARDIOLOGY | Facility: CLINIC | Age: 81
End: 2021-11-12
Payer: MEDICARE

## 2021-11-12 VITALS
HEIGHT: 60 IN | DIASTOLIC BLOOD PRESSURE: 64 MMHG | WEIGHT: 171.5 LBS | BODY MASS INDEX: 33.67 KG/M2 | OXYGEN SATURATION: 97 % | SYSTOLIC BLOOD PRESSURE: 118 MMHG | HEART RATE: 74 BPM

## 2021-11-12 DIAGNOSIS — I48.91 ATRIAL FIBRILLATION, UNSPECIFIED TYPE: Primary | ICD-10-CM

## 2021-11-12 DIAGNOSIS — Z79.01 CHRONIC ANTICOAGULATION: ICD-10-CM

## 2021-11-12 DIAGNOSIS — R00.2 PALPITATION: ICD-10-CM

## 2021-11-12 DIAGNOSIS — G45.9 TIA (TRANSIENT ISCHEMIC ATTACK): ICD-10-CM

## 2021-11-12 DIAGNOSIS — E83.52 HYPERCALCEMIA: ICD-10-CM

## 2021-11-12 DIAGNOSIS — J84.10 PULMONARY FIBROSIS, POSTINFLAMMATORY: ICD-10-CM

## 2021-11-12 DIAGNOSIS — E55.9 VITAMIN D DEFICIENCY: ICD-10-CM

## 2021-11-12 DIAGNOSIS — I10 ESSENTIAL HYPERTENSION: ICD-10-CM

## 2021-11-12 PROCEDURE — 1160F RVW MEDS BY RX/DR IN RCRD: CPT | Mod: CPTII,S$GLB,, | Performed by: INTERNAL MEDICINE

## 2021-11-12 PROCEDURE — 3078F PR MOST RECENT DIASTOLIC BLOOD PRESSURE < 80 MM HG: ICD-10-PCS | Mod: CPTII,S$GLB,, | Performed by: INTERNAL MEDICINE

## 2021-11-12 PROCEDURE — 99999 PR PBB SHADOW E&M-EST. PATIENT-LVL IV: CPT | Mod: PBBFAC,,, | Performed by: INTERNAL MEDICINE

## 2021-11-12 PROCEDURE — 1126F AMNT PAIN NOTED NONE PRSNT: CPT | Mod: CPTII,S$GLB,, | Performed by: INTERNAL MEDICINE

## 2021-11-12 PROCEDURE — 3074F SYST BP LT 130 MM HG: CPT | Mod: CPTII,S$GLB,, | Performed by: INTERNAL MEDICINE

## 2021-11-12 PROCEDURE — 99214 PR OFFICE/OUTPT VISIT, EST, LEVL IV, 30-39 MIN: ICD-10-PCS | Mod: S$GLB,,, | Performed by: INTERNAL MEDICINE

## 2021-11-12 PROCEDURE — 1101F PT FALLS ASSESS-DOCD LE1/YR: CPT | Mod: CPTII,S$GLB,, | Performed by: INTERNAL MEDICINE

## 2021-11-12 PROCEDURE — 1159F PR MEDICATION LIST DOCUMENTED IN MEDICAL RECORD: ICD-10-PCS | Mod: CPTII,S$GLB,, | Performed by: INTERNAL MEDICINE

## 2021-11-12 PROCEDURE — 3074F PR MOST RECENT SYSTOLIC BLOOD PRESSURE < 130 MM HG: ICD-10-PCS | Mod: CPTII,S$GLB,, | Performed by: INTERNAL MEDICINE

## 2021-11-12 PROCEDURE — 99214 OFFICE O/P EST MOD 30 MIN: CPT | Mod: S$GLB,,, | Performed by: INTERNAL MEDICINE

## 2021-11-12 PROCEDURE — 3288F PR FALLS RISK ASSESSMENT DOCUMENTED: ICD-10-PCS | Mod: CPTII,S$GLB,, | Performed by: INTERNAL MEDICINE

## 2021-11-12 PROCEDURE — 1160F PR REVIEW ALL MEDS BY PRESCRIBER/CLIN PHARMACIST DOCUMENTED: ICD-10-PCS | Mod: CPTII,S$GLB,, | Performed by: INTERNAL MEDICINE

## 2021-11-12 PROCEDURE — 1159F MED LIST DOCD IN RCRD: CPT | Mod: CPTII,S$GLB,, | Performed by: INTERNAL MEDICINE

## 2021-11-12 PROCEDURE — 1101F PR PT FALLS ASSESS DOC 0-1 FALLS W/OUT INJ PAST YR: ICD-10-PCS | Mod: CPTII,S$GLB,, | Performed by: INTERNAL MEDICINE

## 2021-11-12 PROCEDURE — 3288F FALL RISK ASSESSMENT DOCD: CPT | Mod: CPTII,S$GLB,, | Performed by: INTERNAL MEDICINE

## 2021-11-12 PROCEDURE — 1126F PR PAIN SEVERITY QUANTIFIED, NO PAIN PRESENT: ICD-10-PCS | Mod: CPTII,S$GLB,, | Performed by: INTERNAL MEDICINE

## 2021-11-12 PROCEDURE — 99499 UNLISTED E&M SERVICE: CPT | Mod: S$GLB,,, | Performed by: INTERNAL MEDICINE

## 2021-11-12 PROCEDURE — 99499 RISK ADDL DX/OHS AUDIT: ICD-10-PCS | Mod: S$GLB,,, | Performed by: INTERNAL MEDICINE

## 2021-11-12 PROCEDURE — 3078F DIAST BP <80 MM HG: CPT | Mod: CPTII,S$GLB,, | Performed by: INTERNAL MEDICINE

## 2021-11-12 PROCEDURE — 99999 PR PBB SHADOW E&M-EST. PATIENT-LVL IV: ICD-10-PCS | Mod: PBBFAC,,, | Performed by: INTERNAL MEDICINE

## 2021-11-15 ENCOUNTER — TELEPHONE (OUTPATIENT)
Dept: FAMILY MEDICINE | Facility: CLINIC | Age: 81
End: 2021-11-15
Payer: MEDICARE

## 2021-11-29 ENCOUNTER — HOSPITAL ENCOUNTER (OUTPATIENT)
Dept: RADIOLOGY | Facility: HOSPITAL | Age: 81
Discharge: HOME | End: 2021-11-29
Attending: INTERNAL MEDICINE
Payer: MEDICARE

## 2021-11-29 VITALS — BODY MASS INDEX: 33.57 KG/M2 | HEIGHT: 60 IN | WEIGHT: 171 LBS

## 2021-11-29 DIAGNOSIS — Z12.31 ENCOUNTER FOR SCREENING MAMMOGRAM FOR MALIGNANT NEOPLASM OF BREAST: ICD-10-CM

## 2021-11-29 PROCEDURE — 77067 SCR MAMMO BI INCL CAD: CPT | Mod: 26,,, | Performed by: RADIOLOGY

## 2021-11-29 PROCEDURE — 77067 SCR MAMMO BI INCL CAD: CPT | Mod: TC

## 2021-11-29 PROCEDURE — 77063 MAMMO DIGITAL SCREENING BILAT WITH TOMO: ICD-10-PCS | Mod: 26,,, | Performed by: RADIOLOGY

## 2021-11-29 PROCEDURE — 77067 MAMMO DIGITAL SCREENING BILAT WITH TOMO: ICD-10-PCS | Mod: 26,,, | Performed by: RADIOLOGY

## 2021-11-29 PROCEDURE — 77063 BREAST TOMOSYNTHESIS BI: CPT | Mod: 26,,, | Performed by: RADIOLOGY

## 2021-12-13 ENCOUNTER — HOSPITAL ENCOUNTER (OUTPATIENT)
Dept: RADIOLOGY | Facility: HOSPITAL | Age: 81
Discharge: HOME | End: 2021-12-13
Attending: INTERNAL MEDICINE
Payer: MEDICARE

## 2021-12-13 ENCOUNTER — OFFICE VISIT (OUTPATIENT)
Dept: FAMILY MEDICINE | Facility: CLINIC | Age: 81
End: 2021-12-13
Payer: MEDICARE

## 2021-12-13 VITALS
BODY MASS INDEX: 34.74 KG/M2 | TEMPERATURE: 98 F | HEIGHT: 60 IN | WEIGHT: 176.94 LBS | SYSTOLIC BLOOD PRESSURE: 116 MMHG | OXYGEN SATURATION: 98 % | DIASTOLIC BLOOD PRESSURE: 68 MMHG | HEART RATE: 91 BPM

## 2021-12-13 DIAGNOSIS — E21.3 HYPERPARATHYROIDISM: ICD-10-CM

## 2021-12-13 DIAGNOSIS — E78.49 OTHER HYPERLIPIDEMIA: ICD-10-CM

## 2021-12-13 DIAGNOSIS — R93.89 ABNORMAL CXR: ICD-10-CM

## 2021-12-13 DIAGNOSIS — Z85.3 HISTORY OF RIGHT BREAST CANCER: ICD-10-CM

## 2021-12-13 DIAGNOSIS — I48.91 ATRIAL FIBRILLATION, UNSPECIFIED TYPE: ICD-10-CM

## 2021-12-13 DIAGNOSIS — J84.10 PULMONARY FIBROSIS, POSTINFLAMMATORY: ICD-10-CM

## 2021-12-13 DIAGNOSIS — M81.0 AGE-RELATED OSTEOPOROSIS WITHOUT CURRENT PATHOLOGICAL FRACTURE: ICD-10-CM

## 2021-12-13 DIAGNOSIS — Z79.01 CHRONIC ANTICOAGULATION: ICD-10-CM

## 2021-12-13 DIAGNOSIS — R05.3 CHRONIC COUGH: ICD-10-CM

## 2021-12-13 DIAGNOSIS — E55.9 VITAMIN D DEFICIENCY: ICD-10-CM

## 2021-12-13 DIAGNOSIS — I10 ESSENTIAL HYPERTENSION: Primary | ICD-10-CM

## 2021-12-13 DIAGNOSIS — Z00.00 ENCOUNTER FOR PREVENTIVE HEALTH EXAMINATION: ICD-10-CM

## 2021-12-13 DIAGNOSIS — R35.0 URINARY FREQUENCY: ICD-10-CM

## 2021-12-13 PROCEDURE — 71046 X-RAY EXAM CHEST 2 VIEWS: CPT | Mod: 26,,, | Performed by: RADIOLOGY

## 2021-12-13 PROCEDURE — 99214 PR OFFICE/OUTPT VISIT, EST, LEVL IV, 30-39 MIN: ICD-10-PCS | Mod: S$GLB,,, | Performed by: INTERNAL MEDICINE

## 2021-12-13 PROCEDURE — 71046 XR CHEST PA AND LATERAL: ICD-10-PCS | Mod: 26,,, | Performed by: RADIOLOGY

## 2021-12-13 PROCEDURE — 99214 OFFICE O/P EST MOD 30 MIN: CPT | Mod: S$GLB,,, | Performed by: INTERNAL MEDICINE

## 2021-12-13 PROCEDURE — 71046 X-RAY EXAM CHEST 2 VIEWS: CPT | Mod: TC,FY,PO

## 2021-12-13 PROCEDURE — 99999 PR PBB SHADOW E&M-EST. PATIENT-LVL IV: CPT | Mod: PBBFAC,,, | Performed by: INTERNAL MEDICINE

## 2021-12-13 PROCEDURE — 99999 PR PBB SHADOW E&M-EST. PATIENT-LVL IV: ICD-10-PCS | Mod: PBBFAC,,, | Performed by: INTERNAL MEDICINE

## 2021-12-13 RX ORDER — OMEPRAZOLE 40 MG/1
40 CAPSULE, DELAYED RELEASE ORAL DAILY
COMMUNITY
Start: 2021-11-01

## 2021-12-14 ENCOUNTER — TELEPHONE (OUTPATIENT)
Dept: FAMILY MEDICINE | Facility: CLINIC | Age: 81
End: 2021-12-14
Payer: MEDICARE

## 2021-12-14 DIAGNOSIS — E21.3 HYPERPARATHYROIDISM: Primary | ICD-10-CM

## 2021-12-14 DIAGNOSIS — R93.89 ABNORMAL CXR: ICD-10-CM

## 2022-01-05 ENCOUNTER — TELEPHONE (OUTPATIENT)
Dept: RHEUMATOLOGY | Facility: CLINIC | Age: 82
End: 2022-01-05
Payer: MEDICARE

## 2022-01-05 NOTE — TELEPHONE ENCOUNTER
Reached out to patient to schedule appointment from the work queue. Apt has been made.. Pt understand. All questions answered.     Damon Ballesteros  Medical Assistant

## 2022-01-11 NOTE — PROGRESS NOTES
Subjective:      Patient ID: Lorenzo Ospina is a 81 y.o. female.    Chief Complaint: Follow-up      HPI  Here for follow up of medical problems.  Not checking BPs lately.  Cough is better.  Off losartan, and saw ENT and checked for aspiration/given nasal spray by ENT.  Test says no aspiration noted.  Daughter says cough is definitely improved.  To see Pulm next month.  Urine is dark and smells bad.  BMs normal.  No f/c/n/v.  No cp/sob/palp.    Updated/ annual due 12/22:  HM: 10/21 fluvax, 2/21 covid vaccines/booster, 6/17 [id number], refuses [id number] unknown reason even after discussion, ref tetanus, 11/21 MMG/now me, 9/20 BMD rep 2y,  2012 Cscope saw Gastro in 2017 and says due in 2022.     Review of Systems   Constitutional: Negative for chills, diaphoresis and fever.   Respiratory: Negative for cough and shortness of breath.    Cardiovascular: Negative for chest pain, palpitations and leg swelling.   Gastrointestinal: Negative for blood in stool, constipation, diarrhea, nausea and vomiting.   Genitourinary: Negative for dysuria, frequency and hematuria.   Psychiatric/Behavioral: The patient is not nervous/anxious.          Objective:   BP (!) 146/80 (BP Location: Left arm, Patient Position: Sitting)   Pulse 87   Temp 97 °F (36.1 °C) (Temporal)   Resp 18   Ht 5' (1.524 m)   Wt 79 kg (174 lb 2.6 oz)   SpO2 98%   BMI 34.01 kg/m²     Physical Exam  Constitutional:       Appearance: She is well-developed.   HENT:      Mouth/Throat:      Mouth: Oropharynx is clear and moist.   Neck:      Thyroid: No thyroid mass.      Vascular: No carotid bruit.   Cardiovascular:      Rate and Rhythm: Normal rate. Rhythm irregularly irregular.      Pulses: Intact distal pulses.      Heart sounds: No murmur heard.  No friction rub. No gallop.    Pulmonary:      Effort: Pulmonary effort is normal.      Breath sounds: Normal breath sounds. No wheezing or rales.   Abdominal:      General: Bowel sounds are normal.       Palpations: Abdomen is soft. There is no hepatosplenomegaly or mass.      Tenderness: There is no abdominal tenderness.   Musculoskeletal:         General: No edema.      Cervical back: Neck supple.   Lymphadenopathy:      Cervical: No cervical adenopathy.   Neurological:      Mental Status: She is alert and oriented to person, place, and time.   Psychiatric:         Mood and Affect: Mood and affect normal.             Assessment:       1. Chronic cough    2. Essential hypertension    3. Pulmonary fibrosis, postinflammatory    4. Hyperparathyroidism    5. Paroxysmal atrial fibrillation    6. Chronic anticoagulation    7. History of right breast cancer    8. Urine malodor          Plan:     Lorenzo was seen today for follow-up.    Diagnoses and all orders for this visit:    Chronic cough, improving, not sure if ARB or pnd, or fibrosis, improving.    Essential hypertension- monitor BPs and send to me in 1wk.  Poss double BB.    Pulmonary fibrosis, postinflammatory    Hyperparathyroidism, nonsurgical now.    Paroxysmal atrial fibrillation, Chronic anticoagulation    History of right breast cancer, with radiation.    Urine malodor r/o UTI.  -     Urinalysis; Future  -     Urine culture; Future    RTC 4mo.

## 2022-01-16 ENCOUNTER — PATIENT OUTREACH (OUTPATIENT)
Dept: ADMINISTRATIVE | Facility: OTHER | Age: 82
End: 2022-01-16
Payer: MEDICARE

## 2022-01-19 ENCOUNTER — PES CALL (OUTPATIENT)
Dept: ADMINISTRATIVE | Facility: CLINIC | Age: 82
End: 2022-01-19
Payer: MEDICARE

## 2022-01-19 ENCOUNTER — OFFICE VISIT (OUTPATIENT)
Dept: SURGERY | Facility: CLINIC | Age: 82
End: 2022-01-19
Payer: MEDICARE

## 2022-01-19 VITALS
DIASTOLIC BLOOD PRESSURE: 62 MMHG | HEART RATE: 82 BPM | TEMPERATURE: 98 F | WEIGHT: 177.25 LBS | SYSTOLIC BLOOD PRESSURE: 145 MMHG | BODY MASS INDEX: 34.62 KG/M2

## 2022-01-19 DIAGNOSIS — E21.3 HYPERPARATHYROIDISM: ICD-10-CM

## 2022-01-19 PROCEDURE — 1160F PR REVIEW ALL MEDS BY PRESCRIBER/CLIN PHARMACIST DOCUMENTED: ICD-10-PCS | Mod: CPTII,S$GLB,, | Performed by: SURGERY

## 2022-01-19 PROCEDURE — 1160F RVW MEDS BY RX/DR IN RCRD: CPT | Mod: CPTII,S$GLB,, | Performed by: SURGERY

## 2022-01-19 PROCEDURE — 3078F DIAST BP <80 MM HG: CPT | Mod: CPTII,S$GLB,, | Performed by: SURGERY

## 2022-01-19 PROCEDURE — 3077F PR MOST RECENT SYSTOLIC BLOOD PRESSURE >= 140 MM HG: ICD-10-PCS | Mod: CPTII,S$GLB,, | Performed by: SURGERY

## 2022-01-19 PROCEDURE — 3077F SYST BP >= 140 MM HG: CPT | Mod: CPTII,S$GLB,, | Performed by: SURGERY

## 2022-01-19 PROCEDURE — 1126F PR PAIN SEVERITY QUANTIFIED, NO PAIN PRESENT: ICD-10-PCS | Mod: CPTII,S$GLB,, | Performed by: SURGERY

## 2022-01-19 PROCEDURE — 3078F PR MOST RECENT DIASTOLIC BLOOD PRESSURE < 80 MM HG: ICD-10-PCS | Mod: CPTII,S$GLB,, | Performed by: SURGERY

## 2022-01-19 PROCEDURE — 99999 PR PBB SHADOW E&M-EST. PATIENT-LVL III: ICD-10-PCS | Mod: PBBFAC,,, | Performed by: SURGERY

## 2022-01-19 PROCEDURE — 1159F PR MEDICATION LIST DOCUMENTED IN MEDICAL RECORD: ICD-10-PCS | Mod: CPTII,S$GLB,, | Performed by: SURGERY

## 2022-01-19 PROCEDURE — 99999 PR PBB SHADOW E&M-EST. PATIENT-LVL III: CPT | Mod: PBBFAC,,, | Performed by: SURGERY

## 2022-01-19 PROCEDURE — 99204 PR OFFICE/OUTPT VISIT, NEW, LEVL IV, 45-59 MIN: ICD-10-PCS | Mod: S$GLB,,, | Performed by: SURGERY

## 2022-01-19 PROCEDURE — 99204 OFFICE O/P NEW MOD 45 MIN: CPT | Mod: S$GLB,,, | Performed by: SURGERY

## 2022-01-19 PROCEDURE — 1159F MED LIST DOCD IN RCRD: CPT | Mod: CPTII,S$GLB,, | Performed by: SURGERY

## 2022-01-19 PROCEDURE — 1126F AMNT PAIN NOTED NONE PRSNT: CPT | Mod: CPTII,S$GLB,, | Performed by: SURGERY

## 2022-01-24 ENCOUNTER — OFFICE VISIT (OUTPATIENT)
Dept: FAMILY MEDICINE | Facility: CLINIC | Age: 82
End: 2022-01-24
Payer: MEDICARE

## 2022-01-24 VITALS
WEIGHT: 174.19 LBS | TEMPERATURE: 97 F | RESPIRATION RATE: 18 BRPM | SYSTOLIC BLOOD PRESSURE: 146 MMHG | DIASTOLIC BLOOD PRESSURE: 80 MMHG | OXYGEN SATURATION: 98 % | HEART RATE: 87 BPM | BODY MASS INDEX: 34.2 KG/M2 | HEIGHT: 60 IN

## 2022-01-24 DIAGNOSIS — Z79.01 CHRONIC ANTICOAGULATION: ICD-10-CM

## 2022-01-24 DIAGNOSIS — Z85.3 HISTORY OF RIGHT BREAST CANCER: ICD-10-CM

## 2022-01-24 DIAGNOSIS — J84.10 PULMONARY FIBROSIS, POSTINFLAMMATORY: ICD-10-CM

## 2022-01-24 DIAGNOSIS — I48.0 PAROXYSMAL ATRIAL FIBRILLATION: ICD-10-CM

## 2022-01-24 DIAGNOSIS — R05.3 CHRONIC COUGH: Primary | ICD-10-CM

## 2022-01-24 DIAGNOSIS — I10 ESSENTIAL HYPERTENSION: ICD-10-CM

## 2022-01-24 DIAGNOSIS — R82.90 URINE MALODOR: ICD-10-CM

## 2022-01-24 DIAGNOSIS — E21.3 HYPERPARATHYROIDISM: ICD-10-CM

## 2022-01-24 PROCEDURE — 3288F PR FALLS RISK ASSESSMENT DOCUMENTED: ICD-10-PCS | Mod: CPTII,S$GLB,, | Performed by: INTERNAL MEDICINE

## 2022-01-24 PROCEDURE — 1159F PR MEDICATION LIST DOCUMENTED IN MEDICAL RECORD: ICD-10-PCS | Mod: CPTII,S$GLB,, | Performed by: INTERNAL MEDICINE

## 2022-01-24 PROCEDURE — 1159F MED LIST DOCD IN RCRD: CPT | Mod: CPTII,S$GLB,, | Performed by: INTERNAL MEDICINE

## 2022-01-24 PROCEDURE — 3077F PR MOST RECENT SYSTOLIC BLOOD PRESSURE >= 140 MM HG: ICD-10-PCS | Mod: CPTII,S$GLB,, | Performed by: INTERNAL MEDICINE

## 2022-01-24 PROCEDURE — 3077F SYST BP >= 140 MM HG: CPT | Mod: CPTII,S$GLB,, | Performed by: INTERNAL MEDICINE

## 2022-01-24 PROCEDURE — 3079F PR MOST RECENT DIASTOLIC BLOOD PRESSURE 80-89 MM HG: ICD-10-PCS | Mod: CPTII,S$GLB,, | Performed by: INTERNAL MEDICINE

## 2022-01-24 PROCEDURE — 1101F PT FALLS ASSESS-DOCD LE1/YR: CPT | Mod: CPTII,S$GLB,, | Performed by: INTERNAL MEDICINE

## 2022-01-24 PROCEDURE — 99999 PR PBB SHADOW E&M-EST. PATIENT-LVL IV: ICD-10-PCS | Mod: PBBFAC,,, | Performed by: INTERNAL MEDICINE

## 2022-01-24 PROCEDURE — 3079F DIAST BP 80-89 MM HG: CPT | Mod: CPTII,S$GLB,, | Performed by: INTERNAL MEDICINE

## 2022-01-24 PROCEDURE — 1126F PR PAIN SEVERITY QUANTIFIED, NO PAIN PRESENT: ICD-10-PCS | Mod: CPTII,S$GLB,, | Performed by: INTERNAL MEDICINE

## 2022-01-24 PROCEDURE — 99999 PR PBB SHADOW E&M-EST. PATIENT-LVL IV: CPT | Mod: PBBFAC,,, | Performed by: INTERNAL MEDICINE

## 2022-01-24 PROCEDURE — 3288F FALL RISK ASSESSMENT DOCD: CPT | Mod: CPTII,S$GLB,, | Performed by: INTERNAL MEDICINE

## 2022-01-24 PROCEDURE — 99214 PR OFFICE/OUTPT VISIT, EST, LEVL IV, 30-39 MIN: ICD-10-PCS | Mod: S$GLB,,, | Performed by: INTERNAL MEDICINE

## 2022-01-24 PROCEDURE — 1126F AMNT PAIN NOTED NONE PRSNT: CPT | Mod: CPTII,S$GLB,, | Performed by: INTERNAL MEDICINE

## 2022-01-24 PROCEDURE — 99214 OFFICE O/P EST MOD 30 MIN: CPT | Mod: S$GLB,,, | Performed by: INTERNAL MEDICINE

## 2022-01-24 PROCEDURE — 1101F PR PT FALLS ASSESS DOC 0-1 FALLS W/OUT INJ PAST YR: ICD-10-PCS | Mod: CPTII,S$GLB,, | Performed by: INTERNAL MEDICINE

## 2022-01-28 NOTE — PROGRESS NOTES
History & Physical    SUBJECTIVE:     History of Present Illness:  Patient is a 81 y.o. female referred for hyperparathyroidism.  She has had elevated PTH for the the past few years with levels greater than 200. Recently she was noted to have a calcium of 11. She denies any previous kidney stones, fractures, abdominal pains, mentation changes.      Chief Complaint   Patient presents with    Consult     Hyperparathyroidism, endocrine       Review of patient's allergies indicates:   Allergen Reactions    Medrol [methylprednisolone]        Current Outpatient Medications   Medication Sig Dispense Refill    atorvastatin (LIPITOR) 20 MG tablet Take 1 tablet (20 mg total) by mouth once daily. 90 tablet 3    ELIQUIS 5 mg Tab TAKE 1 TABLET BY MOUTH TWICE A DAY 60 tablet 6    fish oil-omega-3 fatty acids 300-1,000 mg capsule Take 2 g by mouth once daily.      furosemide (LASIX) 20 MG tablet TAKE 1 TABLET BY MOUTH EVERY DAY 30 tablet 1    glucosam/chond/hyalu/CF borate ([facility] ORAL) Take 1 tablet by mouth once daily.      metoprolol succinate (TOPROL-XL) 50 MG 24 hr tablet TAKE ONE TABLET BY MOUTH ONCE DAILY 90 tablet 3    MULTIVIT-IRON-MIN-FOLIC ACID 3,500-18-0.4 UNIT-MG-MG ORAL CHEW Take by mouth.      omeprazole (PRILOSEC) 40 MG capsule Take 40 mg by mouth once daily.      vitamin D 1000 units Tab Take 2,000 Units by mouth once daily.      gabapentin (NEURONTIN) 100 MG capsule TAKE 1 CAPSULE BY MOUTH THREE TIMES A DAY 90 capsule 11    ranolazine (RANEXA) 500 MG Tb12 TAKE 1 TABLET BY MOUTH TWICE A  tablet 3     No current facility-administered medications for this visit.       Past Medical History:   Diagnosis Date    A-fib 11/5/2019    Breast cancer     right, dx 2008.  Dr. Callaway follows.s/p lumpectomy and arimidex x 5y.    Hyperlipidemia     Hypertension     OP (osteoporosis)     on bisphos x 2y.    TIA (transient ischemic attack)     Toe fracture      Past Surgical History:    Procedure Laterality Date    BREAST LUMPECTOMY      right 2008    TOTAL ABDOMINAL HYSTERECTOMY      no cancer    TRANSESOPHAGEAL ECHOCARDIOGRAPHY N/A 2019    Procedure: ECHOCARDIOGRAM, TRANSESOPHAGEAL;  Surgeon: Otto Flores MD;  Location: [facility] CATH LAB;  Service: Cardiology;  Laterality: N/A;    TREATMENT OF CARDIAC ARRHYTHMIA N/A 2019    Procedure: CARDIOVERSION;  Surgeon: Otto Flores MD;  Location: [facility] CATH LAB;  Service: Cardiology;  Laterality: N/A;    TREATMENT OF CARDIAC ARRHYTHMIA N/A 2019    Procedure: CARDIOVERSION;  Surgeon: Otto Flores MD;  Location: [facility] CATH LAB;  Service: Cardiology;  Laterality: N/A;    TREATMENT OF CARDIAC ARRHYTHMIA N/A 2020    Procedure: CARDIOVERSION;  Surgeon: Otto Flores MD;  Location: [facility] CATH LAB;  Service: Cardiology;  Laterality: N/A;    TREATMENT OF CARDIAC ARRHYTHMIA N/A 3/2/2020    Procedure: CARDIOVERSION;  Surgeon: Mayi Beltran MD;  Location: [facility] CATH LAB;  Service: Cardiology;  Laterality: N/A;  830 start per Dr. beltran     History reviewed. No pertinent family history.  Social History     Tobacco Use    Smoking status: Former Smoker     Packs/day: 1.50     Years: 22.00     Pack years: 33.00     Start date: 1972     Quit date: 1994     Years since quittin.3    Smokeless tobacco: Never Used   Substance Use Topics    Alcohol use: Not Currently     Alcohol/week: 1.0 standard drink     Types: 1 Glasses of wine per week     Comment: daily    Drug use: Never        Review of Systems:  Review of Systems   Constitutional: Negative for activity change, appetite change, chills, diaphoresis, fatigue, fever and unexpected weight change.   HENT: Negative for congestion, dental problem, rhinorrhea and sore throat.    Eyes: Negative for visual disturbance.   Respiratory: Negative for cough, chest tightness, shortness of breath and wheezing.    Cardiovascular: Negative for chest pain, palpitations and leg swelling.   Gastrointestinal:  Negative for abdominal distention, abdominal pain, constipation, diarrhea, nausea and vomiting.   Endocrine: Negative for cold intolerance, heat intolerance, polydipsia, polyphagia and polyuria.   Genitourinary: Negative for difficulty urinating, dysuria, frequency, hematuria and urgency.   Musculoskeletal: Negative for arthralgias, gait problem, myalgias and neck pain.   Skin: Negative for color change, pallor, rash and wound.   Neurological: Negative for dizziness, syncope, weakness, light-headedness, numbness and headaches.   Hematological: Negative for adenopathy. Does not bruise/bleed easily.   Psychiatric/Behavioral: Negative for confusion, decreased concentration and sleep disturbance. The patient is not nervous/anxious.        OBJECTIVE:     Vital Signs (Most Recent)  Temp: 97.5 °F (36.4 °C) (01/19/22 1603)  Pulse: 82 (01/19/22 1603)  BP: (!) 145/62 (01/19/22 1603)     80.4 kg (177 lb 4 oz)     Physical Exam:  Physical Exam  Vitals reviewed.   Constitutional:       General: She is not in acute distress.     Appearance: She is well-developed. She is not diaphoretic.   HENT:      Head: Normocephalic and atraumatic.      Right Ear: External ear normal.      Left Ear: External ear normal.   Eyes:      General: No scleral icterus.     Conjunctiva/sclera: Conjunctivae normal.      Pupils: Pupils are equal, round, and reactive to light.   Neck:      Thyroid: No thyromegaly.      Trachea: No tracheal deviation.   Cardiovascular:      Rate and Rhythm: Normal rate and regular rhythm.      Heart sounds: Normal heart sounds. No murmur heard.  No friction rub. No gallop.    Pulmonary:      Effort: Pulmonary effort is normal. No respiratory distress.      Breath sounds: Normal breath sounds. No wheezing or rales.   Chest:      Chest wall: No tenderness.   Abdominal:      General: Bowel sounds are normal. There is no distension.      Palpations: Abdomen is soft.      Tenderness: There is no abdominal tenderness.       Hernia: No hernia is present.   Musculoskeletal:         General: No tenderness or deformity. Normal range of motion.      Cervical back: Normal range of motion and neck supple.   Lymphadenopathy:      Cervical: No cervical adenopathy.   Skin:     General: Skin is warm and dry.      Coloration: Skin is not pale.      Findings: No erythema or rash.   Neurological:      Mental Status: She is alert and oriented to person, place, and time.   Psychiatric:         Behavior: Behavior normal.         Thought Content: Thought content normal.         Judgment: Judgment normal.         Laboratory  PTH>200  Calcium 11  Vitamin-D 44  GFR > 60    Diagnostic Results:  FINDINGS:  The L1 to L4 vertebral bone mineral density is equal to 1.280 g/cm squared with a T score of 0.8.  There has been no significant change relative to the prior study.     The left femoral neck bone mineral density is equal to 0.764 g/cm squared with a T score of -2.0.  There has been  a -8.2% statistically significant change relative to the prior study.     There is a 20.9% risk of a major osteoporotic fracture and a 5.3% risk of hip fracture in the next 10 years (FRAX).     Impression:     Osteopenia     Consider FDA approved medical therapies in postmenopausal women and men aged 50 years and older, based on the following:     *A hip or vertebral (clinical or morphometric) fracture  *T score less than or equal to -2.5 at the femoral neck or spine after appropriate evaluation to exclude secondary causes.  *Low bone mass -- also known as osteopenia (T score between -1.0 and -2.5 at the femoral neck or spine) and a 10 year probability of hip fracture greater than or equal to 3% or a 10 year probability of major osteoporosis-related fracture greater than or equal to 20% based on the [place]-adapted WHO algorithm.  *Clinicians judgment and/or patient preference may indicate treatment for people with 10 year fracture probabilities is above or below these  levels.    ASSESSMENT/PLAN:     81-year-old female with hypercalcemia/hyperparathyroidism    PLAN:Plan     Currently asymptomatic  We discussed current surgical indications for hyperparathyroidism to include:  Elevated calcium > 1 mg/dL  Hypercalciuria  Age less than 50  Kidney stones  Osteoporosis  We discussed her most recent DEXA scan and results of osteopenia and she is very close to osteoporosis.  We offered checking a DEXA scan but she would prefer to wait till her 2 year follow-up in August.  We discussed options at length for parathyroidectomy versus observation as she is quite close to osteoporosis.  We discussed the hyperparathyroidism/hypercalcemia/associated medical conditions could continue to progress if left untreated verses the risks associated with parathyroidectomy.  Ultimately she elected to monitor and will plan to get DEXA in August for further evaluation of her osteopenia  She will follow-up if her condition worsens or reconsiders moving forward with surgery as she is currently borderline with indications in the treatment of her hyperparathyroidism/hypercalcemia

## 2022-01-31 ENCOUNTER — TELEPHONE (OUTPATIENT)
Dept: PULMONOLOGY | Facility: CLINIC | Age: 82
End: 2022-01-31
Payer: MEDICARE

## 2022-01-31 NOTE — TELEPHONE ENCOUNTER
Called patient in regards to pre procedural covid test. Spoke with her daughter in law who will be bringing her to the appointment. She verbalized understanding.

## 2022-02-01 ENCOUNTER — CLINICAL SUPPORT (OUTPATIENT)
Dept: PULMONOLOGY | Facility: CLINIC | Age: 82
End: 2022-02-01
Payer: MEDICARE

## 2022-02-01 ENCOUNTER — OFFICE VISIT (OUTPATIENT)
Dept: PULMONOLOGY | Facility: CLINIC | Age: 82
End: 2022-02-01
Payer: MEDICARE

## 2022-02-01 ENCOUNTER — HOSPITAL ENCOUNTER (OUTPATIENT)
Dept: RADIOLOGY | Facility: HOSPITAL | Age: 82
Discharge: HOME | End: 2022-02-01
Attending: INTERNAL MEDICINE
Payer: MEDICARE

## 2022-02-01 VITALS
HEIGHT: 60 IN | HEART RATE: 83 BPM | SYSTOLIC BLOOD PRESSURE: 157 MMHG | BODY MASS INDEX: 34.8 KG/M2 | DIASTOLIC BLOOD PRESSURE: 101 MMHG | BODY MASS INDEX: 34.8 KG/M2 | WEIGHT: 177.25 LBS | OXYGEN SATURATION: 97 % | HEIGHT: 60 IN | RESPIRATION RATE: 12 BRPM | WEIGHT: 177.25 LBS

## 2022-02-01 DIAGNOSIS — Z01.818 PRE-PROCEDURAL EXAMINATION: Primary | ICD-10-CM

## 2022-02-01 DIAGNOSIS — I10 ESSENTIAL HYPERTENSION: ICD-10-CM

## 2022-02-01 DIAGNOSIS — R93.89 ABNORMAL CXR: ICD-10-CM

## 2022-02-01 DIAGNOSIS — J84.10 PULMONARY FIBROSIS, POSTINFLAMMATORY: Primary | ICD-10-CM

## 2022-02-01 DIAGNOSIS — J84.10 PULMONARY FIBROSIS, POSTINFLAMMATORY: ICD-10-CM

## 2022-02-01 DIAGNOSIS — J18.9 ATYPICAL PNEUMONIA: ICD-10-CM

## 2022-02-01 DIAGNOSIS — R06.02 SHORTNESS OF BREATH: ICD-10-CM

## 2022-02-01 DIAGNOSIS — I48.0 PAROXYSMAL ATRIAL FIBRILLATION: ICD-10-CM

## 2022-02-01 DIAGNOSIS — J18.9 PNEUMONIA, UNSPECIFIED ORGANISM: ICD-10-CM

## 2022-02-01 LAB
BRPFT: NORMAL
CTP QC/QA: YES
DLCO ADJ PRE: 15.74 ML/(MIN*MMHG)
DLCO SINGLE BREATH LLN: 11.65
DLCO SINGLE BREATH PRE REF: 90.5 %
DLCO SINGLE BREATH REF: 17.39
DLCOC SBVA LLN: 2.44
DLCOC SBVA PRE REF: 111.8 %
DLCOC SBVA REF: 4.1
DLCOC SINGLE BREATH LLN: 11.65
DLCOC SINGLE BREATH PRE REF: 90.5 %
DLCOC SINGLE BREATH REF: 17.39
DLCOVA LLN: 2.44
DLCOVA PRE REF: 111.8 %
DLCOVA PRE: 4.58 ML/(MIN*MMHG*L)
DLCOVA REF: 4.1
DLVAADJ PRE: 4.58 ML/(MIN*MMHG*L)
ERV LLN: -16449.53
ERV PRE REF: 86 %
ERV REF: 0.47
FEF 25 75 LLN: 0.59
FEF 25 75 PRE REF: 82.1 %
FEF 25 75 REF: 1.42
FEV1 FVC LLN: 63
FEV1 FVC PRE REF: 90.6 %
FEV1 FVC REF: 78
FEV1 LLN: 1.18
FEV1 PRE REF: 107.5 %
FEV1 REF: 1.68
FRCPLETH LLN: 1.66
FRCPLETH PREREF: 130.5 %
FRCPLETH REF: 2.48
FVC LLN: 1.53
FVC PRE REF: 117.4 %
FVC REF: 2.19
IVC PRE: 2.28 L
IVC SINGLE BREATH LLN: 1.53
IVC SINGLE BREATH PRE REF: 104.1 %
IVC SINGLE BREATH REF: 2.19
MVV LLN: 49
MVV PRE REF: 87.5 %
MVV REF: 57
PEF LLN: 2.11
PEF PRE REF: 83.9 %
PEF REF: 3.86
PRE DLCO: 15.74 ML/(MIN*MMHG)
PRE ERV: 0.4 L
PRE FEF 25 75: 1.16 L/S
PRE FET 100: 10.66 SEC
PRE FEV1 FVC: 70.28 %
PRE FEV1: 1.8 L
PRE FRC PL: 3.24 L
PRE FVC: 2.57 L
PRE MVV: 50 L/MIN
PRE PEF: 3.24 L/S
PRE RV: 2.42 L
PRE TLC: 4.98 L
RAW LLN: 3.06
RAW PRE REF: 244.7 %
RAW PRE: 7.48 CMH2O*S/L
RAW REF: 3.06
RV LLN: 1.44
RV PRE REF: 119.8 %
RV REF: 2.02
RVTLC LLN: 36
RVTLC PRE REF: 105.8 %
RVTLC PRE: 48.47 %
RVTLC REF: 46
SARS-COV-2 AG RESP QL IA.RAPID: NEGATIVE
TLC LLN: 3.25
TLC PRE REF: 117.4 %
TLC REF: 4.24
VA PRE: 3.43 L
VA SINGLE BREATH LLN: 4.09
VA SINGLE BREATH PRE REF: 83.9 %
VA SINGLE BREATH REF: 4.09
VC LLN: 1.53
VC PRE REF: 117.4 %
VC PRE: 2.57 L
VC REF: 2.19
VTGRAWPRE: 2.02 L

## 2022-02-01 PROCEDURE — 3288F FALL RISK ASSESSMENT DOCD: CPT | Mod: CPTII,S$GLB,, | Performed by: INTERNAL MEDICINE

## 2022-02-01 PROCEDURE — 94729 PR C02/MEMBANE DIFFUSE CAPACITY: ICD-10-PCS | Mod: S$GLB,,, | Performed by: INTERNAL MEDICINE

## 2022-02-01 PROCEDURE — 94010 BREATHING CAPACITY TEST: ICD-10-PCS | Mod: S$GLB,,, | Performed by: INTERNAL MEDICINE

## 2022-02-01 PROCEDURE — 99499 RISK ADDL DX/OHS AUDIT: ICD-10-PCS | Mod: S$GLB,,, | Performed by: INTERNAL MEDICINE

## 2022-02-01 PROCEDURE — 71250 CT THORAX DX C-: CPT | Mod: 26,,, | Performed by: RADIOLOGY

## 2022-02-01 PROCEDURE — 94618 PULMONARY STRESS TESTING: CPT | Mod: S$GLB,,, | Performed by: INTERNAL MEDICINE

## 2022-02-01 PROCEDURE — 3077F SYST BP >= 140 MM HG: CPT | Mod: CPTII,S$GLB,, | Performed by: INTERNAL MEDICINE

## 2022-02-01 PROCEDURE — 3080F DIAST BP >= 90 MM HG: CPT | Mod: CPTII,S$GLB,, | Performed by: INTERNAL MEDICINE

## 2022-02-01 PROCEDURE — 3080F PR MOST RECENT DIASTOLIC BLOOD PRESSURE >= 90 MM HG: ICD-10-PCS | Mod: CPTII,S$GLB,, | Performed by: INTERNAL MEDICINE

## 2022-02-01 PROCEDURE — 94726 PULM FUNCT TST PLETHYSMOGRAP: ICD-10-PCS | Mod: S$GLB,,, | Performed by: INTERNAL MEDICINE

## 2022-02-01 PROCEDURE — 1101F PR PT FALLS ASSESS DOC 0-1 FALLS W/OUT INJ PAST YR: ICD-10-PCS | Mod: CPTII,S$GLB,, | Performed by: INTERNAL MEDICINE

## 2022-02-01 PROCEDURE — 94010 BREATHING CAPACITY TEST: CPT | Mod: S$GLB,,, | Performed by: INTERNAL MEDICINE

## 2022-02-01 PROCEDURE — 94618 PULMONARY STRESS TESTING: ICD-10-PCS | Mod: S$GLB,,, | Performed by: INTERNAL MEDICINE

## 2022-02-01 PROCEDURE — 1159F MED LIST DOCD IN RCRD: CPT | Mod: CPTII,S$GLB,, | Performed by: INTERNAL MEDICINE

## 2022-02-01 PROCEDURE — 71250 CT THORAX DX C-: CPT | Mod: TC

## 2022-02-01 PROCEDURE — 99214 PR OFFICE/OUTPT VISIT, EST, LEVL IV, 30-39 MIN: ICD-10-PCS | Mod: 25,S$GLB,, | Performed by: INTERNAL MEDICINE

## 2022-02-01 PROCEDURE — 71250 CT CHEST WITHOUT CONTRAST: ICD-10-PCS | Mod: 26,,, | Performed by: RADIOLOGY

## 2022-02-01 PROCEDURE — 1159F PR MEDICATION LIST DOCUMENTED IN MEDICAL RECORD: ICD-10-PCS | Mod: CPTII,S$GLB,, | Performed by: INTERNAL MEDICINE

## 2022-02-01 PROCEDURE — 94729 DIFFUSING CAPACITY: CPT | Mod: S$GLB,,, | Performed by: INTERNAL MEDICINE

## 2022-02-01 PROCEDURE — 99999 PR PBB SHADOW E&M-EST. PATIENT-LVL IV: CPT | Mod: PBBFAC,,, | Performed by: INTERNAL MEDICINE

## 2022-02-01 PROCEDURE — 1101F PT FALLS ASSESS-DOCD LE1/YR: CPT | Mod: CPTII,S$GLB,, | Performed by: INTERNAL MEDICINE

## 2022-02-01 PROCEDURE — 87811 SARS CORONAVIRUS 2 ANTIGEN POCT, MANUAL READ: ICD-10-PCS | Mod: S$GLB,,, | Performed by: INTERNAL MEDICINE

## 2022-02-01 PROCEDURE — 87811 SARS-COV-2 COVID19 W/OPTIC: CPT | Mod: S$GLB,,, | Performed by: INTERNAL MEDICINE

## 2022-02-01 PROCEDURE — 99999 PR PBB SHADOW E&M-EST. PATIENT-LVL IV: ICD-10-PCS | Mod: PBBFAC,,, | Performed by: INTERNAL MEDICINE

## 2022-02-01 PROCEDURE — 99499 UNLISTED E&M SERVICE: CPT | Mod: S$GLB,,, | Performed by: INTERNAL MEDICINE

## 2022-02-01 PROCEDURE — 99214 OFFICE O/P EST MOD 30 MIN: CPT | Mod: 25,S$GLB,, | Performed by: INTERNAL MEDICINE

## 2022-02-01 PROCEDURE — 3288F PR FALLS RISK ASSESSMENT DOCUMENTED: ICD-10-PCS | Mod: CPTII,S$GLB,, | Performed by: INTERNAL MEDICINE

## 2022-02-01 PROCEDURE — 3077F PR MOST RECENT SYSTOLIC BLOOD PRESSURE >= 140 MM HG: ICD-10-PCS | Mod: CPTII,S$GLB,, | Performed by: INTERNAL MEDICINE

## 2022-02-01 PROCEDURE — 94726 PLETHYSMOGRAPHY LUNG VOLUMES: CPT | Mod: S$GLB,,, | Performed by: INTERNAL MEDICINE

## 2022-02-01 RX ORDER — IPRATROPIUM BROMIDE 42 UG/1
SPRAY, METERED NASAL
COMMUNITY
Start: 2021-12-28 | End: 2022-05-24 | Stop reason: SDUPTHER

## 2022-02-01 NOTE — PROCEDURES
[facility] 3rdFl  Six Minute Walk     SUMMARY     Ordering Provider: Dr. Carbone   Interpreting Provider: Dr. Carbone  Performing nurse/tech/RT: TIMOTHY Shaw RRT  Diagnosis: Pulmonary Fibrosis  Height: 5' (152.4 cm)  Weight: 80.4 kg (177 lb 4 oz)  BMI (Calculated): 34.6   Patient Race:             Phase Oxygen Assessment Supplemental O2 Heart   Rate Blood Pressure Ethan Dyspnea Scale Rating   Resting 97 % Room Air 80 bpm 124/86 0   Exercise        Minute        1 96 % Room Air 119 bpm     2 95 % Room Air 129 bpm     3 96 % Room Air 129 bpm     4 91 % Room Air 126 bpm     5 97 % Room Air 142 bpm     6  95 % Room Air 132 bpm (!) 157/101 5-6   Recovery        Minute        1 96 % Room Air 112 bpm     2 97 % Room Air 87 bpm     3 97 % Room Air 83 bpm     4 98 % Room Air 86 bpm 144/70 0     Six Minute Walk Summary  6MWT Status: completed without stopping  Patient Reported: Dyspnea     Interpretation:  Did the patient stop or pause?: No                                         Total Time Walked (Calculated): 360 seconds  Final Partial Lap Distance (feet): 25 feet  Total Distance Meters (Calculated): 373.38 meters  Predicted Distance Meters (Calculated): 347.83 meters  Percentage of Predicted (Calculated): 107.35  Peak VO2 (Calculated): 15.18  Mets: 4.34  Has The Patient Had a Previous Six Minute Walk Test?: No       Previous 6MWT Results  Has The Patient Had a Previous Six Minute Walk Test?: No    Interpretation:  Total distance walked in six minutes is normal indicating normal functional capacity.   Patient did not meet criteria for oxygen prescription. Clinical correlation suggested.    [] Mild exercise-induced hypoxemia described as an arterial oxygen saturation of 93-95% (or 3-4% less than at rest),  [x] Moderate exercise-induced hypoxemia as 89-93%  [] Severe exercise induced hypoxemia as < 89% O2 saturation.  Medicare Criteria for oxygen prescription comments:   When arterial oxygen saturation is  at or below 88% during exercise on room air (severe exercise induced hypoxemia) then the patient falls under Medicare Group 1 criteria for supplemental oxygen prescription.  Details about Medicare Group Criteria coverage can be found at http://www.cms.hhs.gov/manuals/downloads/     Damion Carbone MD

## 2022-02-01 NOTE — ASSESSMENT & PLAN NOTE
Patient stopped medication (Losartan) about a month ago due to coughing.  Blood pressure record - from home and during 6 min walk test was a little elevated. Probably needs replacement (norvasc ?)

## 2022-02-01 NOTE — PROGRESS NOTES
Subjective: Less shortness of breath since last visit.     Patient ID: Lorenzo Ospina is a 81 y.o. female.    Chief Complaint:  Follow up for pulmonary fibrosis following severe bilateral atypical pneumonia (probably COVID 19 ). Hx of radiation therapy to right lung    HPI   Dyspnea  Patient complains of shortness of breath. Symptoms improved with bronchodilators.  Symptoms occur  two block walking, she denies paroxysmal nocturnal dyspnea or phlegm production . Symptoms began about one year ago, gradually worsening in March of 2021 and now improved.. Associated symptoms include  dyspnea on exertion, morning cough - nonproductive. She denies chest pain, located left chest. She does not have had recent travel. Weight has been stable. Symptoms are exacerbated by moderate activity. Symptoms are alleviated by rest.   Nasal congestion resolved    She revieved the COVID 19 vaccine in early March 2021 and a few days after was noted to have severe bilateral upper lobe pneumonia.  She had persistent infiltrates on her Chest X Ray . Seen today in follow up. She had no exercise limitation  But is not that active due to hip arthritis.  In 3/2021 had some Chest discomfort with cough, chest congestion and Chest X Ray with bilateral infiltrates and was given prescription antibiotic - Z pac , followed by Augmentin  which she finished. Also on Lasix - but has had persistently Abnormal Chest X Ray. Seen today and CT scan demonstrates a  improvement however bialteral pulmonary infiltrates compared to 3/2021    Past Medical History:   Diagnosis Date    A-fib 11/5/2019    Breast cancer     right, dx 2008.  Dr. Callaway follows.s/p lumpectomy and arimidex x 5y.    Hyperlipidemia     Hypertension     OP (osteoporosis)     on bisphos x 2y.    TIA (transient ischemic attack)     Toe fracture      Past Surgical History:   Procedure Laterality Date    BREAST LUMPECTOMY      right 2008    TOTAL ABDOMINAL HYSTERECTOMY      no cancer     TRANSESOPHAGEAL ECHOCARDIOGRAPHY N/A 11/5/2019    Procedure: ECHOCARDIOGRAM, TRANSESOPHAGEAL;  Surgeon: Otto Flores MD;  Location: [facility] CATH LAB;  Service: Cardiology;  Laterality: N/A;    TREATMENT OF CARDIAC ARRHYTHMIA N/A 11/5/2019    Procedure: CARDIOVERSION;  Surgeon: Otto Flores MD;  Location: [facility] CATH LAB;  Service: Cardiology;  Laterality: N/A;    TREATMENT OF CARDIAC ARRHYTHMIA N/A 11/5/2019    Procedure: CARDIOVERSION;  Surgeon: Otto Flores MD;  Location: [facility] CATH LAB;  Service: Cardiology;  Laterality: N/A;    TREATMENT OF CARDIAC ARRHYTHMIA N/A 2/4/2020    Procedure: CARDIOVERSION;  Surgeon: Otto Flores MD;  Location: [facility] CATH LAB;  Service: Cardiology;  Laterality: N/A;    TREATMENT OF CARDIAC ARRHYTHMIA N/A 3/2/2020    Procedure: CARDIOVERSION;  Surgeon: Mayi Beltran MD;  Location: [facility] CATH LAB;  Service: Cardiology;  Laterality: N/A;  830 start per Dr. beltran     Review of patient's allergies indicates:   Allergen Reactions    Medrol [methylprednisolone]      Current Outpatient Medications on File Prior to Visit   Medication Sig Dispense Refill    atorvastatin (LIPITOR) 20 MG tablet Take 1 tablet (20 mg total) by mouth once daily. 90 tablet 3    ELIQUIS 5 mg Tab TAKE 1 TABLET BY MOUTH TWICE A DAY 60 tablet 6    ipratropium (ATROVENT) 42 mcg (0.06 %) nasal spray SMARTSIG:Both Nares      metoprolol succinate (TOPROL-XL) 50 MG 24 hr tablet TAKE ONE TABLET BY MOUTH ONCE DAILY 90 tablet 3    MULTIVIT-IRON-MIN-FOLIC ACID 3,500-18-0.4 UNIT-MG-MG ORAL CHEW Take by mouth.      omeprazole (PRILOSEC) 40 MG capsule Take 40 mg by mouth once daily.      ranolazine (RANEXA) 500 MG Tb12 TAKE 1 TABLET BY MOUTH TWICE A  tablet 3    vitamin D 1000 units Tab Take 2,000 Units by mouth once daily.      fish oil-omega-3 fatty acids 300-1,000 mg capsule Take 2 g by mouth once daily.      furosemide (LASIX) 20 MG tablet TAKE 1 TABLET BY MOUTH EVERY DAY (Patient not taking: Reported on 2/1/2022) 30  tablet 1    gabapentin (NEURONTIN) 100 MG capsule TAKE 1 CAPSULE BY MOUTH THREE TIMES A DAY (Patient not taking: Reported on 2022) 90 capsule 11    glucosam/chond/hyalu/CF borate (MOVE FREE JOINT HEALTH ORAL) Take 1 tablet by mouth once daily.       No current facility-administered medications on file prior to visit.     Social History     Socioeconomic History    Marital status:    Tobacco Use    Smoking status: Former Smoker     Packs/day: 1.50     Years: 22.00     Pack years: 33.00     Start date: 1972     Quit date: 1994     Years since quittin.3    Smokeless tobacco: Never Used   Substance and Sexual Activity    Alcohol use: Not Currently     Alcohol/week: 1.0 standard drink     Types: 1 Glasses of wine per week     Comment: daily    Drug use: Never     No family history on file.    Review of Systems   Constitutional: Positive for fatigue. Negative for fever, weight loss, weight gain, night sweats and weakness.   HENT: Negative.    Respiratory: Positive for sputum production, shortness of breath and dyspnea on extertion.    Cardiovascular: Negative.    Genitourinary: Negative.    Endocrine: endocrine negative   Musculoskeletal: Negative.    Skin: Negative.    Gastrointestinal: Negative.    Neurological: Negative.    Psychiatric/Behavioral: Negative.        Objective:      BP (!) 157/101   Pulse 83   Resp 12   Ht 5' (1.524 m)   Wt 80.4 kg (177 lb 4 oz)   SpO2 97%   BMI 34.62 kg/m²   Physical Exam  Vitals and nursing note reviewed.   Constitutional:       Appearance: She is well-developed.   HENT:      Head: Normocephalic and atraumatic.      Nose: Nose normal.   Eyes:      Conjunctiva/sclera: Conjunctivae normal.      Pupils: Pupils are equal, round, and reactive to light.   Neck:      Thyroid: No thyromegaly.      Vascular: No JVD.      Trachea: No tracheal deviation.   Cardiovascular:      Rate and Rhythm: Normal rate. Rhythm irregular.      Heart sounds: Normal heart  sounds.   Pulmonary:      Effort: Pulmonary effort is normal. No respiratory distress.      Breath sounds: Examination of the right-middle field reveals rales. Examination of the left-middle field reveals rales. Rales present. No wheezing.      Comments: Few rales heard anteriorly  Chest:      Chest wall: No tenderness.   Abdominal:      General: Bowel sounds are normal.      Palpations: Abdomen is soft.   Musculoskeletal:         General: Normal range of motion.      Cervical back: Neck supple.   Lymphadenopathy:      Cervical: No cervical adenopathy.   Skin:     General: Skin is warm and dry.   Neurological:      Mental Status: She is alert and oriented to person, place, and time.       Personal Diagnostic Review  Chest x-ray: stable and slightly improved ssince 3/9/2021    6 min walk normal  Pulmonary Function Studies: normal    X-Ray Chest PA And Lateral  Narrative: EXAMINATION:  XR CHEST PA AND LATERAL    CLINICAL HISTORY:  Chronic cough    TECHNIQUE:  PA and lateral views of the chest were performed.    COMPARISON:  2021    FINDINGS:  Interval improved aeration of the bilateral mid to lower lung zones when compared to the previous exam.  Vague right mid lung zone density persists but also appears improved.  No effusion.  Aortic atherosclerosis and tortuosity.  Cardiopericardial silhouette remains enlarged but stable in appearance.  Osteopenia and degenerative changes of the spine are noted.  Continued follow-up is indicated.  Impression: As above    Electronically signed by: Kaushik Jauregui MD  Date:    2021  Time:    08:06      Office Spirometry Results: no recent    Radiology Result      Name:   :  Patient MRN:   Lorenzo Ospina [birth year] [id number]   Account Number: Room & Bed Accession Number:   [id number]   [id number]   Authorizing Physician: Patient Class: Diagnosis:   [facility] OP- Outpatient Diagnostic Testing Abnormal CXR [R93.89 (ICD-10-CM)]   Procedure:  Exam Date: Reason for  Exam:   CT Chest With Contrast 03/08/2021 lung massess, abn CXR             RESULTS:     EXAMINATION:  CT CHEST WITH CONTRAST     CLINICAL HISTORY:  lung massess, abn CXR; Abnormal findings on diagnostic imaging of other   specified body structures     TECHNIQUE:  Low dose axial images, sagittal and coronal reformations were obtained   from the thoracic inlet to the upper abdomen following the IV   administration of 75 mL of Omnipaque 350     COMPARISON:  Chest radiograph March 2, 2021     FINDINGS:  There are prominent bilateral patchy consolidations which demonstrate a   peribronchovascular predominant distribution.  A few smaller ground-glass   opacities also demonstrate a peripheral distribution.  Some of the more   nodular opacities demonstrate relative central clearing with a denser   consolidated peripheral ring (reverse halo sign), for example within the   anterior left upper lobe on series 4, image 148.     There is no pneumothorax or pleural effusion.  Tracheobronchial tree is   normal without evidence of bronchiectasis or suspicious lesion.     Thyroid is unremarkable.  There is no axillary, supraclavicular, or hilar   lymphadenopathy.  Mildly enlarged mediastinal nodes are possibly reactive.    For example, there is a 2.0 x 1.5 cm precarinal node (series 2, image   31).     Mild atherosclerotic calcification within the thoracic aorta which is   nonaneurysmal.  Pulmonary arteries are normal in size.  Heart is mildly   enlarged.  No pericardial effusion.     Limited view of the upper abdomen is unremarkable.     No suspicious lytic or blastic osseous lesion.     Impression:     1.  Bilateral patchy consolidations and ground-glass nodules demonstrating   a peribronchovascular predominant distribution correspond to the   abnormalities seen on recent chest radiograph.  Given the distribution of   these opacities and the presence of multiple nodules demonstrating a   reverse halo sign configuration, the  primary differential consideration   for these findings is organizing pneumonia.  Alternatively, multifocal   pneumonia including potential COVID-19 infection could produce a similar   appearance in the appropriate clinical context.  Recommend continued   imaging follow-up of these findings to resolution.     2.  Mild mediastinal lymphadenopathy, likely reactive in the setting of   the previously described pulmonary findings.        Electronically signed by:     Jagdeep Cohen  Date:                                    03/08/2021  Time:                                   18:01                     Signed By:  Jagdeep Cohen MD on 3/8/2021  6:01 PM                     No flowsheet data found.  Pulmonary Studies Review 2/1/2022   SpO2 97   Ordering Provider -   Interpreting Provider -   Performing nurse/tech/RT -   Diagnosis -   Height 60   Weight 2836   BMI (Calculated) 34.6   Predicted Distance 201.53   Patient Race -   6MWT Status -   Patient Reported -   Was O2 used? -   6MW Distance walked (feet) -   Distance walked (meters) -   Did patient stop? -   Type of assistive device(s) used? -   Is extra documentation required for this patient? -   Oxygen Saturation -   Supplemental Oxygen -   Heart Rate -   Blood Pressure -   Ethan Dyspnea Rating  -   Oxygen Saturation -   Supplemental Oxygen -   Heart Rate -   Blood Pressure -   Ethan Dyspnea Rating  -   Recovery Time (seconds) -   Oxygen Saturation -   Supplemental Oxygen -   Heart Rate -   Blood Pressure -   Ethan Dyspnea Rating  -   Is procedure ready for interpretation? -   Did the patient stop or pause? -   Total Time Walked (Calculated) -   Total Laps Walked -   Final Partial Lap Distance (feet) -   Total Distance Feet (Calculated) -   Total Distance Meters (Calculated) -   Predicted Distance Meters (Calculated) 347.83   Percentage of Predicted (Calculated) -   Peak VO2 (Calculated) -   Mets -   Has The Patient Had a Previous Six Minute Walk Test? -   Oxygen  Qualification? -         Assessment:       Atypical pneumonia - resolved , bilateral  Resolved    Essential hypertension  Patient stopped medication (Losartan) about a month ago due to coughing.  Blood pressure record - from home and during 6 min walk test was a little elevated. Probably needs replacement (norvasc ?)      Pulmonary fibrosis, postinflammatory  -     X-Ray Chest PA And Lateral; Future; Expected date: 02/01/2022  -     Complete PFT with bronchodilator; Future; Expected date: 02/01/2022    Abnormal CXR  -     Ambulatory referral/consult to Pulmonology    Atypical pneumonia - resolved , bilateral  -     X-Ray Chest PA And Lateral; Future; Expected date: 02/01/2022  -     Complete PFT with bronchodilator; Future; Expected date: 02/01/2022    Essential hypertension    Paroxysmal atrial fibrillation          Outpatient Encounter Medications as of 2/1/2022   Medication Sig Dispense Refill    atorvastatin (LIPITOR) 20 MG tablet Take 1 tablet (20 mg total) by mouth once daily. 90 tablet 3    ELIQUIS 5 mg Tab TAKE 1 TABLET BY MOUTH TWICE A DAY 60 tablet 6    ipratropium (ATROVENT) 42 mcg (0.06 %) nasal spray SMARTSIG:Both Nares      metoprolol succinate (TOPROL-XL) 50 MG 24 hr tablet TAKE ONE TABLET BY MOUTH ONCE DAILY 90 tablet 3    MULTIVIT-IRON-MIN-FOLIC ACID 3,500-18-0.4 UNIT-MG-MG ORAL CHEW Take by mouth.      omeprazole (PRILOSEC) 40 MG capsule Take 40 mg by mouth once daily.      ranolazine (RANEXA) 500 MG Tb12 TAKE 1 TABLET BY MOUTH TWICE A  tablet 3    vitamin D 1000 units Tab Take 2,000 Units by mouth once daily.      fish oil-omega-3 fatty acids 300-1,000 mg capsule Take 2 g by mouth once daily.      furosemide (LASIX) 20 MG tablet TAKE 1 TABLET BY MOUTH EVERY DAY (Patient not taking: Reported on 2/1/2022) 30 tablet 1    gabapentin (NEURONTIN) 100 MG capsule TAKE 1 CAPSULE BY MOUTH THREE TIMES A DAY (Patient not taking: Reported on 2/1/2022) 90 capsule 11    glucosam/chond/hyalu/CF  borate (MOVE FREE registracija vozila ORAL) Take 1 tablet by mouth once daily.       No facility-administered encounter medications on file as of 2/1/2022.     Plan:       Requested Prescriptions      No prescriptions requested or ordered in this encounter     Problem List Items Addressed This Visit     Atrial fibrillation    Atypical pneumonia - resolved , bilateral    Overview     Started around 3/9/2021, slow to resolve on CXR         Current Assessment & Plan     Resolved         Relevant Orders    X-Ray Chest PA And Lateral    Complete PFT with bronchodilator    Essential hypertension    Current Assessment & Plan     Patient stopped medication (Losartan) about a month ago due to coughing.  Blood pressure record - from home and during 6 min walk test was a little elevated. Probably needs replacement (norvasc ?)           Pulmonary fibrosis, postinflammatory - Primary    Relevant Orders    X-Ray Chest PA And Lateral    Complete PFT with bronchodilator      Other Visit Diagnoses     Abnormal CXR                 Follow up in about 1 year (around 2/1/2023) for Review PFT and CXR - on return.    MEDICAL DECISION MAKING: Moderate to high complexity.  Overall, the multiple problems listed are of moderate to high severity that may impact quality of life and activities of daily living. Side effects of medications, treatment plan as well as options and alternatives reviewed and discussed with patient. There was counseling of patient concerning these issues.    Total time spent in counseling and coordination of care - 40  minutes of total time spent on the encounter, which includes face to face time and non-face to face time preparing to see the patient (eg, review of tests), Obtaining and/or reviewing separately obtained history, Documenting clinical information in the electronic or other health record, Independently interpreting results (not separately reported) and communicating results to the patient/family/caregiver, or Care  coordination (not separately reported).    Time was used in discussion of prognosis, risks, benefits of treatment, instructions and compliance with regimen . Discussion with other physicians and/or health care providers - home health or for use of durable medical equipment (oxygen, nebulizers, CPAP, BiPAP) occurred.

## 2022-02-03 ENCOUNTER — TELEPHONE (OUTPATIENT)
Dept: FAMILY MEDICINE | Facility: CLINIC | Age: 82
End: 2022-02-03
Payer: MEDICARE

## 2022-02-03 NOTE — TELEPHONE ENCOUNTER
S/w pt daughter in law and she states you stopped her bp medication and her bp meds are high and Dr Velazquez recommended that maybe she get on a different bp med her readings are on your desk. I told her you was out of the office and as soon as you returned you would review the readings and let her know what the next step is... Looking at the readings they are not terrible.    02/02    125/68  02/01 132/89  01/31  131/82  141/81    01/30  128/78  143/80  01/29   131/89 140/88  01/28  126/72  01/27  124/73  01/26   128/78  01/25  129/79  01/24  150/99

## 2022-02-03 NOTE — TELEPHONE ENCOUNTER
----- Message from Dorys Johns sent at 2/3/2022 10:50 AM CST -----  Contact: Daughter In Law  Type:  Patient Returning Call    Who Called: Daughter in law  Who Left Message for Patient: unknown   Does the patient know what this is regarding?: no  Would the patient rather a call back or a response via [facility]? Call back  Best Call Back Number: [phone]  Additional Information: n/a

## 2022-02-03 NOTE — TELEPHONE ENCOUNTER
----- Message from Shannen Kuo sent at 2/3/2022 10:04 AM CST -----  .Type:  Needs Medical Advice    Who Called: Diane (daughter in law) @ [phone]  Symptoms (please be specific):  How long has patient had these symptoms:   Pharmacy name and phone #:   Would the patient rather a call back or a response via [facility]?   Best Call Back Number:  .phon  Additional Information:  Pt daughter in law requesting a call regarding pts blood pressure.Please call

## 2022-02-07 NOTE — TELEPHONE ENCOUNTER
Please call Frankie, daughter in law [phone]  BPs look good on average.  Please continue to monitor and send me a list in a month.  SM    S/w daughter in law she verbalized understanding.

## 2022-02-07 NOTE — TELEPHONE ENCOUNTER
Please call Frankie, daughter in law [phone]  BPs look good on average.  Please continue to monitor and send me a list in a month.  SM

## 2022-02-17 ENCOUNTER — PATIENT OUTREACH (OUTPATIENT)
Dept: ADMINISTRATIVE | Facility: OTHER | Age: 82
End: 2022-02-17
Payer: MEDICARE

## 2022-02-18 ENCOUNTER — OFFICE VISIT (OUTPATIENT)
Dept: CARDIOLOGY | Facility: CLINIC | Age: 82
End: 2022-02-18
Payer: MEDICARE

## 2022-02-18 VITALS
BODY MASS INDEX: 34.97 KG/M2 | HEIGHT: 60 IN | HEART RATE: 98 BPM | OXYGEN SATURATION: 97 % | SYSTOLIC BLOOD PRESSURE: 130 MMHG | WEIGHT: 178.13 LBS | DIASTOLIC BLOOD PRESSURE: 84 MMHG

## 2022-02-18 DIAGNOSIS — M81.0 AGE-RELATED OSTEOPOROSIS WITHOUT CURRENT PATHOLOGICAL FRACTURE: ICD-10-CM

## 2022-02-18 DIAGNOSIS — E21.3 HYPERPARATHYROIDISM: ICD-10-CM

## 2022-02-18 DIAGNOSIS — Z79.01 CHRONIC ANTICOAGULATION: ICD-10-CM

## 2022-02-18 DIAGNOSIS — I48.0 PAROXYSMAL ATRIAL FIBRILLATION: ICD-10-CM

## 2022-02-18 DIAGNOSIS — E55.9 VITAMIN D DEFICIENCY: ICD-10-CM

## 2022-02-18 DIAGNOSIS — I10 ESSENTIAL HYPERTENSION: Primary | ICD-10-CM

## 2022-02-18 DIAGNOSIS — J84.10 PULMONARY FIBROSIS, POSTINFLAMMATORY: ICD-10-CM

## 2022-02-18 DIAGNOSIS — E78.49 OTHER HYPERLIPIDEMIA: ICD-10-CM

## 2022-02-18 DIAGNOSIS — R00.2 PALPITATION: ICD-10-CM

## 2022-02-18 DIAGNOSIS — G45.9 TIA (TRANSIENT ISCHEMIC ATTACK): ICD-10-CM

## 2022-02-18 PROCEDURE — 99999 PR PBB SHADOW E&M-EST. PATIENT-LVL III: ICD-10-PCS | Mod: PBBFAC,,, | Performed by: INTERNAL MEDICINE

## 2022-02-18 PROCEDURE — 99214 PR OFFICE/OUTPT VISIT, EST, LEVL IV, 30-39 MIN: ICD-10-PCS | Mod: S$GLB,,, | Performed by: INTERNAL MEDICINE

## 2022-02-18 PROCEDURE — 99499 UNLISTED E&M SERVICE: CPT | Mod: S$GLB,,, | Performed by: INTERNAL MEDICINE

## 2022-02-18 PROCEDURE — 1101F PT FALLS ASSESS-DOCD LE1/YR: CPT | Mod: CPTII,S$GLB,, | Performed by: INTERNAL MEDICINE

## 2022-02-18 PROCEDURE — 3079F PR MOST RECENT DIASTOLIC BLOOD PRESSURE 80-89 MM HG: ICD-10-PCS | Mod: CPTII,S$GLB,, | Performed by: INTERNAL MEDICINE

## 2022-02-18 PROCEDURE — 3075F SYST BP GE 130 - 139MM HG: CPT | Mod: CPTII,S$GLB,, | Performed by: INTERNAL MEDICINE

## 2022-02-18 PROCEDURE — 1159F PR MEDICATION LIST DOCUMENTED IN MEDICAL RECORD: ICD-10-PCS | Mod: CPTII,S$GLB,, | Performed by: INTERNAL MEDICINE

## 2022-02-18 PROCEDURE — 1160F RVW MEDS BY RX/DR IN RCRD: CPT | Mod: CPTII,S$GLB,, | Performed by: INTERNAL MEDICINE

## 2022-02-18 PROCEDURE — 1160F PR REVIEW ALL MEDS BY PRESCRIBER/CLIN PHARMACIST DOCUMENTED: ICD-10-PCS | Mod: CPTII,S$GLB,, | Performed by: INTERNAL MEDICINE

## 2022-02-18 PROCEDURE — 99499 RISK ADDL DX/OHS AUDIT: ICD-10-PCS | Mod: S$GLB,,, | Performed by: INTERNAL MEDICINE

## 2022-02-18 PROCEDURE — 1159F MED LIST DOCD IN RCRD: CPT | Mod: CPTII,S$GLB,, | Performed by: INTERNAL MEDICINE

## 2022-02-18 PROCEDURE — 3075F PR MOST RECENT SYSTOLIC BLOOD PRESS GE 130-139MM HG: ICD-10-PCS | Mod: CPTII,S$GLB,, | Performed by: INTERNAL MEDICINE

## 2022-02-18 PROCEDURE — 3288F FALL RISK ASSESSMENT DOCD: CPT | Mod: CPTII,S$GLB,, | Performed by: INTERNAL MEDICINE

## 2022-02-18 PROCEDURE — 3288F PR FALLS RISK ASSESSMENT DOCUMENTED: ICD-10-PCS | Mod: CPTII,S$GLB,, | Performed by: INTERNAL MEDICINE

## 2022-02-18 PROCEDURE — 1126F AMNT PAIN NOTED NONE PRSNT: CPT | Mod: CPTII,S$GLB,, | Performed by: INTERNAL MEDICINE

## 2022-02-18 PROCEDURE — 1101F PR PT FALLS ASSESS DOC 0-1 FALLS W/OUT INJ PAST YR: ICD-10-PCS | Mod: CPTII,S$GLB,, | Performed by: INTERNAL MEDICINE

## 2022-02-18 PROCEDURE — 99999 PR PBB SHADOW E&M-EST. PATIENT-LVL III: CPT | Mod: PBBFAC,,, | Performed by: INTERNAL MEDICINE

## 2022-02-18 PROCEDURE — 3079F DIAST BP 80-89 MM HG: CPT | Mod: CPTII,S$GLB,, | Performed by: INTERNAL MEDICINE

## 2022-02-18 PROCEDURE — 1126F PR PAIN SEVERITY QUANTIFIED, NO PAIN PRESENT: ICD-10-PCS | Mod: CPTII,S$GLB,, | Performed by: INTERNAL MEDICINE

## 2022-02-18 PROCEDURE — 99214 OFFICE O/P EST MOD 30 MIN: CPT | Mod: S$GLB,,, | Performed by: INTERNAL MEDICINE

## 2022-02-18 RX ORDER — LOSARTAN POTASSIUM 25 MG/1
25 TABLET ORAL DAILY
Qty: 90 TABLET | Refills: 3 | Status: SHIPPED | OUTPATIENT
Start: 2022-02-18 | End: 2023-02-16

## 2022-02-18 RX ORDER — LANOLIN ALCOHOL/MO/W.PET/CERES
100 CREAM (GRAM) TOPICAL DAILY
COMMUNITY

## 2022-02-18 NOTE — PROGRESS NOTES
Subjective:   Patient ID:  Lorenzo Ospina is a 81 y.o. female who presents for follow up of Atrial Fibrillation      HPI  8/28/2020  A 81 yo female with htn afib failed cardioversion was scheduled to have BLATION HAD CANCELED DUE TO FAMILY ISSUES AND NOW COVID. SHE IS NOT COMPLAINING OF CHEST PAIN SHORTNESS OF BREATH TAKES MEDS REGULARILY HAS IMPROVED EATING HABITS AS FAR AS SALT IS CONCERNED.SHE WALKS BETWEEN 15-30 MINUTES DAILY WEATHER PERMITS BP IS IMPROVED WITHE XERCISE. HAS NO LEG SWELLING NO CHF SYMPTOMS. LIPIDS ARE ON [facility]     TODAY IS HERE FOR F/U SHE FEELS WELL SHE IS EXERCISING HAS NOT BEEN COMPLAINING OF CHEST PAIN OR SHORTNESS OF BREATH HAS NO HEADACHE NO BLURRED VISION HAS BEEN ON NOAC NO BLEEDING. SAW DR MORA TODAY STOPPED HER AMIODARONE  AND KEPT HER ON B BLOCKERS. HER BP RECORDING FROM HOME ARE ON [employer] AND WELL CONTROLLED SHE TRIES TO EXERCISE REGULARILY OTHERWISE HER BOP IS ELEVATED.      3/31/2021  Had nocturnal wheezing and has dyspnea on exertion was seen in er resolved with diuretics. This is suggestive of pnd angina decubitus, she continues to have exertional dyspnea has no energy with activity has to take a break when doing house chores. Reviewed her bp readings seems within range. Has no leg swelling after short course of diuretics pnd resolved chest tightness wheezing resolved.      11/12/2021  HERE FROF /U JUST GOT BACK FROM [place] SHE SPENT 4 MONTH THERE ./ SHE HAS BEEN WALKING HAD  DIFFICULTY GOING UPHILL SHE GEST TIRED SHORT OF BREATH HAS TO SIT DOWN. NOW HERE FELT TIRED WALKING. HAS  BEEN COMPLIANT WITH MEDS BP SEEMED CONTROLLED. SHE IS ABLE TO DO ALL HOUSE WORK NOT STOPPING WITH ANY SHORTNESS OF BREATH HAS NO LEG SWELLING  NO ORTHOPNEA PND  NO SYNCOPE. HER CARDIAC W/U NEGATIVE     2/18/2022    here for f/u has been off losartan due to cough not sure if she improved off losartan DR CORONADO THINK HER COUGH IS BETTER. WHILE PATIENT DOES NOT THINK SO. SHE IS TAKING HER  MEDS. HER DIASTOLIC BP IS STAYING ABOVE MID 80S. HAS NO NEW SYMPTOMS   HER SHORTNESS OF BREATH IS IMPROVING SHE HAS NO WHEEZING SHE HAS NO LEG SWELLING SHE IS COMPLIANT WITH DIET.   Past Medical History:   Diagnosis Date    A-fib 2019    Breast cancer     right, dx .  Dr. Callaway follows.s/p lumpectomy and arimidex x 5y.    Hyperlipidemia     Hypertension     OP (osteoporosis)     on bisphos x 2y.    TIA (transient ischemic attack)     Toe fracture        Past Surgical History:   Procedure Laterality Date    BREAST LUMPECTOMY      right     TOTAL ABDOMINAL HYSTERECTOMY      no cancer    TRANSESOPHAGEAL ECHOCARDIOGRAPHY N/A 2019    Procedure: ECHOCARDIOGRAM, TRANSESOPHAGEAL;  Surgeon: Otto Flores MD;  Location: [facility] CATH LAB;  Service: Cardiology;  Laterality: N/A;    TREATMENT OF CARDIAC ARRHYTHMIA N/A 2019    Procedure: CARDIOVERSION;  Surgeon: Otto Flores MD;  Location: [facility] CATH LAB;  Service: Cardiology;  Laterality: N/A;    TREATMENT OF CARDIAC ARRHYTHMIA N/A 2019    Procedure: CARDIOVERSION;  Surgeon: Otto Flores MD;  Location: [facility] CATH LAB;  Service: Cardiology;  Laterality: N/A;    TREATMENT OF CARDIAC ARRHYTHMIA N/A 2020    Procedure: CARDIOVERSION;  Surgeon: Otto Flores MD;  Location: [facility] CATH LAB;  Service: Cardiology;  Laterality: N/A;    TREATMENT OF CARDIAC ARRHYTHMIA N/A 3/2/2020    Procedure: CARDIOVERSION;  Surgeon: Mayi Beltran MD;  Location: [facility] CATH LAB;  Service: Cardiology;  Laterality: N/A;  830 start per Dr. beltran       Social History     Tobacco Use    Smoking status: Former Smoker     Packs/day: 1.50     Years: 22.00     Pack years: 33.00     Start date: 1972     Quit date: 1994     Years since quittin.4    Smokeless tobacco: Never Used   Substance Use Topics    Alcohol use: Not Currently     Alcohol/week: 1.0 standard drink     Types: 1 Glasses of wine per week     Comment: daily    Drug use: Never       History reviewed. No  pertinent family history.    Current Outpatient Medications   Medication Sig    atorvastatin (LIPITOR) 20 MG tablet Take 1 tablet (20 mg total) by mouth once daily.    cyanocobalamin (VITAMIN B-12) 1000 MCG tablet Take 100 mcg by mouth once daily.    ELIQUIS 5 mg Tab TAKE 1 TABLET BY MOUTH TWICE A DAY    glucosam/chond/hyalu/CF borate (MOVE FREE JOINT HEALTH ORAL) Take 1 tablet by mouth once daily.    ipratropium (ATROVENT) 42 mcg (0.06 %) nasal spray SMARTSIG:Both Nares    metoprolol succinate (TOPROL-XL) 50 MG 24 hr tablet TAKE ONE TABLET BY MOUTH ONCE DAILY    omeprazole (PRILOSEC) 40 MG capsule Take 40 mg by mouth once daily.    ranolazine (RANEXA) 500 MG Tb12 TAKE 1 TABLET BY MOUTH TWICE A DAY    vitamin D 1000 units Tab Take 2,000 Units by mouth once daily.    fish oil-omega-3 fatty acids 300-1,000 mg capsule Take 2 g by mouth once daily.    MULTIVIT-IRON-MIN-FOLIC ACID 3,500-18-0.4 UNIT-MG-MG ORAL CHEW Take by mouth.     No current facility-administered medications for this visit.     Current Outpatient Medications on File Prior to Visit   Medication Sig    atorvastatin (LIPITOR) 20 MG tablet Take 1 tablet (20 mg total) by mouth once daily.    cyanocobalamin (VITAMIN B-12) 1000 MCG tablet Take 100 mcg by mouth once daily.    ELIQUIS 5 mg Tab TAKE 1 TABLET BY MOUTH TWICE A DAY    glucosam/chond/hyalu/CF borate (MOVE FREE JOINT HEALTH ORAL) Take 1 tablet by mouth once daily.    ipratropium (ATROVENT) 42 mcg (0.06 %) nasal spray SMARTSIG:Both Nares    metoprolol succinate (TOPROL-XL) 50 MG 24 hr tablet TAKE ONE TABLET BY MOUTH ONCE DAILY    omeprazole (PRILOSEC) 40 MG capsule Take 40 mg by mouth once daily.    ranolazine (RANEXA) 500 MG Tb12 TAKE 1 TABLET BY MOUTH TWICE A DAY    vitamin D 1000 units Tab Take 2,000 Units by mouth once daily.    fish oil-omega-3 fatty acids 300-1,000 mg capsule Take 2 g by mouth once daily.    MULTIVIT-IRON-MIN-FOLIC ACID 3,500-18-0.4 UNIT-MG-MG ORAL CHEW  Take by mouth.     No current facility-administered medications on file prior to visit.     Review of patient's allergies indicates:   Allergen Reactions    Medrol [methylprednisolone]      Review of Systems   Constitutional: Negative for malaise/fatigue.        FEELS COLD    Eyes: Negative for blurred vision.   Cardiovascular: Negative for chest pain, claudication, cyanosis, dyspnea on exertion, irregular heartbeat, leg swelling, near-syncope, orthopnea, palpitations and paroxysmal nocturnal dyspnea.   Respiratory: Negative for cough, hemoptysis and shortness of breath.    Hematologic/Lymphatic: Negative for bleeding problem. Does not bruise/bleed easily.   Skin: Negative for dry skin and itching.   Musculoskeletal: Negative for falls, muscle weakness and myalgias.   Gastrointestinal: Negative for abdominal pain, diarrhea, heartburn, hematemesis, hematochezia and melena.   Genitourinary: Negative for flank pain and hematuria.   Neurological: Negative for dizziness, focal weakness, headaches, light-headedness, numbness, paresthesias, seizures and weakness.   Psychiatric/Behavioral: Negative for altered mental status and memory loss. The patient is not nervous/anxious.    Allergic/Immunologic: Negative for hives.       Objective:   Physical Exam  Vitals and nursing note reviewed.   Constitutional:       General: She is not in acute distress.     Appearance: Normal appearance. She is well-developed and well-nourished. She is not ill-appearing.   HENT:      Head: Normocephalic and atraumatic.   Eyes:      General: No scleral icterus.     Extraocular Movements: EOM normal.      Pupils: Pupils are equal, round, and reactive to light.   Neck:      Thyroid: No thyromegaly.      Vascular: Normal carotid pulses. No carotid bruit, hepatojugular reflux or JVD.      Trachea: No tracheal deviation.   Cardiovascular:      Rate and Rhythm: Normal rate. Rhythm irregularly irregular.      Pulses: Normal pulses.      Heart sounds:  Normal heart sounds. No murmur heard.  No friction rub. No gallop.    Pulmonary:      Effort: Pulmonary effort is normal. No respiratory distress.      Breath sounds: Normal breath sounds. No wheezing, rhonchi or rales.   Chest:      Chest wall: No tenderness.   Abdominal:      General: Bowel sounds are normal. There is no ascites or abdominal bruit. Aorta is normal.      Palpations: Abdomen is soft. There is no hepatomegaly or pulsatile mass.      Tenderness: There is no abdominal tenderness.   Musculoskeletal:         General: No edema.      Right shoulder: No deformity.      Cervical back: Normal range of motion and neck supple.   Skin:     General: Skin is warm and dry.      Findings: No erythema or rash.      Nails: There is no clubbing or cyanosis.   Neurological:      Mental Status: She is alert and oriented to person, place, and time.      Cranial Nerves: No cranial nerve deficit.      Coordination: Coordination normal.      Deep Tendon Reflexes: Strength normal.   Psychiatric:         Mood and Affect: Mood and affect normal.         Speech: Speech normal.         Behavior: Behavior normal.       Vitals:    02/18/22 1122 02/18/22 1125   BP: 138/80 130/84   BP Location: Right arm Left arm   Patient Position: Sitting Sitting   BP Method: Medium (Manual) Medium (Manual)   Pulse: 98    SpO2: 97%    Weight: 80.8 kg (178 lb 2.1 oz)    Height: 5' (1.524 m)      Lab Results   Component Value Date    CHOL 153 12/13/2021    CHOL 114 (L) 03/26/2021    CHOL 126 08/21/2020     Lab Results   Component Value Date    HDL 73 12/13/2021    HDL 46 03/26/2021    HDL 59 08/21/2020     Lab Results   Component Value Date    LDLCALC 56.4 (L) 12/13/2021    LDLCALC 52.2 (L) 03/26/2021    LDLCALC 55.4 (L) 08/21/2020     Lab Results   Component Value Date    TRIG 118 12/13/2021    TRIG 79 03/26/2021    TRIG 58 08/21/2020     Lab Results   Component Value Date    CHOLHDL 47.7 12/13/2021    CHOLHDL 40.4 03/26/2021    CHOLHDL 46.8  08/21/2020       Chemistry        Component Value Date/Time     12/13/2021 1636    K 4.8 12/13/2021 1636     12/13/2021 1636    CO2 25 12/13/2021 1636    BUN 11 12/13/2021 1636    CREATININE 0.7 12/13/2021 1636     12/13/2021 1636        Component Value Date/Time    CALCIUM 11.0 (H) 12/13/2021 1636    ALKPHOS 58 12/13/2021 1636    AST 42 (H) 12/13/2021 1636    ALT 22 12/13/2021 1636    BILITOT 0.5 12/13/2021 1636    ESTGFRAFRICA >60.0 12/13/2021 1636    EGFRNONAA >60.0 12/13/2021 1636          Lab Results   Component Value Date    TSH 0.530 12/13/2021     No results found for: INR, PROTIME  Lab Results   Component Value Date    WBC 8.61 12/13/2021    HGB 13.3 12/13/2021    HCT 42.3 12/13/2021    MCV 94 12/13/2021     12/13/2021     BMP  Sodium   Date Value Ref Range Status   12/13/2021 140 136 - 145 mmol/L Final     Potassium   Date Value Ref Range Status   12/13/2021 4.8 3.5 - 5.1 mmol/L Final     Chloride   Date Value Ref Range Status   12/13/2021 104 95 - 110 mmol/L Final     CO2   Date Value Ref Range Status   12/13/2021 25 23 - 29 mmol/L Final     BUN   Date Value Ref Range Status   12/13/2021 11 8 - 23 mg/dL Final     Creatinine   Date Value Ref Range Status   12/13/2021 0.7 0.5 - 1.4 mg/dL Final     Calcium   Date Value Ref Range Status   12/13/2021 11.0 (H) 8.7 - 10.5 mg/dL Final     Anion Gap   Date Value Ref Range Status   12/13/2021 11 8 - 16 mmol/L Final     eGFR if    Date Value Ref Range Status   12/13/2021 >60.0 >60 mL/min/1.73 m^2 Final     eGFR if non    Date Value Ref Range Status   12/13/2021 >60.0 >60 mL/min/1.73 m^2 Final     Comment:     Calculation used to obtain the estimated glomerular filtration  rate (eGFR) is the CKD-EPI equation.        CrCl cannot be calculated (Patient's most recent lab result is older than the maximum 7 days allowed.).    Assessment:     1. Essential hypertension    2. TIA (transient ischemic attack)    3.  Pulmonary fibrosis, postinflammatory    4. Paroxysmal atrial fibrillation    5. Other hyperlipidemia    6. Palpitation    7. Chronic anticoagulation    8. Hyperparathyroidism    9. Vitamin D deficiency    10. Age-related osteoporosis without current pathological fracture      HTN NOT WELL CONTROLLED OFF LOSARTAN THERE IS A CONFLICT OF INFIORNMATION ABOUT COUGH RESPONSE TO DISCONTINUATION OF LOSARTAN  FROM MY DISCUSSION WITH THE PATIENT AND DAUGHTER SHE HAS NO CHANGE IN COUGH IF THIS IS THE CASE SHE NEEDS TO BE BACK ON LOSARTAN 25 MG PO DAILY HER DIASTOLIC BP IS IN THE HIGH 80S BY REVIEW.   HLP ON [employer] TOLERATING MEDS WELL  AFIB RATE CONTROLLED ON CVA PROPHYLAXIS. CONTINUE THE SAME.   DIASTOLIC DYSFUNCTION APPEARS WELL COMPENSATED. SHE IS WALKING REGUILARILY FOR EXERCISE WEATHER PERMITTING.   Plan:   LOSARTAN 25 MG PO DAILY   Continue current therapy  Cardiac low salt diet.  Risk factor modification and excercise program.  F/u in 6 months with lipid cmp

## 2022-05-13 ENCOUNTER — TELEPHONE (OUTPATIENT)
Dept: ADMINISTRATIVE | Facility: HOSPITAL | Age: 82
End: 2022-05-13
Payer: MEDICARE

## 2022-05-16 NOTE — PROGRESS NOTES
Subjective:      Patient ID: Lorenzo Ospina is a 82 y.o. female.    Chief Complaint: Follow-up      HPI  Here for follow up of medical problems.  BPs good range at home, avg 130/65.  Cough is better with atrovent.  Heat helps mid back pain.  No recent palpitations.  Energy good.  No f/c.  No cp/sob.    Saw Surg for HPT.    Updated/ annual due 12/22:  HM: 10/21 fluvax, 2/21 covid vaccines/booster, 6/17 [id number], refuses [id number] unknown reason even after discussion, ref tetanus, 11/21 MMG/me, 9/20 BMD rep 2y,  2012 Cscope saw Gastro in 2017 and says due in 2022.     Review of Systems   Constitutional: Negative for chills, diaphoresis and fever.   Respiratory: Negative for cough and shortness of breath.    Cardiovascular: Negative for chest pain, palpitations and leg swelling.   Gastrointestinal: Negative for blood in stool, constipation, diarrhea, nausea and vomiting.   Genitourinary: Negative for dysuria, frequency and hematuria.   Psychiatric/Behavioral: The patient is not nervous/anxious.          Objective:   /65   Pulse 98   Temp 97.4 °F (36.3 °C)   Ht 5' (1.524 m)   Wt 80.5 kg (177 lb 7.5 oz)   SpO2 96%   BMI 34.66 kg/m²     Physical Exam  Constitutional:       Appearance: She is well-developed.   Neck:      Thyroid: No thyroid mass.      Vascular: No carotid bruit.   Cardiovascular:      Rate and Rhythm: Normal rate. Rhythm irregularly irregular.      Heart sounds: No murmur heard.    No friction rub. No gallop.   Pulmonary:      Effort: Pulmonary effort is normal.      Breath sounds: No wheezing or rales.   Abdominal:      General: Bowel sounds are normal.      Palpations: Abdomen is soft. There is no mass.      Tenderness: There is no abdominal tenderness.   Musculoskeletal:      Cervical back: Neck supple.   Lymphadenopathy:      Cervical: No cervical adenopathy.   Neurological:      Mental Status: She is alert and oriented to person, place, and time.             Assessment:       1.  Essential hypertension    2. Paroxysmal atrial fibrillation    3. Pulmonary fibrosis, postinflammatory    4. Chronic anticoagulation    5. History of right breast cancer    6. Chronic cough    7. Hyperparathyroidism          Plan:     Essential hypertension- stable at home, cont meds.    Paroxysmal atrial fibrillation, Chronic anticoagulation- f/w Cards.    Pulmonary fibrosis, postinflammatory- clinically doing well now, f/w Pulm.    History of right breast cancer    Chronic cough- restart rx, call if not resolved in 1-2 weeks.  -     ipratropium (ATROVENT) 42 mcg (0.06 %) nasal spray; SMARTSIG:Both Nares  Dispense: 15 mL; Refill: 11    Hyperparathyroidism- add repeat BMD prior to Dr. HERNÁNDEZ visit.  -     DXA Bone Density Spine And Hip; Future; Expected date: 05/24/2022    RTC 4mo.

## 2022-05-23 ENCOUNTER — TELEPHONE (OUTPATIENT)
Dept: FAMILY MEDICINE | Facility: CLINIC | Age: 82
End: 2022-05-23
Payer: MEDICARE

## 2022-05-24 ENCOUNTER — OFFICE VISIT (OUTPATIENT)
Dept: FAMILY MEDICINE | Facility: CLINIC | Age: 82
End: 2022-05-24
Payer: MEDICARE

## 2022-05-24 VITALS
DIASTOLIC BLOOD PRESSURE: 65 MMHG | HEIGHT: 60 IN | WEIGHT: 177.5 LBS | OXYGEN SATURATION: 96 % | HEART RATE: 98 BPM | BODY MASS INDEX: 34.85 KG/M2 | SYSTOLIC BLOOD PRESSURE: 130 MMHG | TEMPERATURE: 97 F

## 2022-05-24 DIAGNOSIS — Z85.3 HISTORY OF RIGHT BREAST CANCER: ICD-10-CM

## 2022-05-24 DIAGNOSIS — E21.3 HYPERPARATHYROIDISM: ICD-10-CM

## 2022-05-24 DIAGNOSIS — Z79.01 CHRONIC ANTICOAGULATION: ICD-10-CM

## 2022-05-24 DIAGNOSIS — I48.0 PAROXYSMAL ATRIAL FIBRILLATION: ICD-10-CM

## 2022-05-24 DIAGNOSIS — I10 ESSENTIAL HYPERTENSION: Primary | ICD-10-CM

## 2022-05-24 DIAGNOSIS — R05.3 CHRONIC COUGH: ICD-10-CM

## 2022-05-24 DIAGNOSIS — J84.10 PULMONARY FIBROSIS, POSTINFLAMMATORY: ICD-10-CM

## 2022-05-24 PROCEDURE — 3075F PR MOST RECENT SYSTOLIC BLOOD PRESS GE 130-139MM HG: ICD-10-PCS | Mod: CPTII,S$GLB,, | Performed by: INTERNAL MEDICINE

## 2022-05-24 PROCEDURE — 3078F DIAST BP <80 MM HG: CPT | Mod: CPTII,S$GLB,, | Performed by: INTERNAL MEDICINE

## 2022-05-24 PROCEDURE — 3288F PR FALLS RISK ASSESSMENT DOCUMENTED: ICD-10-PCS | Mod: CPTII,S$GLB,, | Performed by: INTERNAL MEDICINE

## 2022-05-24 PROCEDURE — 1126F PR PAIN SEVERITY QUANTIFIED, NO PAIN PRESENT: ICD-10-PCS | Mod: CPTII,S$GLB,, | Performed by: INTERNAL MEDICINE

## 2022-05-24 PROCEDURE — 3078F PR MOST RECENT DIASTOLIC BLOOD PRESSURE < 80 MM HG: ICD-10-PCS | Mod: CPTII,S$GLB,, | Performed by: INTERNAL MEDICINE

## 2022-05-24 PROCEDURE — 1159F PR MEDICATION LIST DOCUMENTED IN MEDICAL RECORD: ICD-10-PCS | Mod: CPTII,S$GLB,, | Performed by: INTERNAL MEDICINE

## 2022-05-24 PROCEDURE — 99999 PR PBB SHADOW E&M-EST. PATIENT-LVL IV: ICD-10-PCS | Mod: PBBFAC,,, | Performed by: INTERNAL MEDICINE

## 2022-05-24 PROCEDURE — 1159F MED LIST DOCD IN RCRD: CPT | Mod: CPTII,S$GLB,, | Performed by: INTERNAL MEDICINE

## 2022-05-24 PROCEDURE — 99999 PR PBB SHADOW E&M-EST. PATIENT-LVL IV: CPT | Mod: PBBFAC,,, | Performed by: INTERNAL MEDICINE

## 2022-05-24 PROCEDURE — 99214 PR OFFICE/OUTPT VISIT, EST, LEVL IV, 30-39 MIN: ICD-10-PCS | Mod: S$GLB,,, | Performed by: INTERNAL MEDICINE

## 2022-05-24 PROCEDURE — 1101F PT FALLS ASSESS-DOCD LE1/YR: CPT | Mod: CPTII,S$GLB,, | Performed by: INTERNAL MEDICINE

## 2022-05-24 PROCEDURE — 1126F AMNT PAIN NOTED NONE PRSNT: CPT | Mod: CPTII,S$GLB,, | Performed by: INTERNAL MEDICINE

## 2022-05-24 PROCEDURE — 3288F FALL RISK ASSESSMENT DOCD: CPT | Mod: CPTII,S$GLB,, | Performed by: INTERNAL MEDICINE

## 2022-05-24 PROCEDURE — 1101F PR PT FALLS ASSESS DOC 0-1 FALLS W/OUT INJ PAST YR: ICD-10-PCS | Mod: CPTII,S$GLB,, | Performed by: INTERNAL MEDICINE

## 2022-05-24 PROCEDURE — 3075F SYST BP GE 130 - 139MM HG: CPT | Mod: CPTII,S$GLB,, | Performed by: INTERNAL MEDICINE

## 2022-05-24 PROCEDURE — 99214 OFFICE O/P EST MOD 30 MIN: CPT | Mod: S$GLB,,, | Performed by: INTERNAL MEDICINE

## 2022-05-24 RX ORDER — IPRATROPIUM BROMIDE 42 UG/1
SPRAY, METERED NASAL
Qty: 15 ML | Refills: 11 | Status: SHIPPED | OUTPATIENT
Start: 2022-05-24 | End: 2023-02-20 | Stop reason: SDUPTHER

## 2022-10-03 ENCOUNTER — APPOINTMENT (OUTPATIENT)
Dept: RADIOLOGY | Facility: HOSPITAL | Age: 82
End: 2022-10-03
Attending: INTERNAL MEDICINE
Payer: MEDICARE

## 2022-10-03 DIAGNOSIS — E21.3 HYPERPARATHYROIDISM: ICD-10-CM

## 2022-10-03 PROCEDURE — 77080 DXA BONE DENSITY AXIAL: CPT | Mod: TC

## 2022-10-03 PROCEDURE — 77080 DEXA BONE DENSITY SPINE HIP: ICD-10-PCS | Mod: 26,,, | Performed by: RADIOLOGY

## 2022-10-03 PROCEDURE — 77080 DXA BONE DENSITY AXIAL: CPT | Mod: 26,,, | Performed by: RADIOLOGY

## 2022-10-04 ENCOUNTER — TELEPHONE (OUTPATIENT)
Dept: CARDIOLOGY | Facility: HOSPITAL | Age: 82
End: 2022-10-04
Payer: MEDICARE

## 2022-10-04 NOTE — TELEPHONE ENCOUNTER
The patients daughter in law has been notified of this information and all questions answered.  Labs reviewed. CMP stable. Lipids at goal.   Continue same mgmt. F/U with Dr. Vega as scheduled.   verbalized understanding.

## 2022-10-04 NOTE — TELEPHONE ENCOUNTER
Please phone patient. Labs reviewed. CMP stable. Lipids at goal.    Continue same mgmt. F/U with Dr. Vega as scheduled.    Thanks

## 2022-10-14 ENCOUNTER — OFFICE VISIT (OUTPATIENT)
Dept: CARDIOLOGY | Facility: CLINIC | Age: 82
End: 2022-10-14
Payer: MEDICARE

## 2022-10-14 VITALS
BODY MASS INDEX: 34.32 KG/M2 | HEIGHT: 60 IN | DIASTOLIC BLOOD PRESSURE: 80 MMHG | OXYGEN SATURATION: 98 % | WEIGHT: 174.81 LBS | HEART RATE: 94 BPM | SYSTOLIC BLOOD PRESSURE: 130 MMHG

## 2022-10-14 DIAGNOSIS — I10 ESSENTIAL HYPERTENSION: ICD-10-CM

## 2022-10-14 DIAGNOSIS — R73.01 ELEVATED FASTING BLOOD SUGAR: ICD-10-CM

## 2022-10-14 DIAGNOSIS — E78.49 OTHER HYPERLIPIDEMIA: ICD-10-CM

## 2022-10-14 DIAGNOSIS — I48.21 PERMANENT ATRIAL FIBRILLATION: Primary | ICD-10-CM

## 2022-10-14 DIAGNOSIS — E55.9 VITAMIN D DEFICIENCY: ICD-10-CM

## 2022-10-14 DIAGNOSIS — M81.0 AGE-RELATED OSTEOPOROSIS WITHOUT CURRENT PATHOLOGICAL FRACTURE: ICD-10-CM

## 2022-10-14 DIAGNOSIS — R00.2 PALPITATION: ICD-10-CM

## 2022-10-14 DIAGNOSIS — G45.9 TIA (TRANSIENT ISCHEMIC ATTACK): ICD-10-CM

## 2022-10-14 DIAGNOSIS — J84.10 PULMONARY FIBROSIS, POSTINFLAMMATORY: ICD-10-CM

## 2022-10-14 DIAGNOSIS — E21.3 HYPERPARATHYROIDISM: ICD-10-CM

## 2022-10-14 DIAGNOSIS — Z79.01 CHRONIC ANTICOAGULATION: ICD-10-CM

## 2022-10-14 PROCEDURE — 99214 PR OFFICE/OUTPT VISIT, EST, LEVL IV, 30-39 MIN: ICD-10-PCS | Mod: S$GLB,,, | Performed by: INTERNAL MEDICINE

## 2022-10-14 PROCEDURE — 3075F PR MOST RECENT SYSTOLIC BLOOD PRESS GE 130-139MM HG: ICD-10-PCS | Mod: CPTII,S$GLB,, | Performed by: INTERNAL MEDICINE

## 2022-10-14 PROCEDURE — 1101F PR PT FALLS ASSESS DOC 0-1 FALLS W/OUT INJ PAST YR: ICD-10-PCS | Mod: CPTII,S$GLB,, | Performed by: INTERNAL MEDICINE

## 2022-10-14 PROCEDURE — 1159F PR MEDICATION LIST DOCUMENTED IN MEDICAL RECORD: ICD-10-PCS | Mod: CPTII,S$GLB,, | Performed by: INTERNAL MEDICINE

## 2022-10-14 PROCEDURE — 1101F PT FALLS ASSESS-DOCD LE1/YR: CPT | Mod: CPTII,S$GLB,, | Performed by: INTERNAL MEDICINE

## 2022-10-14 PROCEDURE — 99499 UNLISTED E&M SERVICE: CPT | Mod: S$GLB,,, | Performed by: INTERNAL MEDICINE

## 2022-10-14 PROCEDURE — 3075F SYST BP GE 130 - 139MM HG: CPT | Mod: CPTII,S$GLB,, | Performed by: INTERNAL MEDICINE

## 2022-10-14 PROCEDURE — 99499 RISK ADDL DX/OHS AUDIT: ICD-10-PCS | Mod: S$GLB,,, | Performed by: INTERNAL MEDICINE

## 2022-10-14 PROCEDURE — 1160F PR REVIEW ALL MEDS BY PRESCRIBER/CLIN PHARMACIST DOCUMENTED: ICD-10-PCS | Mod: CPTII,S$GLB,, | Performed by: INTERNAL MEDICINE

## 2022-10-14 PROCEDURE — 3288F FALL RISK ASSESSMENT DOCD: CPT | Mod: CPTII,S$GLB,, | Performed by: INTERNAL MEDICINE

## 2022-10-14 PROCEDURE — 3288F PR FALLS RISK ASSESSMENT DOCUMENTED: ICD-10-PCS | Mod: CPTII,S$GLB,, | Performed by: INTERNAL MEDICINE

## 2022-10-14 PROCEDURE — 99214 OFFICE O/P EST MOD 30 MIN: CPT | Mod: S$GLB,,, | Performed by: INTERNAL MEDICINE

## 2022-10-14 PROCEDURE — 1126F AMNT PAIN NOTED NONE PRSNT: CPT | Mod: CPTII,S$GLB,, | Performed by: INTERNAL MEDICINE

## 2022-10-14 PROCEDURE — 1160F RVW MEDS BY RX/DR IN RCRD: CPT | Mod: CPTII,S$GLB,, | Performed by: INTERNAL MEDICINE

## 2022-10-14 PROCEDURE — 99999 PR PBB SHADOW E&M-EST. PATIENT-LVL III: CPT | Mod: PBBFAC,,, | Performed by: INTERNAL MEDICINE

## 2022-10-14 PROCEDURE — 3079F PR MOST RECENT DIASTOLIC BLOOD PRESSURE 80-89 MM HG: ICD-10-PCS | Mod: CPTII,S$GLB,, | Performed by: INTERNAL MEDICINE

## 2022-10-14 PROCEDURE — 3079F DIAST BP 80-89 MM HG: CPT | Mod: CPTII,S$GLB,, | Performed by: INTERNAL MEDICINE

## 2022-10-14 PROCEDURE — 99999 PR PBB SHADOW E&M-EST. PATIENT-LVL III: ICD-10-PCS | Mod: PBBFAC,,, | Performed by: INTERNAL MEDICINE

## 2022-10-14 PROCEDURE — 1126F PR PAIN SEVERITY QUANTIFIED, NO PAIN PRESENT: ICD-10-PCS | Mod: CPTII,S$GLB,, | Performed by: INTERNAL MEDICINE

## 2022-10-14 PROCEDURE — 1159F MED LIST DOCD IN RCRD: CPT | Mod: CPTII,S$GLB,, | Performed by: INTERNAL MEDICINE

## 2022-10-14 NOTE — PROGRESS NOTES
Subjective:   Patient ID:  Lorenzo Ospina is a 82 y.o. female who presents for follow up of No chief complaint on file.      HPI  8/28/2020  A 79 yo female with htn afib failed cardioversion was scheduled to have BLATION HAD CANCELED DUE TO FAMILY ISSUES AND NOW COVID. SHE IS NOT COMPLAINING OF CHEST PAIN SHORTNESS OF BREATH TAKES MEDS REGULARILY HAS IMPROVED EATING HABITS AS FAR AS SALT IS CONCERNED.SHE WALKS BETWEEN 15-30 MINUTES DAILY WEATHER PERMITS BP IS IMPROVED WITHE XERCISE. HAS NO LEG SWELLING NO CHF SYMPTOMS. LIPIDS ARE ON TARGET     TODAY IS HERE FOR F/U SHE FEELS WELL SHE IS EXERCISING HAS NOT BEEN COMPLAINING OF CHEST PAIN OR SHORTNESS OF BREATH HAS NO HEADACHE NO BLURRED VISION HAS BEEN ON NOAC NO BLEEDING. SAW DR MORA TODAY STOPPED HER AMIODARONE  AND KEPT HER ON B BLOCKERS. HER BP RECORDING FROM HOME ARE ON TARGET AND WELL CONTROLLED SHE TRIES TO EXERCISE REGULARILY OTHERWISE HER BOP IS ELEVATED.      3/31/2021  Had nocturnal wheezing and has dyspnea on exertion was seen in er resolved with diuretics. This is suggestive of pnd angina decubitus, she continues to have exertional dyspnea has no energy with activity has to take a break when doing house chores. Reviewed her bp readings seems within range. Has no leg swelling after short course of diuretics pnd resolved chest tightness wheezing resolved.      11/12/2021  HERE FROF /U JUST GOT BACK FROM [place] SHE SPENT 4 MONTH THERE ./ SHE HAS BEEN WALKING HAD  DIFFICULTY GOING UPHILL SHE GEST TIRED SHORT OF BREATH HAS TO SIT DOWN. NOW HERE FELT TIRED WALKING. HAS  BEEN COMPLIANT WITH MEDS BP SEEMED CONTROLLED. SHE IS ABLE TO DO ALL HOUSE WORK NOT STOPPING WITH ANY SHORTNESS OF BREATH HAS NO LEG SWELLING  NO ORTHOPNEA PND  NO SYNCOPE. HER CARDIAC W/U NEGATIVE      2/18/2022    here for f/u has been off losartan due to cough not sure if she improved off losartan DR CORONADO THINK HER COUGH IS BETTER. WHILE PATIENT DOES NOT THINK SO. SHE IS  TAKING HER MEDS. HER DIASTOLIC BP IS STAYING ABOVE MID 80S. HAS NO NEW SYMPTOMS   HER SHORTNESS OF BREATH IS IMPROVING SHE HAS NO WHEEZING SHE HAS NO LEG SWELLING SHE IS COMPLIANT WITH DIET.     10/14/2022  Doing well clinically reviewed bp reading for the past 4 months seems well controlled. Has no tia symptoms. Has  no more cough tolerated losartan well her medical regimen adequate . Has no bleeding or bruising. Has fatigue in the morning not related to bp issue. Drinks coffee and eat feels better. She is compliant with salt in  a reasonable fashion.    Past Medical History:   Diagnosis Date    A-fib 11/5/2019    Breast cancer     right, dx 2008.  Dr. Callaway follows.s/p lumpectomy and arimidex x 5y.    Hyperlipidemia     Hypertension     OP (osteoporosis)     on bisphos x 2y.    TIA (transient ischemic attack)     Toe fracture        Past Surgical History:   Procedure Laterality Date    BREAST LUMPECTOMY      right 2008    TOTAL ABDOMINAL HYSTERECTOMY      no cancer    TRANSESOPHAGEAL ECHOCARDIOGRAPHY N/A 11/5/2019    Procedure: ECHOCARDIOGRAM, TRANSESOPHAGEAL;  Surgeon: Otto Flores MD;  Location: [facility] CATH LAB;  Service: Cardiology;  Laterality: N/A;    TREATMENT OF CARDIAC ARRHYTHMIA N/A 11/5/2019    Procedure: CARDIOVERSION;  Surgeon: Otto Flores MD;  Location: [facility] CATH LAB;  Service: Cardiology;  Laterality: N/A;    TREATMENT OF CARDIAC ARRHYTHMIA N/A 11/5/2019    Procedure: CARDIOVERSION;  Surgeon: Otto Flores MD;  Location: [facility] CATH LAB;  Service: Cardiology;  Laterality: N/A;    TREATMENT OF CARDIAC ARRHYTHMIA N/A 2/4/2020    Procedure: CARDIOVERSION;  Surgeon: Otto Flores MD;  Location: [facility] CATH LAB;  Service: Cardiology;  Laterality: N/A;    TREATMENT OF CARDIAC ARRHYTHMIA N/A 3/2/2020    Procedure: CARDIOVERSION;  Surgeon: Mayi Beltran MD;  Location: [facility] CATH LAB;  Service: Cardiology;  Laterality: N/A;  830 start per Dr. beltran       Social History     Tobacco Use    Smoking status: Former      Packs/day: 1.50     Years: 22.00     Pack years: 33.00     Types: Cigarettes     Start date: 1972     Quit date: 1994     Years since quittin.0    Smokeless tobacco: Never   Substance Use Topics    Alcohol use: Not Currently     Alcohol/week: 1.0 standard drink     Types: 1 Glasses of wine per week     Comment: daily    Drug use: Never       History reviewed. No pertinent family history.    Current Outpatient Medications   Medication Sig    atorvastatin (LIPITOR) 20 MG tablet TAKE 1 TABLET BY MOUTH EVERY DAY    cyanocobalamin (VITAMIN B-12) 1000 MCG tablet Take 100 mcg by mouth once daily.    ELIQUIS 5 mg Tab TAKE 1 TABLET BY MOUTH TWICE A DAY    fish oil-omega-3 fatty acids 300-1,000 mg capsule Take 2 g by mouth once daily.    glucosam/chond/hyalu/CF borate (MOVE FREE JOINT HEALTH ORAL) Take 1 tablet by mouth once daily.    ipratropium (ATROVENT) 42 mcg (0.06 %) nasal spray SMARTSIG:Both Nares    losartan (COZAAR) 25 MG tablet Take 1 tablet (25 mg total) by mouth once daily.    metoprolol succinate (TOPROL-XL) 50 MG 24 hr tablet TAKE 1 TABLET BY MOUTH EVERY DAY    MULTIVIT-IRON-MIN-FOLIC ACID 3,500-18-0.4 UNIT-MG-MG ORAL CHEW Take by mouth.    omeprazole (PRILOSEC) 40 MG capsule Take 40 mg by mouth once daily.    ranolazine (RANEXA) 500 MG Tb12 TAKE 1 TABLET BY MOUTH TWICE A DAY    vitamin D 1000 units Tab Take 2,000 Units by mouth once daily.     No current facility-administered medications for this visit.     Current Outpatient Medications on File Prior to Visit   Medication Sig    atorvastatin (LIPITOR) 20 MG tablet TAKE 1 TABLET BY MOUTH EVERY DAY    cyanocobalamin (VITAMIN B-12) 1000 MCG tablet Take 100 mcg by mouth once daily.    ELIQUIS 5 mg Tab TAKE 1 TABLET BY MOUTH TWICE A DAY    fish oil-omega-3 fatty acids 300-1,000 mg capsule Take 2 g by mouth once daily.    glucosam/chond/hyalu/CF borate (MOVE FREE JOINT HEALTH ORAL) Take 1 tablet by mouth once daily.    ipratropium (ATROVENT) 42 mcg  (0.06 %) nasal spray SMARTSIG:Both Nares    losartan (COZAAR) 25 MG tablet Take 1 tablet (25 mg total) by mouth once daily.    metoprolol succinate (TOPROL-XL) 50 MG 24 hr tablet TAKE 1 TABLET BY MOUTH EVERY DAY    MULTIVIT-IRON-MIN-FOLIC ACID 3,500-18-0.4 UNIT-MG-MG ORAL CHEW Take by mouth.    omeprazole (PRILOSEC) 40 MG capsule Take 40 mg by mouth once daily.    ranolazine (RANEXA) 500 MG Tb12 TAKE 1 TABLET BY MOUTH TWICE A DAY    vitamin D 1000 units Tab Take 2,000 Units by mouth once daily.     No current facility-administered medications on file prior to visit.     Review of patient's allergies indicates:   Allergen Reactions    Medrol [methylprednisolone]       Review of Systems   Constitutional: Negative for diaphoresis, malaise/fatigue and weight gain.   HENT:  Negative for hoarse voice.    Eyes:  Negative for double vision and visual disturbance.   Cardiovascular:  Negative for chest pain, claudication, cyanosis, dyspnea on exertion, irregular heartbeat, leg swelling, near-syncope, orthopnea, palpitations, paroxysmal nocturnal dyspnea and syncope.   Respiratory:  Negative for cough, hemoptysis, shortness of breath and snoring.    Hematologic/Lymphatic: Negative for bleeding problem. Does not bruise/bleed easily.   Skin:  Negative for color change and poor wound healing.   Musculoskeletal:  Negative for muscle cramps, muscle weakness and myalgias.   Gastrointestinal:  Negative for bloating, abdominal pain, change in bowel habit, diarrhea, heartburn, hematemesis, hematochezia, melena and nausea.   Neurological:  Negative for excessive daytime sleepiness, dizziness, headaches, light-headedness, loss of balance, numbness and weakness.   Psychiatric/Behavioral:  Negative for memory loss. The patient does not have insomnia.    Allergic/Immunologic: Negative for hives.     Objective:   Physical Exam  Constitutional:       General: She is not in acute distress.     Appearance: Normal appearance. She is  well-developed. She is not ill-appearing.   HENT:      Head: Normocephalic and atraumatic.   Eyes:      General: No scleral icterus.     Pupils: Pupils are equal, round, and reactive to light.   Neck:      Thyroid: No thyromegaly.      Vascular: Normal carotid pulses. No carotid bruit, hepatojugular reflux or JVD.      Trachea: No tracheal deviation.   Cardiovascular:      Rate and Rhythm: Normal rate and regular rhythm.      Pulses: Normal pulses.      Heart sounds: Normal heart sounds. No murmur heard.    No friction rub. No gallop.   Pulmonary:      Effort: Pulmonary effort is normal. No respiratory distress.      Breath sounds: Normal breath sounds. No wheezing, rhonchi or rales.   Chest:      Chest wall: No tenderness.   Abdominal:      General: Bowel sounds are normal. There is no abdominal bruit.      Palpations: Abdomen is soft. There is no hepatomegaly or pulsatile mass.      Tenderness: There is no abdominal tenderness.   Musculoskeletal:      Right shoulder: No deformity.      Cervical back: Normal range of motion and neck supple.      Right lower leg: No edema.      Left lower leg: No edema.   Skin:     General: Skin is warm and dry.      Findings: No erythema or rash.      Nails: There is no clubbing.   Neurological:      Mental Status: She is alert and oriented to person, place, and time.      Cranial Nerves: No cranial nerve deficit.      Coordination: Coordination normal.   Psychiatric:         Speech: Speech normal.         Behavior: Behavior normal.         Judgment: Judgment normal.     Vitals:    10/14/22 1021 10/14/22 1024   BP: 126/82 130/80   BP Location: Left arm Right arm   Patient Position: Sitting Sitting   BP Method: Medium (Manual) Medium (Manual)   Pulse: 94    SpO2: 98%    Weight: 79.3 kg (174 lb 13.2 oz)    Height: 5' (1.524 m)      Lab Results   Component Value Date    CHOL 145 10/03/2022    CHOL 153 12/13/2021    CHOL 114 (L) 03/26/2021     Lab Results   Component Value Date    HDL  62 10/03/2022    HDL 73 12/13/2021    HDL 46 03/26/2021     Lab Results   Component Value Date    LDLCALC 68.8 10/03/2022    LDLCALC 56.4 (L) 12/13/2021    LDLCALC 52.2 (L) 03/26/2021     Lab Results   Component Value Date    TRIG 71 10/03/2022    TRIG 118 12/13/2021    TRIG 79 03/26/2021     Lab Results   Component Value Date    CHOLHDL 42.8 10/03/2022    CHOLHDL 47.7 12/13/2021    CHOLHDL 40.4 03/26/2021       Chemistry        Component Value Date/Time     10/03/2022 0853    K 4.9 10/03/2022 0853     10/03/2022 0853    CO2 26 10/03/2022 0853    BUN 14 10/03/2022 0853    CREATININE 0.9 10/03/2022 0853     (H) 10/03/2022 0853        Component Value Date/Time    CALCIUM 10.8 (H) 10/03/2022 0853    ALKPHOS 61 10/03/2022 0853    AST 16 10/03/2022 0853    ALT 18 10/03/2022 0853    BILITOT 0.8 10/03/2022 0853    ESTGFRAFRICA >60.0 12/13/2021 1636    EGFRNONAA >60.0 12/13/2021 1636        Lab Results   Component Value Date    HGBA1C 5.4 02/03/2016       Lab Results   Component Value Date    TSH 0.530 12/13/2021     No results found for: INR, PROTIME  Lab Results   Component Value Date    WBC 8.61 12/13/2021    HGB 13.3 12/13/2021    HCT 42.3 12/13/2021    MCV 94 12/13/2021     12/13/2021     BMP  Sodium   Date Value Ref Range Status   10/03/2022 143 136 - 145 mmol/L Final     Potassium   Date Value Ref Range Status   10/03/2022 4.9 3.5 - 5.1 mmol/L Final     Chloride   Date Value Ref Range Status   10/03/2022 109 95 - 110 mmol/L Final     CO2   Date Value Ref Range Status   10/03/2022 26 23 - 29 mmol/L Final     BUN   Date Value Ref Range Status   10/03/2022 14 8 - 23 mg/dL Final     Creatinine   Date Value Ref Range Status   10/03/2022 0.9 0.5 - 1.4 mg/dL Final     Calcium   Date Value Ref Range Status   10/03/2022 10.8 (H) 8.7 - 10.5 mg/dL Final     Anion Gap   Date Value Ref Range Status   10/03/2022 8 8 - 16 mmol/L Final     eGFR if    Date Value Ref Range Status   12/13/2021  >60.0 >60 mL/min/1.73 m^2 Final     eGFR if non    Date Value Ref Range Status   12/13/2021 >60.0 >60 mL/min/1.73 m^2 Final     Comment:     Calculation used to obtain the estimated glomerular filtration  rate (eGFR) is the CKD-EPI equation.        CrCl cannot be calculated (Patient's most recent lab result is older than the maximum 7 days allowed.).    Assessment:     1. Permanent atrial fibrillation    2. TIA (transient ischemic attack)    3. Pulmonary fibrosis, postinflammatory    4. Essential hypertension    5. Other hyperlipidemia    6. Palpitation    7. Chronic anticoagulation    8. Hyperparathyroidism    9. Age-related osteoporosis without current pathological fracture    10. Vitamin D deficiency      Over cardiac status stable afib rate controlled on noac no complication   Htn controlled low salt diet emphasized encouraged hydration  Hlp on target continue same.  Elevated blood sugar fasting counseled about carbohydrate intake   Plan:   Continue current therapy  Cardiac low salt diet.  Risk factor modification and excercise program./weight loss  F/u in 6 months with lipid cmp ekg.

## 2022-11-03 ENCOUNTER — PES CALL (OUTPATIENT)
Dept: ADMINISTRATIVE | Facility: CLINIC | Age: 82
End: 2022-11-03
Payer: MEDICARE

## 2022-11-10 NOTE — PROGRESS NOTES
Subjective:      Patient ID: Lorenzo Ospina is a 82 y.o. female.    Chief Complaint: Follow-up (Routine follow up, no issues or concerns. Pt says she feels good.)      HPI  Here for f/u medical problems and preventive exam.  Has taken fosamax over 5 years, so it was not continued in 11/20 due to amount of time.  Had not wanted injections for bones, but now says will take it.  Morning little cough, but not during day.  No f/c/sw/cough.  Breathing well.  Not needed lately very much.   Active with walking in and out of house for exercise.  BMs normal, no b/b.  Urine normal.        10/22 BMD:  FINDINGS:  The L1 to L4 vertebral bone mineral density is equal to 1.261 g/cm squared with a T score of 0.7.  There has been no significant change relative to the prior study.     The left femoral neck bone mineral density is equal to 0.768 g/cm squared with a T score of -1.9.  There has been  no significant change relative to the prior study.     There is a 21.2% risk of a major osteoporotic fracture and a 5.5% risk of hip fracture in the next 10 years (FRAX).     Impression:     Osteopenia         HM: 11/22 fluvax, 2/21 covid vaccines/booster, 6/17 [id number], refuses [id number] unknown reason even after discussion, ref tetanus, 11/21 MMG/no more, 10/22 BMD rep 2y,  2012 Cscope saw Gastro in 2017 and says due in 2022.     Review of Systems   Constitutional:  Negative for chills, diaphoresis and fever.   Respiratory:  Negative for cough and shortness of breath.    Cardiovascular:  Negative for chest pain, palpitations and leg swelling.   Gastrointestinal:  Negative for blood in stool, constipation, diarrhea, nausea and vomiting.   Genitourinary:  Negative for dysuria, frequency and hematuria.   Psychiatric/Behavioral:  The patient is not nervous/anxious.        Objective:   BP (!) 128/58 (BP Location: Left arm)   Pulse 66   Temp 97.9 °F (36.6 °C) (Oral)   Ht 5' (1.524 m)   Wt 79 kg (174 lb 1.6 oz)   SpO2 99%   BMI 34.00  kg/m²     Physical Exam  Constitutional:       Appearance: She is well-developed.   Neck:      Thyroid: No thyroid mass.      Vascular: No carotid bruit.   Cardiovascular:      Rate and Rhythm: Normal rate and regular rhythm.      Heart sounds: No murmur heard.    No friction rub. No gallop.   Pulmonary:      Effort: Pulmonary effort is normal.      Breath sounds: Normal breath sounds. No wheezing or rales.   Abdominal:      General: Bowel sounds are normal.      Palpations: Abdomen is soft. There is no mass.      Tenderness: There is no abdominal tenderness.   Musculoskeletal:      Cervical back: Neck supple.   Lymphadenopathy:      Cervical: No cervical adenopathy.   Neurological:      Mental Status: She is alert and oriented to person, place, and time.      Latest Reference Range & Units 10/03/22 08:53   Sodium 136 - 145 mmol/L 143   Potassium 3.5 - 5.1 mmol/L 4.9   Chloride 95 - 110 mmol/L 109   CO2 23 - 29 mmol/L 26   Anion Gap 8 - 16 mmol/L 8   BUN 8 - 23 mg/dL 14   Creatinine 0.5 - 1.4 mg/dL 0.9   eGFR >60 mL/min/1.73 m^2 >60.0   Glucose 70 - 110 mg/dL 113 (H)   Calcium 8.7 - 10.5 mg/dL 10.8 (H)   Alkaline Phosphatase 55 - 135 U/L 61   PROTEIN TOTAL 6.0 - 8.4 g/dL 7.0   Albumin 3.5 - 5.2 g/dL 4.1   BILIRUBIN TOTAL 0.1 - 1.0 mg/dL 0.8   AST 10 - 40 U/L 16   ALT 10 - 44 U/L 18   Cholesterol 120 - 199 mg/dL 145   HDL 40 - 75 mg/dL 62   HDL/Cholesterol Ratio 20.0 - 50.0 % 42.8   LDL Cholesterol External 63.0 - 159.0 mg/dL 68.8   Non-HDL Cholesterol mg/dL 83   Total Cholesterol/HDL Ratio 2.0 - 5.0  2.3   Triglycerides 30 - 150 mg/dL 71         Assessment:       1. Essential hypertension    2. History of right breast cancer    3. Pulmonary fibrosis, postinflammatory    4. Atherosclerosis of aorta    5. Simple chronic bronchitis    6. Age-related osteoporosis without current pathological fracture    7. Paroxysmal atrial fibrillation    8. Other hyperlipidemia    9. Hyperparathyroidism    10. Osteopenia with high  risk of fracture    11. Screen for colon cancer          Plan:     Essential hypertension- stable, cont rx.    History of right breast cancer 20y ago.    Pulmonary fibrosis, postinflammatory, Simple chronic bronchitis- cont albuterol prn.  -     albuterol (VENTOLIN HFA) 90 mcg/actuation inhaler; Inhale 2 puffs into the lungs every 6 (six) hours as needed for Wheezing. Rescue  Dispense: 18 g; Refill: 3    Age-related osteoporosis without current pathological fracture, Osteopenia with high risk of fracture  -     Ambulatory referral/consult to Rheumatology; Future; Expected date: 11/18/2022  -     Vitamin D; Future    Paroxysmal atrial fibrillation  -     TSH; Future; Expected date: 11/11/2022  -     CBC Auto Differential; Future; Expected date: 11/11/2022    Other hyperlipidemia, atherosclerosis- cont statin, chol good range, cont exercise.    Hyperparathyroidism  -     PTH, Intact; Future; Expected date: 11/25/2022    Screen for colon cancer  -     Cologuard Screening (Multitarget Stool DNA); Future; Expected date: 11/11/2022    RTC  4mo.  Still ref [email or web address].

## 2022-11-11 ENCOUNTER — OFFICE VISIT (OUTPATIENT)
Dept: PRIMARY CARE CLINIC | Facility: CLINIC | Age: 82
End: 2022-11-11
Payer: MEDICARE

## 2022-11-11 VITALS
TEMPERATURE: 98 F | OXYGEN SATURATION: 99 % | HEIGHT: 60 IN | HEART RATE: 66 BPM | DIASTOLIC BLOOD PRESSURE: 58 MMHG | BODY MASS INDEX: 34.18 KG/M2 | WEIGHT: 174.13 LBS | SYSTOLIC BLOOD PRESSURE: 128 MMHG

## 2022-11-11 DIAGNOSIS — J41.0 SIMPLE CHRONIC BRONCHITIS: ICD-10-CM

## 2022-11-11 DIAGNOSIS — M85.80 OSTEOPENIA WITH HIGH RISK OF FRACTURE: ICD-10-CM

## 2022-11-11 DIAGNOSIS — Z12.11 SCREEN FOR COLON CANCER: ICD-10-CM

## 2022-11-11 DIAGNOSIS — I70.0 ATHEROSCLEROSIS OF AORTA: ICD-10-CM

## 2022-11-11 DIAGNOSIS — I10 ESSENTIAL HYPERTENSION: Primary | ICD-10-CM

## 2022-11-11 DIAGNOSIS — E78.49 OTHER HYPERLIPIDEMIA: ICD-10-CM

## 2022-11-11 DIAGNOSIS — E21.3 HYPERPARATHYROIDISM: ICD-10-CM

## 2022-11-11 DIAGNOSIS — J84.10 PULMONARY FIBROSIS, POSTINFLAMMATORY: ICD-10-CM

## 2022-11-11 DIAGNOSIS — Z85.3 HISTORY OF RIGHT BREAST CANCER: ICD-10-CM

## 2022-11-11 DIAGNOSIS — M81.0 AGE-RELATED OSTEOPOROSIS WITHOUT CURRENT PATHOLOGICAL FRACTURE: ICD-10-CM

## 2022-11-11 DIAGNOSIS — I48.0 PAROXYSMAL ATRIAL FIBRILLATION: ICD-10-CM

## 2022-11-11 LAB
BASOPHILS # BLD AUTO: 0.02 K/UL (ref 0–0.2)
BASOPHILS NFR BLD: 0.3 % (ref 0–1.9)
DIFFERENTIAL METHOD: NORMAL
EOSINOPHIL # BLD AUTO: 0.1 K/UL (ref 0–0.5)
EOSINOPHIL NFR BLD: 1.6 % (ref 0–8)
ERYTHROCYTE [DISTWIDTH] IN BLOOD BY AUTOMATED COUNT: 13.6 % (ref 11.5–14.5)
HCT VFR BLD AUTO: 43.4 % (ref 37–48.5)
HGB BLD-MCNC: 13.9 G/DL (ref 12–16)
IMM GRANULOCYTES # BLD AUTO: 0.01 K/UL (ref 0–0.04)
IMM GRANULOCYTES NFR BLD AUTO: 0.2 % (ref 0–0.5)
LYMPHOCYTES # BLD AUTO: 1.9 K/UL (ref 1–4.8)
LYMPHOCYTES NFR BLD: 31 % (ref 18–48)
MCH RBC QN AUTO: 29.9 PG (ref 27–31)
MCHC RBC AUTO-ENTMCNC: 32 G/DL (ref 32–36)
MCV RBC AUTO: 93 FL (ref 82–98)
MONOCYTES # BLD AUTO: 0.5 K/UL (ref 0.3–1)
MONOCYTES NFR BLD: 7.9 % (ref 4–15)
NEUTROPHILS # BLD AUTO: 3.7 K/UL (ref 1.8–7.7)
NEUTROPHILS NFR BLD: 59 % (ref 38–73)
NRBC BLD-RTO: 0 /100 WBC
PLATELET # BLD AUTO: 220 K/UL (ref 150–450)
PMV BLD AUTO: 10.7 FL (ref 9.2–12.9)
RBC # BLD AUTO: 4.65 M/UL (ref 4–5.4)
WBC # BLD AUTO: 6.23 K/UL (ref 3.9–12.7)

## 2022-11-11 PROCEDURE — 1101F PT FALLS ASSESS-DOCD LE1/YR: CPT | Mod: CPTII,S$GLB,, | Performed by: INTERNAL MEDICINE

## 2022-11-11 PROCEDURE — 99999 PR PBB SHADOW E&M-EST. PATIENT-LVL IV: CPT | Mod: PBBFAC,,, | Performed by: INTERNAL MEDICINE

## 2022-11-11 PROCEDURE — 3288F FALL RISK ASSESSMENT DOCD: CPT | Mod: CPTII,S$GLB,, | Performed by: INTERNAL MEDICINE

## 2022-11-11 PROCEDURE — 1126F PR PAIN SEVERITY QUANTIFIED, NO PAIN PRESENT: ICD-10-PCS | Mod: CPTII,S$GLB,, | Performed by: INTERNAL MEDICINE

## 2022-11-11 PROCEDURE — 1101F PR PT FALLS ASSESS DOC 0-1 FALLS W/OUT INJ PAST YR: ICD-10-PCS | Mod: CPTII,S$GLB,, | Performed by: INTERNAL MEDICINE

## 2022-11-11 PROCEDURE — 1126F AMNT PAIN NOTED NONE PRSNT: CPT | Mod: CPTII,S$GLB,, | Performed by: INTERNAL MEDICINE

## 2022-11-11 PROCEDURE — 3288F PR FALLS RISK ASSESSMENT DOCUMENTED: ICD-10-PCS | Mod: CPTII,S$GLB,, | Performed by: INTERNAL MEDICINE

## 2022-11-11 PROCEDURE — 3078F DIAST BP <80 MM HG: CPT | Mod: CPTII,S$GLB,, | Performed by: INTERNAL MEDICINE

## 2022-11-11 PROCEDURE — 1159F PR MEDICATION LIST DOCUMENTED IN MEDICAL RECORD: ICD-10-PCS | Mod: CPTII,S$GLB,, | Performed by: INTERNAL MEDICINE

## 2022-11-11 PROCEDURE — 82306 VITAMIN D 25 HYDROXY: CPT | Performed by: INTERNAL MEDICINE

## 2022-11-11 PROCEDURE — 99999 PR PBB SHADOW E&M-EST. PATIENT-LVL IV: ICD-10-PCS | Mod: PBBFAC,,, | Performed by: INTERNAL MEDICINE

## 2022-11-11 PROCEDURE — 3074F SYST BP LT 130 MM HG: CPT | Mod: CPTII,S$GLB,, | Performed by: INTERNAL MEDICINE

## 2022-11-11 PROCEDURE — 85025 COMPLETE CBC W/AUTO DIFF WBC: CPT | Performed by: INTERNAL MEDICINE

## 2022-11-11 PROCEDURE — 99214 PR OFFICE/OUTPT VISIT, EST, LEVL IV, 30-39 MIN: ICD-10-PCS | Mod: S$GLB,,, | Performed by: INTERNAL MEDICINE

## 2022-11-11 PROCEDURE — 1159F MED LIST DOCD IN RCRD: CPT | Mod: CPTII,S$GLB,, | Performed by: INTERNAL MEDICINE

## 2022-11-11 PROCEDURE — 84443 ASSAY THYROID STIM HORMONE: CPT | Performed by: INTERNAL MEDICINE

## 2022-11-11 PROCEDURE — 99214 OFFICE O/P EST MOD 30 MIN: CPT | Mod: S$GLB,,, | Performed by: INTERNAL MEDICINE

## 2022-11-11 PROCEDURE — 3074F PR MOST RECENT SYSTOLIC BLOOD PRESSURE < 130 MM HG: ICD-10-PCS | Mod: CPTII,S$GLB,, | Performed by: INTERNAL MEDICINE

## 2022-11-11 PROCEDURE — 83970 ASSAY OF PARATHORMONE: CPT | Performed by: INTERNAL MEDICINE

## 2022-11-11 PROCEDURE — 3078F PR MOST RECENT DIASTOLIC BLOOD PRESSURE < 80 MM HG: ICD-10-PCS | Mod: CPTII,S$GLB,, | Performed by: INTERNAL MEDICINE

## 2022-11-11 RX ORDER — ALBUTEROL SULFATE 90 UG/1
2 AEROSOL, METERED RESPIRATORY (INHALATION) EVERY 6 HOURS PRN
Qty: 18 G | Refills: 3 | Status: SHIPPED | OUTPATIENT
Start: 2022-11-11 | End: 2022-11-11

## 2022-11-12 LAB
25(OH)D3+25(OH)D2 SERPL-MCNC: 57 NG/ML (ref 30–96)
PTH-INTACT SERPL-MCNC: 178.8 PG/ML (ref 9–77)
TSH SERPL DL<=0.005 MIU/L-ACNC: 0.77 UIU/ML (ref 0.4–4)

## 2022-11-14 ENCOUNTER — TELEPHONE (OUTPATIENT)
Dept: PRIMARY CARE CLINIC | Facility: CLINIC | Age: 82
End: 2022-11-14

## 2022-11-14 DIAGNOSIS — Z23 NEED FOR VACCINATION: Primary | ICD-10-CM

## 2022-11-14 DIAGNOSIS — Z23 NEED FOR PNEUMOCOCCAL VACCINATION: Primary | ICD-10-CM

## 2022-11-17 ENCOUNTER — TELEPHONE (OUTPATIENT)
Dept: PRIMARY CARE CLINIC | Facility: CLINIC | Age: 82
End: 2022-11-17
Payer: MEDICARE

## 2022-11-17 NOTE — TELEPHONE ENCOUNTER
Please inform family member, usually daughter, that labs look good for vit D, blood count, and thyroid function.  Parathyroid level is still high, but calcium level is better so that is good-  SM

## 2022-11-30 LAB — NONINV COLON CA DNA+OCC BLD SCRN STL QL: NORMAL

## 2022-12-19 LAB — NONINV COLON CA DNA+OCC BLD SCRN STL QL: NEGATIVE

## 2022-12-21 ENCOUNTER — TELEPHONE (OUTPATIENT)
Dept: PRIMARY CARE CLINIC | Facility: CLINIC | Age: 82
End: 2022-12-21
Payer: MEDICARE

## 2022-12-21 ENCOUNTER — TELEPHONE (OUTPATIENT)
Dept: INTERNAL MEDICINE | Facility: CLINIC | Age: 82
End: 2022-12-21
Payer: MEDICARE

## 2022-12-29 ENCOUNTER — TELEPHONE (OUTPATIENT)
Dept: PRIMARY CARE CLINIC | Facility: CLINIC | Age: 82
End: 2022-12-29
Payer: MEDICARE

## 2022-12-29 ENCOUNTER — OFFICE VISIT (OUTPATIENT)
Dept: PRIMARY CARE CLINIC | Facility: CLINIC | Age: 82
End: 2022-12-29
Payer: MEDICARE

## 2022-12-29 VITALS
OXYGEN SATURATION: 96 % | HEIGHT: 60 IN | BODY MASS INDEX: 34.04 KG/M2 | SYSTOLIC BLOOD PRESSURE: 118 MMHG | HEART RATE: 86 BPM | TEMPERATURE: 99 F | WEIGHT: 173.38 LBS | DIASTOLIC BLOOD PRESSURE: 58 MMHG

## 2022-12-29 DIAGNOSIS — M79.10 MYALGIA: Primary | ICD-10-CM

## 2022-12-29 DIAGNOSIS — R53.83 FATIGUE, UNSPECIFIED TYPE: ICD-10-CM

## 2022-12-29 DIAGNOSIS — J20.9 ACUTE BRONCHITIS, UNSPECIFIED ORGANISM: ICD-10-CM

## 2022-12-29 DIAGNOSIS — R05.9 COUGH, UNSPECIFIED TYPE: ICD-10-CM

## 2022-12-29 LAB
CTP QC/QA: YES
CTP QC/QA: YES
POC MOLECULAR INFLUENZA A AGN: NEGATIVE
POC MOLECULAR INFLUENZA B AGN: NEGATIVE
SARS-COV-2 AG RESP QL IA.RAPID: NEGATIVE

## 2022-12-29 PROCEDURE — 87502 POCT INFLUENZA A/B MOLECULAR: ICD-10-PCS | Mod: QW,S$GLB,, | Performed by: INTERNAL MEDICINE

## 2022-12-29 PROCEDURE — 1159F PR MEDICATION LIST DOCUMENTED IN MEDICAL RECORD: ICD-10-PCS | Mod: CPTII,S$GLB,, | Performed by: INTERNAL MEDICINE

## 2022-12-29 PROCEDURE — 1101F PT FALLS ASSESS-DOCD LE1/YR: CPT | Mod: CPTII,S$GLB,, | Performed by: INTERNAL MEDICINE

## 2022-12-29 PROCEDURE — 96372 PR INJECTION,THERAP/PROPH/DIAG2ST, IM OR SUBCUT: ICD-10-PCS | Mod: S$GLB,,, | Performed by: INTERNAL MEDICINE

## 2022-12-29 PROCEDURE — 96372 THER/PROPH/DIAG INJ SC/IM: CPT | Mod: S$GLB,,, | Performed by: INTERNAL MEDICINE

## 2022-12-29 PROCEDURE — 99213 PR OFFICE/OUTPT VISIT, EST, LEVL III, 20-29 MIN: ICD-10-PCS | Mod: 25,S$GLB,, | Performed by: INTERNAL MEDICINE

## 2022-12-29 PROCEDURE — 87811 SARS-COV-2 COVID19 W/OPTIC: CPT | Mod: QW,S$GLB,, | Performed by: INTERNAL MEDICINE

## 2022-12-29 PROCEDURE — 1159F MED LIST DOCD IN RCRD: CPT | Mod: CPTII,S$GLB,, | Performed by: INTERNAL MEDICINE

## 2022-12-29 PROCEDURE — 99213 OFFICE O/P EST LOW 20 MIN: CPT | Mod: 25,S$GLB,, | Performed by: INTERNAL MEDICINE

## 2022-12-29 PROCEDURE — 3288F PR FALLS RISK ASSESSMENT DOCUMENTED: ICD-10-PCS | Mod: CPTII,S$GLB,, | Performed by: INTERNAL MEDICINE

## 2022-12-29 PROCEDURE — 99999 PR PBB SHADOW E&M-EST. PATIENT-LVL III: CPT | Mod: PBBFAC,,, | Performed by: INTERNAL MEDICINE

## 2022-12-29 PROCEDURE — 3074F SYST BP LT 130 MM HG: CPT | Mod: CPTII,S$GLB,, | Performed by: INTERNAL MEDICINE

## 2022-12-29 PROCEDURE — 1125F PR PAIN SEVERITY QUANTIFIED, PAIN PRESENT: ICD-10-PCS | Mod: CPTII,S$GLB,, | Performed by: INTERNAL MEDICINE

## 2022-12-29 PROCEDURE — 1101F PR PT FALLS ASSESS DOC 0-1 FALLS W/OUT INJ PAST YR: ICD-10-PCS | Mod: CPTII,S$GLB,, | Performed by: INTERNAL MEDICINE

## 2022-12-29 PROCEDURE — 3078F PR MOST RECENT DIASTOLIC BLOOD PRESSURE < 80 MM HG: ICD-10-PCS | Mod: CPTII,S$GLB,, | Performed by: INTERNAL MEDICINE

## 2022-12-29 PROCEDURE — 3288F FALL RISK ASSESSMENT DOCD: CPT | Mod: CPTII,S$GLB,, | Performed by: INTERNAL MEDICINE

## 2022-12-29 PROCEDURE — 3078F DIAST BP <80 MM HG: CPT | Mod: CPTII,S$GLB,, | Performed by: INTERNAL MEDICINE

## 2022-12-29 PROCEDURE — 99999 PR PBB SHADOW E&M-EST. PATIENT-LVL III: ICD-10-PCS | Mod: PBBFAC,,, | Performed by: INTERNAL MEDICINE

## 2022-12-29 PROCEDURE — 1125F AMNT PAIN NOTED PAIN PRSNT: CPT | Mod: CPTII,S$GLB,, | Performed by: INTERNAL MEDICINE

## 2022-12-29 PROCEDURE — 87502 INFLUENZA DNA AMP PROBE: CPT | Mod: QW,S$GLB,, | Performed by: INTERNAL MEDICINE

## 2022-12-29 PROCEDURE — 3074F PR MOST RECENT SYSTOLIC BLOOD PRESSURE < 130 MM HG: ICD-10-PCS | Mod: CPTII,S$GLB,, | Performed by: INTERNAL MEDICINE

## 2022-12-29 PROCEDURE — 87811 SARS CORONAVIRUS 2 ANTIGEN POCT, MANUAL READ: ICD-10-PCS | Mod: QW,S$GLB,, | Performed by: INTERNAL MEDICINE

## 2022-12-29 RX ORDER — METHYLPREDNISOLONE ACETATE 40 MG/ML
60 INJECTION, SUSPENSION INTRA-ARTICULAR; INTRALESIONAL; INTRAMUSCULAR; SOFT TISSUE
Status: COMPLETED | OUTPATIENT
Start: 2022-12-29 | End: 2022-12-29

## 2022-12-29 RX ORDER — METHYLPREDNISOLONE ACETATE 80 MG/ML
60 INJECTION, SUSPENSION INTRA-ARTICULAR; INTRALESIONAL; INTRAMUSCULAR; SOFT TISSUE
Status: DISCONTINUED | OUTPATIENT
Start: 2022-12-29 | End: 2023-12-28

## 2022-12-29 RX ORDER — AZITHROMYCIN 250 MG/1
TABLET, FILM COATED ORAL
Qty: 6 TABLET | Refills: 0 | Status: SHIPPED | OUTPATIENT
Start: 2022-12-29 | End: 2023-01-03

## 2022-12-29 RX ADMIN — METHYLPREDNISOLONE ACETATE 60 MG: 40 INJECTION, SUSPENSION INTRA-ARTICULAR; INTRALESIONAL; INTRAMUSCULAR; SOFT TISSUE at 03:12

## 2022-12-29 NOTE — PROGRESS NOTES
Subjective:      Patient ID: Lorenzo Ospina is a 82 y.o. female.    Chief Complaint: No chief complaint on file.      HPI  Here for follow up of medical problems.  5 days cough/myalgias/chest tightness, albuterol makes her cough worse.  No n/v/d.  No fever.  No exertional cp/sob/palp.    Updated/ annual due 11/23:  HM: 11/22 fluvax, 2/21 covid vaccines/booster, 6/17 [id number], refuses [id number] unknown reason even after discussion, ref tetanus, 11/21 MMG/no more, 10/22 BMD rep 2y,  2012 Cscope saw Gastro in 2017 and says due in 2022.     Review of Systems   Constitutional:  Positive for fatigue. Negative for chills, diaphoresis and fever.   Respiratory:  Positive for cough. Negative for shortness of breath.    Cardiovascular:  Negative for chest pain, palpitations and leg swelling.   Gastrointestinal:  Negative for blood in stool, constipation, diarrhea, nausea and vomiting.   Genitourinary:  Negative for dysuria, frequency and hematuria.   Musculoskeletal:  Positive for myalgias.   Psychiatric/Behavioral:  The patient is not nervous/anxious.        Objective:   BP (!) 118/58 (BP Location: Left arm)   Pulse 86   Temp 99 °F (37.2 °C) (Oral)   Ht 5' (1.524 m)   Wt 78.7 kg (173 lb 6.4 oz)   SpO2 96%   BMI 33.86 kg/m²     Physical Exam  Constitutional:       Appearance: She is well-developed.   Neck:      Thyroid: No thyroid mass.      Vascular: No carotid bruit.   Cardiovascular:      Rate and Rhythm: Normal rate and regular rhythm.      Heart sounds: No murmur heard.    No friction rub. No gallop.   Pulmonary:      Effort: Pulmonary effort is normal.      Breath sounds: Normal breath sounds. No wheezing or rales.   Abdominal:      General: Bowel sounds are normal.      Palpations: Abdomen is soft. There is no mass.      Tenderness: There is no abdominal tenderness.   Musculoskeletal:      Cervical back: Neck supple.   Lymphadenopathy:      Cervical: No cervical adenopathy.   Neurological:      Mental Status:  She is alert and oriented to person, place, and time.           Assessment:       1. Myalgia    2. Cough, unspecified type    3. Fatigue, unspecified type    4. Acute bronchitis, unspecified organism          Plan:     Myalgia, Cough, Fatigue x 5 days, cough is spastic-  Medrol 60mg IM now, Zpak.  Call if not better 1wk.  RTC as sched.  -     SARS Coronavirus 2 Antigen, POCT Manual Read  -     POCT Influenza A/B Molecular

## 2023-01-13 ENCOUNTER — OFFICE VISIT (OUTPATIENT)
Dept: PRIMARY CARE CLINIC | Facility: CLINIC | Age: 83
End: 2023-01-13
Payer: MEDICARE

## 2023-01-13 VITALS
SYSTOLIC BLOOD PRESSURE: 122 MMHG | HEART RATE: 80 BPM | BODY MASS INDEX: 33.73 KG/M2 | TEMPERATURE: 98 F | HEIGHT: 60 IN | DIASTOLIC BLOOD PRESSURE: 80 MMHG | OXYGEN SATURATION: 100 % | WEIGHT: 171.81 LBS

## 2023-01-13 DIAGNOSIS — M79.622 PAIN IN LEFT UPPER ARM: ICD-10-CM

## 2023-01-13 DIAGNOSIS — T80.90XA INJECTION SITE REACTION, INITIAL ENCOUNTER: ICD-10-CM

## 2023-01-13 PROCEDURE — 99999 PR PBB SHADOW E&M-EST. PATIENT-LVL IV: ICD-10-PCS | Mod: PBBFAC,,, | Performed by: NURSE PRACTITIONER

## 2023-01-13 PROCEDURE — 1159F MED LIST DOCD IN RCRD: CPT | Mod: CPTII,S$GLB,, | Performed by: NURSE PRACTITIONER

## 2023-01-13 PROCEDURE — 1101F PR PT FALLS ASSESS DOC 0-1 FALLS W/OUT INJ PAST YR: ICD-10-PCS | Mod: CPTII,S$GLB,, | Performed by: NURSE PRACTITIONER

## 2023-01-13 PROCEDURE — 1101F PT FALLS ASSESS-DOCD LE1/YR: CPT | Mod: CPTII,S$GLB,, | Performed by: NURSE PRACTITIONER

## 2023-01-13 PROCEDURE — 3079F DIAST BP 80-89 MM HG: CPT | Mod: CPTII,S$GLB,, | Performed by: NURSE PRACTITIONER

## 2023-01-13 PROCEDURE — 1125F AMNT PAIN NOTED PAIN PRSNT: CPT | Mod: CPTII,S$GLB,, | Performed by: NURSE PRACTITIONER

## 2023-01-13 PROCEDURE — 3288F PR FALLS RISK ASSESSMENT DOCUMENTED: ICD-10-PCS | Mod: CPTII,S$GLB,, | Performed by: NURSE PRACTITIONER

## 2023-01-13 PROCEDURE — 1159F PR MEDICATION LIST DOCUMENTED IN MEDICAL RECORD: ICD-10-PCS | Mod: CPTII,S$GLB,, | Performed by: NURSE PRACTITIONER

## 2023-01-13 PROCEDURE — 3079F PR MOST RECENT DIASTOLIC BLOOD PRESSURE 80-89 MM HG: ICD-10-PCS | Mod: CPTII,S$GLB,, | Performed by: NURSE PRACTITIONER

## 2023-01-13 PROCEDURE — 99203 OFFICE O/P NEW LOW 30 MIN: CPT | Mod: S$GLB,,, | Performed by: NURSE PRACTITIONER

## 2023-01-13 PROCEDURE — 3074F PR MOST RECENT SYSTOLIC BLOOD PRESSURE < 130 MM HG: ICD-10-PCS | Mod: CPTII,S$GLB,, | Performed by: NURSE PRACTITIONER

## 2023-01-13 PROCEDURE — 3288F FALL RISK ASSESSMENT DOCD: CPT | Mod: CPTII,S$GLB,, | Performed by: NURSE PRACTITIONER

## 2023-01-13 PROCEDURE — 99999 PR PBB SHADOW E&M-EST. PATIENT-LVL IV: CPT | Mod: PBBFAC,,, | Performed by: NURSE PRACTITIONER

## 2023-01-13 PROCEDURE — 1125F PR PAIN SEVERITY QUANTIFIED, PAIN PRESENT: ICD-10-PCS | Mod: CPTII,S$GLB,, | Performed by: NURSE PRACTITIONER

## 2023-01-13 PROCEDURE — 3074F SYST BP LT 130 MM HG: CPT | Mod: CPTII,S$GLB,, | Performed by: NURSE PRACTITIONER

## 2023-01-13 PROCEDURE — 99203 PR OFFICE/OUTPT VISIT, NEW, LEVL III, 30-44 MIN: ICD-10-PCS | Mod: S$GLB,,, | Performed by: NURSE PRACTITIONER

## 2023-01-13 NOTE — PROGRESS NOTES
"Azadouhi TAPAN Moutafian  01/13/2023  [id number]    Lorena Fiore MD  Patient Care Team:  Lorena Fiore MD as PCP - General (Internal Medicine)  Nikhil Cedeno LPN as Care Coordinator      [facility] Note      Chief Complaint:  Chief Complaint   Patient presents with    Pain in upper Lt arm where flu shot was given       History of Present Illness:  Approx 2 months ago received flu vaccine in left upper arm deltoid muscle. Initially had site reaction with area of erythema, mild swelling and tenderness. Area improved.  Yesterday, awakened with exacerbation of pain to the left deltoid muscle. The area was more pain, aching nature, "4" (1-10), tenderness to touch. Left arm felt heavy.    Not certain of any trauma or possibly sleeping on the arm  Pt is left hand dominant.   Apply steroid cream to the arm, warm heating a pad to area which has helped  Aggravating - flexion at the shoulder  Alleviating - No pain with resting of arm, took Tylenol which help, heating pad    Review of Systems   Constitutional:  Negative for chills, fever and malaise/fatigue.   HENT:  Negative for congestion, ear pain and sore throat.    Eyes: Negative.    Respiratory:  Positive for cough (chronic). Negative for shortness of breath.    Cardiovascular:  Negative for chest pain, palpitations and leg swelling.   Gastrointestinal:  Negative for abdominal pain, diarrhea, nausea and vomiting.   Genitourinary: Negative.    Musculoskeletal:  Positive for myalgias. Negative for back pain, falls, joint pain and neck pain.   Skin: Negative.    Neurological:  Negative for dizziness, tingling, sensory change and focal weakness.   Psychiatric/Behavioral: Negative.         The following were reviewed: Active problem list, medication list, allergies, family history, social history, and Health Maintenance.     History:  Past Medical History:   Diagnosis Date    A-fib 11/5/2019    Breast cancer     right, dx 2008.  Dr. Callaway follows.s/p lumpectomy " and arimidex x 5y.    Hyperlipidemia     Hypertension     OP (osteoporosis)     on bisphos x 2y.    TIA (transient ischemic attack)     Toe fracture      Past Surgical History:   Procedure Laterality Date    BREAST LUMPECTOMY      right 2008    TOTAL ABDOMINAL HYSTERECTOMY      no cancer    TRANSESOPHAGEAL ECHOCARDIOGRAPHY N/A 11/5/2019    Procedure: ECHOCARDIOGRAM, TRANSESOPHAGEAL;  Surgeon: Otto Flores MD;  Location: [facility] CATH LAB;  Service: Cardiology;  Laterality: N/A;    TREATMENT OF CARDIAC ARRHYTHMIA N/A 11/5/2019    Procedure: CARDIOVERSION;  Surgeon: Otto Flores MD;  Location: [facility] CATH LAB;  Service: Cardiology;  Laterality: N/A;    TREATMENT OF CARDIAC ARRHYTHMIA N/A 11/5/2019    Procedure: CARDIOVERSION;  Surgeon: Otto Flores MD;  Location: [facility] CATH LAB;  Service: Cardiology;  Laterality: N/A;    TREATMENT OF CARDIAC ARRHYTHMIA N/A 2/4/2020    Procedure: CARDIOVERSION;  Surgeon: Otto Flores MD;  Location: [facility] CATH LAB;  Service: Cardiology;  Laterality: N/A;    TREATMENT OF CARDIAC ARRHYTHMIA N/A 3/2/2020    Procedure: CARDIOVERSION;  Surgeon: Mayi Beltran MD;  Location: [facility] CATH LAB;  Service: Cardiology;  Laterality: N/A;  830 start per Dr. beltran     History reviewed. No pertinent family history.  Patient Active Problem List   Diagnosis    Age-related osteoporosis without current pathological fracture    Other hyperlipidemia    History of right breast cancer    TIA (transient ischemic attack)    Essential hypertension    Vitamin D deficiency    Hyperparathyroidism    Palpitation    Paroxysmal atrial fibrillation    Hypercalcemia    Chronic anticoagulation    Atypical pneumonia - resolved , bilateral    Pulmonary fibrosis, postinflammatory    Atherosclerosis of aorta    Simple chronic bronchitis    Pain in left upper arm    Injection site reaction     Review of patient's allergies indicates:  No Known Allergies    Medications:  Current Outpatient Medications on File Prior to Visit   Medication Sig  Dispense Refill    albuterol (PROVENTIL/VENTOLIN HFA) 90 mcg/actuation inhaler INHALE 2 PUFFS INTO THE LUNGS EVERY 6 (SIX) HOURS AS NEEDED FOR WHEEZING. RESCUE 18 g 3    atorvastatin (LIPITOR) 20 MG tablet TAKE 1 TABLET BY MOUTH EVERY DAY 90 tablet 1    cyanocobalamin (VITAMIN B-12) 1000 MCG tablet Take 100 mcg by mouth once daily.      ELIQUIS 5 mg Tab TAKE 1 TABLET BY MOUTH TWICE A DAY 60 tablet 5    fish oil-omega-3 fatty acids 300-1,000 mg capsule Take 2 g by mouth once daily.      glucosam/chond/hyalu/CF borate (MOVE FREE JOINT HEALTH ORAL) Take 1 tablet by mouth once daily.      ipratropium (ATROVENT) 42 mcg (0.06 %) nasal spray SMARTSIG:Both Nares 15 mL 11    losartan (COZAAR) 25 MG tablet Take 1 tablet (25 mg total) by mouth once daily. 90 tablet 3    metoprolol succinate (TOPROL-XL) 50 MG 24 hr tablet TAKE 1 TABLET BY MOUTH EVERY DAY 90 tablet 1    MULTIVIT-IRON-MIN-FOLIC ACID 3,500-18-0.4 UNIT-MG-MG ORAL CHEW Take by mouth.      omeprazole (PRILOSEC) 40 MG capsule Take 40 mg by mouth once daily.      ranolazine (RANEXA) 500 MG Tb12 TAKE 1 TABLET BY MOUTH TWICE A  tablet 3    vitamin D 1000 units Tab Take 2,000 Units by mouth once daily.       Current Facility-Administered Medications on File Prior to Visit   Medication Dose Route Frequency Provider Last Rate Last Admin    methylPREDNISolone acetate injection 60 mg  60 mg Intramuscular 1 time in Clinic/HOD Lorena Fiore MD           Medications have been reviewed and reconciled with patient at visit today.          Exam:  Vitals:    01/13/23 0956   BP: 122/80   Pulse: 80   Temp: 97.5 °F (36.4 °C)     Weight: 77.9 kg (171 lb 12.8 oz)   Body mass index is 33.55 kg/m².      BP Readings from Last 3 Encounters:   01/13/23 122/80   12/29/22 (!) 118/58   11/11/22 (!) 128/58     Wt Readings from Last 3 Encounters:   01/13/23 0956 77.9 kg (171 lb 12.8 oz)   12/29/22 1353 78.7 kg (173 lb 6.4 oz)   11/11/22 0828 79 kg (174 lb 1.6 oz)            Physical  Exam  Vitals reviewed.   Constitutional:       Appearance: Normal appearance. She is well-developed. She is obese.   HENT:      Head: Normocephalic and atraumatic.      Nose: Nose normal.   Eyes:      General: No scleral icterus.     Extraocular Movements: Extraocular movements intact.      Conjunctiva/sclera: Conjunctivae normal.   Cardiovascular:      Rate and Rhythm: Normal rate. Rhythm irregular.      Pulses:           Radial pulses are 2+ on the left side.      Heart sounds: Normal heart sounds. No murmur heard.    No friction rub. No gallop.   Pulmonary:      Effort: Pulmonary effort is normal.      Breath sounds: Normal breath sounds.   Abdominal:      General: Bowel sounds are normal.      Palpations: Abdomen is soft.      Tenderness: There is no abdominal tenderness.   Musculoskeletal:         General: Normal range of motion.      Left shoulder: Normal. No tenderness. Normal range of motion.      Right upper arm: Normal.      Left upper arm: Tenderness present. No swelling, edema, deformity, lacerations or bony tenderness.        Arms:       Cervical back: Full passive range of motion without pain, normal range of motion and neck supple.      Left lower leg: No edema.      Comments: Area of pain and slight tenderness  No obvious localized area of swelling, erythema or any type of fluid collection  No weakness to the left arm     Skin:     General: Skin is warm and dry.      Findings: No erythema.   Neurological:      General: No focal deficit present.      Mental Status: She is alert and oriented to person, place, and time.   Psychiatric:         Behavior: Behavior normal.         Thought Content: Thought content normal.         Judgment: Judgment normal.       Laboratory Reviewed:   Lab Results   Component Value Date    WBC 6.23 11/11/2022    HGB 13.9 11/11/2022    HCT 43.4 11/11/2022     11/11/2022    CHOL 145 10/03/2022    TRIG 71 10/03/2022    HDL 62 10/03/2022    ALT 18 10/03/2022    AST 16  10/03/2022     10/03/2022    K 4.9 10/03/2022     10/03/2022    CREATININE 0.9 10/03/2022    BUN 14 10/03/2022    CO2 26 10/03/2022    TSH 0.774 11/11/2022    HGBA1C 5.4 02/03/2016           Health Maintenance  Health Maintenance Topics with due status: Not Due       Topic Last Completion Date    Lipid Panel 10/03/2022    DEXA Scan 10/03/2022     Health Maintenance Due   Topic Date Due    TETANUS VACCINE  Never done    Hemoglobin A1c (Prediabetes)  02/03/2017    COVID-19 Vaccine (3 - Booster) 04/29/2021    Shingles Vaccine (2 of 2) 03/14/2022       Assessment and Plan:  1. Pain in left upper arm  Assessment & Plan:  Localized area of muscle tenderness  Continue Tylenol, application of warm heating pad and topical warming sports cream like Bengay or Tiger Balm  Soft tissue US to evaluate for swelling or localized area of fluid    Orders:  -     US Soft Tissue Upper Extremity, Left; Future; Expected date: 01/13/2023    2. Injection site reaction, initial encounter  Assessment & Plan:  Deltoid muscle tenderness  No obvious swelling, erythema or warm  Continue heating pad, Tylenol  Avoid NSAIDS    Orders:  -     US Soft Tissue Upper Extremity, Left; Future; Expected date: 01/13/2023                 -Patient's lab results were reviewed and discussed with patient  -Treatment options and alternatives were discussed with the patient. Patient expressed understanding. Patient was given the opportunity to ask questions and be an active participant in their medical care. Patient had no further questions or concerns at this time.         Follow up: Appointment in March with Dr. Fiore      After visit summary printed and given to patient upon discharge.  Patient goals and care plan are included in After visit summary.    Total medical decision making time was 40 min.    The following issues were discussed: Diagnoses of Pain in left upper arm and Injection site reaction, initial encounter were pertinent to this  visit.    Health maintenance needs, recent test results and goals of care discussed with pt and questions answered.           NISHI Herrera, NP-C  [facility] [id number] [place] [address]

## 2023-01-13 NOTE — ASSESSMENT & PLAN NOTE
Deltoid muscle tenderness  No obvious swelling, erythema or warm  Continue heating pad, Tylenol  Avoid NSAIDS

## 2023-01-13 NOTE — PATIENT INSTRUCTIONS
Continue Tylenol as needed for pain  Apply warm heating pad to the left upper arm    Apply Bengay or other type of warming sports cream for the muscle pain of left arm

## 2023-01-13 NOTE — ASSESSMENT & PLAN NOTE
Localized area of muscle tenderness  Continue Tylenol, application of warm heating pad and topical warming sports cream like Bengay or Tiger Balm  Soft tissue US to evaluate for swelling or localized area of fluid

## 2023-01-17 ENCOUNTER — HOSPITAL ENCOUNTER (OUTPATIENT)
Dept: RADIOLOGY | Facility: HOSPITAL | Age: 83
Discharge: HOME | End: 2023-01-17
Attending: NURSE PRACTITIONER
Payer: MEDICARE

## 2023-01-17 ENCOUNTER — TELEPHONE (OUTPATIENT)
Dept: PRIMARY CARE CLINIC | Facility: CLINIC | Age: 83
End: 2023-01-17
Payer: MEDICARE

## 2023-01-17 DIAGNOSIS — T80.90XA INJECTION SITE REACTION, INITIAL ENCOUNTER: ICD-10-CM

## 2023-01-17 DIAGNOSIS — Z12.39 ENCOUNTER FOR SCREENING FOR MALIGNANT NEOPLASM OF BREAST, UNSPECIFIED SCREENING MODALITY: Primary | ICD-10-CM

## 2023-01-17 DIAGNOSIS — M79.622 PAIN IN LEFT UPPER ARM: ICD-10-CM

## 2023-01-17 DIAGNOSIS — Z12.31 ENCOUNTER FOR SCREENING MAMMOGRAM FOR MALIGNANT NEOPLASM OF BREAST: ICD-10-CM

## 2023-01-17 PROCEDURE — 76882 US LMTD JT/FCL EVL NVASC XTR: CPT | Mod: TC,LT

## 2023-01-17 PROCEDURE — 76882 US LMTD JT/FCL EVL NVASC XTR: CPT | Mod: 26,LT,, | Performed by: RADIOLOGY

## 2023-01-17 PROCEDURE — 76882 US SOFT TISSUE, UPPER EXTREMITY, LEFT: ICD-10-PCS | Mod: 26,LT,, | Performed by: RADIOLOGY

## 2023-01-17 NOTE — PROGRESS NOTES
Please call Ms. Ospina and tell her the soft tissue ultrasound of her left upper arm was negative for any type of fluid collection, muscle bruising or any area of swelling.   I suspect you have muscle tenderness possibly from the earlier injection or possibly you slept on your arm.   Continue to apply the warm heating pad, take Tylenol for pain and use the warming sports creams for comfort.  If condition worsens, please return to the clinic for evaluation.   Thank you,  Liset Sotelo, NP

## 2023-02-20 ENCOUNTER — HOSPITAL ENCOUNTER (OUTPATIENT)
Dept: RADIOLOGY | Facility: HOSPITAL | Age: 83
Discharge: HOME | End: 2023-02-20
Attending: INTERNAL MEDICINE
Payer: MEDICARE

## 2023-02-20 ENCOUNTER — OFFICE VISIT (OUTPATIENT)
Dept: PULMONOLOGY | Facility: CLINIC | Age: 83
End: 2023-02-20
Payer: MEDICARE

## 2023-02-20 ENCOUNTER — CLINICAL SUPPORT (OUTPATIENT)
Dept: PULMONOLOGY | Facility: CLINIC | Age: 83
End: 2023-02-20
Payer: MEDICARE

## 2023-02-20 VITALS
WEIGHT: 173.5 LBS | HEIGHT: 60 IN | DIASTOLIC BLOOD PRESSURE: 82 MMHG | OXYGEN SATURATION: 97 % | SYSTOLIC BLOOD PRESSURE: 156 MMHG | RESPIRATION RATE: 18 BRPM | BODY MASS INDEX: 34.06 KG/M2 | HEART RATE: 86 BPM

## 2023-02-20 DIAGNOSIS — J84.10 PULMONARY FIBROSIS, POSTINFLAMMATORY: ICD-10-CM

## 2023-02-20 DIAGNOSIS — J18.9 ATYPICAL PNEUMONIA: ICD-10-CM

## 2023-02-20 DIAGNOSIS — J30.0 CHRONIC VASOMOTOR RHINITIS: ICD-10-CM

## 2023-02-20 DIAGNOSIS — R05.3 CHRONIC COUGH: Primary | ICD-10-CM

## 2023-02-20 DIAGNOSIS — J41.0 SIMPLE CHRONIC BRONCHITIS: ICD-10-CM

## 2023-02-20 DIAGNOSIS — R05.3 CHRONIC COUGH: ICD-10-CM

## 2023-02-20 LAB
BRPFT: NORMAL
DLCO ADJ PRE: 16.14 ML/(MIN*MMHG)
DLCO SINGLE BREATH LLN: 11.51
DLCO SINGLE BREATH PRE REF: 93.6 %
DLCO SINGLE BREATH REF: 17.24
DLCOC SBVA LLN: 2.41
DLCOC SBVA PRE REF: 113.3 %
DLCOC SBVA REF: 4.06
DLCOC SINGLE BREATH LLN: 11.51
DLCOC SINGLE BREATH PRE REF: 93.6 %
DLCOC SINGLE BREATH REF: 17.24
DLCOVA LLN: 2.41
DLCOVA PRE REF: 113.3 %
DLCOVA PRE: 4.6 ML/(MIN*MMHG*L)
DLCOVA REF: 4.06
DLVAADJ PRE: 4.6 ML/(MIN*MMHG*L)
ERV LLN: -16449.55
ERV PRE REF: 65.5 %
ERV REF: 0.45
FEF 25 75 CHG: -4.7 %
FEF 25 75 LLN: 0.57
FEF 25 75 POST REF: 138.2 %
FEF 25 75 PRE REF: 145 %
FEF 25 75 REF: 1.39
FET100 CHG: 11.6 %
FEV1 CHG: 1.4 %
FEV1 FVC CHG: -3 %
FEV1 FVC LLN: 63
FEV1 FVC POST REF: 106.2 %
FEV1 FVC PRE REF: 109.4 %
FEV1 FVC REF: 77
FEV1 LLN: 1.15
FEV1 POST REF: 106.8 %
FEV1 PRE REF: 105.2 %
FEV1 REF: 1.66
FRCPLETH LLN: 1.66
FRCPLETH PREREF: 78.1 %
FRCPLETH REF: 2.48
FVC CHG: 4.5 %
FVC LLN: 1.51
FVC POST REF: 99.4 %
FVC PRE REF: 95.1 %
FVC REF: 2.16
IVC PRE: 2.21 L
IVC SINGLE BREATH LLN: 1.51
IVC SINGLE BREATH PRE REF: 102.2 %
IVC SINGLE BREATH REF: 2.16
MVV LLN: 48
MVV PRE REF: 96.2 %
MVV REF: 56
PEF CHG: 14.5 %
PEF LLN: 2.68
PEF POST REF: 108.9 %
PEF PRE REF: 95.1 %
PEF REF: 4.17
POST FEF 25 75: 1.92 L/S
POST FET 100: 4.47 SEC
POST FEV1 FVC: 82.22 %
POST FEV1: 1.77 L
POST FVC: 2.15 L
POST PEF: 4.55 L/S
PRE DLCO: 16.14 ML/(MIN*MMHG)
PRE ERV: 0.3 L
PRE FEF 25 75: 2.02 L/S
PRE FET 100: 4.01 SEC
PRE FEV1 FVC: 84.73 %
PRE FEV1: 1.74 L
PRE FRC PL: 1.94 L
PRE FVC: 2.06 L
PRE MVV: 54 L/MIN
PRE PEF: 3.97 L/S
PRE RV: 1.62 L
PRE TLC: 3.84 L
RAW LLN: 3.06
RAW PRE REF: 204 %
RAW PRE: 6.24 CMH2O*S/L
RAW REF: 3.06
RV LLN: 1.46
RV PRE REF: 79.8 %
RV REF: 2.03
RVTLC LLN: 37
RVTLC PRE REF: 91.4 %
RVTLC PRE: 42.2 %
RVTLC REF: 46
TLC LLN: 3.25
TLC PRE REF: 90.5 %
TLC REF: 4.24
VA PRE: 3.51 L
VA SINGLE BREATH LLN: 4.09
VA SINGLE BREATH PRE REF: 85.7 %
VA SINGLE BREATH REF: 4.09
VC LLN: 1.51
VC PRE REF: 102.7 %
VC PRE: 2.22 L
VC REF: 2.16
VTGRAWPRE: 2.27 L

## 2023-02-20 PROCEDURE — 3288F PR FALLS RISK ASSESSMENT DOCUMENTED: ICD-10-PCS | Mod: CPTII,S$GLB,, | Performed by: INTERNAL MEDICINE

## 2023-02-20 PROCEDURE — 99215 OFFICE O/P EST HI 40 MIN: CPT | Mod: 25,S$GLB,, | Performed by: INTERNAL MEDICINE

## 2023-02-20 PROCEDURE — 1159F PR MEDICATION LIST DOCUMENTED IN MEDICAL RECORD: ICD-10-PCS | Mod: CPTII,S$GLB,, | Performed by: INTERNAL MEDICINE

## 2023-02-20 PROCEDURE — 94060 PR EVAL OF BRONCHOSPASM: ICD-10-PCS | Mod: S$GLB,,, | Performed by: INTERNAL MEDICINE

## 2023-02-20 PROCEDURE — 1159F MED LIST DOCD IN RCRD: CPT | Mod: CPTII,S$GLB,, | Performed by: INTERNAL MEDICINE

## 2023-02-20 PROCEDURE — 3079F DIAST BP 80-89 MM HG: CPT | Mod: CPTII,S$GLB,, | Performed by: INTERNAL MEDICINE

## 2023-02-20 PROCEDURE — 94729 PR C02/MEMBANE DIFFUSE CAPACITY: ICD-10-PCS | Mod: S$GLB,,, | Performed by: INTERNAL MEDICINE

## 2023-02-20 PROCEDURE — 94726 PULM FUNCT TST PLETHYSMOGRAP: ICD-10-PCS | Mod: S$GLB,,, | Performed by: INTERNAL MEDICINE

## 2023-02-20 PROCEDURE — 94729 DIFFUSING CAPACITY: CPT | Mod: S$GLB,,, | Performed by: INTERNAL MEDICINE

## 2023-02-20 PROCEDURE — 1101F PR PT FALLS ASSESS DOC 0-1 FALLS W/OUT INJ PAST YR: ICD-10-PCS | Mod: CPTII,S$GLB,, | Performed by: INTERNAL MEDICINE

## 2023-02-20 PROCEDURE — 1126F PR PAIN SEVERITY QUANTIFIED, NO PAIN PRESENT: ICD-10-PCS | Mod: CPTII,S$GLB,, | Performed by: INTERNAL MEDICINE

## 2023-02-20 PROCEDURE — 3079F PR MOST RECENT DIASTOLIC BLOOD PRESSURE 80-89 MM HG: ICD-10-PCS | Mod: CPTII,S$GLB,, | Performed by: INTERNAL MEDICINE

## 2023-02-20 PROCEDURE — 99999 PR PBB SHADOW E&M-EST. PATIENT-LVL IV: CPT | Mod: PBBFAC,,, | Performed by: INTERNAL MEDICINE

## 2023-02-20 PROCEDURE — 71046 XR CHEST PA AND LATERAL: ICD-10-PCS | Mod: 26,,, | Performed by: RADIOLOGY

## 2023-02-20 PROCEDURE — 94726 PLETHYSMOGRAPHY LUNG VOLUMES: CPT | Mod: S$GLB,,, | Performed by: INTERNAL MEDICINE

## 2023-02-20 PROCEDURE — 99215 PR OFFICE/OUTPT VISIT, EST, LEVL V, 40-54 MIN: ICD-10-PCS | Mod: 25,S$GLB,, | Performed by: INTERNAL MEDICINE

## 2023-02-20 PROCEDURE — 1101F PT FALLS ASSESS-DOCD LE1/YR: CPT | Mod: CPTII,S$GLB,, | Performed by: INTERNAL MEDICINE

## 2023-02-20 PROCEDURE — 99999 PR PBB SHADOW E&M-EST. PATIENT-LVL IV: ICD-10-PCS | Mod: PBBFAC,,, | Performed by: INTERNAL MEDICINE

## 2023-02-20 PROCEDURE — 3077F PR MOST RECENT SYSTOLIC BLOOD PRESSURE >= 140 MM HG: ICD-10-PCS | Mod: CPTII,S$GLB,, | Performed by: INTERNAL MEDICINE

## 2023-02-20 PROCEDURE — 3077F SYST BP >= 140 MM HG: CPT | Mod: CPTII,S$GLB,, | Performed by: INTERNAL MEDICINE

## 2023-02-20 PROCEDURE — 94060 EVALUATION OF WHEEZING: CPT | Mod: S$GLB,,, | Performed by: INTERNAL MEDICINE

## 2023-02-20 PROCEDURE — 71046 X-RAY EXAM CHEST 2 VIEWS: CPT | Mod: TC

## 2023-02-20 PROCEDURE — 1126F AMNT PAIN NOTED NONE PRSNT: CPT | Mod: CPTII,S$GLB,, | Performed by: INTERNAL MEDICINE

## 2023-02-20 PROCEDURE — 71046 X-RAY EXAM CHEST 2 VIEWS: CPT | Mod: 26,,, | Performed by: RADIOLOGY

## 2023-02-20 PROCEDURE — 3288F FALL RISK ASSESSMENT DOCD: CPT | Mod: CPTII,S$GLB,, | Performed by: INTERNAL MEDICINE

## 2023-02-20 RX ORDER — IPRATROPIUM BROMIDE 42 UG/1
SPRAY, METERED NASAL
Qty: 15 ML | Refills: 11 | Status: SHIPPED | OUTPATIENT
Start: 2023-02-20 | End: 2023-02-23

## 2023-02-20 RX ORDER — GUAIFENESIN 600 MG/1
1200 TABLET, EXTENDED RELEASE ORAL 2 TIMES DAILY
Qty: 60 TABLET | Refills: 11 | Status: SHIPPED | OUTPATIENT
Start: 2023-02-20 | End: 2023-03-23

## 2023-02-20 RX ORDER — IPRATROPIUM BROMIDE 0.5 MG/2.5ML
500 SOLUTION RESPIRATORY (INHALATION) 4 TIMES DAILY
Qty: 300 ML | Refills: 11 | Status: SHIPPED | OUTPATIENT
Start: 2023-02-20 | End: 2024-02-12 | Stop reason: SDUPTHER

## 2023-02-20 NOTE — PROGRESS NOTES
Subjective:     Patient ID: Lorenzo Ospina is a 82 y.o. female.    Chief Complaint:      HPI History largely reported by daughter.  Concerned about production of clear sputum on nearly a daily basis . Prescribed neb TX in past but not using   Chronic intermittent coughing, post nasal drip.   History of pneumonia in early 2021 ( probable COVID 19 ) and with residual scar tissue. Chest X Ray was normal prior to that time. Pulmonary scarring/ fibrosis has persisted and is not progressive    Nebulizer bothering her - not taking regularly    Past Medical History:   Diagnosis Date    A-fib 11/5/2019    Breast cancer     right, dx 2008.  Dr. Callaway follows.s/p lumpectomy and arimidex x 5y.    Hyperlipidemia     Hypertension     OP (osteoporosis)     on bisphos x 2y.    TIA (transient ischemic attack)     Toe fracture      Past Surgical History:   Procedure Laterality Date    BREAST LUMPECTOMY      right 2008    TOTAL ABDOMINAL HYSTERECTOMY      no cancer    TRANSESOPHAGEAL ECHOCARDIOGRAPHY N/A 11/5/2019    Procedure: ECHOCARDIOGRAM, TRANSESOPHAGEAL;  Surgeon: Otto Flores MD;  Location: [facility] CATH LAB;  Service: Cardiology;  Laterality: N/A;    TREATMENT OF CARDIAC ARRHYTHMIA N/A 11/5/2019    Procedure: CARDIOVERSION;  Surgeon: Otto Flores MD;  Location: [facility] CATH LAB;  Service: Cardiology;  Laterality: N/A;    TREATMENT OF CARDIAC ARRHYTHMIA N/A 11/5/2019    Procedure: CARDIOVERSION;  Surgeon: Otto Flores MD;  Location: [facility] CATH LAB;  Service: Cardiology;  Laterality: N/A;    TREATMENT OF CARDIAC ARRHYTHMIA N/A 2/4/2020    Procedure: CARDIOVERSION;  Surgeon: Otto Flores MD;  Location: [facility] CATH LAB;  Service: Cardiology;  Laterality: N/A;    TREATMENT OF CARDIAC ARRHYTHMIA N/A 3/2/2020    Procedure: CARDIOVERSION;  Surgeon: Mayi Vega MD;  Location: [facility] CATH LAB;  Service: Cardiology;  Laterality: N/A;  830 start per Dr. vega     Review of patient's allergies indicates:  No Known Allergies  Current Outpatient  Medications on File Prior to Visit   Medication Sig Dispense Refill    albuterol (PROVENTIL/VENTOLIN HFA) 90 mcg/actuation inhaler INHALE 2 PUFFS INTO THE LUNGS EVERY 6 (SIX) HOURS AS NEEDED FOR WHEEZING. RESCUE 18 g 3    atorvastatin (LIPITOR) 20 MG tablet TAKE 1 TABLET BY MOUTH EVERY DAY 90 tablet 1    clopidogrel bisulfate (PLAVIX ORAL) Plavix Take No date recorded No form recorded No frequency recorded No route recorded No set duration recorded No set duration amount recorded suspended No dosage strength recorded No dosage strength units of measure recorded      cyanocobalamin (VITAMIN B-12) 1000 MCG tablet Take 100 mcg by mouth once daily.      ELIQUIS 5 mg Tab TAKE 1 TABLET BY MOUTH TWICE A DAY 60 tablet 5    fish oil-omega-3 fatty acids 300-1,000 mg capsule Take 2 g by mouth once daily.      glucosam/chond/hyalu/CF borate (MOVE FREE JOINT HEALTH ORAL) Take 1 tablet by mouth once daily.      losartan (COZAAR) 25 MG tablet TAKE 1 TABLET BY MOUTH EVERY DAY 90 tablet 3    metoprolol succinate (TOPROL-XL) 50 MG 24 hr tablet TAKE 1 TABLET BY MOUTH EVERY DAY 90 tablet 1    MULTIVIT-IRON-MIN-FOLIC ACID 3,500-18-0.4 UNIT-MG-MG ORAL CHEW Take by mouth.      omeprazole (PRILOSEC) 40 MG capsule Take 40 mg by mouth once daily.      ranolazine (RANEXA) 500 MG Tb12 TAKE 1 TABLET BY MOUTH TWICE A  tablet 3    vitamin D 1000 units Tab Take 2,000 Units by mouth once daily.       Current Facility-Administered Medications on File Prior to Visit   Medication Dose Route Frequency Provider Last Rate Last Admin    methylPREDNISolone acetate injection 60 mg  60 mg Intramuscular 1 time in Clinic/HOD Lorena Fiore MD         Social History     Socioeconomic History    Marital status:    Tobacco Use    Smoking status: Former     Packs/day: 1.50     Years: 22.00     Pack years: 33.00     Types: Cigarettes     Start date: 1972     Quit date: 1994     Years since quittin.4    Smokeless tobacco: Never    Substance and Sexual Activity    Alcohol use: Not Currently     Alcohol/week: 1.0 standard drink     Types: 1 Glasses of wine per week     Comment: daily    Drug use: Never     History reviewed. No pertinent family history.    Review of Systems   Constitutional:  Positive for fatigue. Negative for fever.   HENT:  Positive for postnasal drip, rhinorrhea and congestion.    Eyes:  Negative for redness and itching.   Respiratory:  Positive for cough, sputum production, shortness of breath, dyspnea on extertion, use of rescue inhaler and Paroxysmal Nocturnal Dyspnea.    Cardiovascular:  Negative for chest pain, palpitations and leg swelling.   Genitourinary:  Negative for difficulty urinating and hematuria.   Endocrine:  Negative for cold intolerance and heat intolerance.    Musculoskeletal:  Positive for arthralgias.   Skin:  Negative for rash.   Gastrointestinal:  Negative for nausea and abdominal pain.   Neurological:  Negative for dizziness, syncope, weakness and light-headedness.   Hematological:  Negative for adenopathy. Does not bruise/bleed easily.   Psychiatric/Behavioral:  Negative for sleep disturbance. The patient is not nervous/anxious.      Objective:      BP (!) 156/82   Pulse 86   Resp 18   Ht 5' (1.524 m)   Wt 78.7 kg (173 lb 8 oz)   SpO2 97%   BMI 33.88 kg/m²   Physical Exam  Vitals and nursing note reviewed.   Constitutional:       Appearance: Normal appearance. She is well-developed. She is obese.   HENT:      Head: Normocephalic and atraumatic.      Nose: Nose normal.   Eyes:      Conjunctiva/sclera: Conjunctivae normal.      Pupils: Pupils are equal, round, and reactive to light.   Neck:      Thyroid: No thyromegaly.      Vascular: No JVD.      Trachea: No tracheal deviation.   Cardiovascular:      Rate and Rhythm: Normal rate and regular rhythm.      Heart sounds: Normal heart sounds.   Pulmonary:      Effort: Pulmonary effort is normal. No respiratory distress.      Breath sounds:  Examination of the right-lower field reveals rales. Examination of the left-lower field reveals rales. Rales present. No wheezing.   Chest:      Chest wall: No tenderness.   Abdominal:      General: Bowel sounds are normal.      Palpations: Abdomen is soft.   Musculoskeletal:         General: Normal range of motion.      Cervical back: Neck supple.   Lymphadenopathy:      Cervical: No cervical adenopathy.   Skin:     General: Skin is warm and dry.   Neurological:      Mental Status: She is alert and oriented to person, place, and time.     Personal Diagnostic Review  Pulmonary Function Studies:Spirometry is normal. Spirometry remains unimproved following bronchodilator. Lung volume determination is normal. Airway mechanics are abnormal showing increased airway resistance and decreased conductance. DLCO is normal. MVV is normal.   Â    Notes:   Â    The failure to demonstrate improvement in spirometry does not preclude a clinical response to a trial of bronchodilators.      Pulmonary Studies Review 2/20/2023   SpO2 97   Ordering Provider -   Interpreting Provider -   Performing nurse/tech/RT -   Diagnosis -   Height 60   Weight 2776.03   BMI (Calculated) 33.9   Predicted Distance 200.06   Patient Race -   6MWT Status -   Patient Reported -   Was O2 used? -   6MW Distance walked (feet) -   Distance walked (meters) -   Did patient stop? -   Type of assistive device(s) used? -   Is extra documentation required for this patient? -   Oxygen Saturation -   Supplemental Oxygen -   Heart Rate -   Blood Pressure -   Ethan Dyspnea Rating  -   Oxygen Saturation -   Supplemental Oxygen -   Heart Rate -   Blood Pressure -   Ethan Dyspnea Rating  -   Recovery Time (seconds) -   Oxygen Saturation -   Supplemental Oxygen -   Heart Rate -   Blood Pressure -   Ethan Dyspnea Rating  -   Is procedure ready for interpretation? -   Did the patient stop or pause? -   Total Time Walked (Calculated) -   Total Laps Walked -   Final Partial Lap  Distance (feet) -   Total Distance Feet (Calculated) -   Total Distance Meters (Calculated) -   Predicted Distance Meters (Calculated) 345.94   Percentage of Predicted (Calculated) -   Peak VO2 (Calculated) -   Mets -   Has The Patient Had a Previous Six Minute Walk Test? -   Oxygen Qualification? -       X-Ray Chest PA And Lateral  Narrative: Exam:  XR CHEST PA AND LATERAL    History: SOB; [J84.10]-Pulmonary fibrosis, unspecified./[J18.9]-Pneumonia, unspecified organism.    Comparison exam: 12/13/2021    TECHNIQUE: Chest x-ray 2 views.    Findings:     Mild diffuse increased interstitial markings bilaterally consistent with patient's history of fibrosis.  No significant interval change.  Cardiac silhouette enlarged.  Vascular calcification of aorta with mild tortuosity.  No significant bony abnormalities.  Impression: 1.  No acute chest findings.  2.  Chronic interstitial changes/fibrosis.  3.  No change since previous exam.    Finalized on: 2/20/2023 3:26 PM By:  Parviz Pyle MD  [facility]# [id number]      2023-02-20 15:28:57.828    [facility]      Office Spirometry Results: Pulmonary Function Studies:  Spirometry is normal. Spirometry remains unimproved following bronchodilator. Lung volume determination is normal. Airway mechanics are abnormal showing increased airway resistance and decreased conductance. DLCO is normal. MVV is normal.   Â    Notes:   Â    The failure to demonstrate improvement in spirometry does not preclude a clinical response to a trial of bronchodilators.         No flowsheet data found.  Pulmonary Studies Review 2/20/2023   SpO2 97   Ordering Provider -   Interpreting Provider -   Performing nurse/tech/RT -   Diagnosis -   Height 60   Weight 2776.03   BMI (Calculated) 33.9   Predicted Distance 200.06   Patient Race -   6MWT Status -   Patient Reported -   Was O2 used? -   6MW Distance walked (feet) -   Distance walked (meters) -   Did patient stop? -   Type of assistive device(s) used? -   Is extra  documentation required for this patient? -   Oxygen Saturation -   Supplemental Oxygen -   Heart Rate -   Blood Pressure -   Ethan Dyspnea Rating  -   Oxygen Saturation -   Supplemental Oxygen -   Heart Rate -   Blood Pressure -   Ethan Dyspnea Rating  -   Recovery Time (seconds) -   Oxygen Saturation -   Supplemental Oxygen -   Heart Rate -   Blood Pressure -   Ethan Dyspnea Rating  -   Is procedure ready for interpretation? -   Did the patient stop or pause? -   Total Time Walked (Calculated) -   Total Laps Walked -   Final Partial Lap Distance (feet) -   Total Distance Feet (Calculated) -   Total Distance Meters (Calculated) -   Predicted Distance Meters (Calculated) 345.94   Percentage of Predicted (Calculated) -   Peak VO2 (Calculated) -   Mets -   Has The Patient Had a Previous Six Minute Walk Test? -   Oxygen Qualification? -         Assessment:       Pulmonary fibrosis, postinflammatory  Occurred after an illness that clinically was consistent with COVID 19.  Pulmonary scarring/ fibrosis has persisted and is not progressive    Chronic cough  -     ipratropium (ATROVENT) 0.02 % nebulizer solution; Take 2.5 mLs (500 mcg total) by nebulization 4 (four) times daily.  Dispense: 300 mL; Refill: 11  -     ipratropium (ATROVENT) 42 mcg (0.06 %) nasal spray; SMARTSIG:Both Nares  Dispense: 15 mL; Refill: 11  -     guaiFENesin (MUCINEX) 600 mg 12 hr tablet; Take 2 tablets (1,200 mg total) by mouth 2 (two) times daily.  Dispense: 60 tablet; Refill: 11  -     Spirometry with/without bronchodilator; Future; Expected date: 08/23/2023  -     X-Ray Chest PA And Lateral; Future; Expected date: 02/20/2023    Chronic vasomotor rhinitis  -     ipratropium (ATROVENT) 42 mcg (0.06 %) nasal spray; SMARTSIG:Both Nares  Dispense: 15 mL; Refill: 11    Simple chronic bronchitis  -     Spirometry with/without bronchodilator; Future; Expected date: 08/23/2023  -     X-Ray Chest PA And Lateral; Future; Expected date: 02/20/2023    Pulmonary  fibrosis, postinflammatory          Outpatient Encounter Medications as of 2/20/2023   Medication Sig Dispense Refill    albuterol (PROVENTIL/VENTOLIN HFA) 90 mcg/actuation inhaler INHALE 2 PUFFS INTO THE LUNGS EVERY 6 (SIX) HOURS AS NEEDED FOR WHEEZING. RESCUE 18 g 3    atorvastatin (LIPITOR) 20 MG tablet TAKE 1 TABLET BY MOUTH EVERY DAY 90 tablet 1    clopidogrel bisulfate (PLAVIX ORAL) Plavix Take No date recorded No form recorded No frequency recorded No route recorded No set duration recorded No set duration amount recorded suspended No dosage strength recorded No dosage strength units of measure recorded      cyanocobalamin (VITAMIN B-12) 1000 MCG tablet Take 100 mcg by mouth once daily.      ELIQUIS 5 mg Tab TAKE 1 TABLET BY MOUTH TWICE A DAY 60 tablet 5    fish oil-omega-3 fatty acids 300-1,000 mg capsule Take 2 g by mouth once daily.      glucosam/chond/hyalu/CF borate (MOVE FREE JOINT HEALTH ORAL) Take 1 tablet by mouth once daily.      ipratropium (ATROVENT) 42 mcg (0.06 %) nasal spray SMARTSIG:Both Nares 15 mL 11    losartan (COZAAR) 25 MG tablet TAKE 1 TABLET BY MOUTH EVERY DAY 90 tablet 3    metoprolol succinate (TOPROL-XL) 50 MG 24 hr tablet TAKE 1 TABLET BY MOUTH EVERY DAY 90 tablet 1    MULTIVIT-IRON-MIN-FOLIC ACID 3,500-18-0.4 UNIT-MG-MG ORAL CHEW Take by mouth.      omeprazole (PRILOSEC) 40 MG capsule Take 40 mg by mouth once daily.      ranolazine (RANEXA) 500 MG Tb12 TAKE 1 TABLET BY MOUTH TWICE A  tablet 3    vitamin D 1000 units Tab Take 2,000 Units by mouth once daily.      [DISCONTINUED] ipratropium (ATROVENT) 42 mcg (0.06 %) nasal spray SMARTSIG:Both Nares 15 mL 11    guaiFENesin (MUCINEX) 600 mg 12 hr tablet Take 2 tablets (1,200 mg total) by mouth 2 (two) times daily. 60 tablet 11    ipratropium (ATROVENT) 0.02 % nebulizer solution Take 2.5 mLs (500 mcg total) by nebulization 4 (four) times daily. 300 mL 11    [DISCONTINUED] losartan (COZAAR) 25 MG tablet Take 1 tablet (25 mg  total) by mouth once daily. 90 tablet 3    [DISCONTINUED] ranolazine (RANEXA) 500 MG Tb12 TAKE 1 TABLET BY MOUTH TWICE A  tablet 3     Facility-Administered Encounter Medications as of 2/20/2023   Medication Dose Route Frequency Provider Last Rate Last Admin    methylPREDNISolone acetate injection 60 mg  60 mg Intramuscular 1 time in Clinic/HOD Lorena Fiore MD         Plan:       Requested Prescriptions     Signed Prescriptions Disp Refills    ipratropium (ATROVENT) 0.02 % nebulizer solution 300 mL 11     Sig: Take 2.5 mLs (500 mcg total) by nebulization 4 (four) times daily.    ipratropium (ATROVENT) 42 mcg (0.06 %) nasal spray 15 mL 11     Sig: SMARTSIG:Both Nares    guaiFENesin (MUCINEX) 600 mg 12 hr tablet 60 tablet 11     Sig: Take 2 tablets (1,200 mg total) by mouth 2 (two) times daily.     Problem List Items Addressed This Visit       Pulmonary fibrosis, postinflammatory    Current Assessment & Plan     Occurred after an illness that clinically was consistent with COVID 19.  Pulmonary scarring/ fibrosis has persisted and is not progressive         Simple chronic bronchitis    Relevant Orders    Spirometry with/without bronchodilator    X-Ray Chest PA And Lateral     Other Visit Diagnoses       Chronic cough    -  Primary    Relevant Medications    ipratropium (ATROVENT) 0.02 % nebulizer solution    ipratropium (ATROVENT) 42 mcg (0.06 %) nasal spray    guaiFENesin (MUCINEX) 600 mg 12 hr tablet    Other Relevant Orders    Spirometry with/without bronchodilator    X-Ray Chest PA And Lateral    Chronic vasomotor rhinitis        Relevant Medications    ipratropium (ATROVENT) 42 mcg (0.06 %) nasal spray               Follow up in about 1 year (around 2/20/2024) for Review CXR - on return, Review go - on return.    MEDICAL DECISION MAKING: Moderate to high complexity.  Overall, the multiple problems listed are of moderate to high severity that may impact quality of life and activities of daily  living. Side effects of medications, treatment plan as well as options and alternatives reviewed and discussed with patient. There was counseling of patient concerning these issues.    Total time spent in counseling and coordination of care - 45  minutes of total time spent on the encounter, which includes face to face time and non-face to face time preparing to see the patient (eg, review of tests), Obtaining and/or reviewing separately obtained history, Documenting clinical information in the electronic or other health record, Independently interpreting results (not separately reported) and communicating results to the patient/family/caregiver, or Care coordination (not separately reported).    Time was used in discussion of prognosis, risks, benefits of treatment, instructions and compliance with regimen . Discussion with other physicians and/or health care providers - home health or for use of durable medical equipment (oxygen, nebulizers, CPAP, BiPAP) occurred.

## 2023-02-21 NOTE — ASSESSMENT & PLAN NOTE
Occurred after an illness that clinically was consistent with COVID 19.  Pulmonary scarring/ fibrosis has persisted and is not progressive

## 2023-02-23 ENCOUNTER — TELEPHONE (OUTPATIENT)
Dept: PRIMARY CARE CLINIC | Facility: CLINIC | Age: 83
End: 2023-02-23
Payer: MEDICARE

## 2023-02-23 RX ORDER — IPRATROPIUM BROMIDE 42 UG/1
SPRAY, METERED NASAL
Qty: 15 ML | Refills: 11 | Status: SHIPPED | OUTPATIENT
Start: 2023-02-23 | End: 2023-02-23

## 2023-02-23 RX ORDER — IPRATROPIUM BROMIDE 42 UG/1
2 SPRAY, METERED NASAL 3 TIMES DAILY PRN
Qty: 15 ML | Refills: 11 | Status: SHIPPED | OUTPATIENT
Start: 2023-02-23

## 2023-02-23 NOTE — TELEPHONE ENCOUNTER
----- Message from Sandie Muñoz RN sent at 2/23/2023 11:07 AM CST -----  Contact: pt's daughter in law/Diane    ----- Message -----  From: Komal Wangwaleosei  Sent: 2/23/2023  11:01 AM CST  To: [facility] Staff    Diane is calling in regard to the pt's varicose veins that are hurting, the veins are the size of a pencil. Diane would like to know what type of doctor she would need to see for these veins.  Please call her back at [phone] thanks/mpd

## 2023-02-24 ENCOUNTER — OFFICE VISIT (OUTPATIENT)
Dept: PRIMARY CARE CLINIC | Facility: CLINIC | Age: 83
End: 2023-02-24
Payer: MEDICARE

## 2023-02-24 VITALS
HEIGHT: 60 IN | WEIGHT: 172.38 LBS | BODY MASS INDEX: 33.84 KG/M2 | TEMPERATURE: 97 F | OXYGEN SATURATION: 97 % | DIASTOLIC BLOOD PRESSURE: 70 MMHG | SYSTOLIC BLOOD PRESSURE: 128 MMHG | HEART RATE: 90 BPM

## 2023-02-24 DIAGNOSIS — I83.812 VARICOSE VEINS OF LEFT LOWER EXTREMITY WITH PAIN: ICD-10-CM

## 2023-02-24 PROBLEM — I83.92 VARICOSE VEINS OF LEFT LOWER EXTREMITY: Status: ACTIVE | Noted: 2023-02-24

## 2023-02-24 PROBLEM — M79.622 PAIN IN LEFT UPPER ARM: Status: RESOLVED | Noted: 2023-01-13 | Resolved: 2023-02-24

## 2023-02-24 PROCEDURE — 3288F FALL RISK ASSESSMENT DOCD: CPT | Mod: CPTII,S$GLB,, | Performed by: NURSE PRACTITIONER

## 2023-02-24 PROCEDURE — 3078F PR MOST RECENT DIASTOLIC BLOOD PRESSURE < 80 MM HG: ICD-10-PCS | Mod: CPTII,S$GLB,, | Performed by: NURSE PRACTITIONER

## 2023-02-24 PROCEDURE — 99999 PR PBB SHADOW E&M-EST. PATIENT-LVL V: ICD-10-PCS | Mod: PBBFAC,,, | Performed by: NURSE PRACTITIONER

## 2023-02-24 PROCEDURE — 3288F PR FALLS RISK ASSESSMENT DOCUMENTED: ICD-10-PCS | Mod: CPTII,S$GLB,, | Performed by: NURSE PRACTITIONER

## 2023-02-24 PROCEDURE — 99999 PR PBB SHADOW E&M-EST. PATIENT-LVL V: CPT | Mod: PBBFAC,,, | Performed by: NURSE PRACTITIONER

## 2023-02-24 PROCEDURE — 1159F MED LIST DOCD IN RCRD: CPT | Mod: CPTII,S$GLB,, | Performed by: NURSE PRACTITIONER

## 2023-02-24 PROCEDURE — 99215 PR OFFICE/OUTPT VISIT, EST, LEVL V, 40-54 MIN: ICD-10-PCS | Mod: S$GLB,,, | Performed by: NURSE PRACTITIONER

## 2023-02-24 PROCEDURE — 1159F PR MEDICATION LIST DOCUMENTED IN MEDICAL RECORD: ICD-10-PCS | Mod: CPTII,S$GLB,, | Performed by: NURSE PRACTITIONER

## 2023-02-24 PROCEDURE — 1101F PR PT FALLS ASSESS DOC 0-1 FALLS W/OUT INJ PAST YR: ICD-10-PCS | Mod: CPTII,S$GLB,, | Performed by: NURSE PRACTITIONER

## 2023-02-24 PROCEDURE — 99215 OFFICE O/P EST HI 40 MIN: CPT | Mod: S$GLB,,, | Performed by: NURSE PRACTITIONER

## 2023-02-24 PROCEDURE — 1125F AMNT PAIN NOTED PAIN PRSNT: CPT | Mod: CPTII,S$GLB,, | Performed by: NURSE PRACTITIONER

## 2023-02-24 PROCEDURE — 3078F DIAST BP <80 MM HG: CPT | Mod: CPTII,S$GLB,, | Performed by: NURSE PRACTITIONER

## 2023-02-24 PROCEDURE — 1101F PT FALLS ASSESS-DOCD LE1/YR: CPT | Mod: CPTII,S$GLB,, | Performed by: NURSE PRACTITIONER

## 2023-02-24 PROCEDURE — 1125F PR PAIN SEVERITY QUANTIFIED, PAIN PRESENT: ICD-10-PCS | Mod: CPTII,S$GLB,, | Performed by: NURSE PRACTITIONER

## 2023-02-24 PROCEDURE — 3074F PR MOST RECENT SYSTOLIC BLOOD PRESSURE < 130 MM HG: ICD-10-PCS | Mod: CPTII,S$GLB,, | Performed by: NURSE PRACTITIONER

## 2023-02-24 PROCEDURE — 3074F SYST BP LT 130 MM HG: CPT | Mod: CPTII,S$GLB,, | Performed by: NURSE PRACTITIONER

## 2023-02-24 NOTE — PATIENT INSTRUCTIONS
Continue to elevate the legs as high as possible    Can apply warm heating pad for comfort    Tylenol for pain    Sometimes very small varicose veins can be lasered by a dermatologist    You will have to see a Vascular doctor regarding the larger varicose vein but likely no surgical intervention    Wear compression socks during the day - will help decrease the swelling    Futuro compression socks - [facility] or [facility]  Try a mens size with medium to low pressure       Talk to Dr. Vega on your next with him regarding your varicose vein and ask him if anything can be done

## 2023-02-24 NOTE — ASSESSMENT & PLAN NOTE
ACE wrap applied for compression and support  Tylenol  Warm heating pad  Compression socks  Tylenol for pain

## 2023-02-24 NOTE — PROGRESS NOTES
Lorenzo Ospina  02/24/2023  [id number]    Lorena Fiore MD  Patient Care Team:  Lorena Fiore MD as PCP - General (Internal Medicine)  Nikihl Cedeno LPN as Care Coordinator      [facility] Primary Care Note      Chief Complaint:  Chief Complaint   Patient presents with    Varicose Veins     Patient c/o veins hurting mostly in Lt leg       History of Present Illness:  Pt presents with reports of painful varicose vein to the left leg - onset of pain 4 to 5 days ago. Pt awakened and noted pain to her left calf muscle when walking.  She took Tylenol, applied a warm heating pad which did help discomfort.  The pain is resolved when she is resting but she does note some pain when trying to sleep.  The vein is larger than usual. Pt reports elevating her feet on 1 pillow.     Other symptoms denied including trauma, leg erythema, swelling of leg  and chest pain.   Skin intact to legs without ulceration.         Review of Systems   Constitutional:  Negative for chills and fever.   HENT:  Negative for congestion, ear discharge and sore throat.    Respiratory:  Positive for cough and shortness of breath (chronic).    Cardiovascular:  Negative for chest pain and leg swelling.   Gastrointestinal:  Negative for nausea and vomiting.   Genitourinary: Negative.    Musculoskeletal:  Negative for back pain and myalgias.   Neurological: Negative.    Psychiatric/Behavioral: Negative.         The following were reviewed: Active problem list, medication list, allergies, family history, social history, and Health Maintenance.     History:  Past Medical History:   Diagnosis Date    A-fib 11/5/2019    Breast cancer     right, dx 2008.  Dr. Callaway follows.s/p lumpectomy and arimidex x 5y.    Hyperlipidemia     Hypertension     OP (osteoporosis)     on bisphos x 2y.    TIA (transient ischemic attack)     Toe fracture      Past Surgical History:   Procedure Laterality Date    BREAST LUMPECTOMY      right 2008    TOTAL ABDOMINAL  HYSTERECTOMY      no cancer    TRANSESOPHAGEAL ECHOCARDIOGRAPHY N/A 11/5/2019    Procedure: ECHOCARDIOGRAM, TRANSESOPHAGEAL;  Surgeon: Otto Flores MD;  Location: [facility] CATH LAB;  Service: Cardiology;  Laterality: N/A;    TREATMENT OF CARDIAC ARRHYTHMIA N/A 11/5/2019    Procedure: CARDIOVERSION;  Surgeon: Otto Flores MD;  Location: [facility] CATH LAB;  Service: Cardiology;  Laterality: N/A;    TREATMENT OF CARDIAC ARRHYTHMIA N/A 11/5/2019    Procedure: CARDIOVERSION;  Surgeon: Otto Flores MD;  Location: [facility] CATH LAB;  Service: Cardiology;  Laterality: N/A;    TREATMENT OF CARDIAC ARRHYTHMIA N/A 2/4/2020    Procedure: CARDIOVERSION;  Surgeon: Otto Flores MD;  Location: [facility] CATH LAB;  Service: Cardiology;  Laterality: N/A;    TREATMENT OF CARDIAC ARRHYTHMIA N/A 3/2/2020    Procedure: CARDIOVERSION;  Surgeon: Mayi Beltran MD;  Location: [facility] CATH LAB;  Service: Cardiology;  Laterality: N/A;  830 start per Dr. beltran     History reviewed. No pertinent family history.  Patient Active Problem List   Diagnosis    Age-related osteoporosis without current pathological fracture    Other hyperlipidemia    History of right breast cancer    TIA (transient ischemic attack)    Essential hypertension    Vitamin D deficiency    Hyperparathyroidism    Palpitation    Paroxysmal atrial fibrillation    Hypercalcemia    Chronic anticoagulation    Atypical pneumonia - resolved , bilateral    Pulmonary fibrosis, postinflammatory    Atherosclerosis of aorta    Simple chronic bronchitis    Injection site reaction    Varicose veins of left lower extremity with pain     Review of patient's allergies indicates:  No Known Allergies    Medications:  Current Outpatient Medications on File Prior to Visit   Medication Sig Dispense Refill    albuterol (PROVENTIL/VENTOLIN HFA) 90 mcg/actuation inhaler INHALE 2 PUFFS INTO THE LUNGS EVERY 6 (SIX) HOURS AS NEEDED FOR WHEEZING. RESCUE 18 g 3    atorvastatin (LIPITOR) 20 MG tablet TAKE 1 TABLET BY MOUTH EVERY DAY  90 tablet 1    clopidogrel bisulfate (PLAVIX ORAL) Plavix Take No date recorded No form recorded No frequency recorded No route recorded No set duration recorded No set duration amount recorded suspended No dosage strength recorded No dosage strength units of measure recorded      cyanocobalamin (VITAMIN B-12) 1000 MCG tablet Take 100 mcg by mouth once daily.      ELIQUIS 5 mg Tab TAKE 1 TABLET BY MOUTH TWICE A DAY 60 tablet 5    fish oil-omega-3 fatty acids 300-1,000 mg capsule Take 2 g by mouth once daily.      glucosam/chond/hyalu/CF borate (MOVE FREE JOINT HEALTH ORAL) Take 1 tablet by mouth once daily.      guaiFENesin (MUCINEX) 600 mg 12 hr tablet Take 2 tablets (1,200 mg total) by mouth 2 (two) times daily. 60 tablet 11    ipratropium (ATROVENT) 0.02 % nebulizer solution Take 2.5 mLs (500 mcg total) by nebulization 4 (four) times daily. 300 mL 11    ipratropium (ATROVENT) 42 mcg (0.06 %) nasal spray 2 sprays by Each Nostril route 3 (three) times daily as needed for Rhinitis (nasal drip). 15 mL 11    losartan (COZAAR) 25 MG tablet TAKE 1 TABLET BY MOUTH EVERY DAY 90 tablet 3    metoprolol succinate (TOPROL-XL) 50 MG 24 hr tablet TAKE 1 TABLET BY MOUTH EVERY DAY 90 tablet 1    MULTIVIT-IRON-MIN-FOLIC ACID 3,500-18-0.4 UNIT-MG-MG ORAL CHEW Take by mouth.      omeprazole (PRILOSEC) 40 MG capsule Take 40 mg by mouth once daily.      ranolazine (RANEXA) 500 MG Tb12 TAKE 1 TABLET BY MOUTH TWICE A  tablet 3    vitamin D 1000 units Tab Take 2,000 Units by mouth once daily.       Current Facility-Administered Medications on File Prior to Visit   Medication Dose Route Frequency Provider Last Rate Last Admin    methylPREDNISolone acetate injection 60 mg  60 mg Intramuscular 1 time in Clinic/HOD Lorena Fiore MD           Medications have been reviewed and reconciled with patient at visit today.          Exam:  Vitals:    02/24/23 1056   BP: 128/70   Pulse: 90   Temp: 97.4 °F (36.3 °C)     Weight: 78.2 kg (172  lb 6.4 oz)   Body mass index is 33.67 kg/m².      BP Readings from Last 3 Encounters:   02/24/23 128/70   02/20/23 (!) 156/82   01/13/23 122/80     Wt Readings from Last 3 Encounters:   02/24/23 1056 78.2 kg (172 lb 6.4 oz)   02/20/23 1602 78.7 kg (173 lb 8 oz)   01/13/23 0956 77.9 kg (171 lb 12.8 oz)            Physical Exam  Vitals reviewed.   Constitutional:       Appearance: Normal appearance. She is well-developed. She is obese.   HENT:      Head: Normocephalic and atraumatic.      Nose: Nose normal.   Eyes:      General: No scleral icterus.     Conjunctiva/sclera: Conjunctivae normal.      Pupils: Pupils are equal, round, and reactive to light.   Cardiovascular:      Rate and Rhythm: Normal rate and regular rhythm.      Heart sounds: Normal heart sounds. No murmur heard.    No friction rub. No gallop.   Pulmonary:      Effort: Pulmonary effort is normal.      Breath sounds: Normal breath sounds.   Musculoskeletal:         General: Normal range of motion.      Cervical back: Normal range of motion and neck supple.      Right lower leg: No edema.      Left lower leg: No edema.        Legs:       Comments: Large tortuous dilated varicose vein    Other small varicosities to both legs   Skin:     General: Skin is warm and dry.   Neurological:      Mental Status: She is alert and oriented to person, place, and time.   Psychiatric:         Behavior: Behavior normal.       Laboratory Reviewed:   Lab Results   Component Value Date    WBC 6.23 11/11/2022    HGB 13.9 11/11/2022    HCT 43.4 11/11/2022     11/11/2022    CHOL 145 10/03/2022    TRIG 71 10/03/2022    HDL 62 10/03/2022    ALT 18 10/03/2022    AST 16 10/03/2022     10/03/2022    K 4.9 10/03/2022     10/03/2022    CREATININE 0.9 10/03/2022    BUN 14 10/03/2022    CO2 26 10/03/2022    TSH 0.774 11/11/2022    HGBA1C 5.4 02/03/2016           Health Maintenance  Health Maintenance Topics with due status: Not Due       Topic Last Completion Date     Lipid Panel 10/03/2022    DEXA Scan 10/03/2022     Health Maintenance Due   Topic Date Due    TETANUS VACCINE  Never done    Hemoglobin A1c (Prediabetes)  02/03/2017    COVID-19 Vaccine (3 - Booster) 04/29/2021    Shingles Vaccine (2 of 2) 03/14/2022       Assessment and Plan:  1. Varicose veins of left lower extremity with pain  Assessment & Plan:  ACE wrap applied for compression and support  Tylenol  Warm heating pad  Compression socks  Tylenol for pain                   -Patient's lab results were reviewed and discussed with patient  -Treatment options and alternatives were discussed with the patient. Patient expressed understanding. Patient was given the opportunity to ask questions and be an active participant in their medical care. Patient had no further questions or concerns at this time.         Future Appointments   Date Time Provider Department Center   3/16/2023 11:00 AM Lorena Fiore MD [facility]   4/19/2023 10:20 AM Mayi Vega MD [facility] CARDIO [place]   2/12/2024 10:30 AM [facility] XR1 [facility] XRAY [place]   2/12/2024 10:45 AM PULMONARY LAB, [facility] [place]   2/12/2024 11:20 AM Damion Carbone MD [facility]          After visit summary printed and given to patient upon discharge.  Patient goals and care plan are included in After visit summary.    Total medical decision making time was 40 min.    The following issues were discussed: The encounter diagnosis was Varicose veins of left lower extremity with pain.    Health maintenance needs, recent test results and goals of care discussed with pt and questions answered.           NISHI Herrera, NP-C  [facility]  [address] [place] [id number]

## 2023-03-08 DIAGNOSIS — E78.5 HYPERLIPIDEMIA: ICD-10-CM

## 2023-03-08 RX ORDER — ATORVASTATIN CALCIUM 20 MG/1
TABLET, FILM COATED ORAL
Qty: 90 TABLET | Refills: 1 | Status: SHIPPED | OUTPATIENT
Start: 2023-03-08 | End: 2023-09-01

## 2023-03-08 NOTE — TELEPHONE ENCOUNTER
No new care gaps identified.  [facility] Embedded Care Gaps. Reference number: [id number]. 3/08/2023   12:20:26 AM CST

## 2023-03-08 NOTE — PROGRESS NOTES
Subjective:      Patient ID: Lorenzo Ospina is a 83 y.o. female.    Chief Complaint: Follow-up (Four month follow up, pt was last seen for vericose veins. Pt says she's been in pain from head to toes. )      HPI  Here for follow up of medical problems.  Cough is much improved with BID nebulizer.    Muscles hurt all over x 2 months.  Most pain is in the morning, described as a stiffness all over.  Last night after large meal, had upper abd/ lower chest pain, lasted 4 hours and stopped after taking hot tea/sprite to help it.  No SOB/palp/dizziness/sweating associated.  Did not show for 2 Rheum appts for OP.  BMs normal, no b/b.        11/22 BMD:  FINDINGS:  The L1 to L4 vertebral bone mineral density is equal to 1.261 g/cm squared with a T score of 0.7.  There has been no significant change relative to the prior study.     The left femoral neck bone mineral density is equal to 0.768 g/cm squared with a T score of -1.9.  There has been  no significant change relative to the prior study.     There is a 21.2% risk of a major osteoporotic fracture and a 5.5% risk of hip fracture in the next 10 years (FRAX).     Impression:     Osteopenia       Updated/ annual due 11/23:  HM: 11/22 fluvax, 2/21 covid vaccines/booster, 6/17 [id number], refuses [id number] unknown reason even after discussion, ref tetanus, 11/21 MMG/no more, 10/22 BMD rep 2y,  2012 Cscope saw Gastro in 2017 and says due in 2022.     Review of Systems   Constitutional:  Negative for chills, diaphoresis and fever.   Respiratory:  Negative for cough and shortness of breath.    Cardiovascular:  Negative for chest pain, palpitations and leg swelling.   Gastrointestinal:  Negative for blood in stool, constipation, diarrhea, nausea and vomiting.   Genitourinary:  Negative for dysuria, frequency and hematuria.   Psychiatric/Behavioral:  The patient is not nervous/anxious.        Objective:   /68 (BP Location: Left arm)   Pulse 95   Temp 98.1 °F (36.7 °C)  (Oral)   Ht 5' (1.524 m)   Wt 78.6 kg (173 lb 3.2 oz)   SpO2 98%   BMI 33.83 kg/m²     Physical Exam  Constitutional:       Appearance: She is well-developed.   Neck:      Thyroid: No thyroid mass.      Vascular: No carotid bruit.   Cardiovascular:      Rate and Rhythm: Normal rate and regular rhythm.      Heart sounds: No murmur heard.    No friction rub. No gallop.   Pulmonary:      Effort: Pulmonary effort is normal.      Breath sounds: Normal breath sounds. No wheezing or rales.   Abdominal:      General: Bowel sounds are normal.      Palpations: Abdomen is soft. There is no mass.      Tenderness: There is no abdominal tenderness.   Musculoskeletal:      Cervical back: Neck supple.   Lymphadenopathy:      Cervical: No cervical adenopathy.   Neurological:      Mental Status: She is alert and oriented to person, place, and time.         Assessment:       1. Atypical chest pain    2. Essential hypertension    3. Paroxysmal atrial fibrillation    4. Chronic anticoagulation    5. Pulmonary fibrosis, postinflammatory    6. Simple chronic bronchitis    7. History of right breast cancer    8. Atherosclerosis of aorta    9. Age-related osteoporosis without current pathological fracture    10. Hyperparathyroidism    11. Myalgia    12. Morning stiffness of joints          Plan:     Lorenzo was seen today for follow-up.    Diagnoses and all orders for this visit:    Atypical chest pain last night-  -     Troponin I; Future    Essential hypertension- stable, cont rx.    Paroxysmal atrial fibrillation, Chronic anticoagulation, Atherosclerosis of aorta- cont statin for now, but may need to stop due to myalgias.    Pulmonary fibrosis, postinflammatory, Simple chronic bronchitis- cough much improved on neb tx.    History of right breast cancer    Age-related osteoporosis without current pathological fracture, likely intol of oral bisphos due to reflux sx- will need Rheum, but work up these conditions  first.    Hyperparathyroidism  -     Basic Metabolic Panel; Future  -     PTH, Intact; Future    Myalgia/ morning stiffness- RA work up, CPK- may need to hold statin/ will discuss with Silvia if cannot find another cause.  -     CK; Future  -     C-Reactive Protein; Future  -     Sedimentation rate; Future  -     CBC Auto Differential; Future    RTC 2wk.

## 2023-03-08 NOTE — TELEPHONE ENCOUNTER
Refill Decision Note   Bradleyolvin Anai  is requesting a refill authorization.  Brief Assessment and Rationale for Refill:  Approve     Medication Therapy Plan:       Medication Reconciliation Completed: No   Comments:     No Care Gaps recommended.     Note composed:8:33 AM 03/08/2023

## 2023-03-16 ENCOUNTER — OFFICE VISIT (OUTPATIENT)
Dept: PRIMARY CARE CLINIC | Facility: CLINIC | Age: 83
End: 2023-03-16
Payer: MEDICARE

## 2023-03-16 ENCOUNTER — TELEPHONE (OUTPATIENT)
Dept: PRIMARY CARE CLINIC | Facility: CLINIC | Age: 83
End: 2023-03-16

## 2023-03-16 ENCOUNTER — LAB VISIT (OUTPATIENT)
Dept: LAB | Facility: HOSPITAL | Age: 83
End: 2023-03-16
Attending: INTERNAL MEDICINE
Payer: MEDICARE

## 2023-03-16 VITALS
WEIGHT: 173.19 LBS | DIASTOLIC BLOOD PRESSURE: 68 MMHG | BODY MASS INDEX: 34 KG/M2 | OXYGEN SATURATION: 98 % | HEART RATE: 95 BPM | HEIGHT: 60 IN | TEMPERATURE: 98 F | SYSTOLIC BLOOD PRESSURE: 122 MMHG

## 2023-03-16 DIAGNOSIS — J84.10 PULMONARY FIBROSIS, POSTINFLAMMATORY: ICD-10-CM

## 2023-03-16 DIAGNOSIS — M79.10 MYALGIA: ICD-10-CM

## 2023-03-16 DIAGNOSIS — I48.0 PAROXYSMAL ATRIAL FIBRILLATION: ICD-10-CM

## 2023-03-16 DIAGNOSIS — Z79.01 CHRONIC ANTICOAGULATION: ICD-10-CM

## 2023-03-16 DIAGNOSIS — Z85.3 HISTORY OF RIGHT BREAST CANCER: ICD-10-CM

## 2023-03-16 DIAGNOSIS — I70.0 ATHEROSCLEROSIS OF AORTA: ICD-10-CM

## 2023-03-16 DIAGNOSIS — E21.3 HYPERPARATHYROIDISM: ICD-10-CM

## 2023-03-16 DIAGNOSIS — J41.0 SIMPLE CHRONIC BRONCHITIS: ICD-10-CM

## 2023-03-16 DIAGNOSIS — R07.89 ATYPICAL CHEST PAIN: ICD-10-CM

## 2023-03-16 DIAGNOSIS — M25.60 MORNING STIFFNESS OF JOINTS: ICD-10-CM

## 2023-03-16 DIAGNOSIS — M81.0 AGE-RELATED OSTEOPOROSIS WITHOUT CURRENT PATHOLOGICAL FRACTURE: ICD-10-CM

## 2023-03-16 DIAGNOSIS — R07.89 ATYPICAL CHEST PAIN: Primary | ICD-10-CM

## 2023-03-16 DIAGNOSIS — I10 ESSENTIAL HYPERTENSION: ICD-10-CM

## 2023-03-16 LAB
ALBUMIN SERPL BCP-MCNC: 4.1 G/DL (ref 3.5–5.2)
ALP SERPL-CCNC: 60 U/L (ref 55–135)
ALT SERPL W/O P-5'-P-CCNC: 20 U/L (ref 10–44)
ANION GAP SERPL CALC-SCNC: 8 MMOL/L (ref 8–16)
AST SERPL-CCNC: 18 U/L (ref 10–40)
BASOPHILS # BLD AUTO: 0.04 K/UL (ref 0–0.2)
BASOPHILS NFR BLD: 0.5 % (ref 0–1.9)
BILIRUB SERPL-MCNC: 0.4 MG/DL (ref 0.1–1)
BUN SERPL-MCNC: 9 MG/DL (ref 8–23)
CALCIUM SERPL-MCNC: 10.6 MG/DL (ref 8.7–10.5)
CHLORIDE SERPL-SCNC: 108 MMOL/L (ref 95–110)
CK SERPL-CCNC: 46 U/L (ref 20–180)
CO2 SERPL-SCNC: 25 MMOL/L (ref 23–29)
CREAT SERPL-MCNC: 0.8 MG/DL (ref 0.5–1.4)
CRP SERPL-MCNC: 1.1 MG/L (ref 0–8.2)
DIFFERENTIAL METHOD: NORMAL
EOSINOPHIL # BLD AUTO: 0.2 K/UL (ref 0–0.5)
EOSINOPHIL NFR BLD: 2 % (ref 0–8)
ERYTHROCYTE [DISTWIDTH] IN BLOOD BY AUTOMATED COUNT: 13.5 % (ref 11.5–14.5)
ERYTHROCYTE [SEDIMENTATION RATE] IN BLOOD BY WESTERGREN METHOD: 10 MM/HR (ref 0–20)
EST. GFR  (NO RACE VARIABLE): >60 ML/MIN/1.73 M^2
GLUCOSE SERPL-MCNC: 101 MG/DL (ref 70–110)
HCT VFR BLD AUTO: 43 % (ref 37–48.5)
HGB BLD-MCNC: 13.9 G/DL (ref 12–16)
IMM GRANULOCYTES # BLD AUTO: 0.03 K/UL (ref 0–0.04)
IMM GRANULOCYTES NFR BLD AUTO: 0.4 % (ref 0–0.5)
LYMPHOCYTES # BLD AUTO: 2.3 K/UL (ref 1–4.8)
LYMPHOCYTES NFR BLD: 28.7 % (ref 18–48)
MCH RBC QN AUTO: 29.6 PG (ref 27–31)
MCHC RBC AUTO-ENTMCNC: 32.3 G/DL (ref 32–36)
MCV RBC AUTO: 92 FL (ref 82–98)
MONOCYTES # BLD AUTO: 0.6 K/UL (ref 0.3–1)
MONOCYTES NFR BLD: 7.2 % (ref 4–15)
NEUTROPHILS # BLD AUTO: 4.8 K/UL (ref 1.8–7.7)
NEUTROPHILS NFR BLD: 61.2 % (ref 38–73)
NRBC BLD-RTO: 0 /100 WBC
PLATELET # BLD AUTO: 168 K/UL (ref 150–450)
PMV BLD AUTO: 10.9 FL (ref 9.2–12.9)
POTASSIUM SERPL-SCNC: 4.1 MMOL/L (ref 3.5–5.1)
PROT SERPL-MCNC: 7.4 G/DL (ref 6–8.4)
PTH-INTACT SERPL-MCNC: 176.6 PG/ML (ref 9–77)
RBC # BLD AUTO: 4.7 M/UL (ref 4–5.4)
RHEUMATOID FACT SERPL-ACNC: <13 IU/ML (ref 0–15)
SODIUM SERPL-SCNC: 141 MMOL/L (ref 136–145)
TROPONIN I SERPL DL<=0.01 NG/ML-MCNC: 0.01 NG/ML (ref 0–0.03)
WBC # BLD AUTO: 7.88 K/UL (ref 3.9–12.7)

## 2023-03-16 PROCEDURE — 3288F FALL RISK ASSESSMENT DOCD: CPT | Mod: CPTII,S$GLB,, | Performed by: INTERNAL MEDICINE

## 2023-03-16 PROCEDURE — 80053 COMPREHEN METABOLIC PANEL: CPT | Performed by: INTERNAL MEDICINE

## 2023-03-16 PROCEDURE — 36415 COLL VENOUS BLD VENIPUNCTURE: CPT | Mod: PO | Performed by: INTERNAL MEDICINE

## 2023-03-16 PROCEDURE — 86200 CCP ANTIBODY: CPT | Performed by: INTERNAL MEDICINE

## 2023-03-16 PROCEDURE — 3288F PR FALLS RISK ASSESSMENT DOCUMENTED: ICD-10-PCS | Mod: CPTII,S$GLB,, | Performed by: INTERNAL MEDICINE

## 2023-03-16 PROCEDURE — 3074F PR MOST RECENT SYSTOLIC BLOOD PRESSURE < 130 MM HG: ICD-10-PCS | Mod: CPTII,S$GLB,, | Performed by: INTERNAL MEDICINE

## 2023-03-16 PROCEDURE — 1101F PR PT FALLS ASSESS DOC 0-1 FALLS W/OUT INJ PAST YR: ICD-10-PCS | Mod: CPTII,S$GLB,, | Performed by: INTERNAL MEDICINE

## 2023-03-16 PROCEDURE — 86038 ANTINUCLEAR ANTIBODIES: CPT | Performed by: INTERNAL MEDICINE

## 2023-03-16 PROCEDURE — 82550 ASSAY OF CK (CPK): CPT | Performed by: INTERNAL MEDICINE

## 2023-03-16 PROCEDURE — 1159F PR MEDICATION LIST DOCUMENTED IN MEDICAL RECORD: ICD-10-PCS | Mod: CPTII,S$GLB,, | Performed by: INTERNAL MEDICINE

## 2023-03-16 PROCEDURE — 83970 ASSAY OF PARATHORMONE: CPT | Performed by: INTERNAL MEDICINE

## 2023-03-16 PROCEDURE — 99214 PR OFFICE/OUTPT VISIT, EST, LEVL IV, 30-39 MIN: ICD-10-PCS | Mod: S$GLB,,, | Performed by: INTERNAL MEDICINE

## 2023-03-16 PROCEDURE — 3078F DIAST BP <80 MM HG: CPT | Mod: CPTII,S$GLB,, | Performed by: INTERNAL MEDICINE

## 2023-03-16 PROCEDURE — 85651 RBC SED RATE NONAUTOMATED: CPT | Performed by: INTERNAL MEDICINE

## 2023-03-16 PROCEDURE — 1101F PT FALLS ASSESS-DOCD LE1/YR: CPT | Mod: CPTII,S$GLB,, | Performed by: INTERNAL MEDICINE

## 2023-03-16 PROCEDURE — 1125F PR PAIN SEVERITY QUANTIFIED, PAIN PRESENT: ICD-10-PCS | Mod: CPTII,S$GLB,, | Performed by: INTERNAL MEDICINE

## 2023-03-16 PROCEDURE — 86140 C-REACTIVE PROTEIN: CPT | Performed by: INTERNAL MEDICINE

## 2023-03-16 PROCEDURE — 84484 ASSAY OF TROPONIN QUANT: CPT | Performed by: INTERNAL MEDICINE

## 2023-03-16 PROCEDURE — 85025 COMPLETE CBC W/AUTO DIFF WBC: CPT | Performed by: INTERNAL MEDICINE

## 2023-03-16 PROCEDURE — 99999 PR PBB SHADOW E&M-EST. PATIENT-LVL IV: ICD-10-PCS | Mod: PBBFAC,,, | Performed by: INTERNAL MEDICINE

## 2023-03-16 PROCEDURE — 3078F PR MOST RECENT DIASTOLIC BLOOD PRESSURE < 80 MM HG: ICD-10-PCS | Mod: CPTII,S$GLB,, | Performed by: INTERNAL MEDICINE

## 2023-03-16 PROCEDURE — 99999 PR PBB SHADOW E&M-EST. PATIENT-LVL IV: CPT | Mod: PBBFAC,,, | Performed by: INTERNAL MEDICINE

## 2023-03-16 PROCEDURE — 99214 OFFICE O/P EST MOD 30 MIN: CPT | Mod: S$GLB,,, | Performed by: INTERNAL MEDICINE

## 2023-03-16 PROCEDURE — 86431 RHEUMATOID FACTOR QUANT: CPT | Performed by: INTERNAL MEDICINE

## 2023-03-16 PROCEDURE — 1125F AMNT PAIN NOTED PAIN PRSNT: CPT | Mod: CPTII,S$GLB,, | Performed by: INTERNAL MEDICINE

## 2023-03-16 PROCEDURE — 3074F SYST BP LT 130 MM HG: CPT | Mod: CPTII,S$GLB,, | Performed by: INTERNAL MEDICINE

## 2023-03-16 PROCEDURE — 1159F MED LIST DOCD IN RCRD: CPT | Mod: CPTII,S$GLB,, | Performed by: INTERNAL MEDICINE

## 2023-03-16 NOTE — TELEPHONE ENCOUNTER
PC with daughter    Trop and inflammatory labs negative.  Can try a massage.  If not better, will d/w Dr. Vega trying a stop of statin.  SM

## 2023-03-17 LAB
ANA SER QL IF: NORMAL
CCP AB SER IA-ACNC: <0.5 U/ML

## 2023-03-17 NOTE — PROGRESS NOTES
Subjective:      Patient ID: Lorenzo Ospina is a 83 y.o. female.    Chief Complaint: Follow-up (Swollen ankle and foot on left leg)      HPI  Here for follow up of medical problems.  Pain is much improved, thinks due to wearing boot left leg.  Cough increased in 2 days.  No more chest pain.  No short of breath.  Hasn't seen Rheum.  No f/c.  No n/v.    Updated/ annual due 11/23:  HM: 11/22 fluvax, 2/21 covid vaccines/booster, 6/17 [id number], refuses [id number] unknown reason even after discussion, ref tetanus, 11/21 MMG/no more, 10/22 BMD rep 2y,  2012 Cscope saw Gastro in 2017 and says due in 2022.     Review of Systems   Constitutional:  Negative for chills, diaphoresis and fever.   Respiratory:  Negative for cough and shortness of breath.    Cardiovascular:  Negative for chest pain, palpitations and leg swelling.   Gastrointestinal:  Negative for blood in stool, constipation, diarrhea, nausea and vomiting.   Genitourinary:  Negative for dysuria, frequency and hematuria.   Psychiatric/Behavioral:  The patient is not nervous/anxious.        Objective:   BP (!) 138/58 (BP Location: Left arm, Patient Position: Sitting)   Pulse 77   Temp 97.7 °F (36.5 °C) (Oral)   Wt 77.2 kg (170 lb 3.2 oz)   SpO2 98%   BMI 33.24 kg/m²     Physical Exam  Constitutional:       Appearance: She is well-developed.   Neck:      Thyroid: No thyroid mass.      Vascular: No carotid bruit.   Cardiovascular:      Rate and Rhythm: Normal rate. Rhythm irregularly irregular.      Heart sounds: No murmur heard.    No friction rub. No gallop.   Pulmonary:      Effort: Pulmonary effort is normal.      Breath sounds: Normal breath sounds. No wheezing or rales.   Abdominal:      General: Bowel sounds are normal.      Palpations: Abdomen is soft. There is no mass.      Tenderness: There is no abdominal tenderness.   Musculoskeletal:      Cervical back: Neck supple.   Lymphadenopathy:      Cervical: No cervical adenopathy.   Neurological:       Mental Status: She is alert and oriented to person, place, and time.          Latest Reference Range & Units 03/16/23 12:09   WBC 3.90 - 12.70 K/uL 7.88   RBC 4.00 - 5.40 M/uL 4.70   Hemoglobin 12.0 - 16.0 g/dL 13.9   Hematocrit 37.0 - 48.5 % 43.0   MCV 82 - 98 fL 92   MCH 27.0 - 31.0 pg 29.6   MCHC 32.0 - 36.0 g/dL 32.3   RDW 11.5 - 14.5 % 13.5   Platelets 150 - 450 K/uL 168   MPV 9.2 - 12.9 fL 10.9   Gran % 38.0 - 73.0 % 61.2   Lymph % 18.0 - 48.0 % 28.7   Mono % 4.0 - 15.0 % 7.2   Eosinophil % 0.0 - 8.0 % 2.0   Basophil % 0.0 - 1.9 % 0.5   Immature Granulocytes 0.0 - 0.5 % 0.4   Gran # (ANC) 1.8 - 7.7 K/uL 4.8   Lymph # 1.0 - 4.8 K/uL 2.3   Mono # 0.3 - 1.0 K/uL 0.6   Eos # 0.0 - 0.5 K/uL 0.2   Baso # 0.00 - 0.20 K/uL 0.04   Immature Grans (Abs) 0.00 - 0.04 K/uL 0.03   nRBC 0 /100 WBC 0   Differential Method  Automated   Sed Rate 0 - 20 mm/Hr 10   Sodium 136 - 145 mmol/L 141   Potassium 3.5 - 5.1 mmol/L 4.1   Chloride 95 - 110 mmol/L 108   CO2 23 - 29 mmol/L 25   Anion Gap 8 - 16 mmol/L 8   BUN 8 - 23 mg/dL 9   Creatinine 0.5 - 1.4 mg/dL 0.8   eGFR >60 mL/min/1.73 m^2 >60   Glucose 70 - 110 mg/dL 101   Calcium 8.7 - 10.5 mg/dL 10.6 (H)   Alkaline Phosphatase 55 - 135 U/L 60   PROTEIN TOTAL 6.0 - 8.4 g/dL 7.4   Albumin 3.5 - 5.2 g/dL 4.1   BILIRUBIN TOTAL 0.1 - 1.0 mg/dL 0.4   AST 10 - 40 U/L 18   ALT 10 - 44 U/L 20   CRP 0.0 - 8.2 mg/L 1.1   CPK 20 - 180 U/L 46   Troponin I 0.000 - 0.026 ng/mL 0.011   PTH 9.0 - 77.0 pg/mL 176.6 (H)   SPENSER Screen Negative <1:80  Negative <1:80   CCP Antibodies <5.0 U/mL <0.5   Rheumatoid Factor 0.0 - 15.0 IU/mL <13.0     Assessment:       1. Atypical chest pain    2. Hyperparathyroidism    3. Essential hypertension    4. Pulmonary fibrosis, postinflammatory    5. Paroxysmal atrial fibrillation    6. Chronic anticoagulation    7. Age-related osteoporosis without current pathological fracture    8. Myalgia    9. Morning stiffness of joints          Plan:     Atypical chest pain,  resolved.    Hyperparathyroidism, Age-related osteoporosis without current pathological fracture- refer to Rheum.    Essential hypertension, stable.    Pulmonary fibrosis, postinflammatory- chronic cough, restart mucinex.    Paroxysmal atrial fibrillation, Chronic anticoagulation    Myalgia, Morning stiffness of joints, resolved.

## 2023-03-23 ENCOUNTER — OFFICE VISIT (OUTPATIENT)
Dept: PRIMARY CARE CLINIC | Facility: CLINIC | Age: 83
End: 2023-03-23
Payer: MEDICARE

## 2023-03-23 VITALS
OXYGEN SATURATION: 98 % | SYSTOLIC BLOOD PRESSURE: 117 MMHG | BODY MASS INDEX: 33.8 KG/M2 | HEIGHT: 60 IN | DIASTOLIC BLOOD PRESSURE: 70 MMHG | HEART RATE: 71 BPM | TEMPERATURE: 98 F | WEIGHT: 172.19 LBS

## 2023-03-23 DIAGNOSIS — M25.562 ACUTE PAIN OF LEFT KNEE: Primary | ICD-10-CM

## 2023-03-23 DIAGNOSIS — I82.592 CHRONIC EMBOLISM AND THROMBOSIS OF OTHER SPECIFIED DEEP VEIN OF LEFT LOWER EXTREMITY: ICD-10-CM

## 2023-03-23 PROCEDURE — 3288F FALL RISK ASSESSMENT DOCD: CPT | Mod: CPTII,S$GLB,, | Performed by: NURSE PRACTITIONER

## 2023-03-23 PROCEDURE — 99214 OFFICE O/P EST MOD 30 MIN: CPT | Mod: S$GLB,,, | Performed by: NURSE PRACTITIONER

## 2023-03-23 PROCEDURE — 3078F DIAST BP <80 MM HG: CPT | Mod: CPTII,S$GLB,, | Performed by: NURSE PRACTITIONER

## 2023-03-23 PROCEDURE — 1159F MED LIST DOCD IN RCRD: CPT | Mod: CPTII,S$GLB,, | Performed by: NURSE PRACTITIONER

## 2023-03-23 PROCEDURE — 3078F PR MOST RECENT DIASTOLIC BLOOD PRESSURE < 80 MM HG: ICD-10-PCS | Mod: CPTII,S$GLB,, | Performed by: NURSE PRACTITIONER

## 2023-03-23 PROCEDURE — 3288F PR FALLS RISK ASSESSMENT DOCUMENTED: ICD-10-PCS | Mod: CPTII,S$GLB,, | Performed by: NURSE PRACTITIONER

## 2023-03-23 PROCEDURE — 99999 PR PBB SHADOW E&M-EST. PATIENT-LVL V: CPT | Mod: PBBFAC,,, | Performed by: NURSE PRACTITIONER

## 2023-03-23 PROCEDURE — 1125F PR PAIN SEVERITY QUANTIFIED, PAIN PRESENT: ICD-10-PCS | Mod: CPTII,S$GLB,, | Performed by: NURSE PRACTITIONER

## 2023-03-23 PROCEDURE — 3074F SYST BP LT 130 MM HG: CPT | Mod: CPTII,S$GLB,, | Performed by: NURSE PRACTITIONER

## 2023-03-23 PROCEDURE — 3074F PR MOST RECENT SYSTOLIC BLOOD PRESSURE < 130 MM HG: ICD-10-PCS | Mod: CPTII,S$GLB,, | Performed by: NURSE PRACTITIONER

## 2023-03-23 PROCEDURE — 1125F AMNT PAIN NOTED PAIN PRSNT: CPT | Mod: CPTII,S$GLB,, | Performed by: NURSE PRACTITIONER

## 2023-03-23 PROCEDURE — 99214 PR OFFICE/OUTPT VISIT, EST, LEVL IV, 30-39 MIN: ICD-10-PCS | Mod: S$GLB,,, | Performed by: NURSE PRACTITIONER

## 2023-03-23 PROCEDURE — 99999 PR PBB SHADOW E&M-EST. PATIENT-LVL V: ICD-10-PCS | Mod: PBBFAC,,, | Performed by: NURSE PRACTITIONER

## 2023-03-23 PROCEDURE — 1101F PR PT FALLS ASSESS DOC 0-1 FALLS W/OUT INJ PAST YR: ICD-10-PCS | Mod: CPTII,S$GLB,, | Performed by: NURSE PRACTITIONER

## 2023-03-23 PROCEDURE — 1101F PT FALLS ASSESS-DOCD LE1/YR: CPT | Mod: CPTII,S$GLB,, | Performed by: NURSE PRACTITIONER

## 2023-03-23 PROCEDURE — 1159F PR MEDICATION LIST DOCUMENTED IN MEDICAL RECORD: ICD-10-PCS | Mod: CPTII,S$GLB,, | Performed by: NURSE PRACTITIONER

## 2023-03-23 NOTE — PROGRESS NOTES
Lorenzo Ospina  03/23/2023  [id number]    Lorena Fiore MD  Patient Care Team:  Lorena Fiore MD as PCP - General (Internal Medicine)  Nikhil Cedeno LPN as Care Coordinator      [facility]      Chief Complaint:  Chief Complaint   Patient presents with    Left/RT  feet and leg swelling with pain       History of Present Illness:  Pt is known to me - last visit regarding painful tortuous varicose veins in the left calf muscle  Today returns with c/o increased pain to the left posterior knee and pain with walking to the left calf muscle. Onset yesterday after walking. Also, notes left lower leg swelling > right.   Took Tylenol with some relief. Has used massage and elevated leg with no improvement.   She denies trauma, chest pain, SOB, pleuritic chest pain and hemoptysis.   Denies fever, chills, URI symptoms, vomiting, diarrhea, no gross neuro deficits, urinary symptoms.          The following were reviewed: Active problem list, medication list, allergies, family history, social history, and Health Maintenance.     History:  Past Medical History:   Diagnosis Date    A-fib 11/5/2019    Breast cancer     right, dx 2008.  Dr. Callaway follows.s/p lumpectomy and arimidex x 5y.    Hyperlipidemia     Hypertension     OP (osteoporosis)     on bisphos x 2y.    TIA (transient ischemic attack)     Toe fracture      Past Surgical History:   Procedure Laterality Date    BREAST LUMPECTOMY      right 2008    TOTAL ABDOMINAL HYSTERECTOMY      no cancer    TRANSESOPHAGEAL ECHOCARDIOGRAPHY N/A 11/5/2019    Procedure: ECHOCARDIOGRAM, TRANSESOPHAGEAL;  Surgeon: Otto Flores MD;  Location: [facility] CATH LAB;  Service: Cardiology;  Laterality: N/A;    TREATMENT OF CARDIAC ARRHYTHMIA N/A 11/5/2019    Procedure: CARDIOVERSION;  Surgeon: Otto Flores MD;  Location: [facility] CATH LAB;  Service: Cardiology;  Laterality: N/A;    TREATMENT OF CARDIAC ARRHYTHMIA N/A 11/5/2019    Procedure: CARDIOVERSION;  Surgeon: Otto Flores MD;   Location: [facility] CATH LAB;  Service: Cardiology;  Laterality: N/A;    TREATMENT OF CARDIAC ARRHYTHMIA N/A 2/4/2020    Procedure: CARDIOVERSION;  Surgeon: Otto Flores MD;  Location: [facility] CATH LAB;  Service: Cardiology;  Laterality: N/A;    TREATMENT OF CARDIAC ARRHYTHMIA N/A 3/2/2020    Procedure: CARDIOVERSION;  Surgeon: Mayi Beltran MD;  Location: [facility] CATH LAB;  Service: Cardiology;  Laterality: N/A;  830 start per Dr. beltran     History reviewed. No pertinent family history.  Patient Active Problem List   Diagnosis    Age-related osteoporosis without current pathological fracture    Other hyperlipidemia    History of right breast cancer    TIA (transient ischemic attack)    Essential hypertension    Vitamin D deficiency    Hyperparathyroidism    Palpitation    Paroxysmal atrial fibrillation    Hypercalcemia    Chronic anticoagulation    Atypical pneumonia - resolved , bilateral    Pulmonary fibrosis, postinflammatory    Atherosclerosis of aorta    Simple chronic bronchitis    Injection site reaction    Varicose veins of left lower extremity with pain     Review of patient's allergies indicates:  No Known Allergies    Medications:  Current Outpatient Medications on File Prior to Visit   Medication Sig Dispense Refill    albuterol (PROVENTIL/VENTOLIN HFA) 90 mcg/actuation inhaler INHALE 2 PUFFS INTO THE LUNGS EVERY 6 (SIX) HOURS AS NEEDED FOR WHEEZING. RESCUE 18 g 3    atorvastatin (LIPITOR) 20 MG tablet TAKE 1 TABLET BY MOUTH EVERY DAY 90 tablet 1    clopidogrel bisulfate (PLAVIX ORAL) Plavix Take No date recorded No form recorded No frequency recorded No route recorded No set duration recorded No set duration amount recorded suspended No dosage strength recorded No dosage strength units of measure recorded      cyanocobalamin (VITAMIN B-12) 1000 MCG tablet Take 100 mcg by mouth once daily.      ELIQUIS 5 mg Tab TAKE 1 TABLET BY MOUTH TWICE A DAY 60 tablet 5    fish oil-omega-3 fatty acids 300-1,000 mg capsule Take  2 g by mouth once daily.      glucosam/chond/hyalu/CF borate (MOVE FREE [facility] ORAL) Take 1 tablet by mouth once daily.      guaiFENesin (MUCINEX) 600 mg 12 hr tablet Take 2 tablets (1,200 mg total) by mouth 2 (two) times daily. 60 tablet 11    ipratropium (ATROVENT) 0.02 % nebulizer solution Take 2.5 mLs (500 mcg total) by nebulization 4 (four) times daily. 300 mL 11    ipratropium (ATROVENT) 42 mcg (0.06 %) nasal spray 2 sprays by Each Nostril route 3 (three) times daily as needed for Rhinitis (nasal drip). 15 mL 11    losartan (COZAAR) 25 MG tablet TAKE 1 TABLET BY MOUTH EVERY DAY 90 tablet 3    metoprolol succinate (TOPROL-XL) 50 MG 24 hr tablet TAKE 1 TABLET BY MOUTH EVERY DAY 90 tablet 1    MULTIVIT-IRON-MIN-FOLIC ACID 3,500-18-0.4 UNIT-MG-MG ORAL CHEW Take by mouth.      omeprazole (PRILOSEC) 40 MG capsule Take 40 mg by mouth once daily.      ranolazine (RANEXA) 500 MG Tb12 TAKE 1 TABLET BY MOUTH TWICE A  tablet 3    vitamin D 1000 units Tab Take 2,000 Units by mouth once daily.       Current Facility-Administered Medications on File Prior to Visit   Medication Dose Route Frequency Provider Last Rate Last Admin    methylPREDNISolone acetate injection 60 mg  60 mg Intramuscular 1 time in Clinic/HOD Lorena Fiore MD           Medications have been reviewed and reconciled with patient at visit today.          Exam:  Vitals:    03/23/23 1456   BP: 117/70   Pulse: 71   Temp: 97.7 °F (36.5 °C)     Weight: 78.1 kg (172 lb 3.2 oz)   Body mass index is 33.63 kg/m².      BP Readings from Last 3 Encounters:   03/23/23 117/70   03/16/23 122/68   02/24/23 128/70     Wt Readings from Last 3 Encounters:   03/23/23 1456 78.1 kg (172 lb 3.2 oz)   03/16/23 1056 78.6 kg (173 lb 3.2 oz)   02/24/23 1056 78.2 kg (172 lb 6.4 oz)            Physical Exam  Vitals reviewed.   Constitutional:       Appearance: Normal appearance. She is well-developed. She is obese.   HENT:      Head: Normocephalic and atraumatic.       Nose: Nose normal.   Eyes:      General: No scleral icterus.     Conjunctiva/sclera: Conjunctivae normal.   Cardiovascular:      Rate and Rhythm: Normal rate and regular rhythm.      Heart sounds: Normal heart sounds. No murmur heard.  Pulmonary:      Effort: Pulmonary effort is normal.      Breath sounds: Normal breath sounds.   Musculoskeletal:         General: Normal range of motion.      Cervical back: Normal range of motion and neck supple.      Left knee: Swelling present. No deformity, effusion or erythema. Normal range of motion.      Instability Tests: Anterior drawer test negative. Posterior drawer test negative.      Right lower leg: No edema.      Left lower leg: Swelling present. No deformity. No edema.        Legs:       Comments: Large tortuous dilated varicose vein    Other small varicosities to both legs    Diffuse swelling to the left lower leg +1- +2 nonpitting    Tenderness to palpation to posterior knee just adjacent to the varicose veins   Skin:     General: Skin is warm and dry.   Neurological:      Mental Status: She is alert and oriented to person, place, and time.   Psychiatric:         Behavior: Behavior normal.       Laboratory Reviewed:   Lab Results   Component Value Date    WBC 7.88 03/16/2023    HGB 13.9 03/16/2023    HCT 43.0 03/16/2023     03/16/2023    CHOL 145 10/03/2022    TRIG 71 10/03/2022    HDL 62 10/03/2022    ALT 20 03/16/2023    AST 18 03/16/2023     03/16/2023    K 4.1 03/16/2023     03/16/2023    CREATININE 0.8 03/16/2023    BUN 9 03/16/2023    CO2 25 03/16/2023    TSH 0.774 11/11/2022    HGBA1C 5.4 02/03/2016           Health Maintenance  Health Maintenance Topics with due status: Not Due       Topic Last Completion Date    Lipid Panel 10/03/2022    DEXA Scan 10/03/2022     Health Maintenance Due   Topic Date Due    TETANUS VACCINE  Never done    Hemoglobin A1c (Prediabetes)  02/03/2017    COVID-19 Vaccine (3 - Booster) 04/29/2021    Shingles Vaccine  (2 of 2) 03/14/2022       Assessment and Plan:  1. Acute pain of left knee  -     US Lower Extremity Veins Left; Future; Expected date: 03/23/2023  -     X-Ray Knee 3 View Left; Future; Expected date: 03/23/2023    2. 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  Advised to continue Tylenol, elevation of left leg, wearing compression socks or lightly wrapped ACE wrap  Warm heating pad to varicose veins for comfort        -Patient's lab results were reviewed and discussed with patient  -Treatment options and alternatives were discussed with the patient. Patient expressed understanding. Patient was given the opportunity to ask questions and be an active participant in their medical care. Patient had no further questions or concerns at this time.         Future Appointments   Date Time Provider Department Center   3[facility]24[facility]2023 11:00 [facility]   3/31/2023  8:20 AM Lorena Fiore MD [facility]   4/6/2023 11:20 AM Liset Sotelo NP [facility]   4/19/2023 10:20 AM Mayi Vega MD [facility] CARDIO [facility]   2/12/2024 10:30 AM [facility] XR1 [facility] XRAY [facility]   2/12/2024 10:45 AM PULMONARY LAB, [place] [facility]   2/12/2024 11:20 AM Damion Carbone MD [facility] PULMSVC [facility]          After visit summary printed and given to patient upon discharge.  Patient goals and care plan are included in After visit summary.    Total medical decision making time was 30 min.    The following issues were discussed: The primary encounter diagnosis was Acute pain of left knee. 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    Health maintenance needs, recent test results and goals of care discussed with pt and questions answered.            NISHI Herrera, NP-C  [facility] [address] [place] [address]

## 2023-03-24 ENCOUNTER — HOSPITAL ENCOUNTER (OUTPATIENT)
Dept: RADIOLOGY | Facility: HOSPITAL | Age: 83
Discharge: HOME | End: 2023-03-24
Attending: NURSE PRACTITIONER
Payer: MEDICARE

## 2023-03-24 ENCOUNTER — TELEPHONE (OUTPATIENT)
Dept: PRIMARY CARE CLINIC | Facility: CLINIC | Age: 83
End: 2023-03-24
Payer: MEDICARE

## 2023-03-24 DIAGNOSIS — M25.562 ACUTE PAIN OF LEFT KNEE: ICD-10-CM

## 2023-03-24 DIAGNOSIS — M25.562 ACUTE PAIN OF LEFT KNEE: Primary | ICD-10-CM

## 2023-03-24 DIAGNOSIS — I82.592 CHRONIC EMBOLISM AND THROMBOSIS OF OTHER SPECIFIED DEEP VEIN OF LEFT LOWER EXTREMITY: ICD-10-CM

## 2023-03-24 PROCEDURE — 93971 US LOWER EXTREMITY VEINS LEFT: ICD-10-PCS | Mod: 26,LT,, | Performed by: STUDENT IN AN ORGANIZED HEALTH CARE EDUCATION/TRAINING PROGRAM

## 2023-03-24 PROCEDURE — 93971 EXTREMITY STUDY: CPT | Mod: 26,LT,, | Performed by: STUDENT IN AN ORGANIZED HEALTH CARE EDUCATION/TRAINING PROGRAM

## 2023-03-24 PROCEDURE — 93971 EXTREMITY STUDY: CPT | Mod: TC,LT

## 2023-03-24 NOTE — TELEPHONE ENCOUNTER
Spoke with pt DIL- advised of Ultrasound results and appt with Dr. Gerber. Verbalized understanding.

## 2023-03-30 ENCOUNTER — OFFICE VISIT (OUTPATIENT)
Dept: SPORTS MEDICINE | Facility: CLINIC | Age: 83
End: 2023-03-30
Payer: MEDICARE

## 2023-03-30 ENCOUNTER — HOSPITAL ENCOUNTER (OUTPATIENT)
Dept: RADIOLOGY | Facility: HOSPITAL | Age: 83
Discharge: HOME | End: 2023-03-30
Attending: FAMILY MEDICINE
Payer: MEDICARE

## 2023-03-30 ENCOUNTER — HOSPITAL ENCOUNTER (OUTPATIENT)
Dept: RADIOLOGY | Facility: HOSPITAL | Age: 83
Discharge: HOME | End: 2023-03-30
Attending: NURSE PRACTITIONER
Payer: MEDICARE

## 2023-03-30 DIAGNOSIS — M25.562 ACUTE PAIN OF LEFT KNEE: ICD-10-CM

## 2023-03-30 DIAGNOSIS — M79.672 LEFT FOOT PAIN: ICD-10-CM

## 2023-03-30 DIAGNOSIS — M79.672 LEFT FOOT PAIN: Primary | ICD-10-CM

## 2023-03-30 DIAGNOSIS — M25.472 EDEMA OF LEFT ANKLE: Primary | ICD-10-CM

## 2023-03-30 DIAGNOSIS — M71.22 POPLITEAL CYST, LEFT: ICD-10-CM

## 2023-03-30 PROCEDURE — 73564 XR KNEE ORTHO LEFT WITH FLEXION: ICD-10-PCS | Mod: 26,LT,, | Performed by: RADIOLOGY

## 2023-03-30 PROCEDURE — 99204 PR OFFICE/OUTPT VISIT, NEW, LEVL IV, 45-59 MIN: ICD-10-PCS | Mod: S$GLB,,, | Performed by: FAMILY MEDICINE

## 2023-03-30 PROCEDURE — 73610 XR ANKLE COMPLETE 3 VIEW LEFT: ICD-10-PCS | Mod: 26,LT,, | Performed by: RADIOLOGY

## 2023-03-30 PROCEDURE — 99999 PR PBB SHADOW E&M-EST. PATIENT-LVL III: ICD-10-PCS | Mod: PBBFAC,,, | Performed by: FAMILY MEDICINE

## 2023-03-30 PROCEDURE — 73564 X-RAY EXAM KNEE 4 OR MORE: CPT | Mod: TC,LT

## 2023-03-30 PROCEDURE — 73564 X-RAY EXAM KNEE 4 OR MORE: CPT | Mod: 26,LT,, | Performed by: RADIOLOGY

## 2023-03-30 PROCEDURE — 73562 XR KNEE ORTHO LEFT WITH FLEXION: ICD-10-PCS | Mod: 26,RT,, | Performed by: RADIOLOGY

## 2023-03-30 PROCEDURE — 99999 PR PBB SHADOW E&M-EST. PATIENT-LVL III: CPT | Mod: PBBFAC,,, | Performed by: FAMILY MEDICINE

## 2023-03-30 PROCEDURE — 73630 XR FOOT COMPLETE 3 VIEW LEFT: ICD-10-PCS | Mod: 26,LT,, | Performed by: RADIOLOGY

## 2023-03-30 PROCEDURE — 73610 X-RAY EXAM OF ANKLE: CPT | Mod: TC,LT

## 2023-03-30 PROCEDURE — 73610 X-RAY EXAM OF ANKLE: CPT | Mod: 26,LT,, | Performed by: RADIOLOGY

## 2023-03-30 PROCEDURE — 73630 X-RAY EXAM OF FOOT: CPT | Mod: 26,LT,, | Performed by: RADIOLOGY

## 2023-03-30 PROCEDURE — 99204 OFFICE O/P NEW MOD 45 MIN: CPT | Mod: S$GLB,,, | Performed by: FAMILY MEDICINE

## 2023-03-30 PROCEDURE — 73630 X-RAY EXAM OF FOOT: CPT | Mod: TC,LT

## 2023-03-30 PROCEDURE — 1159F MED LIST DOCD IN RCRD: CPT | Mod: CPTII,S$GLB,, | Performed by: FAMILY MEDICINE

## 2023-03-30 PROCEDURE — 1159F PR MEDICATION LIST DOCUMENTED IN MEDICAL RECORD: ICD-10-PCS | Mod: CPTII,S$GLB,, | Performed by: FAMILY MEDICINE

## 2023-03-30 PROCEDURE — 73562 X-RAY EXAM OF KNEE 3: CPT | Mod: 26,RT,, | Performed by: RADIOLOGY

## 2023-03-30 NOTE — PROGRESS NOTES
Subjective:     Patient ID: Lorenzo Ospina is a 83 y.o. female.    Chief Complaint: Pain of the Left Knee and Pain of the Left Ankle      HPI:    Patient reports to clinic today with left ankle pain and some left knee pain.  She reports that 3 weeks ago she noticed veins in the left lower extremity that were tender to touch and firm to touch.  She reported to her PCP and was referred for US of the lower extremities.  It showed no DVTs, but did reveal an 11.9 cm Baker's Cyst, possible hematoma, or abcess.  She states that around a week ago she noticed her ankle starting to cause her pain when walking and during dorsiflexion.  She denies any history of cardiac problems, but does take Toprol-XL and Eliquis.      She lives at home alone and her daughter is accompanying her to at the visit today.    The daughter and patient say that the main pain and problem is in the left ankle and foot swelling over the last few days which they think may have drained down from the knee because she did not have a specific twisting or fall injury.    No history of recent shortness of breath chest pain systemic symptoms or fever.    VIVEK Dunn #[id number]    Past Medical History:   Diagnosis Date    A-fib 11/5/2019    Breast cancer     right, dx 2008.  Dr. Callaway follows.s/p lumpectomy and arimidex x 5y.    Hyperlipidemia     Hypertension     OP (osteoporosis)     on bisphos x 2y.    TIA (transient ischemic attack)     Toe fracture      Past Surgical History:   Procedure Laterality Date    BREAST LUMPECTOMY      right 2008    TOTAL ABDOMINAL HYSTERECTOMY      no cancer    TRANSESOPHAGEAL ECHOCARDIOGRAPHY N/A 11/5/2019    Procedure: ECHOCARDIOGRAM, TRANSESOPHAGEAL;  Surgeon: Otto Flores MD;  Location: [facility] CATH LAB;  Service: Cardiology;  Laterality: N/A;    TREATMENT OF CARDIAC ARRHYTHMIA N/A 11/5/2019    Procedure: CARDIOVERSION;  Surgeon: Otto Flores MD;  Location: [facility] CATH LAB;  Service: Cardiology;  Laterality: N/A;    TREATMENT OF  CARDIAC ARRHYTHMIA N/A 2019    Procedure: CARDIOVERSION;  Surgeon: Otto Flores MD;  Location: [facility] CATH LAB;  Service: Cardiology;  Laterality: N/A;    TREATMENT OF CARDIAC ARRHYTHMIA N/A 2020    Procedure: CARDIOVERSION;  Surgeon: Otto Flores MD;  Location: [facility] CATH LAB;  Service: Cardiology;  Laterality: N/A;    TREATMENT OF CARDIAC ARRHYTHMIA N/A 3/2/2020    Procedure: CARDIOVERSION;  Surgeon: Mayi Beltran MD;  Location: [facility] CATH LAB;  Service: Cardiology;  Laterality: N/A;  830 start per Dr. beltran     History reviewed. No pertinent family history.  Social History     Socioeconomic History    Marital status:    Tobacco Use    Smoking status: Former     Packs/day: 1.50     Years: 22.00     Pack years: 33.00     Types: Cigarettes     Start date: 1972     Quit date: 1994     Years since quittin.5    Smokeless tobacco: Never   Substance and Sexual Activity    Alcohol use: Not Currently    Drug use: Never    Sexual activity: Not Currently     Birth control/protection: Abstinence       Current Outpatient Medications:     albuterol (PROVENTIL/VENTOLIN HFA) 90 mcg/actuation inhaler, INHALE 2 PUFFS INTO THE LUNGS EVERY 6 (SIX) HOURS AS NEEDED FOR WHEEZING. RESCUE, Disp: 18 g, Rfl: 3    atorvastatin (LIPITOR) 20 MG tablet, TAKE 1 TABLET BY MOUTH EVERY DAY, Disp: 90 tablet, Rfl: 1    clopidogrel bisulfate (PLAVIX ORAL), Plavix Take No date recorded No form recorded No frequency recorded No route recorded No set duration recorded No set duration amount recorded suspended No dosage strength recorded No dosage strength units of measure recorded, Disp: , Rfl:     cyanocobalamin (VITAMIN B-12) 1000 MCG tablet, Take 100 mcg by mouth once daily., Disp: , Rfl:     ELIQUIS 5 mg Tab, TAKE 1 TABLET BY MOUTH TWICE A DAY, Disp: 60 tablet, Rfl: 5    fish oil-omega-3 fatty acids 300-1,000 mg capsule, Take 2 g by mouth once daily., Disp: , Rfl:     glucosam/chond/hyalu/CF borate ([facility]  ORAL), Take 1 tablet by mouth once daily., Disp: , Rfl:     ipratropium (ATROVENT) 0.02 % nebulizer solution, Take 2.5 mLs (500 mcg total) by nebulization 4 (four) times daily., Disp: 300 mL, Rfl: 11    ipratropium (ATROVENT) 42 mcg (0.06 %) nasal spray, 2 sprays by Each Nostril route 3 (three) times daily as needed for Rhinitis (nasal drip)., Disp: 15 mL, Rfl: 11    losartan (COZAAR) 25 MG tablet, TAKE 1 TABLET BY MOUTH EVERY DAY, Disp: 90 tablet, Rfl: 3    metoprolol succinate (TOPROL-XL) 50 MG 24 hr tablet, TAKE 1 TABLET BY MOUTH EVERY DAY, Disp: 90 tablet, Rfl: 1    MULTIVIT-IRON-MIN-FOLIC ACID 3,500-18-0.4 UNIT-MG-MG ORAL CHEW, Take by mouth., Disp: , Rfl:     omeprazole (PRILOSEC) 40 MG capsule, Take 40 mg by mouth once daily., Disp: , Rfl:     ranolazine (RANEXA) 500 MG Tb12, TAKE 1 TABLET BY MOUTH TWICE A DAY, Disp: 180 tablet, Rfl: 3    vitamin D 1000 units Tab, Take 2,000 Units by mouth once daily., Disp: , Rfl:     Current Facility-Administered Medications:     methylPREDNISolone acetate injection 60 mg, 60 mg, Intramuscular, 1 time in Clinic/HOD, Lorena Fiore MD  Review of patient's allergies indicates:  No Known Allergies  Review of Systems   Constitutional:  Negative for chills, fever and weight loss.   Respiratory:  Negative for shortness of breath.    Cardiovascular:  Negative for chest pain and palpitations.     Objective:   There is no height or weight on file to calculate BMI.  There were no vitals filed for this visit.        Ortho/SPM Exam-alert and oriented well-nourished well-developed ambulatory pleasant alert female with antalgic gait in no acute distress and with Mervat  and her daughter interprets for her today.    Respiratory effort appears normal      Left Ankle Exam:    Inspection: Moderate Left lower extremity swelling    Palpation tenderness: Calf Tenderness diffuse and mild    Range of motion:  0 deg DF                 50 deg PF       30 deg Inversion       20 deg  Eversion    Strength:  4/5 DF    4/5 PF    4/5 Inversion    4/5 Eversion    N/V Exam:  Tibial:    Normal sensory (plantar foot)  Normal motor (FHL)    Sup Peroneal:   Normal sensory (dorsal foot)  Normal motor (Peroneals)            Deep Peroneal:   Normal sensory (1st web space)  Normal motor (EHL)    Sural:   Normal sensory (lateral foot)   Saphenous:   Normal sensory (medial lower leg)   Normal pedal pulses, warm and well perfused with capillary refill < 2 sec   Calf soft and compressible  Patella tendon reflex normal  Achilles tendon reflex normal  Varicose veins noted posterior calf region      Left Knee Exam:    Inspection: Swelling in popliteal space,    Palpation tenderness: None    Range of motion: 0-120 degrees.    Strength:  4+/5 Extension    4+/5 Flexion    4+/5 Hip Abduction    Stability: stable ACL/Lachman      stable Posterior Drawer      stable MCL/Valgus Stress      stable LCL/Varus Stress    Patella Exam: Negative J-sign   Negative Patellar apprehension   Negative Patellar grind          Patient Instructions   Left ankle boot     Elevate left leg several times during the day    Take Tylenol as needed for pain    Discuss with primary care if swelling worsens and left lower extremity    See Dr. Fuller in 2 weeks to evaluate if follow-up ultrasound needed    IMAGING: X-Ray Ankle Complete Left  Narrative: EXAM:  XR ANKLE COMPLETE 3 VIEW LEFT    CLINICAL HISTORY: Left ankle pain.    TECHNIQUE: 3 views of the left ankle    FINDINGS:  No acute fracture.  Joint alignment is anatomic.  No osteochondral defect.  Joint spaces appear relatively well maintained.  There is soft tissue thickening about the ankle.  Impression:  Soft tissue thickening about the ankle without acute osseous abnormality.    Finalized on: 3/30/2023 10:05 AM By:  Moise Clifford MD  [facility]# [id number]      2023-03-30 10:07:36.513    [facility]  X-Ray Foot Complete Left  Narrative: EXAM:  XR FOOT COMPLETE 3 VIEW LEFT    INDICATIONS:  Left foot  pain    TECHNIQUE:  3 views of the left foot    COMPARISONS:  None available.    FINDINGS:  No fractures nor dislocations.  Mild hallux valgus is present but the first MTP joint is preserved.  3 mm high density object is present in the soft tissues just lateral to the DIP joint of the fifth toe.  Remainder the exam is normal.  Impression: 1.  Hallux valgus    2.  Probable high density foreign body in the left little toe as described    Finalized on: 3/30/2023 9:56 AM By:  Moses Crystal MD  [facility]# [id number]      2023-03-30 09:58:15.851    [facility]  X-ray Knee Ortho Left with Flexion  Narrative: EXAM:  XR KNEE ORTHO LEFT WITH FLEXION    CLINICAL HISTORY: Left knee pain.    TECHNIQUE: [place] flexion and standing views of both knees.  Single lateral view of the left knee    FINDINGS:  No acute fracture.  Joint alignments are anatomic.  Mild right patellofemoral joint space loss.  Joint spaces otherwise appear maintained.  No left joint effusion.  Soft tissue structures appear within normal limits.  Impression:  No acute radiographic abnormality of the left knee.    Finalized on: 3/30/2023 9:46 AM By:  Moise Clifford MD  [facility]# [id number]      2023-03-30 09:48:45.263    [facility]       Radiographs / Imaging : Relevant imaging results reviewed by me and interpreted by me, discussed with the patient and / or family -agree with knee and ankle foot x-rays      Assessment:     Encounter Diagnoses   Name Primary?    Acute pain of left knee     Edema of left ankle Yes    Popliteal cyst, left         Plan:   Edema of left ankle    Acute pain of left knee  -     Ambulatory referral/consult to Orthopedics    Popliteal cyst, left        The patient verbalized good understanding of the medical issues discussed today and expressed appreciation for the care provided.  Patient was given the opportunity to ask questions and be an active participant in their medical care. Patient had no further questions or concerns at this time.     The patient  was encouraged to participate in appropriate physical activity.      Luis Gerber M.D.  [facility]        This note was dictated using voice recognition software and may have sound a like errors.

## 2023-03-30 NOTE — PATIENT INSTRUCTIONS
Left ankle boot     Elevate left leg several times during the day    Take Tylenol as needed for pain    Discuss with primary care if swelling worsens and left lower extremity    See Dr. Fuller in 2 weeks to evaluate if follow-up ultrasound needed

## 2023-03-31 ENCOUNTER — TELEPHONE (OUTPATIENT)
Dept: PRIMARY CARE CLINIC | Facility: CLINIC | Age: 83
End: 2023-03-31

## 2023-03-31 ENCOUNTER — OFFICE VISIT (OUTPATIENT)
Dept: PRIMARY CARE CLINIC | Facility: CLINIC | Age: 83
End: 2023-03-31
Payer: MEDICARE

## 2023-03-31 VITALS
DIASTOLIC BLOOD PRESSURE: 58 MMHG | WEIGHT: 170.19 LBS | OXYGEN SATURATION: 98 % | TEMPERATURE: 98 F | SYSTOLIC BLOOD PRESSURE: 138 MMHG | BODY MASS INDEX: 33.24 KG/M2 | HEART RATE: 77 BPM

## 2023-03-31 DIAGNOSIS — J84.10 PULMONARY FIBROSIS, POSTINFLAMMATORY: ICD-10-CM

## 2023-03-31 DIAGNOSIS — Z79.01 CHRONIC ANTICOAGULATION: ICD-10-CM

## 2023-03-31 DIAGNOSIS — I48.0 PAROXYSMAL ATRIAL FIBRILLATION: ICD-10-CM

## 2023-03-31 DIAGNOSIS — R07.89 ATYPICAL CHEST PAIN: Primary | ICD-10-CM

## 2023-03-31 DIAGNOSIS — M25.60 MORNING STIFFNESS OF JOINTS: ICD-10-CM

## 2023-03-31 DIAGNOSIS — E21.3 HYPERPARATHYROIDISM: ICD-10-CM

## 2023-03-31 DIAGNOSIS — M79.10 MYALGIA: ICD-10-CM

## 2023-03-31 DIAGNOSIS — I10 ESSENTIAL HYPERTENSION: ICD-10-CM

## 2023-03-31 DIAGNOSIS — M81.0 AGE-RELATED OSTEOPOROSIS WITHOUT CURRENT PATHOLOGICAL FRACTURE: ICD-10-CM

## 2023-03-31 PROCEDURE — 3075F SYST BP GE 130 - 139MM HG: CPT | Mod: CPTII,S$GLB,, | Performed by: INTERNAL MEDICINE

## 2023-03-31 PROCEDURE — 1125F AMNT PAIN NOTED PAIN PRSNT: CPT | Mod: CPTII,S$GLB,, | Performed by: INTERNAL MEDICINE

## 2023-03-31 PROCEDURE — 3288F FALL RISK ASSESSMENT DOCD: CPT | Mod: CPTII,S$GLB,, | Performed by: INTERNAL MEDICINE

## 2023-03-31 PROCEDURE — 1159F PR MEDICATION LIST DOCUMENTED IN MEDICAL RECORD: ICD-10-PCS | Mod: CPTII,S$GLB,, | Performed by: INTERNAL MEDICINE

## 2023-03-31 PROCEDURE — 99999 PR PBB SHADOW E&M-EST. PATIENT-LVL IV: ICD-10-PCS | Mod: PBBFAC,,, | Performed by: INTERNAL MEDICINE

## 2023-03-31 PROCEDURE — 3288F PR FALLS RISK ASSESSMENT DOCUMENTED: ICD-10-PCS | Mod: CPTII,S$GLB,, | Performed by: INTERNAL MEDICINE

## 2023-03-31 PROCEDURE — 1125F PR PAIN SEVERITY QUANTIFIED, PAIN PRESENT: ICD-10-PCS | Mod: CPTII,S$GLB,, | Performed by: INTERNAL MEDICINE

## 2023-03-31 PROCEDURE — 3078F DIAST BP <80 MM HG: CPT | Mod: CPTII,S$GLB,, | Performed by: INTERNAL MEDICINE

## 2023-03-31 PROCEDURE — 1101F PT FALLS ASSESS-DOCD LE1/YR: CPT | Mod: CPTII,S$GLB,, | Performed by: INTERNAL MEDICINE

## 2023-03-31 PROCEDURE — 99999 PR PBB SHADOW E&M-EST. PATIENT-LVL IV: CPT | Mod: PBBFAC,,, | Performed by: INTERNAL MEDICINE

## 2023-03-31 PROCEDURE — 1101F PR PT FALLS ASSESS DOC 0-1 FALLS W/OUT INJ PAST YR: ICD-10-PCS | Mod: CPTII,S$GLB,, | Performed by: INTERNAL MEDICINE

## 2023-03-31 PROCEDURE — 3075F PR MOST RECENT SYSTOLIC BLOOD PRESS GE 130-139MM HG: ICD-10-PCS | Mod: CPTII,S$GLB,, | Performed by: INTERNAL MEDICINE

## 2023-03-31 PROCEDURE — 1159F MED LIST DOCD IN RCRD: CPT | Mod: CPTII,S$GLB,, | Performed by: INTERNAL MEDICINE

## 2023-03-31 PROCEDURE — 99213 OFFICE O/P EST LOW 20 MIN: CPT | Mod: S$GLB,,, | Performed by: INTERNAL MEDICINE

## 2023-03-31 PROCEDURE — 99213 PR OFFICE/OUTPT VISIT, EST, LEVL III, 20-29 MIN: ICD-10-PCS | Mod: S$GLB,,, | Performed by: INTERNAL MEDICINE

## 2023-03-31 PROCEDURE — 3078F PR MOST RECENT DIASTOLIC BLOOD PRESSURE < 80 MM HG: ICD-10-PCS | Mod: CPTII,S$GLB,, | Performed by: INTERNAL MEDICINE

## 2023-04-17 DIAGNOSIS — I48.21 PERMANENT ATRIAL FIBRILLATION: Primary | ICD-10-CM

## 2023-04-19 ENCOUNTER — OFFICE VISIT (OUTPATIENT)
Dept: CARDIOLOGY | Facility: CLINIC | Age: 83
End: 2023-04-19
Payer: MEDICARE

## 2023-04-19 ENCOUNTER — HOSPITAL ENCOUNTER (OUTPATIENT)
Dept: CARDIOLOGY | Facility: HOSPITAL | Age: 83
Discharge: HOME | End: 2023-04-19
Attending: INTERNAL MEDICINE
Payer: MEDICARE

## 2023-04-19 VITALS
SYSTOLIC BLOOD PRESSURE: 128 MMHG | WEIGHT: 170.44 LBS | BODY MASS INDEX: 33.28 KG/M2 | HEART RATE: 75 BPM | DIASTOLIC BLOOD PRESSURE: 80 MMHG | HEIGHT: 60 IN | WEIGHT: 170.44 LBS | OXYGEN SATURATION: 97 % | BODY MASS INDEX: 33.46 KG/M2

## 2023-04-19 DIAGNOSIS — J84.10 PULMONARY FIBROSIS, POSTINFLAMMATORY: ICD-10-CM

## 2023-04-19 DIAGNOSIS — E78.49 OTHER HYPERLIPIDEMIA: ICD-10-CM

## 2023-04-19 DIAGNOSIS — I83.812 VARICOSE VEINS OF LEFT LOWER EXTREMITY WITH PAIN: ICD-10-CM

## 2023-04-19 DIAGNOSIS — G45.9 TIA (TRANSIENT ISCHEMIC ATTACK): ICD-10-CM

## 2023-04-19 DIAGNOSIS — I48.0 PAROXYSMAL ATRIAL FIBRILLATION: Primary | ICD-10-CM

## 2023-04-19 DIAGNOSIS — I48.21 PERMANENT ATRIAL FIBRILLATION: ICD-10-CM

## 2023-04-19 DIAGNOSIS — R00.2 PALPITATION: ICD-10-CM

## 2023-04-19 DIAGNOSIS — Z79.01 CHRONIC ANTICOAGULATION: ICD-10-CM

## 2023-04-19 DIAGNOSIS — I10 ESSENTIAL HYPERTENSION: ICD-10-CM

## 2023-04-19 DIAGNOSIS — I70.0 ATHEROSCLEROSIS OF AORTA: ICD-10-CM

## 2023-04-19 PROCEDURE — 1101F PT FALLS ASSESS-DOCD LE1/YR: CPT | Mod: CPTII,S$GLB,, | Performed by: INTERNAL MEDICINE

## 2023-04-19 PROCEDURE — 93010 ELECTROCARDIOGRAM REPORT: CPT | Mod: ,,, | Performed by: INTERNAL MEDICINE

## 2023-04-19 PROCEDURE — 3079F DIAST BP 80-89 MM HG: CPT | Mod: CPTII,S$GLB,, | Performed by: INTERNAL MEDICINE

## 2023-04-19 PROCEDURE — 1101F PR PT FALLS ASSESS DOC 0-1 FALLS W/OUT INJ PAST YR: ICD-10-PCS | Mod: CPTII,S$GLB,, | Performed by: INTERNAL MEDICINE

## 2023-04-19 PROCEDURE — 93010 EKG 12-LEAD: ICD-10-PCS | Mod: ,,, | Performed by: INTERNAL MEDICINE

## 2023-04-19 PROCEDURE — 3074F SYST BP LT 130 MM HG: CPT | Mod: CPTII,S$GLB,, | Performed by: INTERNAL MEDICINE

## 2023-04-19 PROCEDURE — 3074F PR MOST RECENT SYSTOLIC BLOOD PRESSURE < 130 MM HG: ICD-10-PCS | Mod: CPTII,S$GLB,, | Performed by: INTERNAL MEDICINE

## 2023-04-19 PROCEDURE — 93005 ELECTROCARDIOGRAM TRACING: CPT

## 2023-04-19 PROCEDURE — 99214 OFFICE O/P EST MOD 30 MIN: CPT | Mod: S$GLB,,, | Performed by: INTERNAL MEDICINE

## 2023-04-19 PROCEDURE — 1126F AMNT PAIN NOTED NONE PRSNT: CPT | Mod: CPTII,S$GLB,, | Performed by: INTERNAL MEDICINE

## 2023-04-19 PROCEDURE — 1159F PR MEDICATION LIST DOCUMENTED IN MEDICAL RECORD: ICD-10-PCS | Mod: CPTII,S$GLB,, | Performed by: INTERNAL MEDICINE

## 2023-04-19 PROCEDURE — 3288F FALL RISK ASSESSMENT DOCD: CPT | Mod: CPTII,S$GLB,, | Performed by: INTERNAL MEDICINE

## 2023-04-19 PROCEDURE — 99999 PR PBB SHADOW E&M-EST. PATIENT-LVL IV: CPT | Mod: PBBFAC,,, | Performed by: INTERNAL MEDICINE

## 2023-04-19 PROCEDURE — 1126F PR PAIN SEVERITY QUANTIFIED, NO PAIN PRESENT: ICD-10-PCS | Mod: CPTII,S$GLB,, | Performed by: INTERNAL MEDICINE

## 2023-04-19 PROCEDURE — 99214 PR OFFICE/OUTPT VISIT, EST, LEVL IV, 30-39 MIN: ICD-10-PCS | Mod: S$GLB,,, | Performed by: INTERNAL MEDICINE

## 2023-04-19 PROCEDURE — 3079F PR MOST RECENT DIASTOLIC BLOOD PRESSURE 80-89 MM HG: ICD-10-PCS | Mod: CPTII,S$GLB,, | Performed by: INTERNAL MEDICINE

## 2023-04-19 PROCEDURE — 1159F MED LIST DOCD IN RCRD: CPT | Mod: CPTII,S$GLB,, | Performed by: INTERNAL MEDICINE

## 2023-04-19 PROCEDURE — 99999 PR PBB SHADOW E&M-EST. PATIENT-LVL IV: ICD-10-PCS | Mod: PBBFAC,,, | Performed by: INTERNAL MEDICINE

## 2023-04-19 PROCEDURE — 3288F PR FALLS RISK ASSESSMENT DOCUMENTED: ICD-10-PCS | Mod: CPTII,S$GLB,, | Performed by: INTERNAL MEDICINE

## 2023-04-19 NOTE — PROGRESS NOTES
Subjective:   Patient ID:  Lorenzo Ospina is a 83 y.o. female who presents for follow up of No chief complaint on file.      HPI  8/28/2020  A 79 yo female with htn afib failed cardioversion was scheduled to have BLATION HAD CANCELED DUE TO FAMILY ISSUES AND NOW COVID. SHE IS NOT COMPLAINING OF CHEST PAIN SHORTNESS OF BREATH TAKES MEDS REGULARILY HAS IMPROVED EATING HABITS AS FAR AS SALT IS CONCERNED.SHE WALKS BETWEEN 15-30 MINUTES DAILY WEATHER PERMITS BP IS IMPROVED WITHE XERCISE. HAS NO LEG SWELLING NO CHF SYMPTOMS. LIPIDS ARE ON TARGET     TODAY IS HERE FOR F/U SHE FEELS WELL SHE IS EXERCISING HAS NOT BEEN COMPLAINING OF CHEST PAIN OR SHORTNESS OF BREATH HAS NO HEADACHE NO BLURRED VISION HAS BEEN ON NOAC NO BLEEDING. SAW DR MORA TODAY STOPPED HER AMIODARONE  AND KEPT HER ON B BLOCKERS. HER BP RECORDING FROM HOME ARE ON TARGET AND WELL CONTROLLED SHE TRIES TO EXERCISE REGULARILY OTHERWISE HER BOP IS ELEVATED.      3/31/2021  Had nocturnal wheezing and has dyspnea on exertion was seen in er resolved with diuretics. This is suggestive of pnd angina decubitus, she continues to have exertional dyspnea has no energy with activity has to take a break when doing house chores. Reviewed her bp readings seems within range. Has no leg swelling after short course of diuretics pnd resolved chest tightness wheezing resolved.      11/12/2021  HERE FROF /U JUST GOT BACK FROM [place] SHE SPENT 4 MONTH THERE ./ SHE HAS BEEN WALKING HAD  DIFFICULTY GOING UPHILL SHE GEST TIRED SHORT OF BREATH HAS TO SIT DOWN. NOW HERE FELT TIRED WALKING. HAS  BEEN COMPLIANT WITH MEDS BP SEEMED CONTROLLED. SHE IS ABLE TO DO ALL HOUSE WORK NOT STOPPING WITH ANY SHORTNESS OF BREATH HAS NO LEG SWELLING  NO ORTHOPNEA PND  NO SYNCOPE. HER CARDIAC W/U NEGATIVE      2/18/2022      here for f/u has been off losartan due to cough not sure if she improved off losartan DR CORONADO THINK HER COUGH IS BETTER. WHILE PATIENT DOES NOT THINK SO. SHE IS  TAKING HER MEDS. HER DIASTOLIC BP IS STAYING ABOVE MID 80S. HAS NO NEW SYMPTOMS   HER SHORTNESS OF BREATH IS IMPROVING SHE HAS NO WHEEZING SHE HAS NO LEG SWELLING SHE IS COMPLIANT WITH DIET.      10/14/2022  Doing well clinically reviewed bp reading for the past 4 months seems well controlled. Has no tia symptoms. Has  no more cough tolerated losartan well her medical regimen adequate . Has no bleeding or bruising. Has fatigue in the morning not related to bp issue. Drinks coffee and eat feels better. She is compliant with salt in  a reasonable fashion.    4/19/2022  Here for had left leg pain swelling. Used compression socks transiently saw ortho had shoes wear had resolution of pain and swelling now back wearing regular shoes. Has no more pain or swelling.  She is on noac no bleeding. Her bp reading are acceptable she can using stricter bp control. Has no chest pan or shortness of breath  has leg pain with ambulation.reviewed bp readings from home . Readings are on target.        Past Medical History:   Diagnosis Date    A-fib 11/5/2019    Breast cancer     right, dx 2008.  Dr. Callaway follows.s/p lumpectomy and arimidex x 5y.    Hyperlipidemia     Hypertension     OP (osteoporosis)     on bisphos x 2y.    TIA (transient ischemic attack)     Toe fracture        Past Surgical History:   Procedure Laterality Date    BREAST LUMPECTOMY      right 2008    TOTAL ABDOMINAL HYSTERECTOMY      no cancer    TRANSESOPHAGEAL ECHOCARDIOGRAPHY N/A 11/5/2019    Procedure: ECHOCARDIOGRAM, TRANSESOPHAGEAL;  Surgeon: Otto Flores MD;  Location: [facility];  Service: Cardiology;  Laterality: N/A;    TREATMENT OF CARDIAC ARRHYTHMIA N/A 11/5/2019    Procedure: CARDIOVERSION;  Surgeon: Otto Flores MD;  Location: [facility];  Service: Cardiology;  Laterality: N/A;    TREATMENT OF CARDIAC ARRHYTHMIA N/A 11/5/2019    Procedure: CARDIOVERSION;  Surgeon: Otto Flores MD;  Location: [facility];  Service: Cardiology;  Laterality: N/A;     TREATMENT OF CARDIAC ARRHYTHMIA N/A 2020    Procedure: CARDIOVERSION;  Surgeon: Otto Flores MD;  Location: [facility] CATH [facility];  Service: Cardiology;  Laterality: N/A;    TREATMENT OF CARDIAC ARRHYTHMIA N/A 3/2/2020    Procedure: CARDIOVERSION;  Surgeon: Mayi Beltran MD;  Location: [facility] CATH [facility];  Service: Cardiology;  Laterality: N/A;  830 start per Dr. beltran       Social History     Tobacco Use    Smoking status: Former     Packs/day: 1.50     Years: 22.00     Pack years: 33.00     Types: Cigarettes     Start date: 1972     Quit date: 1994     Years since quittin.5    Smokeless tobacco: Never   Substance Use Topics    Alcohol use: Not Currently    Drug use: Never       History reviewed. No pertinent family history.    Current Outpatient Medications   Medication Sig    albuterol (PROVENTIL/VENTOLIN HFA) 90 mcg/actuation inhaler INHALE 2 PUFFS INTO THE LUNGS EVERY 6 (SIX) HOURS AS NEEDED FOR WHEEZING. RESCUE    atorvastatin (LIPITOR) 20 MG tablet TAKE 1 TABLET BY MOUTH EVERY DAY    clopidogrel bisulfate (PLAVIX ORAL) Plavix Take No date recorded No form recorded No frequency recorded No route recorded No set duration recorded No set duration amount recorded suspended No dosage strength recorded No dosage strength units of measure recorded    cyanocobalamin (VITAMIN B-12) 1000 MCG tablet Take 100 mcg by mouth once daily.    ELIQUIS 5 mg Tab TAKE 1 TABLET BY MOUTH TWICE A DAY    fish oil-omega-3 fatty acids 300-1,000 mg capsule Take 2 g by mouth once daily.    glucosam/chond/hyalu/CF borate (MOVE FREE [facility] ORAL) Take 1 tablet by mouth once daily.    ipratropium (ATROVENT) 0.02 % nebulizer solution Take 2.5 mLs (500 mcg total) by nebulization 4 (four) times daily.    ipratropium (ATROVENT) 42 mcg (0.06 %) nasal spray 2 sprays by Each Nostril route 3 (three) times daily as needed for Rhinitis (nasal drip).    losartan (COZAAR) 25 MG tablet TAKE 1 TABLET BY MOUTH EVERY DAY    metoprolol succinate  (TOPROL-XL) 50 MG 24 hr tablet TAKE 1 TABLET BY MOUTH EVERY DAY    MULTIVIT-IRON-MIN-FOLIC ACID 3,500-18-0.4 UNIT-MG-MG ORAL CHEW Take by mouth.    omeprazole (PRILOSEC) 40 MG capsule Take 40 mg by mouth once daily.    ranolazine (RANEXA) 500 MG Tb12 TAKE 1 TABLET BY MOUTH TWICE A DAY    vitamin D 1000 units Tab Take 2,000 Units by mouth once daily.     Current Facility-Administered Medications   Medication    methylPREDNISolone acetate injection 60 mg     Current Outpatient Medications on File Prior to Visit   Medication Sig    albuterol (PROVENTIL/VENTOLIN HFA) 90 mcg/actuation inhaler INHALE 2 PUFFS INTO THE LUNGS EVERY 6 (SIX) HOURS AS NEEDED FOR WHEEZING. RESCUE    atorvastatin (LIPITOR) 20 MG tablet TAKE 1 TABLET BY MOUTH EVERY DAY    clopidogrel bisulfate (PLAVIX ORAL) Plavix Take No date recorded No form recorded No frequency recorded No route recorded No set duration recorded No set duration amount recorded suspended No dosage strength recorded No dosage strength units of measure recorded    cyanocobalamin (VITAMIN B-12) 1000 MCG tablet Take 100 mcg by mouth once daily.    ELIQUIS 5 mg Tab TAKE 1 TABLET BY MOUTH TWICE A DAY    fish oil-omega-3 fatty acids 300-1,000 mg capsule Take 2 g by mouth once daily.    glucosam/chond/hyalu/CF borate (MOVE FREE [facility] ORAL) Take 1 tablet by mouth once daily.    ipratropium (ATROVENT) 0.02 % nebulizer solution Take 2.5 mLs (500 mcg total) by nebulization 4 (four) times daily.    ipratropium (ATROVENT) 42 mcg (0.06 %) nasal spray 2 sprays by Each Nostril route 3 (three) times daily as needed for Rhinitis (nasal drip).    losartan (COZAAR) 25 MG tablet TAKE 1 TABLET BY MOUTH EVERY DAY    metoprolol succinate (TOPROL-XL) 50 MG 24 hr tablet TAKE 1 TABLET BY MOUTH EVERY DAY    MULTIVIT-IRON-MIN-FOLIC ACID 3,500-18-0.4 UNIT-MG-MG ORAL CHEW Take by mouth.    omeprazole (PRILOSEC) 40 MG capsule Take 40 mg by mouth once daily.    ranolazine (RANEXA) 500 MG Tb12 TAKE 1  TABLET BY MOUTH TWICE A DAY    vitamin D 1000 units Tab Take 2,000 Units by mouth once daily.     Current Facility-Administered Medications on File Prior to Visit   Medication    methylPREDNISolone acetate injection 60 mg     Review of patient's allergies indicates:  No Known Allergies   Review of Systems   Constitutional: Negative for diaphoresis, malaise/fatigue and weight gain.   HENT:  Negative for hoarse voice.    Eyes:  Negative for double vision and visual disturbance.   Cardiovascular:  Positive for leg swelling. Negative for chest pain, claudication, cyanosis, dyspnea on exertion, irregular heartbeat, near-syncope, orthopnea, palpitations, paroxysmal nocturnal dyspnea and syncope.   Respiratory:  Negative for cough, hemoptysis, shortness of breath and snoring.    Hematologic/Lymphatic: Negative for bleeding problem. Does not bruise/bleed easily.   Skin:  Negative for color change and poor wound healing.   Musculoskeletal:  Positive for arthritis, joint pain and stiffness. Negative for muscle cramps, muscle weakness and myalgias.   Gastrointestinal:  Negative for bloating, abdominal pain, change in bowel habit, diarrhea, heartburn, hematemesis, hematochezia, melena and nausea.   Neurological:  Negative for excessive daytime sleepiness, dizziness, headaches, light-headedness, loss of balance, numbness and weakness.   Psychiatric/Behavioral:  Negative for memory loss. The patient does not have insomnia.    Allergic/Immunologic: Negative for hives.     Objective:   Physical Exam  Constitutional:       General: She is not in acute distress.     Appearance: Normal appearance. She is well-developed. She is obese. She is not ill-appearing.   HENT:      Head: Normocephalic and atraumatic.   Eyes:      General: No scleral icterus.     Pupils: Pupils are equal, round, and reactive to light.   Neck:      Thyroid: No thyromegaly.      Vascular: Normal carotid pulses. No carotid bruit, hepatojugular reflux or JVD.       Trachea: No tracheal deviation.   Cardiovascular:      Rate and Rhythm: Normal rate. Rhythm irregular.      Pulses: Normal pulses.      Heart sounds: Normal heart sounds. No murmur heard.    No friction rub. No gallop.   Pulmonary:      Effort: Pulmonary effort is normal. No respiratory distress.      Breath sounds: Normal breath sounds. No wheezing, rhonchi or rales.   Chest:      Chest wall: No tenderness.   Abdominal:      General: Bowel sounds are normal. There is no abdominal bruit.      Palpations: Abdomen is soft. There is no hepatomegaly or pulsatile mass.      Tenderness: There is no abdominal tenderness.   Musculoskeletal:      Right shoulder: No deformity.      Cervical back: Normal range of motion and neck supple.      Right lower leg: No edema.      Left lower leg: No edema.      Comments: Varicosities in both legs.   Skin:     General: Skin is warm and dry.      Findings: No erythema or rash.      Nails: There is no clubbing.   Neurological:      General: No focal deficit present.      Mental Status: She is alert and oriented to person, place, and time.      Cranial Nerves: No cranial nerve deficit.      Coordination: Coordination normal.   Psychiatric:         Speech: Speech normal.         Behavior: Behavior normal.         Thought Content: Thought content normal.     Vitals:    04/19/23 1036 04/19/23 1039   BP: 126/80 128/80   BP Location: Left arm Right arm   Patient Position: Sitting Sitting   BP Method: Medium (Manual) Medium (Manual)   Pulse: 75    SpO2: 97%    Weight: 77.3 kg (170 lb 6.7 oz)    Height: 5' (1.524 m)      Lab Results   Component Value Date    CHOL 145 10/03/2022    CHOL 153 12/13/2021    CHOL 114 (L) 03/26/2021      Body mass index is 33.28 kg/m².   Lab Results   Component Value Date    HGBA1C 5.4 02/03/2016      BMP  Lab Results   Component Value Date     03/16/2023    K 4.1 03/16/2023     03/16/2023    CO2 25 03/16/2023    BUN 9 03/16/2023    CREATININE 0.8  03/16/2023    CALCIUM 10.6 (H) 03/16/2023    ANIONGAP 8 03/16/2023    EGFRNORACEVR >60 03/16/2023      Lab Results   Component Value Date    HDL 62 10/03/2022    HDL 73 12/13/2021    HDL 46 03/26/2021     Lab Results   Component Value Date    LDLCALC 68.8 10/03/2022    LDLCALC 56.4 (L) 12/13/2021    LDLCALC 52.2 (L) 03/26/2021     Lab Results   Component Value Date    TRIG 71 10/03/2022    TRIG 118 12/13/2021    TRIG 79 03/26/2021     Lab Results   Component Value Date    CHOLHDL 42.8 10/03/2022    CHOLHDL 47.7 12/13/2021    CHOLHDL 40.4 03/26/2021       Chemistry        Component Value Date/Time     03/16/2023 1209    K 4.1 03/16/2023 1209     03/16/2023 1209    CO2 25 03/16/2023 1209    BUN 9 03/16/2023 1209    CREATININE 0.8 03/16/2023 1209     03/16/2023 1209        Component Value Date/Time    CALCIUM 10.6 (H) 03/16/2023 1209    ALKPHOS 60 03/16/2023 1209    AST 18 03/16/2023 1209    ALT 20 03/16/2023 1209    BILITOT 0.4 03/16/2023 1209    ESTGFRAFRICA >60.0 12/13/2021 1636    EGFRNONAA >60.0 12/13/2021 1636          Lab Results   Component Value Date    TSH 0.774 11/11/2022     No results found for: INR, PROTIME  Lab Results   Component Value Date    WBC 7.88 03/16/2023    HGB 13.9 03/16/2023    HCT 43.0 03/16/2023    MCV 92 03/16/2023     03/16/2023     BMP  Sodium   Date Value Ref Range Status   03/16/2023 141 136 - 145 mmol/L Final     Potassium   Date Value Ref Range Status   03/16/2023 4.1 3.5 - 5.1 mmol/L Final     Chloride   Date Value Ref Range Status   03/16/2023 108 95 - 110 mmol/L Final     CO2   Date Value Ref Range Status   03/16/2023 25 23 - 29 mmol/L Final     BUN   Date Value Ref Range Status   03/16/2023 9 8 - 23 mg/dL Final     Creatinine   Date Value Ref Range Status   03/16/2023 0.8 0.5 - 1.4 mg/dL Final     Calcium   Date Value Ref Range Status   03/16/2023 10.6 (H) 8.7 - 10.5 mg/dL Final     Anion Gap   Date Value Ref Range Status   03/16/2023 8 8 - 16 mmol/L  Final     eGFR if    Date Value Ref Range Status   12/13/2021 >60.0 >60 mL/min/1.73 m^2 Final     eGFR if non    Date Value Ref Range Status   12/13/2021 >60.0 >60 mL/min/1.73 m^2 Final     Comment:     Calculation used to obtain the estimated glomerular filtration  rate (eGFR) is the CKD-EPI equation.        CrCl cannot be calculated (Patient's most recent lab result is older than the maximum 7 days allowed.).  Narrative & Impression  EXAM: US LOWER EXTREMITY VEINS LEFT     CLINICAL HISTORY:  Left knee pain, chronic embolism and thrombosis of the deep veins     COMPARISON:  No relevant prior studies.     TECHNIQUE: Real-time duplex sonography of the left lower extremity deep veins with color and spectral Doppler, with and without compression.     FINDINGS:  There is normal compressibility of the common femoral, femoral, popliteal, and visualized calf veins.  There is normal spontaneous and phasic variation throughout the leg by spectral Doppler.     There is a large heterogeneous collection measuring 11.9 x 2.5 x 2.4 cm within the left popliteal fossa.        Impression:     Negative for deep venous thrombosis.     11.9 cm left popliteal fossa fluid collection, potential complex Baker's cyst versus hematoma.  Abscess not excluded.  Correlate clinically.     Report Date: 3/24/2023 12:17 PM     Finalized on: 3/24/2023 12:17 PM By:  Karey De Los Santos MD  [facility]# [id number]      2023-03-24 12:19:[id number]    [facility]  Assessment:     1. Paroxysmal atrial fibrillation    2. TIA (transient ischemic attack)    3. Pulmonary fibrosis, postinflammatory    4. Atherosclerosis of aorta    5. Essential hypertension    6. Other hyperlipidemia    7. Palpitation    8. Varicose veins of left lower extremity with pain    9. Chronic anticoagulation      Her issues is her popliteal baker's cyst that has cuased he rsymptoms discussed arthritic process and its symptoms as well as treatment discussed appropriate shoe  wear. Compression socks for symptomatic relief of her varicosities  that area symptomatic.   Htn controlled low salt diet emphasized.  Chronic afib rate controlled on naoc no side effects. Continue same.  Hlp on statins controlled continue the same.    Tia on appropriate therapy   Plan:   Continue current therapy  Cardiac low salt diet.  Risk factor modification and excercise program./weight loss  F/u in 6 months with lipid cmp .

## 2023-05-24 DIAGNOSIS — J41.0 SIMPLE CHRONIC BRONCHITIS: ICD-10-CM

## 2023-05-24 RX ORDER — ALBUTEROL SULFATE 90 UG/1
2 AEROSOL, METERED RESPIRATORY (INHALATION) EVERY 6 HOURS PRN
Qty: 25.5 G | Refills: 3 | Status: SHIPPED | OUTPATIENT
Start: 2023-05-24

## 2023-05-24 NOTE — TELEPHONE ENCOUNTER
Refill Decision Note   Bradleyolvin Anai  is requesting a refill authorization.  Brief Assessment and Rationale for Refill:  Approve     Medication Therapy Plan:       Medication Reconciliation Completed: No   Comments:     No Care Gaps recommended.     Note composed:9:21 AM 05/24/2023

## 2023-05-24 NOTE — TELEPHONE ENCOUNTER
No care due was identified.  [facility] Embedded Care Due Messages. Reference number: [id number].   5/24/2023 8:57:09 AM CDT

## 2023-06-03 DIAGNOSIS — I48.91 ATRIAL FIBRILLATION, UNSPECIFIED TYPE: ICD-10-CM

## 2023-06-06 RX ORDER — APIXABAN 5 MG/1
TABLET, FILM COATED ORAL
Qty: 60 TABLET | Refills: 5 | Status: SHIPPED | OUTPATIENT
Start: 2023-06-06 | End: 2023-10-25 | Stop reason: SDUPTHER

## 2023-06-16 NOTE — PROGRESS NOTES
Subjective:      Patient ID: Lorenzo Ospina is a 83 y.o. female.    Chief Complaint: Follow-up (Three month follow up. Pt is complaining of itching on her abdomen. Pt is also complaining of dry mouth.)      HPI  Here for follow up of medical problems.  Getting more worry about small things lately.  Chronic cough really bothers her, castro when has to leave out of a room for coughing.  Clear thin secretions.  No f/c/sw.  No cp/palp.  Gets sob with coughing episodes.  BMs normal.  Eyes and mouth are dry.  Lips can stick together easily, castro after neb treatment.    Updated/ annual due 11/23:  HM: 11/22 fluvax, 2/21 covid vaccines/booster, 6/17 gsvenn22, refuses [id number] unknown reason even after discussion, ref tetanus, 11/21 MMG/no more, 10/22 BMD rep 2y,  2012 Cscope saw Gastro in 2017 and says due in 2022.     Review of Systems   Constitutional:  Negative for chills, diaphoresis and fever.   Respiratory:  Negative for cough and shortness of breath.    Cardiovascular:  Negative for chest pain, palpitations and leg swelling.   Gastrointestinal:  Negative for blood in stool, constipation, diarrhea, nausea and vomiting.   Genitourinary:  Negative for dysuria, frequency and hematuria.   Psychiatric/Behavioral:  The patient is not nervous/anxious.        Objective:   /68 (BP Location: Right arm)   Pulse 88   Temp 97.9 °F (36.6 °C) (Oral)   Ht 5' (1.524 m)   Wt 77.1 kg (170 lb)   SpO2 98%   BMI 33.20 kg/m²     Physical Exam  Constitutional:       Appearance: She is well-developed.   Neck:      Thyroid: No thyroid mass.      Vascular: No carotid bruit.   Cardiovascular:      Rate and Rhythm: Normal rate. Rhythm irregularly irregular.      Heart sounds: No murmur heard.    No friction rub. No gallop.   Pulmonary:      Effort: Pulmonary effort is normal.      Breath sounds: Normal breath sounds. No wheezing or rales.   Abdominal:      General: Bowel sounds are normal.      Palpations: Abdomen is soft. There is  no mass.      Tenderness: There is no abdominal tenderness.   Musculoskeletal:      Cervical back: Neck supple.   Lymphadenopathy:      Cervical: No cervical adenopathy.   Neurological:      Mental Status: She is alert and oriented to person, place, and time.           Assessment:       1. Essential hypertension    2. Age-related osteoporosis without current pathological fracture    3. Hyperparathyroidism    4. Paroxysmal atrial fibrillation    5. History of right breast cancer    6. Sicca, unspecified type    7. Situational anxiety    8. Elevated glucose    9. Chronic anticoagulation    10. Pulmonary fibrosis, postinflammatory          Plan:     Essential hypertension- stable, cont rx.    Pulm fibrosis not progressive, chronic cough- cont meds.    Age-related osteoporosis without current pathological fracture  -     Ambulatory referral/consult to Rheumatology; Future; Expected date: 07/07/2023    Hyperparathyroidism    Paroxysmal atrial fibrillation, chronic anticoag.    History of right breast cancer    Sicca, unspecified type  -     TSH; Future; Expected date: 06/30/2023  -     SPENSER Screen w/Reflex; Future; Expected date: 06/30/2023  -     C-Reactive Protein; Future; Expected date: 06/30/2023  -     Sedimentation rate; Future; Expected date: 06/30/2023    Situational anxiety  -     busPIRone (BUSPAR) 7.5 MG tablet; Take 1 tablet (7.5 mg total) by mouth 3 (three) times daily as needed.  Dispense: 50 tablet; Refill: 5    Elevated glucose  -     Hemoglobin A1C; Future; Expected date: 06/30/2023    RTC 3 mo.

## 2023-06-30 ENCOUNTER — OFFICE VISIT (OUTPATIENT)
Dept: PRIMARY CARE CLINIC | Facility: CLINIC | Age: 83
End: 2023-06-30
Payer: MEDICARE

## 2023-06-30 ENCOUNTER — LAB VISIT (OUTPATIENT)
Dept: LAB | Facility: HOSPITAL | Age: 83
End: 2023-06-30
Attending: INTERNAL MEDICINE
Payer: MEDICARE

## 2023-06-30 VITALS
TEMPERATURE: 98 F | SYSTOLIC BLOOD PRESSURE: 118 MMHG | OXYGEN SATURATION: 98 % | BODY MASS INDEX: 33.38 KG/M2 | WEIGHT: 170 LBS | DIASTOLIC BLOOD PRESSURE: 68 MMHG | HEART RATE: 88 BPM | HEIGHT: 60 IN

## 2023-06-30 DIAGNOSIS — I48.0 PAROXYSMAL ATRIAL FIBRILLATION: ICD-10-CM

## 2023-06-30 DIAGNOSIS — M81.0 AGE-RELATED OSTEOPOROSIS WITHOUT CURRENT PATHOLOGICAL FRACTURE: ICD-10-CM

## 2023-06-30 DIAGNOSIS — Z85.3 HISTORY OF RIGHT BREAST CANCER: ICD-10-CM

## 2023-06-30 DIAGNOSIS — Z79.01 CHRONIC ANTICOAGULATION: ICD-10-CM

## 2023-06-30 DIAGNOSIS — M35.00 SICCA, UNSPECIFIED TYPE: ICD-10-CM

## 2023-06-30 DIAGNOSIS — R73.09 ELEVATED GLUCOSE: ICD-10-CM

## 2023-06-30 DIAGNOSIS — I10 ESSENTIAL HYPERTENSION: Primary | ICD-10-CM

## 2023-06-30 DIAGNOSIS — F41.8 SITUATIONAL ANXIETY: ICD-10-CM

## 2023-06-30 DIAGNOSIS — E21.3 HYPERPARATHYROIDISM: ICD-10-CM

## 2023-06-30 DIAGNOSIS — J84.10 PULMONARY FIBROSIS, POSTINFLAMMATORY: ICD-10-CM

## 2023-06-30 LAB
CRP SERPL-MCNC: 1.1 MG/L (ref 0–8.2)
ERYTHROCYTE [SEDIMENTATION RATE] IN BLOOD BY PHOTOMETRIC METHOD: 4 MM/HR (ref 0–36)
ESTIMATED AVG GLUCOSE: 105 MG/DL (ref 68–131)
HBA1C MFR BLD: 5.3 % (ref 4–5.6)

## 2023-06-30 PROCEDURE — 1101F PT FALLS ASSESS-DOCD LE1/YR: CPT | Mod: CPTII,S$GLB,, | Performed by: INTERNAL MEDICINE

## 2023-06-30 PROCEDURE — 3078F PR MOST RECENT DIASTOLIC BLOOD PRESSURE < 80 MM HG: ICD-10-PCS | Mod: CPTII,S$GLB,, | Performed by: INTERNAL MEDICINE

## 2023-06-30 PROCEDURE — 86038 ANTINUCLEAR ANTIBODIES: CPT | Performed by: INTERNAL MEDICINE

## 2023-06-30 PROCEDURE — 3074F PR MOST RECENT SYSTOLIC BLOOD PRESSURE < 130 MM HG: ICD-10-PCS | Mod: CPTII,S$GLB,, | Performed by: INTERNAL MEDICINE

## 2023-06-30 PROCEDURE — 1159F MED LIST DOCD IN RCRD: CPT | Mod: CPTII,S$GLB,, | Performed by: INTERNAL MEDICINE

## 2023-06-30 PROCEDURE — 85652 RBC SED RATE AUTOMATED: CPT | Performed by: INTERNAL MEDICINE

## 2023-06-30 PROCEDURE — 99213 PR OFFICE/OUTPT VISIT, EST, LEVL III, 20-29 MIN: ICD-10-PCS | Mod: S$GLB,,, | Performed by: INTERNAL MEDICINE

## 2023-06-30 PROCEDURE — 84443 ASSAY THYROID STIM HORMONE: CPT | Performed by: INTERNAL MEDICINE

## 2023-06-30 PROCEDURE — 99999 PR PBB SHADOW E&M-EST. PATIENT-LVL IV: CPT | Mod: PBBFAC,,, | Performed by: INTERNAL MEDICINE

## 2023-06-30 PROCEDURE — 86140 C-REACTIVE PROTEIN: CPT | Performed by: INTERNAL MEDICINE

## 2023-06-30 PROCEDURE — 1101F PR PT FALLS ASSESS DOC 0-1 FALLS W/OUT INJ PAST YR: ICD-10-PCS | Mod: CPTII,S$GLB,, | Performed by: INTERNAL MEDICINE

## 2023-06-30 PROCEDURE — 83036 HEMOGLOBIN GLYCOSYLATED A1C: CPT | Performed by: INTERNAL MEDICINE

## 2023-06-30 PROCEDURE — 36415 COLL VENOUS BLD VENIPUNCTURE: CPT | Mod: PO | Performed by: INTERNAL MEDICINE

## 2023-06-30 PROCEDURE — 3074F SYST BP LT 130 MM HG: CPT | Mod: CPTII,S$GLB,, | Performed by: INTERNAL MEDICINE

## 2023-06-30 PROCEDURE — 1159F PR MEDICATION LIST DOCUMENTED IN MEDICAL RECORD: ICD-10-PCS | Mod: CPTII,S$GLB,, | Performed by: INTERNAL MEDICINE

## 2023-06-30 PROCEDURE — 3288F FALL RISK ASSESSMENT DOCD: CPT | Mod: CPTII,S$GLB,, | Performed by: INTERNAL MEDICINE

## 2023-06-30 PROCEDURE — 3078F DIAST BP <80 MM HG: CPT | Mod: CPTII,S$GLB,, | Performed by: INTERNAL MEDICINE

## 2023-06-30 PROCEDURE — 3288F PR FALLS RISK ASSESSMENT DOCUMENTED: ICD-10-PCS | Mod: CPTII,S$GLB,, | Performed by: INTERNAL MEDICINE

## 2023-06-30 PROCEDURE — 99999 PR PBB SHADOW E&M-EST. PATIENT-LVL IV: ICD-10-PCS | Mod: PBBFAC,,, | Performed by: INTERNAL MEDICINE

## 2023-06-30 PROCEDURE — 99213 OFFICE O/P EST LOW 20 MIN: CPT | Mod: S$GLB,,, | Performed by: INTERNAL MEDICINE

## 2023-06-30 RX ORDER — BUSPIRONE HYDROCHLORIDE 7.5 MG/1
7.5 TABLET ORAL 3 TIMES DAILY PRN
Qty: 50 TABLET | Refills: 5 | Status: SHIPPED | OUTPATIENT
Start: 2023-06-30 | End: 2024-06-29

## 2023-07-01 LAB — TSH SERPL DL<=0.005 MIU/L-ACNC: 0.41 UIU/ML (ref 0.4–4)

## 2023-07-03 LAB — ANA SER QL IF: NORMAL

## 2023-07-05 ENCOUNTER — TELEPHONE (OUTPATIENT)
Dept: PRIMARY CARE CLINIC | Facility: CLINIC | Age: 83
End: 2023-07-05
Payer: MEDICARE

## 2023-07-05 NOTE — TELEPHONE ENCOUNTER
Please call pt's daughter, Marisa, and inform sugars good/ no diabetes and the autoimmune and inflammation testing are all negative/normal.  Try to have mother drink more fluids for dry eyes and mouth.  SM

## 2023-09-01 DIAGNOSIS — E78.5 HYPERLIPIDEMIA: ICD-10-CM

## 2023-09-01 RX ORDER — ATORVASTATIN CALCIUM 20 MG/1
TABLET, FILM COATED ORAL
Qty: 90 TABLET | Refills: 0 | Status: SHIPPED | OUTPATIENT
Start: 2023-09-01 | End: 2023-10-25 | Stop reason: SDUPTHER

## 2023-09-01 NOTE — TELEPHONE ENCOUNTER
Care Due:                  Date            Visit Type   Department     Provider  --------------------------------------------------------------------------------                                ESTABLISHED                  Lorena Lugo  Last Visit: 06-      PATIENT      None Found     Adebayo Lugo  Next Visit: 09-      PATIENT      None Found     Adebayo                                                            Last  Test          Frequency    Reason                     Performed    Due Date  --------------------------------------------------------------------------------    Lipid Panel.  12 months..  atorvastatin.............  10-   09-    [facility] Embedded Care Due Messages. Reference number: [id number].   9/01/2023 12:24:59 AM CDT

## 2023-09-01 NOTE — TELEPHONE ENCOUNTER
Provider Staff:  Action required for this patient     Please see care gap opportunities below in Care Due Message.    Thanks!  [facility]     Appointments      Date Provider   Last Visit   6/30/2023 Lorena Fiore MD   Next Visit   9/29/2023 Lorena Fiore MD     Refill Decision Note   Lorenzo Ospina  is requesting a refill authorization.  Brief Assessment and Rationale for Refill:  Approve     Medication Therapy Plan:         Comments:     Note composed:11:40 AM 09/01/2023             Appointments     Last Visit   6/30/2023 Lorena Fiore MD   Next Visit   9/29/2023 Lorena Fiore MD

## 2023-09-25 DIAGNOSIS — F41.8 SITUATIONAL ANXIETY: ICD-10-CM

## 2023-09-25 RX ORDER — BUSPIRONE HYDROCHLORIDE 7.5 MG/1
7.5 TABLET ORAL 3 TIMES DAILY PRN
Qty: 270 TABLET | Refills: 1 | OUTPATIENT
Start: 2023-09-25 | End: 2024-09-24

## 2023-10-18 RX ORDER — METOPROLOL SUCCINATE 50 MG/1
TABLET, EXTENDED RELEASE ORAL
Qty: 90 TABLET | Refills: 1 | Status: SHIPPED | OUTPATIENT
Start: 2023-10-18 | End: 2023-10-25 | Stop reason: SDUPTHER

## 2023-10-25 ENCOUNTER — OFFICE VISIT (OUTPATIENT)
Dept: CARDIOLOGY | Facility: CLINIC | Age: 83
End: 2023-10-25
Payer: MEDICARE

## 2023-10-25 VITALS
DIASTOLIC BLOOD PRESSURE: 80 MMHG | HEIGHT: 60 IN | SYSTOLIC BLOOD PRESSURE: 156 MMHG | HEART RATE: 81 BPM | OXYGEN SATURATION: 99 % | BODY MASS INDEX: 33.19 KG/M2 | WEIGHT: 169.06 LBS

## 2023-10-25 DIAGNOSIS — M25.472 EDEMA OF LEFT ANKLE: ICD-10-CM

## 2023-10-25 DIAGNOSIS — M71.22 POPLITEAL CYST, LEFT: ICD-10-CM

## 2023-10-25 DIAGNOSIS — J41.0 SIMPLE CHRONIC BRONCHITIS: ICD-10-CM

## 2023-10-25 DIAGNOSIS — E55.9 VITAMIN D DEFICIENCY: ICD-10-CM

## 2023-10-25 DIAGNOSIS — I48.0 PAROXYSMAL ATRIAL FIBRILLATION: ICD-10-CM

## 2023-10-25 DIAGNOSIS — R06.00 DYSPNEA AND RESPIRATORY ABNORMALITIES: ICD-10-CM

## 2023-10-25 DIAGNOSIS — I48.91 ATRIAL FIBRILLATION, UNSPECIFIED TYPE: ICD-10-CM

## 2023-10-25 DIAGNOSIS — E78.49 OTHER HYPERLIPIDEMIA: ICD-10-CM

## 2023-10-25 DIAGNOSIS — G45.9 TIA (TRANSIENT ISCHEMIC ATTACK): ICD-10-CM

## 2023-10-25 DIAGNOSIS — I10 ESSENTIAL HYPERTENSION: Primary | ICD-10-CM

## 2023-10-25 DIAGNOSIS — I70.0 ATHEROSCLEROSIS OF AORTA: ICD-10-CM

## 2023-10-25 DIAGNOSIS — J84.10 PULMONARY FIBROSIS, POSTINFLAMMATORY: ICD-10-CM

## 2023-10-25 DIAGNOSIS — R00.2 PALPITATION: ICD-10-CM

## 2023-10-25 DIAGNOSIS — I83.812 VARICOSE VEINS OF LEFT LOWER EXTREMITY WITH PAIN: ICD-10-CM

## 2023-10-25 DIAGNOSIS — Z79.01 CHRONIC ANTICOAGULATION: ICD-10-CM

## 2023-10-25 DIAGNOSIS — E78.5 HYPERLIPIDEMIA: ICD-10-CM

## 2023-10-25 DIAGNOSIS — R06.89 DYSPNEA AND RESPIRATORY ABNORMALITIES: ICD-10-CM

## 2023-10-25 PROCEDURE — 3077F SYST BP >= 140 MM HG: CPT | Mod: CPTII,S$GLB,, | Performed by: INTERNAL MEDICINE

## 2023-10-25 PROCEDURE — 1160F RVW MEDS BY RX/DR IN RCRD: CPT | Mod: CPTII,S$GLB,, | Performed by: INTERNAL MEDICINE

## 2023-10-25 PROCEDURE — 1101F PT FALLS ASSESS-DOCD LE1/YR: CPT | Mod: CPTII,S$GLB,, | Performed by: INTERNAL MEDICINE

## 2023-10-25 PROCEDURE — 1159F MED LIST DOCD IN RCRD: CPT | Mod: CPTII,S$GLB,, | Performed by: INTERNAL MEDICINE

## 2023-10-25 PROCEDURE — 3077F PR MOST RECENT SYSTOLIC BLOOD PRESSURE >= 140 MM HG: ICD-10-PCS | Mod: CPTII,S$GLB,, | Performed by: INTERNAL MEDICINE

## 2023-10-25 PROCEDURE — 99999 PR PBB SHADOW E&M-EST. PATIENT-LVL III: ICD-10-PCS | Mod: PBBFAC,,, | Performed by: INTERNAL MEDICINE

## 2023-10-25 PROCEDURE — 1157F ADVNC CARE PLAN IN RCRD: CPT | Mod: CPTII,S$GLB,, | Performed by: INTERNAL MEDICINE

## 2023-10-25 PROCEDURE — 3288F PR FALLS RISK ASSESSMENT DOCUMENTED: ICD-10-PCS | Mod: CPTII,S$GLB,, | Performed by: INTERNAL MEDICINE

## 2023-10-25 PROCEDURE — 99214 PR OFFICE/OUTPT VISIT, EST, LEVL IV, 30-39 MIN: ICD-10-PCS | Mod: S$GLB,,, | Performed by: INTERNAL MEDICINE

## 2023-10-25 PROCEDURE — 3288F FALL RISK ASSESSMENT DOCD: CPT | Mod: CPTII,S$GLB,, | Performed by: INTERNAL MEDICINE

## 2023-10-25 PROCEDURE — 1101F PR PT FALLS ASSESS DOC 0-1 FALLS W/OUT INJ PAST YR: ICD-10-PCS | Mod: CPTII,S$GLB,, | Performed by: INTERNAL MEDICINE

## 2023-10-25 PROCEDURE — 99999 PR PBB SHADOW E&M-EST. PATIENT-LVL III: CPT | Mod: PBBFAC,,, | Performed by: INTERNAL MEDICINE

## 2023-10-25 PROCEDURE — 99214 OFFICE O/P EST MOD 30 MIN: CPT | Mod: S$GLB,,, | Performed by: INTERNAL MEDICINE

## 2023-10-25 PROCEDURE — 3079F PR MOST RECENT DIASTOLIC BLOOD PRESSURE 80-89 MM HG: ICD-10-PCS | Mod: CPTII,S$GLB,, | Performed by: INTERNAL MEDICINE

## 2023-10-25 PROCEDURE — 1159F PR MEDICATION LIST DOCUMENTED IN MEDICAL RECORD: ICD-10-PCS | Mod: CPTII,S$GLB,, | Performed by: INTERNAL MEDICINE

## 2023-10-25 PROCEDURE — 3079F DIAST BP 80-89 MM HG: CPT | Mod: CPTII,S$GLB,, | Performed by: INTERNAL MEDICINE

## 2023-10-25 PROCEDURE — 1160F PR REVIEW ALL MEDS BY PRESCRIBER/CLIN PHARMACIST DOCUMENTED: ICD-10-PCS | Mod: CPTII,S$GLB,, | Performed by: INTERNAL MEDICINE

## 2023-10-25 PROCEDURE — 1157F PR ADVANCE CARE PLAN OR EQUIV PRESENT IN MEDICAL RECORD: ICD-10-PCS | Mod: CPTII,S$GLB,, | Performed by: INTERNAL MEDICINE

## 2023-10-25 RX ORDER — ATORVASTATIN CALCIUM 20 MG/1
20 TABLET, FILM COATED ORAL DAILY
Qty: 90 TABLET | Refills: 3 | Status: SHIPPED | OUTPATIENT
Start: 2023-10-25

## 2023-10-25 RX ORDER — METOPROLOL SUCCINATE 50 MG/1
50 TABLET, EXTENDED RELEASE ORAL DAILY
Qty: 90 TABLET | Refills: 3 | Status: SHIPPED | OUTPATIENT
Start: 2023-10-25

## 2023-10-25 RX ORDER — LOSARTAN POTASSIUM 25 MG/1
25 TABLET ORAL 2 TIMES DAILY
Qty: 180 TABLET | Refills: 3 | Status: SHIPPED | OUTPATIENT
Start: 2023-10-25

## 2023-10-25 RX ORDER — RANOLAZINE 500 MG/1
500 TABLET, EXTENDED RELEASE ORAL 2 TIMES DAILY
Qty: 180 TABLET | Refills: 3 | Status: SHIPPED | OUTPATIENT
Start: 2023-10-25

## 2023-10-25 NOTE — PROGRESS NOTES
Subjective:   Patient ID:  Lorenzo Ospina is a 83 y.o. female who presents for follow up of Follow-up      HPI  8/28/2020  A 81 yo female with htn afib failed cardioversion was scheduled to have BLATION HAD CANCELED DUE TO FAMILY ISSUES AND NOW COVID. SHE IS NOT COMPLAINING OF CHEST PAIN SHORTNESS OF BREATH TAKES MEDS REGULARILY HAS IMPROVED EATING HABITS AS FAR AS SALT IS CONCERNED.SHE WALKS BETWEEN 15-30 MINUTES DAILY WEATHER PERMITS BP IS IMPROVED WITHE XERCISE. HAS NO LEG SWELLING NO CHF SYMPTOMS. LIPIDS ARE ON TARGET     TODAY IS HERE FOR F/U SHE FEELS WELL SHE IS EXERCISING HAS NOT BEEN COMPLAINING OF CHEST PAIN OR SHORTNESS OF BREATH HAS NO HEADACHE NO BLURRED VISION HAS BEEN ON NOAC NO BLEEDING. SAW DR MORA TODAY STOPPED HER AMIODARONE  AND KEPT HER ON B BLOCKERS. HER BP RECORDING FROM HOME ARE ON TARGET AND WELL CONTROLLED SHE TRIES TO EXERCISE REGULARILY OTHERWISE HER BOP IS ELEVATED.      3/31/2021  Had nocturnal wheezing and has dyspnea on exertion was seen in er resolved with diuretics. This is suggestive of pnd angina decubitus, she continues to have exertional dyspnea has no energy with activity has to take a break when doing house chores. Reviewed her bp readings seems within range. Has no leg swelling after short course of diuretics pnd resolved chest tightness wheezing resolved.      11/12/2021  HERE FROF /U JUST GOT BACK FROM [place] SHE SPENT 4 MONTH THERE ./ SHE HAS BEEN WALKING HAD  DIFFICULTY GOING UPHILL SHE GEST TIRED SHORT OF BREATH HAS TO SIT DOWN. NOW HERE FELT TIRED WALKING. HAS  BEEN COMPLIANT WITH MEDS BP SEEMED CONTROLLED. SHE IS ABLE TO DO ALL HOUSE WORK NOT STOPPING WITH ANY SHORTNESS OF BREATH HAS NO LEG SWELLING  NO ORTHOPNEA PND  NO SYNCOPE. HER CARDIAC W/U NEGATIVE      2/18/2022       here for f/u has been off losartan due to cough not sure if she improved off losartan DR CORONADO THINK HER COUGH IS BETTER. WHILE PATIENT DOES NOT THINK SO. SHE IS TAKING HER MEDS. HER  DIASTOLIC BP IS STAYING ABOVE MID 80S. HAS NO NEW SYMPTOMS   HER SHORTNESS OF BREATH IS IMPROVING SHE HAS NO WHEEZING SHE HAS NO LEG SWELLING SHE IS COMPLIANT WITH DIET.      10/14/2022  Doing well clinically reviewed bp reading for the past 4 months seems well controlled. Has no tia symptoms. Has  no more cough tolerated losartan well her medical regimen adequate . Has no bleeding or bruising. Has fatigue in the morning not related to bp issue. Drinks coffee and eat feels better. She is compliant with salt in  a reasonable fashion.     4/19/2023  Here for had left leg pain swelling. Used compression socks transiently saw ortho had shoes wear had resolution of pain and swelling now back wearing regular shoes. Has no more pain or swelling.  She is on noac no bleeding. Her bp reading are acceptable she can using stricter bp control. Has no chest pan or shortness of breath  has leg pain with ambulation.reviewed bp readings from home . Readings are on target.       10/25/2023  Has fluctuation in bp ok compliance with slat. Some leg swelling on lefrt improved with empiric lasix use. Has daytime fatigue and sleepiness.  Past Medical History:   Diagnosis Date    A-fib 11/5/2019    Breast cancer     right, dx 2008.  Dr. Callaway follows.s/p lumpectomy and arimidex x 5y.    Hyperlipidemia     Hypertension     OP (osteoporosis)     on bisphos x 2y.    TIA (transient ischemic attack)     Toe fracture        Past Surgical History:   Procedure Laterality Date    BREAST LUMPECTOMY      right 2008    TOTAL ABDOMINAL HYSTERECTOMY      no cancer    TRANSESOPHAGEAL ECHOCARDIOGRAPHY N/A 11/5/2019    Procedure: ECHOCARDIOGRAM, TRANSESOPHAGEAL;  Surgeon: Otto Flores MD;  Location: [facility] CATH LAB;  Service: Cardiology;  Laterality: N/A;    TREATMENT OF CARDIAC ARRHYTHMIA N/A 11/5/2019    Procedure: CARDIOVERSION;  Surgeon: Otto Flores MD;  Location: [facility] CATH LAB;  Service: Cardiology;  Laterality: N/A;    TREATMENT OF CARDIAC ARRHYTHMIA N/A  2019    Procedure: CARDIOVERSION;  Surgeon: Otto Flores MD;  Location: [facility] CATH LAB;  Service: Cardiology;  Laterality: N/A;    TREATMENT OF CARDIAC ARRHYTHMIA N/A 2020    Procedure: CARDIOVERSION;  Surgeon: Otto Flores MD;  Location: [facility] CATH LAB;  Service: Cardiology;  Laterality: N/A;    TREATMENT OF CARDIAC ARRHYTHMIA N/A 3/2/2020    Procedure: CARDIOVERSION;  Surgeon: Mayi Beltran MD;  Location: [facility] CATH LAB;  Service: Cardiology;  Laterality: N/A;  830 start per Dr. beltran       Social History     Tobacco Use    Smoking status: Former     Current packs/day: 0.00     Average packs/day: 1.5 packs/day for 22.7 years (34.1 ttl pk-yrs)     Types: Cigarettes     Start date: 1972     Quit date: 1994     Years since quittin.0    Smokeless tobacco: Never   Substance Use Topics    Alcohol use: Not Currently    Drug use: Never       History reviewed. No pertinent family history.    Current Outpatient Medications   Medication Sig    albuterol (PROVENTIL/VENTOLIN HFA) 90 mcg/actuation inhaler INHALE 2 PUFFS INTO THE LUNGS EVERY 6 (SIX) HOURS AS NEEDED FOR WHEEZING. RESCUE    atorvastatin (LIPITOR) 20 MG tablet TAKE 1 TABLET BY MOUTH EVERY DAY    busPIRone (BUSPAR) 7.5 MG tablet Take 1 tablet (7.5 mg total) by mouth 3 (three) times daily as needed.    clopidogrel bisulfate (PLAVIX ORAL) Plavix Take No date recorded No form recorded No frequency recorded No route recorded No set duration recorded No set duration amount recorded suspended No dosage strength recorded No dosage strength units of measure recorded    cyanocobalamin (VITAMIN B-12) 1000 MCG tablet Take 100 mcg by mouth once daily.    ELIQUIS 5 mg Tab TAKE 1 TABLET BY MOUTH TWICE A DAY    fish oil-omega-3 fatty acids 300-1,000 mg capsule Take 2 g by mouth once daily.    glucosam/chond/hyalu/CF borate (MOVE FREE JOINT HEALTH ORAL) Take 1 tablet by mouth once daily.    ipratropium (ATROVENT) 0.02 % nebulizer solution Take 2.5 mLs (500 mcg  total) by nebulization 4 (four) times daily.    ipratropium (ATROVENT) 42 mcg (0.06 %) nasal spray 2 sprays by Each Nostril route 3 (three) times daily as needed for Rhinitis (nasal drip).    losartan (COZAAR) 25 MG tablet TAKE 1 TABLET BY MOUTH EVERY DAY    metoprolol succinate (TOPROL-XL) 50 MG 24 hr tablet TAKE 1 TABLET BY MOUTH EVERY DAY    MULTIVIT-IRON-MIN-FOLIC ACID 3,500-18-0.4 UNIT-MG-MG ORAL CHEW Take by mouth.    omeprazole (PRILOSEC) 40 MG capsule Take 40 mg by mouth once daily.    ranolazine (RANEXA) 500 MG Tb12 TAKE 1 TABLET BY MOUTH TWICE A DAY    vitamin D 1000 units Tab Take 2,000 Units by mouth once daily.     Current Facility-Administered Medications   Medication    methylPREDNISolone acetate injection 60 mg     Current Outpatient Medications on File Prior to Visit   Medication Sig    albuterol (PROVENTIL/VENTOLIN HFA) 90 mcg/actuation inhaler INHALE 2 PUFFS INTO THE LUNGS EVERY 6 (SIX) HOURS AS NEEDED FOR WHEEZING. RESCUE    atorvastatin (LIPITOR) 20 MG tablet TAKE 1 TABLET BY MOUTH EVERY DAY    busPIRone (BUSPAR) 7.5 MG tablet Take 1 tablet (7.5 mg total) by mouth 3 (three) times daily as needed.    clopidogrel bisulfate (PLAVIX ORAL) Plavix Take No date recorded No form recorded No frequency recorded No route recorded No set duration recorded No set duration amount recorded suspended No dosage strength recorded No dosage strength units of measure recorded    cyanocobalamin (VITAMIN B-12) 1000 MCG tablet Take 100 mcg by mouth once daily.    ELIQUIS 5 mg Tab TAKE 1 TABLET BY MOUTH TWICE A DAY    fish oil-omega-3 fatty acids 300-1,000 mg capsule Take 2 g by mouth once daily.    glucosam/chond/hyalu/CF borate (MOVE FREE JOINT HEALTH ORAL) Take 1 tablet by mouth once daily.    ipratropium (ATROVENT) 0.02 % nebulizer solution Take 2.5 mLs (500 mcg total) by nebulization 4 (four) times daily.    ipratropium (ATROVENT) 42 mcg (0.06 %) nasal spray 2 sprays by Each Nostril route 3 (three) times daily as  needed for Rhinitis (nasal drip).    losartan (COZAAR) 25 MG tablet TAKE 1 TABLET BY MOUTH EVERY DAY    metoprolol succinate (TOPROL-XL) 50 MG 24 hr tablet TAKE 1 TABLET BY MOUTH EVERY DAY    MULTIVIT-IRON-MIN-FOLIC ACID 3,500-18-0.4 UNIT-MG-MG ORAL CHEW Take by mouth.    omeprazole (PRILOSEC) 40 MG capsule Take 40 mg by mouth once daily.    ranolazine (RANEXA) 500 MG Tb12 TAKE 1 TABLET BY MOUTH TWICE A DAY    vitamin D 1000 units Tab Take 2,000 Units by mouth once daily.     Current Facility-Administered Medications on File Prior to Visit   Medication    methylPREDNISolone acetate injection 60 mg     Review of patient's allergies indicates:  No Known Allergies   Review of Systems   Constitutional: Positive for malaise/fatigue. Negative for diaphoresis and weight gain.   HENT:  Negative for hoarse voice.    Eyes:  Negative for double vision and visual disturbance.   Cardiovascular:  Positive for leg swelling. Negative for chest pain, claudication, cyanosis, dyspnea on exertion, irregular heartbeat, near-syncope, orthopnea, palpitations, paroxysmal nocturnal dyspnea and syncope.   Respiratory:  Negative for cough, hemoptysis, shortness of breath and snoring.    Hematologic/Lymphatic: Negative for bleeding problem. Does not bruise/bleed easily.   Skin:  Negative for color change and poor wound healing.   Musculoskeletal:  Negative for muscle cramps, muscle weakness and myalgias.   Gastrointestinal:  Negative for bloating, abdominal pain, change in bowel habit, diarrhea, heartburn, hematemesis, hematochezia, melena and nausea.   Neurological:  Negative for excessive daytime sleepiness, dizziness, headaches, light-headedness, loss of balance, numbness and weakness.   Psychiatric/Behavioral:  Negative for memory loss. The patient does not have insomnia.    Allergic/Immunologic: Negative for hives.       Objective:   Physical Exam  Constitutional:       General: She is not in acute distress.     Appearance: Normal  appearance. She is well-developed. She is not ill-appearing.   HENT:      Head: Normocephalic and atraumatic.   Eyes:      General: No scleral icterus.     Pupils: Pupils are equal, round, and reactive to light.   Neck:      Thyroid: No thyromegaly.      Vascular: Normal carotid pulses. No carotid bruit, hepatojugular reflux or JVD.      Trachea: No tracheal deviation.   Cardiovascular:      Rate and Rhythm: Normal rate. Rhythm irregular.      Pulses: Normal pulses.      Heart sounds: Normal heart sounds. No murmur heard.     No friction rub. No gallop.   Pulmonary:      Effort: Pulmonary effort is normal. No respiratory distress.      Breath sounds: Normal breath sounds. No wheezing, rhonchi or rales.   Chest:      Chest wall: No tenderness.   Abdominal:      General: Bowel sounds are normal. There is no abdominal bruit.      Palpations: Abdomen is soft. There is no hepatomegaly or pulsatile mass.      Tenderness: There is no abdominal tenderness.   Musculoskeletal:      Right shoulder: No deformity.      Cervical back: Normal range of motion and neck supple.      Right lower leg: No edema.      Left lower leg: No edema.      Comments: Varicosities in both legs   Skin:     General: Skin is warm and dry.      Findings: No erythema or rash.      Nails: There is no clubbing.   Neurological:      Mental Status: She is alert and oriented to person, place, and time.      Cranial Nerves: No cranial nerve deficit.      Coordination: Coordination normal.   Psychiatric:         Speech: Speech normal.         Behavior: Behavior normal.       Vitals:    10/25/23 1014 10/25/23 1015   BP: (!) 152/80 (!) 156/80   BP Location: Left arm Right arm   Patient Position: Sitting Sitting   BP Method: Large (Manual) Large (Manual)   Pulse: 81    SpO2: 99%    Weight: 76.7 kg (169 lb 1.5 oz)    Height: 5' (1.524 m)      Lab Results   Component Value Date    CHOL 145 10/03/2022    CHOL 153 12/13/2021    CHOL 114 (L) 03/26/2021      Body mass  "index is 33.02 kg/m².   Lab Results   Component Value Date    HGBA1C 5.3 06/30/2023      BMP  Lab Results   Component Value Date     03/16/2023    K 4.1 03/16/2023     03/16/2023    CO2 25 03/16/2023    BUN 9 03/16/2023    CREATININE 0.8 03/16/2023    CALCIUM 10.6 (H) 03/16/2023    ANIONGAP 8 03/16/2023    EGFRNORACEVR >60 03/16/2023      Lab Results   Component Value Date    HDL 62 10/03/2022    HDL 73 12/13/2021    HDL 46 03/26/2021     Lab Results   Component Value Date    LDLCALC 68.8 10/03/2022    LDLCALC 56.4 (L) 12/13/2021    LDLCALC 52.2 (L) 03/26/2021     Lab Results   Component Value Date    TRIG 71 10/03/2022    TRIG 118 12/13/2021    TRIG 79 03/26/2021     Lab Results   Component Value Date    CHOLHDL 42.8 10/03/2022    CHOLHDL 47.7 12/13/2021    CHOLHDL 40.4 03/26/2021       Chemistry        Component Value Date/Time     03/16/2023 1209    K 4.1 03/16/2023 1209     03/16/2023 1209    CO2 25 03/16/2023 1209    BUN 9 03/16/2023 1209    CREATININE 0.8 03/16/2023 1209     03/16/2023 1209        Component Value Date/Time    CALCIUM 10.6 (H) 03/16/2023 1209    ALKPHOS 60 03/16/2023 1209    AST 18 03/16/2023 1209    ALT 20 03/16/2023 1209    BILITOT 0.4 03/16/2023 1209    ESTGFRAFRICA >60.0 12/13/2021 1636    EGFRNONAA >60.0 12/13/2021 1636          Lab Results   Component Value Date    TSH 0.412 06/30/2023     No results found for: "INR", "PROTIME"  Lab Results   Component Value Date    WBC 7.88 03/16/2023    HGB 13.9 03/16/2023    HCT 43.0 03/16/2023    MCV 92 03/16/2023     03/16/2023     BMP  Sodium   Date Value Ref Range Status   03/16/2023 141 136 - 145 mmol/L Final     Potassium   Date Value Ref Range Status   03/16/2023 4.1 3.5 - 5.1 mmol/L Final     Chloride   Date Value Ref Range Status   03/16/2023 108 95 - 110 mmol/L Final     CO2   Date Value Ref Range Status   03/16/2023 25 23 - 29 mmol/L Final     BUN   Date Value Ref Range Status   03/16/2023 9 8 - 23 mg/dL " Final     Creatinine   Date Value Ref Range Status   03/16/2023 0.8 0.5 - 1.4 mg/dL Final     Calcium   Date Value Ref Range Status   03/16/2023 10.6 (H) 8.7 - 10.5 mg/dL Final     Anion Gap   Date Value Ref Range Status   03/16/2023 8 8 - 16 mmol/L Final     eGFR if    Date Value Ref Range Status   12/13/2021 >60.0 >60 mL/min/1.73 m^2 Final     eGFR if non    Date Value Ref Range Status   12/13/2021 >60.0 >60 mL/min/1.73 m^2 Final     Comment:     Calculation used to obtain the estimated glomerular filtration  rate (eGFR) is the CKD-EPI equation.        CrCl cannot be calculated (Patient's most recent lab result is older than the maximum 7 days allowed.).    Assessment:     1. Essential hypertension    2. TIA (transient ischemic attack)    3. Pulmonary fibrosis, postinflammatory    4. Simple chronic bronchitis    5. Atherosclerosis of aorta    6. Other hyperlipidemia    7. Palpitation    8. Paroxysmal atrial fibrillation    9. Varicose veins of left lower extremity with pain    10. Chronic anticoagulation    11. Vitamin D deficiency    12. Edema of left ankle    13. Popliteal cyst, left      Has htn labile it is still elevated counseled about low salt diet and will increase losartan to 25 mg po bid.  Afib chronic on noac tolerated well stable. Continue same.   Varicose veins in legs responsible for legs welling discussed compression socks with zipper if need be,.  Hlp on statins on target tolerated meds well continue same stable.  Chronic anticaogulation tolerated well no side effects or bleeding continue same.  Plan:   Bp chart review in 3 weeks.   Continue current therapy  Cardiac low salt diet.  Risk factor modification and excercise program.  F/u in 6 months with lipid cmp

## 2023-10-26 NOTE — PROGRESS NOTES
Subjective:      Patient ID: Lorenzo Ospina is a 83 y.o. female.    Chief Complaint: Follow-up (Three month follow up. Pt is complaining of pain and swelling in both legs. )      HPI  Here for f/u medical problems and preventive exam.  Chronic morning cough, clears and breathes well rest of day.  No f/c.  No cp/sob/palp.  BMs normal, no b/b.  Urine normal, but dark.        11/23 BMD:  FINDINGS:  The L1 to L4 vertebral bone mineral density is equal to 1.253 g/cm squared with a T score of 0.6.  There has been no significant change relative to the prior study.     The left femoral neck bone mineral density is equal to 0.749 g/cm squared with a T score of -2.1.  There has been  no significant change relative to the prior study.     There is a 21.9% risk of a major osteoporotic fracture and a 6.2% risk of hip fracture in the next 10 years (FRAX).     Impression:     Osteopenia       HM: 11/22 fluvax/ref now, 2/21 covid vaccines/booster, 6/17 [id number], 11/22 [id number], ref tetanus, 11/21 MMG/no more, 10/22 BMD rep 2y,  2012 Cscope saw Gastro in 2017 and says due in 2022.     Review of Systems   Constitutional:  Negative for appetite change, chills, diaphoresis and fever.   HENT:  Negative for congestion, ear pain, rhinorrhea, sinus pressure and sore throat.    Respiratory:  Negative for cough, chest tightness and shortness of breath.    Cardiovascular:  Negative for chest pain and palpitations.   Gastrointestinal:  Negative for blood in stool, constipation, diarrhea, nausea and vomiting.   Genitourinary:  Negative for dysuria, frequency, hematuria, menstrual problem, urgency and vaginal discharge.   Musculoskeletal:  Negative for arthralgias.   Skin:  Negative for rash.   Neurological:  Negative for dizziness and headaches.   Psychiatric/Behavioral:  Negative for sleep disturbance. The patient is not nervous/anxious.          Objective:   /68 (BP Location: Left arm)   Pulse 74   Temp 97.9 °F (36.6 °C) (Oral)    Ht 5' (1.524 m)   Wt 77.1 kg (170 lb)   SpO2 99%   BMI 33.20 kg/m²     Physical Exam  Constitutional:       Appearance: She is well-developed.   HENT:      Right Ear: External ear normal. Tympanic membrane is not injected.      Left Ear: External ear normal. Tympanic membrane is not injected.   Eyes:      Conjunctiva/sclera: Conjunctivae normal.   Neck:      Thyroid: No thyromegaly.   Cardiovascular:      Rate and Rhythm: Normal rate. Rhythm irregularly irregular.      Heart sounds: No murmur heard.     No friction rub. No gallop.   Pulmonary:      Effort: Pulmonary effort is normal.      Breath sounds: Normal breath sounds. No wheezing or rales.   Abdominal:      General: Bowel sounds are normal.      Palpations: Abdomen is soft. There is no mass.      Tenderness: There is no abdominal tenderness.   Musculoskeletal:      Cervical back: Normal range of motion and neck supple.   Lymphadenopathy:      Cervical: No cervical adenopathy.   Skin:     General: Skin is warm.      Findings: No rash.   Neurological:      Mental Status: She is alert and oriented to person, place, and time.           Assessment:       1. Essential hypertension    2. Age-related osteoporosis without current pathological fracture    3. Paroxysmal atrial fibrillation    4. Chronic anticoagulation    5. Other hyperlipidemia    6. Pulmonary fibrosis, postinflammatory    7. Simple chronic bronchitis    8. Hyperparathyroidism    9. History of right breast cancer    10. Encounter for preventive health examination    11. Chronic pain of both knees    12. Dark urine          Plan:     Essential hypertension- stable, cont rx.    Age-related osteoporosis without current pathological fracture- stable, recheck BMD 2y.    Paroxysmal atrial fibrillation, Chronic anticoagulation    Other hyperlipidemia  -     Comprehensive Metabolic Panel; Future; Expected date: 11/09/2023  -     Lipid Panel; Future; Expected date: 11/09/2023  -     TSH; Future; Expected  date: 11/09/2023    Pulmonary fibrosis, postinflammatory, Simple chronic bronchitis- doing well on inh, cont.    Hyperparathyroidism  -     PTH, Intact; Future; Expected date: 11/23/2023  -     Vitamin D; Future    History of right breast cancer    Encounter for preventive health examination- rec Tet and fluvax.    Chronic pain of both knees  -     Ambulatory referral/consult to Physical/Occupational Therapy; Future; Expected date: 11/16/2023    Dark urine  -     Urinalysis, Reflex to Urine Culture Urine, Clean Catch; Future; Expected date: 11/09/2023    RTC 3 mo.

## 2023-10-31 ENCOUNTER — OFFICE VISIT (OUTPATIENT)
Dept: RHEUMATOLOGY | Facility: CLINIC | Age: 83
End: 2023-10-31
Payer: MEDICARE

## 2023-10-31 VITALS
DIASTOLIC BLOOD PRESSURE: 63 MMHG | WEIGHT: 170 LBS | HEART RATE: 101 BPM | BODY MASS INDEX: 33.38 KG/M2 | SYSTOLIC BLOOD PRESSURE: 117 MMHG | HEIGHT: 60 IN

## 2023-10-31 DIAGNOSIS — M17.11 PRIMARY OSTEOARTHRITIS OF RIGHT KNEE: ICD-10-CM

## 2023-10-31 DIAGNOSIS — M81.0 AGE-RELATED OSTEOPOROSIS WITHOUT CURRENT PATHOLOGICAL FRACTURE: Primary | ICD-10-CM

## 2023-10-31 DIAGNOSIS — M25.561 RIGHT MEDIAL KNEE PAIN: ICD-10-CM

## 2023-10-31 PROCEDURE — 99204 PR OFFICE/OUTPT VISIT, NEW, LEVL IV, 45-59 MIN: ICD-10-PCS | Mod: S$GLB,,, | Performed by: INTERNAL MEDICINE

## 2023-10-31 PROCEDURE — 3078F DIAST BP <80 MM HG: CPT | Mod: CPTII,S$GLB,, | Performed by: INTERNAL MEDICINE

## 2023-10-31 PROCEDURE — 1160F RVW MEDS BY RX/DR IN RCRD: CPT | Mod: CPTII,S$GLB,, | Performed by: INTERNAL MEDICINE

## 2023-10-31 PROCEDURE — 99999 PR PBB SHADOW E&M-EST. PATIENT-LVL V: ICD-10-PCS | Mod: PBBFAC,,, | Performed by: INTERNAL MEDICINE

## 2023-10-31 PROCEDURE — 1159F MED LIST DOCD IN RCRD: CPT | Mod: CPTII,S$GLB,, | Performed by: INTERNAL MEDICINE

## 2023-10-31 PROCEDURE — 3074F PR MOST RECENT SYSTOLIC BLOOD PRESSURE < 130 MM HG: ICD-10-PCS | Mod: CPTII,S$GLB,, | Performed by: INTERNAL MEDICINE

## 2023-10-31 PROCEDURE — 3074F SYST BP LT 130 MM HG: CPT | Mod: CPTII,S$GLB,, | Performed by: INTERNAL MEDICINE

## 2023-10-31 PROCEDURE — 99204 OFFICE O/P NEW MOD 45 MIN: CPT | Mod: S$GLB,,, | Performed by: INTERNAL MEDICINE

## 2023-10-31 PROCEDURE — 99999 PR PBB SHADOW E&M-EST. PATIENT-LVL V: CPT | Mod: PBBFAC,,, | Performed by: INTERNAL MEDICINE

## 2023-10-31 PROCEDURE — 1125F PR PAIN SEVERITY QUANTIFIED, PAIN PRESENT: ICD-10-PCS | Mod: CPTII,S$GLB,, | Performed by: INTERNAL MEDICINE

## 2023-10-31 PROCEDURE — 1101F PR PT FALLS ASSESS DOC 0-1 FALLS W/OUT INJ PAST YR: ICD-10-PCS | Mod: CPTII,S$GLB,, | Performed by: INTERNAL MEDICINE

## 2023-10-31 PROCEDURE — 1159F PR MEDICATION LIST DOCUMENTED IN MEDICAL RECORD: ICD-10-PCS | Mod: CPTII,S$GLB,, | Performed by: INTERNAL MEDICINE

## 2023-10-31 PROCEDURE — 1101F PT FALLS ASSESS-DOCD LE1/YR: CPT | Mod: CPTII,S$GLB,, | Performed by: INTERNAL MEDICINE

## 2023-10-31 PROCEDURE — 1157F PR ADVANCE CARE PLAN OR EQUIV PRESENT IN MEDICAL RECORD: ICD-10-PCS | Mod: CPTII,S$GLB,, | Performed by: INTERNAL MEDICINE

## 2023-10-31 PROCEDURE — 3078F PR MOST RECENT DIASTOLIC BLOOD PRESSURE < 80 MM HG: ICD-10-PCS | Mod: CPTII,S$GLB,, | Performed by: INTERNAL MEDICINE

## 2023-10-31 PROCEDURE — 1157F ADVNC CARE PLAN IN RCRD: CPT | Mod: CPTII,S$GLB,, | Performed by: INTERNAL MEDICINE

## 2023-10-31 PROCEDURE — 1160F PR REVIEW ALL MEDS BY PRESCRIBER/CLIN PHARMACIST DOCUMENTED: ICD-10-PCS | Mod: CPTII,S$GLB,, | Performed by: INTERNAL MEDICINE

## 2023-10-31 PROCEDURE — 1125F AMNT PAIN NOTED PAIN PRSNT: CPT | Mod: CPTII,S$GLB,, | Performed by: INTERNAL MEDICINE

## 2023-10-31 PROCEDURE — 3288F FALL RISK ASSESSMENT DOCD: CPT | Mod: CPTII,S$GLB,, | Performed by: INTERNAL MEDICINE

## 2023-10-31 PROCEDURE — 3288F PR FALLS RISK ASSESSMENT DOCUMENTED: ICD-10-PCS | Mod: CPTII,S$GLB,, | Performed by: INTERNAL MEDICINE

## 2023-10-31 RX ORDER — DICLOFENAC SODIUM 10 MG/G
2 GEL TOPICAL DAILY
Qty: 50 G | Refills: 11 | Status: SHIPPED | OUTPATIENT
Start: 2023-10-31

## 2023-10-31 NOTE — PATIENT INSTRUCTIONS
Take Arthritis strength extended release Tylenol - 650 mg 1 tablet 3 times daily as needed for pain.  Start osteo Bi-Flex triple strength 1 capsule 2 times daily for joint protection.

## 2023-10-31 NOTE — PROGRESS NOTES
RHEUMATOLOGY CLINIC INITIAL VISIT    Reason for consult:-  Knee pain  Chief complaints, HPI, ROS, EXAM, Assessment & Plans:-  Lorenzo TAPAN Leonardopeterlucerodmitry a 83 y.o. pleasant female comes in with osteopenia with high fracture risk.  Longstanding history of osteoporosis treated with Fosamax for 5 years.  Referred here for further therapy of osteoporosis.  No falls or fragility fracture.  Complains of activity-related medial knee joint pain for the past couple of months.  Rheumatological review of system negative otherwise.  Physical examination shows mild right medial knee joint tenderness.  No effusion.  No synovitis of small joints.    1. Age-related osteoporosis without current pathological fracture    2. Right medial knee pain    3. Primary osteoarthritis of right knee      Problem List Items Addressed This Visit       Age-related osteoporosis without current pathological fracture - Primary    Overview     on bisphos x 2y.         Relevant Orders    DXA Bone Density Axial Skeleton 1 or more sites    Right medial knee pain    Relevant Medications    diclofenac sodium (VOLTAREN) 1 % Gel    Other Relevant Orders    Ambulatory referral/consult to Physical/Occupational Therapy     Other Visit Diagnoses       Primary osteoarthritis of right knee        Relevant Orders    Ambulatory referral/consult to Physical/Occupational Therapy        Personally reviewed DEXA scan images from 2022.  Showed stable results both the femoral neck and lumbar spine.  Osteopenia with high fracture risk.    Osteoporosis treated with 5 year bisphosphonate therapy on drug holiday.  DEXA last year showed stable results.  Repeat DEXA scan and consider Reclast if it shows more than 5% drop at femoral neck or lumbar spine.  Try conservative therapy for knee osteoarthritis with Tylenol and Voltaren gel.  If no significant improvement, consider hyaluronic acid injections.  I have explained all of the above in detail and the patient understands all of the  current recommendation(s). I have answered all questions to the best of my ability and to their complete satisfaction.         # Follow up in about 1 year (around 10/31/2024).      Past Medical History:   Diagnosis Date    A-fib 2019    Breast cancer     right, dx .  Dr. Callaway follows.s/p lumpectomy and arimidex x 5y.    Hyperlipidemia     Hypertension     OP (osteoporosis)     on bisphos x 2y.    TIA (transient ischemic attack)     Toe fracture        Past Surgical History:   Procedure Laterality Date    BREAST LUMPECTOMY      right     TOTAL ABDOMINAL HYSTERECTOMY      no cancer    TRANSESOPHAGEAL ECHOCARDIOGRAPHY N/A 2019    Procedure: ECHOCARDIOGRAM, TRANSESOPHAGEAL;  Surgeon: Otto Flores MD;  Location: [facility] CATH LAB;  Service: Cardiology;  Laterality: N/A;    TREATMENT OF CARDIAC ARRHYTHMIA N/A 2019    Procedure: CARDIOVERSION;  Surgeon: Otto Flores MD;  Location: [facility] CATH LAB;  Service: Cardiology;  Laterality: N/A;    TREATMENT OF CARDIAC ARRHYTHMIA N/A 2019    Procedure: CARDIOVERSION;  Surgeon: Otto Flores MD;  Location: [facility] CATH LAB;  Service: Cardiology;  Laterality: N/A;    TREATMENT OF CARDIAC ARRHYTHMIA N/A 2020    Procedure: CARDIOVERSION;  Surgeon: Otto Flores MD;  Location: [facility] CATH LAB;  Service: Cardiology;  Laterality: N/A;    TREATMENT OF CARDIAC ARRHYTHMIA N/A 3/2/2020    Procedure: CARDIOVERSION;  Surgeon: Mayi Beltran MD;  Location: [facility] CATH LAB;  Service: Cardiology;  Laterality: N/A;  830 start per Dr. beltran        Social History     Tobacco Use    Smoking status: Former     Current packs/day: 0.00     Average packs/day: 1.5 packs/day for 22.7 years (34.1 ttl pk-yrs)     Types: Cigarettes     Start date: 1972     Quit date: 1994     Years since quittin.1    Smokeless tobacco: Never   Substance Use Topics    Alcohol use: Not Currently    Drug use: Never       History reviewed. No pertinent family history.    Review of patient's allergies  indicates:  No Known Allergies    Medication List with Changes/Refills   New Medications    DICLOFENAC SODIUM (VOLTAREN) 1 % GEL    Apply 2 g topically once daily.   Current Medications    ALBUTEROL (PROVENTIL/VENTOLIN HFA) 90 MCG/ACTUATION INHALER    INHALE 2 PUFFS INTO THE LUNGS EVERY 6 (SIX) HOURS AS NEEDED FOR WHEEZING. RESCUE    APIXABAN (ELIQUIS) 5 MG TAB    Take 1 tablet (5 mg total) by mouth 2 (two) times daily.    ATORVASTATIN (LIPITOR) 20 MG TABLET    Take 1 tablet (20 mg total) by mouth once daily.    BUSPIRONE (BUSPAR) 7.5 MG TABLET    Take 1 tablet (7.5 mg total) by mouth 3 (three) times daily as needed.    CYANOCOBALAMIN (VITAMIN B-12) 1000 MCG TABLET    Take 100 mcg by mouth once daily.    FISH OIL-OMEGA-3 FATTY ACIDS 300-1,000 MG CAPSULE    Take 2 g by mouth once daily.    GLUCOSAM/CHOND/HYALU/CF BORATE (MOVE FREE [facility] ORAL)    Take 1 tablet by mouth once daily.    IPRATROPIUM (ATROVENT) 0.02 % NEBULIZER SOLUTION    Take 2.5 mLs (500 mcg total) by nebulization 4 (four) times daily.    IPRATROPIUM (ATROVENT) 42 MCG (0.06 %) NASAL SPRAY    2 sprays by Each Nostril route 3 (three) times daily as needed for Rhinitis (nasal drip).    LOSARTAN (COZAAR) 25 MG TABLET    Take 1 tablet (25 mg total) by mouth 2 (two) times a day.    METOPROLOL SUCCINATE (TOPROL-XL) 50 MG 24 HR TABLET    Take 1 tablet (50 mg total) by mouth once daily.    MULTIVIT-IRON-MIN-FOLIC ACID 3,500-18-0.4 UNIT-MG-MG ORAL CHEW    Take by mouth.    OMEPRAZOLE (PRILOSEC) 40 MG CAPSULE    Take 40 mg by mouth once daily.    RANOLAZINE (RANEXA) 500 MG TB12    Take 1 tablet (500 mg total) by mouth 2 (two) times daily.    VITAMIN D 1000 UNITS TAB    Take 2,000 Units by mouth once daily.         Thank you for allowing me to participate in the care ofLorenzo Ospina.    Disclaimer: This note was prepared using voice recognition system and is likely to have sound alike errors and is not proof read.  Please call me with any  questions.

## 2023-11-06 ENCOUNTER — APPOINTMENT (OUTPATIENT)
Dept: RADIOLOGY | Facility: HOSPITAL | Age: 83
End: 2023-11-06
Attending: INTERNAL MEDICINE
Payer: MEDICARE

## 2023-11-06 DIAGNOSIS — M81.0 AGE-RELATED OSTEOPOROSIS WITHOUT CURRENT PATHOLOGICAL FRACTURE: ICD-10-CM

## 2023-11-06 PROCEDURE — 77080 DXA BONE DENSITY AXIAL: CPT | Mod: TC

## 2023-11-06 PROCEDURE — 77080 DXA BONE DENSITY AXIAL: CPT | Mod: 26,,, | Performed by: RADIOLOGY

## 2023-11-06 PROCEDURE — 77080 DXA BONE DENSITY AXIAL SKELETON 1 OR MORE SITES: ICD-10-PCS | Mod: 26,,, | Performed by: RADIOLOGY

## 2023-11-08 ENCOUNTER — TELEPHONE (OUTPATIENT)
Dept: RHEUMATOLOGY | Facility: CLINIC | Age: 83
End: 2023-11-08
Payer: MEDICARE

## 2023-11-08 NOTE — TELEPHONE ENCOUNTER
----- Message from Fredi Crystal MD sent at 11/7/2023  6:29 PM CST -----  Great news.  DEXA scan shows stable results without any significant change.  Repeat DEXA scan in 2 years.

## 2023-11-09 ENCOUNTER — OFFICE VISIT (OUTPATIENT)
Dept: PRIMARY CARE CLINIC | Facility: CLINIC | Age: 83
End: 2023-11-09
Payer: MEDICARE

## 2023-11-09 VITALS
WEIGHT: 170 LBS | HEART RATE: 74 BPM | BODY MASS INDEX: 33.38 KG/M2 | HEIGHT: 60 IN | TEMPERATURE: 98 F | DIASTOLIC BLOOD PRESSURE: 68 MMHG | SYSTOLIC BLOOD PRESSURE: 122 MMHG | OXYGEN SATURATION: 99 %

## 2023-11-09 DIAGNOSIS — I10 ESSENTIAL HYPERTENSION: Primary | ICD-10-CM

## 2023-11-09 DIAGNOSIS — Z00.00 ENCOUNTER FOR PREVENTIVE HEALTH EXAMINATION: ICD-10-CM

## 2023-11-09 DIAGNOSIS — J84.10 PULMONARY FIBROSIS, POSTINFLAMMATORY: ICD-10-CM

## 2023-11-09 DIAGNOSIS — M25.561 CHRONIC PAIN OF BOTH KNEES: ICD-10-CM

## 2023-11-09 DIAGNOSIS — J41.0 SIMPLE CHRONIC BRONCHITIS: ICD-10-CM

## 2023-11-09 DIAGNOSIS — Z85.3 HISTORY OF RIGHT BREAST CANCER: ICD-10-CM

## 2023-11-09 DIAGNOSIS — E78.49 OTHER HYPERLIPIDEMIA: ICD-10-CM

## 2023-11-09 DIAGNOSIS — M81.0 AGE-RELATED OSTEOPOROSIS WITHOUT CURRENT PATHOLOGICAL FRACTURE: ICD-10-CM

## 2023-11-09 DIAGNOSIS — E21.3 HYPERPARATHYROIDISM: ICD-10-CM

## 2023-11-09 DIAGNOSIS — Z79.01 CHRONIC ANTICOAGULATION: ICD-10-CM

## 2023-11-09 DIAGNOSIS — G89.29 CHRONIC PAIN OF BOTH KNEES: ICD-10-CM

## 2023-11-09 DIAGNOSIS — M25.562 CHRONIC PAIN OF BOTH KNEES: ICD-10-CM

## 2023-11-09 DIAGNOSIS — I48.0 PAROXYSMAL ATRIAL FIBRILLATION: ICD-10-CM

## 2023-11-09 DIAGNOSIS — R82.998 DARK URINE: ICD-10-CM

## 2023-11-09 LAB
25(OH)D3+25(OH)D2 SERPL-MCNC: 49 NG/ML (ref 30–96)
ALBUMIN SERPL BCP-MCNC: 4 G/DL (ref 3.5–5.2)
ALP SERPL-CCNC: 56 U/L (ref 55–135)
ALT SERPL W/O P-5'-P-CCNC: 14 U/L (ref 10–44)
ANION GAP SERPL CALC-SCNC: 9 MMOL/L (ref 8–16)
AST SERPL-CCNC: 25 U/L (ref 10–40)
BACTERIA #/AREA URNS AUTO: ABNORMAL /HPF
BILIRUB SERPL-MCNC: 0.5 MG/DL (ref 0.1–1)
BILIRUB UR QL STRIP: NEGATIVE
BUN SERPL-MCNC: 11 MG/DL (ref 8–23)
CALCIUM SERPL-MCNC: 11.5 MG/DL (ref 8.7–10.5)
CHLORIDE SERPL-SCNC: 107 MMOL/L (ref 95–110)
CHOLEST SERPL-MCNC: 131 MG/DL (ref 120–199)
CHOLEST/HDLC SERPL: 2.4 {RATIO} (ref 2–5)
CLARITY UR REFRACT.AUTO: CLEAR
CO2 SERPL-SCNC: 26 MMOL/L (ref 23–29)
COLOR UR AUTO: COLORLESS
CREAT SERPL-MCNC: 0.8 MG/DL (ref 0.5–1.4)
EST. GFR  (NO RACE VARIABLE): >60 ML/MIN/1.73 M^2
GLUCOSE SERPL-MCNC: 98 MG/DL (ref 70–110)
GLUCOSE UR QL STRIP: NEGATIVE
HDLC SERPL-MCNC: 54 MG/DL (ref 40–75)
HDLC SERPL: 41.2 % (ref 20–50)
HGB UR QL STRIP: NEGATIVE
KETONES UR QL STRIP: NEGATIVE
LDLC SERPL CALC-MCNC: 52.6 MG/DL (ref 63–159)
LEUKOCYTE ESTERASE UR QL STRIP: ABNORMAL
MICROSCOPIC COMMENT: ABNORMAL
NITRITE UR QL STRIP: NEGATIVE
NONHDLC SERPL-MCNC: 77 MG/DL
PH UR STRIP: 7 [PH] (ref 5–8)
POTASSIUM SERPL-SCNC: 5.2 MMOL/L (ref 3.5–5.1)
PROT SERPL-MCNC: 7.3 G/DL (ref 6–8.4)
PROT UR QL STRIP: NEGATIVE
PTH-INTACT SERPL-MCNC: 140 PG/ML (ref 9–77)
RBC #/AREA URNS AUTO: 1 /HPF (ref 0–4)
SODIUM SERPL-SCNC: 142 MMOL/L (ref 136–145)
SP GR UR STRIP: 1 (ref 1–1.03)
T4 FREE SERPL-MCNC: 1.14 NG/DL (ref 0.71–1.51)
TRIGL SERPL-MCNC: 122 MG/DL (ref 30–150)
TSH SERPL DL<=0.005 MIU/L-ACNC: 0.35 UIU/ML (ref 0.4–4)
URN SPEC COLLECT METH UR: ABNORMAL
WBC #/AREA URNS AUTO: 14 /HPF (ref 0–5)

## 2023-11-09 PROCEDURE — 87086 URINE CULTURE/COLONY COUNT: CPT | Performed by: INTERNAL MEDICINE

## 2023-11-09 PROCEDURE — 81001 URINALYSIS AUTO W/SCOPE: CPT | Performed by: INTERNAL MEDICINE

## 2023-11-09 PROCEDURE — 84439 ASSAY OF FREE THYROXINE: CPT | Performed by: INTERNAL MEDICINE

## 2023-11-09 PROCEDURE — 99213 PR OFFICE/OUTPT VISIT, EST, LEVL III, 20-29 MIN: ICD-10-PCS | Mod: S$GLB,,, | Performed by: INTERNAL MEDICINE

## 2023-11-09 PROCEDURE — 80061 LIPID PANEL: CPT | Performed by: INTERNAL MEDICINE

## 2023-11-09 PROCEDURE — 3074F SYST BP LT 130 MM HG: CPT | Mod: CPTII,S$GLB,, | Performed by: INTERNAL MEDICINE

## 2023-11-09 PROCEDURE — 1157F ADVNC CARE PLAN IN RCRD: CPT | Mod: CPTII,S$GLB,, | Performed by: INTERNAL MEDICINE

## 2023-11-09 PROCEDURE — 1101F PT FALLS ASSESS-DOCD LE1/YR: CPT | Mod: CPTII,S$GLB,, | Performed by: INTERNAL MEDICINE

## 2023-11-09 PROCEDURE — 99999 PR PBB SHADOW E&M-EST. PATIENT-LVL IV: ICD-10-PCS | Mod: PBBFAC,,, | Performed by: INTERNAL MEDICINE

## 2023-11-09 PROCEDURE — 1157F PR ADVANCE CARE PLAN OR EQUIV PRESENT IN MEDICAL RECORD: ICD-10-PCS | Mod: CPTII,S$GLB,, | Performed by: INTERNAL MEDICINE

## 2023-11-09 PROCEDURE — 83970 ASSAY OF PARATHORMONE: CPT | Performed by: INTERNAL MEDICINE

## 2023-11-09 PROCEDURE — 3078F PR MOST RECENT DIASTOLIC BLOOD PRESSURE < 80 MM HG: ICD-10-PCS | Mod: CPTII,S$GLB,, | Performed by: INTERNAL MEDICINE

## 2023-11-09 PROCEDURE — 87077 CULTURE AEROBIC IDENTIFY: CPT | Performed by: INTERNAL MEDICINE

## 2023-11-09 PROCEDURE — 87088 URINE BACTERIA CULTURE: CPT | Performed by: INTERNAL MEDICINE

## 2023-11-09 PROCEDURE — 1101F PR PT FALLS ASSESS DOC 0-1 FALLS W/OUT INJ PAST YR: ICD-10-PCS | Mod: CPTII,S$GLB,, | Performed by: INTERNAL MEDICINE

## 2023-11-09 PROCEDURE — 99999 PR PBB SHADOW E&M-EST. PATIENT-LVL IV: CPT | Mod: PBBFAC,,, | Performed by: INTERNAL MEDICINE

## 2023-11-09 PROCEDURE — 84443 ASSAY THYROID STIM HORMONE: CPT | Performed by: INTERNAL MEDICINE

## 2023-11-09 PROCEDURE — 99213 OFFICE O/P EST LOW 20 MIN: CPT | Mod: S$GLB,,, | Performed by: INTERNAL MEDICINE

## 2023-11-09 PROCEDURE — 1159F PR MEDICATION LIST DOCUMENTED IN MEDICAL RECORD: ICD-10-PCS | Mod: CPTII,S$GLB,, | Performed by: INTERNAL MEDICINE

## 2023-11-09 PROCEDURE — 80053 COMPREHEN METABOLIC PANEL: CPT | Performed by: INTERNAL MEDICINE

## 2023-11-09 PROCEDURE — 82306 VITAMIN D 25 HYDROXY: CPT | Performed by: INTERNAL MEDICINE

## 2023-11-09 PROCEDURE — 3288F PR FALLS RISK ASSESSMENT DOCUMENTED: ICD-10-PCS | Mod: CPTII,S$GLB,, | Performed by: INTERNAL MEDICINE

## 2023-11-09 PROCEDURE — 3288F FALL RISK ASSESSMENT DOCD: CPT | Mod: CPTII,S$GLB,, | Performed by: INTERNAL MEDICINE

## 2023-11-09 PROCEDURE — 1159F MED LIST DOCD IN RCRD: CPT | Mod: CPTII,S$GLB,, | Performed by: INTERNAL MEDICINE

## 2023-11-09 PROCEDURE — 87186 SC STD MICRODIL/AGAR DIL: CPT | Performed by: INTERNAL MEDICINE

## 2023-11-09 PROCEDURE — 3074F PR MOST RECENT SYSTOLIC BLOOD PRESSURE < 130 MM HG: ICD-10-PCS | Mod: CPTII,S$GLB,, | Performed by: INTERNAL MEDICINE

## 2023-11-09 PROCEDURE — 3078F DIAST BP <80 MM HG: CPT | Mod: CPTII,S$GLB,, | Performed by: INTERNAL MEDICINE

## 2023-11-10 ENCOUNTER — TELEPHONE (OUTPATIENT)
Dept: PRIMARY CARE CLINIC | Facility: CLINIC | Age: 83
End: 2023-11-10
Payer: MEDICARE

## 2023-11-10 DIAGNOSIS — E21.3 HYPERPARATHYROIDISM: ICD-10-CM

## 2023-11-10 DIAGNOSIS — N30.90 CYSTITIS: Primary | ICD-10-CM

## 2023-11-10 RX ORDER — CIPROFLOXACIN 250 MG/1
250 TABLET, FILM COATED ORAL 2 TIMES DAILY
Qty: 10 TABLET | Refills: 0 | Status: SHIPPED | OUTPATIENT
Start: 2023-11-10 | End: 2023-11-15

## 2023-11-10 NOTE — TELEPHONE ENCOUNTER
PC with pt's daughter in law.    UTI, rx sent to pharm, cipro 250mg bid x 5d.    Labs show hyperPTH again, Bone density recently stable.    Please sched xray and 24h urine from  facility on Monday, 11/13/23.      SM

## 2023-11-12 LAB — BACTERIA UR CULT: ABNORMAL

## 2023-11-13 ENCOUNTER — HOSPITAL ENCOUNTER (OUTPATIENT)
Dept: RADIOLOGY | Facility: HOSPITAL | Age: 83
Discharge: HOME | End: 2023-11-13
Attending: INTERNAL MEDICINE
Payer: MEDICARE

## 2023-11-13 DIAGNOSIS — E21.3 HYPERPARATHYROIDISM: ICD-10-CM

## 2023-11-13 PROCEDURE — 74018 RADEX ABDOMEN 1 VIEW: CPT | Mod: TC,FY,PO

## 2023-11-13 PROCEDURE — 74018 XR ABDOMEN AP 1 VIEW: ICD-10-PCS | Mod: 26,,, | Performed by: RADIOLOGY

## 2023-11-13 PROCEDURE — 74018 RADEX ABDOMEN 1 VIEW: CPT | Mod: 26,,, | Performed by: RADIOLOGY

## 2023-11-14 ENCOUNTER — TELEPHONE (OUTPATIENT)
Dept: CARDIOLOGY | Facility: CLINIC | Age: 83
End: 2023-11-14
Payer: MEDICARE

## 2023-11-14 NOTE — TELEPHONE ENCOUNTER
Gave fax number to fax b/p readings----- Message from Lona Villagran sent at 11/14/2023  1:59 PM CST -----  Pt's  daughter in law is requesting a call back regarding blood pressure images. Call back number is [phone]. x.EL

## 2023-11-16 ENCOUNTER — CLINICAL SUPPORT (OUTPATIENT)
Dept: REHABILITATION | Facility: HOSPITAL | Age: 83
End: 2023-11-16
Payer: MEDICARE

## 2023-11-16 ENCOUNTER — TELEPHONE (OUTPATIENT)
Dept: PRIMARY CARE CLINIC | Facility: CLINIC | Age: 83
End: 2023-11-16
Payer: MEDICARE

## 2023-11-16 DIAGNOSIS — E21.3 HYPERPARATHYROIDISM: Primary | ICD-10-CM

## 2023-11-16 DIAGNOSIS — R26.9 GAIT ABNORMALITY: ICD-10-CM

## 2023-11-16 DIAGNOSIS — M25.60 DECREASED RANGE OF MOTION: ICD-10-CM

## 2023-11-16 DIAGNOSIS — M25.562 CHRONIC PAIN OF BOTH KNEES: ICD-10-CM

## 2023-11-16 DIAGNOSIS — Z74.09 DECREASED STRENGTH, ENDURANCE, AND MOBILITY: Primary | ICD-10-CM

## 2023-11-16 DIAGNOSIS — R53.1 DECREASED STRENGTH, ENDURANCE, AND MOBILITY: Primary | ICD-10-CM

## 2023-11-16 DIAGNOSIS — M25.561 CHRONIC PAIN OF BOTH KNEES: ICD-10-CM

## 2023-11-16 DIAGNOSIS — G89.29 CHRONIC PAIN OF BOTH KNEES: ICD-10-CM

## 2023-11-16 DIAGNOSIS — R68.89 DECREASED STRENGTH, ENDURANCE, AND MOBILITY: Primary | ICD-10-CM

## 2023-11-16 PROCEDURE — 97110 THERAPEUTIC EXERCISES: CPT | Mod: PN

## 2023-11-16 PROCEDURE — 97162 PT EVAL MOD COMPLEX 30 MIN: CPT | Mod: PN

## 2023-11-16 NOTE — TELEPHONE ENCOUNTER
PC with daughter in law:    KUB no stones, Ca not elevated in 24h urine.  Will continue to follow lab.    Please sched lab prior to appt in Feb.  SM

## 2023-11-16 NOTE — PLAN OF CARE
[facility]   Physical Therapy Initial Evaluation      Date: 11/16/2023   Name: Lorenzo Ospina  Clinic Number: [id number]    Therapy Diagnosis:    Encounter Diagnoses   Name Primary?    Chronic pain of both knees     Decreased strength, endurance, and mobility Yes    Gait abnormality     Decreased range of motion       Physician: Lorena Fiore MD     Physician Orders: PT Eval and Treat  Medical Diagnosis from Referral: Chronic pain of both knees  Evaluation Date: 11/16/2023  Authorization Period Expiration: 12/31/2023  Plan of Care Expiration: 1/16/24  Progress Note Due: 12/16/2023  Visit # / Visits authorized: 1/1  FOTO: 1/3 (last performed on 11/16/2023)    Precautions: Standard, cancer, and HTN, osteoporosis    Time In: 12:40 pm  Time Out: 1:30 pm  Total Billable Time (timed & untimed codes): 50 minutes    SUBJECTIVE   Date of onset: a few months    History of current condition - Lorenzo reports having pain in her hips and then spreads to knees. She has more pain in the morning when she wakes up and as she moves around her pain gets better. She has had swelling and papin in her left ankle and saw Dr Андрей Gerber earlier this year and he put her into a boot, but it made her balance too bad so she did not wear.  She has been putting Voltaren on her knees and it has been helping.  She did recently go to her sons house with stairs and going up and down the stairs made her tired. No increased pain from stairs.  She is concerned that her knees make noise when she bends and straighten them - she is worried that there is something wrong because the noise they make is loud.    Current Activity Level: Household activities.    Falls: [x] No  [] Yes:     Imaging: [x] Xray [] MRI [] CT:   FINDINGS:  No acute fracture.  Joint alignments are anatomic.  Mild right patellofemoral joint space loss.  Joint spaces otherwise appear maintained.  No left joint effusion.  Soft tissue structures  appear within normal limits.     Prior Therapy:  [x] No  [] Yes:   Social History: Patient lives alone   Occupation: Patient is retired  Prior Level of Function: not asked  Current Level of Function:  walking 15 min, can stand for long time 30 min in kitchen.    Pain:  Current 0/10, worst 5/10, best 0/10   Location: bilateral   Description: Aching  Aggravating Factors: first thing in morning when wakes up  Easing Factors:  voltaren, circles of legs    Patients goals: Decrease the pain in the morning.    Medical History:   Past Medical History:   Diagnosis Date    A-fib 11/5/2019    Breast cancer     right, dx 2008.  Dr. Callaway follows.s/p lumpectomy and arimidex x 5y.    Hyperlipidemia     Hypertension     OP (osteoporosis)     on bisphos x 2y.    TIA (transient ischemic attack)     Toe fracture        Surgical History:   Lorenzo Ospina  has a past surgical history that includes Breast lumpectomy; Total abdominal hysterectomy; Transesophageal echocardiography (N/A, 11/5/2019); Treatment of cardiac arrhythmia (N/A, 11/5/2019); Treatment of cardiac arrhythmia (N/A, 11/5/2019); Treatment of cardiac arrhythmia (N/A, 2/4/2020); and Treatment of cardiac arrhythmia (N/A, 3/2/2020).    Medications:   Lorenzo has a current medication list which includes the following prescription(s): albuterol, apixaban, atorvastatin, buspirone, cyanocobalamin, diclofenac sodium, fish oil-omega-3 fatty acids, glucosam/chond/hyalu/cf borate, ipratropium, ipratropium, losartan, metoprolol succinate, multivit-iron-min-folic acid, omeprazole, ranolazine, and vitamin d, and the following Facility-Administered Medications: methylprednisolone acetate.    Allergies:   Review of patient's allergies indicates:  No Known Allergies     OBJECTIVE     Posture:  Patient presents with postural abnormalities which include:    [x] Forward Head   [] Increased Lumbar Lordosis   [x] Rounded Shoulder   [] Flat Back Posture   [] Increased Thoracic  Kyphosis [] Pes Planus   [] Increased Trunk Sway  [] Valgus knee position   [] Increased Trunk Rotation  [] Varus knee position   [] Increased cervical lordosis [] Other:    Sensation:    Sensation to light touch over UE's is  [] Intact [] Impaired [x] Defer  Sensation to light touch over LE's is  [x] Intact [] Impaired [] Defer    Reflexes:  Patellar   [x] Defer [] Normal [] Hyper []  Hypo   Achilles  [x] Defer [] Normal [] Hyper []  Hypo  Tricep  [x] Defer [] Normal [] Hyper []  Hypo  Brachioradialis [x] Defer [] Normal [] Hyper []  Hypo      Gait Analysis: The patient ambulated with the following assistive device: none; the patient presents with the following gait abnormalities: occasional unsteady gait, decreased step length, increased base of support, and decreased knee flexion/extension with hip drop and trunk shift.     Balance  Right   (seconds) Left  (seconds) Norms   Single Leg Stance 7 7 Less than 4.9 sec high risk  5-6.4 sec increased risk  6.5 or greater low risk       Functional Tests  Outcome Norms   Timed Up and Go 11.62 <13.5 sec   30 second Sit to Stand 10x Age Male Female   60-64 <14 <12   65-69 <12 <11   70-74 <12 <10   75-79 <11 <10   80-84 <10 <9   85-89 <8 <8   90-93 <7 <4      5x sit to stand 15.13 60s: <11.4 sec  70s: <12.6 sec  80s: 14.8 sec        Range of Motion:    Knee AROM/PROM Right Left Pain/Dysfunction with Movement   Knee Flexion (135º) 125 120    Knee Extension (0º) sitting  -9 0          AROM:  Motion: Movement Results:   Multi-Segmental Flexion lower shin   Multi-Segmental Extension 25%   Multi-Segmental Rotation 90%   Arms Down Deep Squat defer       Strength:    L/E MMT Right   Left Pain/Dysfunction with Movement   Hip Flexion  3/5 3/5    Knee Extension 4-/5 4-/5    Knee Flexion 3+/5 3+/5    Hip IR 3+/5 3+/5    Hip ER 3+/5 3+/5    Ankle DF 4+/5 4+/5    Ankle PF 4+/5 4+/5        Muscle Length:       Muscle Tested  Right Left  Limitation   Hamstrings  [] Normal      [x] Limited  [] Normal      [x] Limited    Piriformis  [] Normal      [x] Limited [] Normal      [x] Limited    Gastrocnemius  [] Normal      [x] Limited [] Normal      [x] Limited    Soleus  [] Normal      [x] Limited [] Normal      [x] Limited        Joint Mobility:   JOINT MOBILITY CHARTS:   Joint Motion Right Mobility  (spine) Left Mobility Goal   Patellar Medial Glide  [x] Hypo     [] Normal     [] Hyper [x] Hypo     [] Normal     [] Hyper Normal    Patellar Lateral Glide  [x] Hypo     [] Normal     [] Hyper [x] Hypo     [] Normal     [] Hyper Normal    Patellar Superior Glide  [x] Hypo     [] Normal     [] Hyper [x] Hypo     [] Normal     [] Hyper Normal    Patellar Inferior Glide  [x] Hypo     [] Normal     [] Hyper [x] Hypo     [] Normal     [] Hyper Normal        Special Tests:  defer    Palpation:  defer      FOTO:    Intake Outcome Measure for FOTO not assessed Survey    Therapist reviewed FOTO scores for Lorenzo Ospina on 11/16/2023.   FOTO documents entered into [employer] - see Media section or FOTO account episode details..    Intake Score: unable to obtain FOTO today due to lack of  services.         TREATMENT     Total Treatment time (time-based codes) separate from Evaluation: (15) minutes     Lorenzo received the treatments listed below:   Lorenzo received therapeutic exercises to develop strength, ROM, flexibility, and core stabilization for 15 minutes including:    Intervention 11/16/2023  Parameters   See HEP x                            PATIENT EDUCATION AND HOME EXERCISES     Education provided:   - Patient  was instructed in home exercise program  to address the deficits listed above and to address overall condition and quality of life. Patient  was encouraged to participate in cardiovascular exercise and wellness exercise in fitness center with assistance of health  at [address].   - Patient was educated on all the above exercise prior/during/after for proper posture,  positioning, and execution for safe performance with home exercise program.    Written Home Exercises Provided: yes. Exercises were reviewed and Lorenzo was able to demonstrate them prior to the end of the session.  Lorenzo demonstrated good  understanding of the education provided. See EMR under Patient Instructions for exercises provided during therapy sessions.    ASSESSMENT     Lorenzo is a 83 year old female referred to outpatient Physical Therapy with a medical diagnosis of chronic pain of both knees. The patient presents with signs and symptoms consistent with diagnosis along with general deconditioning and impairments which include weakness, impaired functional mobility, impaired balance, decreased lower extremity function, decreased ROM, impaired joint extensibility, and impaired muscle length.    Patient  will require follow up with physical therapy to monitor and establish safe and effective exercise program    Patient prognosis is Good.   Patient will benefit from skilled outpatient Physical Therapy to address the deficits stated above and in the chart below, provide patient /family education, and to maximize patientt's level of independence.     Plan of care discussed with patient: Yes  Patient's spiritual, cultural and educational needs considered and patient is agreeable to the plan of care and goals as stated below:     Anticipated Barriers for therapy: co-morbidities, sedentary lifestyle, chronicity of condition, lack of understanding of condition, lack of support system, social background, and coping style    Medical Necessity is demonstrated by the following   History  Co-morbidities and personal factors that may impact the plan of care [] LOW: no personal factors / co-morbidities  [] MODERATE: 1-2 personal factors / co-morbidities  [x] HIGH: 3+ personal factors / co-morbidities    Moderate / High Support Documentation:   Past Medical History:   Diagnosis Date    A-fib 11/5/2019    Breast  cancer     right, dx 2008.  Dr. Callaway follows.s/p lumpectomy and arimidex x 5y.    Hyperlipidemia     Hypertension     OP (osteoporosis)     on bisphos x 2y.    TIA (transient ischemic attack)     Toe fracture          Examination  Body Structures and Functions, activity limitations and participation restrictions that may impact the plan of care [] LOW: addressing 1-2 elements  [x] MODERATE: 2+ elements  [] HIGH: 3+ elements (please support below)    Moderate / High Support Documentation: see evaluation/objective measurements above.     Clinical Presentation [] LOW: stable  [x] MODERATE: Evolving  [] HIGH: Unstable     Decision Making/ Complexity Score: moderate         Goals:  Pt will be independent with self management of his/her condition with home exercise program, posture, positioning and improved body mechanics.  2.   Pt will safely transition to and participate in wellness/fitness program.     PLAN   Plan of care Certification: 11/16/2023 to 1/16/24.    Outpatient Physical Therapy 1 times/  in 2 week(s) to include the following interventions: Manual Therapy, Neuromuscular Re-ed, Patient Education, Self Care, Therapeutic Activities, and Therapeutic Exercise to establish safe and effective exercise program that patient can perform independently. Health  will contact patient either by phone or in person weekly to monitor exercise program, answer questions regarding exercises and encourage follow up in fitness center. Health  will communicate any concerns with treating therapist and will recommend follow up with physical therapist as needed.     Laurel Rodríguez, PT

## 2023-11-30 ENCOUNTER — CLINICAL SUPPORT (OUTPATIENT)
Dept: REHABILITATION | Facility: HOSPITAL | Age: 83
End: 2023-11-30
Payer: MEDICARE

## 2023-11-30 DIAGNOSIS — R53.1 DECREASED STRENGTH, ENDURANCE, AND MOBILITY: Primary | ICD-10-CM

## 2023-11-30 DIAGNOSIS — R68.89 DECREASED STRENGTH, ENDURANCE, AND MOBILITY: Primary | ICD-10-CM

## 2023-11-30 DIAGNOSIS — R26.9 GAIT ABNORMALITY: ICD-10-CM

## 2023-11-30 DIAGNOSIS — Z74.09 DECREASED STRENGTH, ENDURANCE, AND MOBILITY: Primary | ICD-10-CM

## 2023-11-30 DIAGNOSIS — M25.60 DECREASED RANGE OF MOTION: ICD-10-CM

## 2023-11-30 PROCEDURE — 97110 THERAPEUTIC EXERCISES: CPT | Mod: PN

## 2023-11-30 PROCEDURE — 97112 NEUROMUSCULAR REEDUCATION: CPT | Mod: PN

## 2023-11-30 PROCEDURE — 97530 THERAPEUTIC ACTIVITIES: CPT | Mod: PN

## 2023-11-30 NOTE — PROGRESS NOTES
[facility] OUT[facility] THERAP[facility] AND WELL[facility]   Physical Therapy Treatment Note        Name: Lorenzo Ospina  Clinic Number: [id number]    Therapy Diagnosis: No diagnosis found.  Physician: Lorena Fiore MD    Visit Date: 11/30/2023      Physician Orders: PT Eval and Treat  Medical Diagnosis from Referral: Chronic pain of both knees  Evaluation Date: 11/16/2023  Authorization Period Expiration: 12/31/2023  Plan of Care Expiration: 1/16/24  Progress Note Due: 12/16/2023  Visit # / Visits authorized: 1/1  FOTO: 1/3 (last performed on 11/16/2023)     Precautions: Standard, cancer, and HTN, osteoporosis      Time In: 12:30 pm  Time Out: 1:30 pm  Total Billable Time: 53 minutes      SUBJECTIVE     Pt reports: her knee is feeling better and she is not having any pain in it. She has been doing her exercises at home every day.    She was compliant with home exercise program.  Response to previous treatment: less pain  Functional change:  less pain    Pain: 0/10  Location: bilateral knee      OBJECTIVE     Objective Measures updated at progress report unless specified.       TREATMENT     Lorenzo received the treatments listed below:      therapeutic exercises to develop strength, endurance, ROM, flexibility, and core stabilization for 15 minutes including:    Intervention 11/30/2023  Parameters   nustep x 7 min        LAQ x 2# 10x/3   LTR x  10x                                          manual therapy techniques: Joint mobilizations, Myofacial release, and Soft tissue Mobilization were applied to the: lower extremities  for 0 minutes, including:    Manual Intervention 11/30/2023     Soft Tissue Mobilization     Joint Mobilizations     Mobilization with movement          Functional Dry Needling           neuromuscular re-education activities to improve: Balance, Coordination, Proprioception, and Posture for 28 minutes. The following activities were included:    Intervention 11/30/2023  Parameters   Bridge  x 10x/ 2 sets    Sidelying hip abduction  x 10x        SL balance x 30s 3x each   Heel/toe raises x 15x   Seated marching x 20x   Sit to stand x 5x/ 3 sets, last set with 4# in hands             Home exercise program review x           Lorenzo participated in dynamic functional therapeutic activities to improve functional performance for 10  minutes, including:    Intervention 11/30/2023  Parameters   Side stepping x 5 laps, 10 feet   Step up x 10x/ each 6 inch step                                                        PATIENT EDUCATION AND HOME EXERCISES     Home Exercises Provided and Patient Education Provided     Education provided:   - home exercise program importance of continued strengthening    Written Home Exercises Provided: yes. Exercises were reviewed and Lorenzo was able to demonstrate them prior to the end of the session.  Lorenzo demonstrated good  understanding of the education provided. See EMR under Patient Instructions for exercises provided during therapy sessions      ASSESSMENT     Patient tolerated all interventions and progressions well, she did have increased fatigue with all interventions and did require minor rest breaks. She requires some cues for interventions today but should do well with home exercise program and has plans to follow up with Health .    Lorenzo Is progressing well towards her goals.   Pt prognosis is Good.     Pt will continue to benefit from skilled outpatient physical therapy to address the deficits listed in the problem list box on initial evaluation, provide pt/family education and to maximize pt's level of independence in the home and community environment.     Pt's spiritual, cultural and educational needs considered and pt agreeable to plan of care and goals.     Anticipated barriers to physical therapy:  co-morbidities, sedentary lifestyle, chronicity of condition, lack of understanding of condition, lack of support system, social background, and coping style      Goals:   Reviewed: 11/30/2023    Pt will be independent with self management of his/her condition with home exercise program, posture, positioning and improved body mechanics.  2.   Pt will safely transition to and participate in wellness/fitness program.     PLAN     At this time patient has been established with a safe and effective home exercise program that can be performed independently. Health  will contact patient either by phone or in person weekly to monitor exercise program, answer questions regarding exercises and encourage follow up in fitness center. Will DC to self care.     Laurel Rodríguez, PT

## 2023-12-01 PROBLEM — M25.60 DECREASED RANGE OF MOTION: Status: RESOLVED | Noted: 2023-11-30 | Resolved: 2023-11-30

## 2023-12-01 PROBLEM — R26.9 GAIT ABNORMALITY: Status: RESOLVED | Noted: 2023-11-30 | Resolved: 2023-11-30

## 2023-12-01 PROBLEM — R68.89 DECREASED STRENGTH, ENDURANCE, AND MOBILITY: Status: RESOLVED | Noted: 2023-11-30 | Resolved: 2023-11-30

## 2023-12-01 PROBLEM — R53.1 DECREASED STRENGTH, ENDURANCE, AND MOBILITY: Status: RESOLVED | Noted: 2023-11-30 | Resolved: 2023-11-30

## 2023-12-01 PROBLEM — Z74.09 DECREASED STRENGTH, ENDURANCE, AND MOBILITY: Status: RESOLVED | Noted: 2023-11-30 | Resolved: 2023-11-30

## 2023-12-01 NOTE — PLAN OF CARE
[facility]  Physical Therapy Discharge Note    Name: Lorenzo Ospina  Clinic Number: [id number]    Therapy Diagnosis:   Encounter Diagnoses   Name Primary?    Decreased strength, endurance, and mobility Yes    Gait abnormality     Decreased range of motion      Physician: Lorena Fiore MD       Physician Orders: PT Eval and Treat  Medical Diagnosis from Referral: Chronic pain of both knees  Evaluation Date: 11/16/2023      Date of Last visit: 11/30/2023   Total Visits Received: 2    ASSESSMENT      See daily note    Discharge reason: Patient has met all of his/her goals    Discharge FOTO Score: see daily note    Goals: see daily note    PLAN   This patient is discharged from Physical Therapy      Laurel Rodríguez, PT

## 2023-12-07 ENCOUNTER — DOCUMENTATION ONLY (OUTPATIENT)
Dept: REHABILITATION | Facility: HOSPITAL | Age: 83
End: 2023-12-07
Payer: MEDICARE

## 2023-12-07 NOTE — PROGRESS NOTES
Health  Consult Note    Name: Lorenzo Ospina  Clinic Number: [id number]  Physician: No ref. provider found  Past Medical History:   Diagnosis Date    A-fib 11/5/2019    Breast cancer     right, dx 2008.  Dr. Callaway follows.s/p lumpectomy and arimidex x 5y.    Hyperlipidemia     Hypertension     OP (osteoporosis)     on bisphos x 2y.    TIA (transient ischemic attack)     Toe fracture      One a scale of 1-10, with 10 being 100% happy, how would you rate your happiness in each of the wellness areas below?    Happiness:         1     2     3     4     5     6     7     8     9     10    Initial Date: DC Date: +/- Total Change   Exercise/Movement Does exercises at home, goes on walkss,      Physical Health Knee pain, back pain      Stress Level Low      Nutrition Mostly eats home cooked meals and doesn't snack, has a light lunch and larger breakfast and dinner, drinks, water and logan as well as orange juice     Sleep Sometimes wakes up in the middle of the night, wakes up feeling rested     Play x     Body Image x     Relationships 10     Energy/Vitality Medium energy during the week         Health : Gayatri Curry MA  12/7/2023

## 2023-12-08 ENCOUNTER — DOCUMENTATION ONLY (OUTPATIENT)
Dept: REHABILITATION | Facility: HOSPITAL | Age: 83
End: 2023-12-08
Payer: MEDICARE

## 2023-12-08 NOTE — PROGRESS NOTES
8 min nustep  Walks up and down room 2x  Side walks up and down room 2x  Palhoff press  High knees against wall  Overhead press

## 2023-12-28 ENCOUNTER — TELEPHONE (OUTPATIENT)
Dept: PRIMARY CARE CLINIC | Facility: CLINIC | Age: 83
End: 2023-12-28
Payer: MEDICARE

## 2023-12-28 RX ORDER — DOXYCYCLINE 100 MG/1
100 CAPSULE ORAL 2 TIMES DAILY
Qty: 20 CAPSULE | Refills: 0 | Status: SHIPPED | OUTPATIENT
Start: 2023-12-28 | End: 2024-01-07

## 2023-12-28 RX ORDER — PREDNISONE 20 MG/1
TABLET ORAL
Qty: 9 TABLET | Refills: 0 | Status: SHIPPED | OUTPATIENT
Start: 2023-12-28 | End: 2024-02-12

## 2024-01-16 ENCOUNTER — DOCUMENTATION ONLY (OUTPATIENT)
Dept: REHABILITATION | Facility: HOSPITAL | Age: 84
End: 2024-01-16
Payer: MEDICARE

## 2024-01-22 ENCOUNTER — DOCUMENTATION ONLY (OUTPATIENT)
Dept: REHABILITATION | Facility: HOSPITAL | Age: 84
End: 2024-01-22
Payer: MEDICARE

## 2024-01-22 NOTE — PROGRESS NOTES
8 min nustep  Walks up and down room 2x  Side walks up and down room 2x  Palhoff press  High knees against wall  Overhead press   Lat raises and side raises

## 2024-01-29 ENCOUNTER — DOCUMENTATION ONLY (OUTPATIENT)
Dept: REHABILITATION | Facility: HOSPITAL | Age: 84
End: 2024-01-29
Payer: MEDICARE

## 2024-02-02 ENCOUNTER — LAB VISIT (OUTPATIENT)
Dept: LAB | Facility: HOSPITAL | Age: 84
End: 2024-02-02
Attending: INTERNAL MEDICINE
Payer: MEDICARE

## 2024-02-02 DIAGNOSIS — E21.3 HYPERPARATHYROIDISM: ICD-10-CM

## 2024-02-02 LAB
ANION GAP SERPL CALC-SCNC: 11 MMOL/L (ref 8–16)
BUN SERPL-MCNC: 8 MG/DL (ref 8–23)
CALCIUM SERPL-MCNC: 10.8 MG/DL (ref 8.7–10.5)
CHLORIDE SERPL-SCNC: 106 MMOL/L (ref 95–110)
CO2 SERPL-SCNC: 24 MMOL/L (ref 23–29)
CREAT SERPL-MCNC: 0.7 MG/DL (ref 0.5–1.4)
EST. GFR  (NO RACE VARIABLE): >60 ML/MIN/1.73 M^2
GLUCOSE SERPL-MCNC: 102 MG/DL (ref 70–110)
POTASSIUM SERPL-SCNC: 4.4 MMOL/L (ref 3.5–5.1)
PTH-INTACT SERPL-MCNC: 143.6 PG/ML (ref 9–77)
SODIUM SERPL-SCNC: 141 MMOL/L (ref 136–145)

## 2024-02-02 PROCEDURE — 36415 COLL VENOUS BLD VENIPUNCTURE: CPT | Mod: PO | Performed by: INTERNAL MEDICINE

## 2024-02-02 PROCEDURE — 83970 ASSAY OF PARATHORMONE: CPT | Performed by: INTERNAL MEDICINE

## 2024-02-02 PROCEDURE — 80048 BASIC METABOLIC PNL TOTAL CA: CPT | Performed by: INTERNAL MEDICINE

## 2024-02-05 ENCOUNTER — DOCUMENTATION ONLY (OUTPATIENT)
Dept: REHABILITATION | Facility: HOSPITAL | Age: 84
End: 2024-02-05
Payer: MEDICARE

## 2024-02-08 ENCOUNTER — DOCUMENTATION ONLY (OUTPATIENT)
Dept: REHABILITATION | Facility: HOSPITAL | Age: 84
End: 2024-02-08
Payer: MEDICARE

## 2024-02-12 ENCOUNTER — OFFICE VISIT (OUTPATIENT)
Dept: PULMONOLOGY | Facility: CLINIC | Age: 84
End: 2024-02-12
Payer: MEDICARE

## 2024-02-12 ENCOUNTER — CLINICAL SUPPORT (OUTPATIENT)
Dept: PULMONOLOGY | Facility: CLINIC | Age: 84
End: 2024-02-12
Payer: MEDICARE

## 2024-02-12 ENCOUNTER — HOSPITAL ENCOUNTER (OUTPATIENT)
Dept: RADIOLOGY | Facility: HOSPITAL | Age: 84
Discharge: HOME | End: 2024-02-12
Attending: INTERNAL MEDICINE
Payer: MEDICARE

## 2024-02-12 VITALS
BODY MASS INDEX: 33.84 KG/M2 | HEART RATE: 91 BPM | RESPIRATION RATE: 14 BRPM | SYSTOLIC BLOOD PRESSURE: 100 MMHG | OXYGEN SATURATION: 97 % | WEIGHT: 172.38 LBS | DIASTOLIC BLOOD PRESSURE: 60 MMHG | HEIGHT: 60 IN

## 2024-02-12 DIAGNOSIS — J41.0 SIMPLE CHRONIC BRONCHITIS: ICD-10-CM

## 2024-02-12 DIAGNOSIS — J84.10 PULMONARY FIBROSIS, POSTINFLAMMATORY: ICD-10-CM

## 2024-02-12 DIAGNOSIS — I50.32 CHRONIC DIASTOLIC CONGESTIVE HEART FAILURE: Primary | ICD-10-CM

## 2024-02-12 DIAGNOSIS — R05.3 CHRONIC COUGH: ICD-10-CM

## 2024-02-12 DIAGNOSIS — J18.9 ATYPICAL PNEUMONIA: ICD-10-CM

## 2024-02-12 DIAGNOSIS — R60.0 LOCALIZED EDEMA: ICD-10-CM

## 2024-02-12 LAB
BRPFT: NORMAL
FEF 25 75 LLN: 0.56
FEF 25 75 PRE REF: 83.8 %
FEF 25 75 REF: 1.37
FEV1 FVC LLN: 62
FEV1 FVC PRE REF: 93 %
FEV1 FVC REF: 77
FEV1 LLN: 1.14
FEV1 PRE REF: 104.1 %
FEV1 REF: 1.63
FVC LLN: 1.49
FVC PRE REF: 110.6 %
FVC REF: 2.14
PEF LLN: 2.58
PEF PRE REF: 109.5 %
PEF REF: 4.08
PRE FEF 25 75: 1.15 L/S (ref 0.56–2.17)
PRE FET 100: 9.92 SEC
PRE FEV1 FVC: 71.97 % (ref 62.34–92.38)
PRE FEV1: 1.7 L (ref 1.14–2.13)
PRE FVC: 2.37 L (ref 1.49–2.79)
PRE PEF: 4.46 L/S (ref 2.58–5.57)

## 2024-02-12 PROCEDURE — 99999 PR PBB SHADOW E&M-EST. PATIENT-LVL IV: CPT | Mod: PBBFAC,,, | Performed by: INTERNAL MEDICINE

## 2024-02-12 PROCEDURE — 71046 X-RAY EXAM CHEST 2 VIEWS: CPT | Mod: 26,,, | Performed by: RADIOLOGY

## 2024-02-12 PROCEDURE — 71046 X-RAY EXAM CHEST 2 VIEWS: CPT | Mod: TC

## 2024-02-12 PROCEDURE — 94010 BREATHING CAPACITY TEST: CPT | Mod: S$GLB,,, | Performed by: INTERNAL MEDICINE

## 2024-02-12 PROCEDURE — 99215 OFFICE O/P EST HI 40 MIN: CPT | Mod: 25,S$GLB,, | Performed by: INTERNAL MEDICINE

## 2024-02-12 RX ORDER — FUROSEMIDE 20 MG/1
20 TABLET ORAL DAILY
Qty: 30 TABLET | Refills: 11 | Status: SHIPPED | OUTPATIENT
Start: 2024-02-12 | End: 2025-02-11

## 2024-02-12 RX ORDER — IPRATROPIUM BROMIDE 0.5 MG/2.5ML
500 SOLUTION RESPIRATORY (INHALATION) 4 TIMES DAILY
Qty: 300 ML | Refills: 11 | Status: SHIPPED | OUTPATIENT
Start: 2024-02-12

## 2024-02-12 NOTE — ASSESSMENT & PLAN NOTE
Noted   Patient is identified as having Diastolic (HFpEF) heart failure that is Chronic. CHF is currently uncontrolled due to Continued edema of extremities. Latest ECHO performed and demonstrates- Results for orders placed during the hospital encounter of 04/22/21    Echo Color Flow Doppler? Yes    Interpretation Summary  · Concentric hypertrophy and normal systolic function.  · Severe left atrial enlargement.  · Severe right atrial enlargement.  · With normal right ventricular systolic function.  · The estimated ejection fraction is 55%.  · Grade III left ventricular diastolic dysfunction.  · Normal central venous pressure (3 mmHg).  · The estimated PA systolic pressure is 34 mmHg.  · Mild mitral regurgitation.  · Mild tricuspid regurgitation.  . Continue Beta Blocker and ACE/ARB and monitor clinical status closely. Monitor on telemetry. Patient is off CHF pathway.  Monitor strict Is&Os and daily weights.  Place on fluid restriction of  not discussed . Cardiology has been consulted. Continue to stress to patient importance of self efficacy and  on diet for CHF. Last BNP reviewed- and noted below   @LABRCNTIP(BNP,BNPTRIAGEBLO)@  Start lasix.

## 2024-02-12 NOTE — PROGRESS NOTES
Subjective:     Patient ID: Lorenzo Ospina is a 83 y.o. female.    Chief Complaint:  Worsening edema    HPI      Dyspnea  Patient complains of shortness of breath. Symptoms occur intermittenly - one flight of stairs. Symptoms began 5 years ago, gradually improving since. Associated symptoms include   palpitations . She denies chest pain, located left chest. She does not have had recent travel. Weight has been stable. Symptoms are exacerbated by strenuous activity. Symptoms are alleviated by rest.     History of pneumonia \(COVID) in the past with stable postinflammatory changes on Chest X Ray and CT of chest    Past Medical History:   Diagnosis Date    A-fib 11/5/2019    Breast cancer     right, dx 2008.  Dr. Callaway follows.s/p lumpectomy and arimidex x 5y.    Hyperlipidemia     Hypertension     OP (osteoporosis)     on bisphos x 2y.    TIA (transient ischemic attack)     Toe fracture      Past Surgical History:   Procedure Laterality Date    BREAST LUMPECTOMY      right 2008    TOTAL ABDOMINAL HYSTERECTOMY      no cancer    TRANSESOPHAGEAL ECHOCARDIOGRAPHY N/A 11/5/2019    Procedure: ECHOCARDIOGRAM, TRANSESOPHAGEAL;  Surgeon: Otto Flores MD;  Location: [facility] CATH LAB;  Service: Cardiology;  Laterality: N/A;    TREATMENT OF CARDIAC ARRHYTHMIA N/A 11/5/2019    Procedure: CARDIOVERSION;  Surgeon: Otto Flores MD;  Location: [facility] CATH LAB;  Service: Cardiology;  Laterality: N/A;    TREATMENT OF CARDIAC ARRHYTHMIA N/A 11/5/2019    Procedure: CARDIOVERSION;  Surgeon: Otto Flores MD;  Location: [facility] CATH LAB;  Service: Cardiology;  Laterality: N/A;    TREATMENT OF CARDIAC ARRHYTHMIA N/A 2/4/2020    Procedure: CARDIOVERSION;  Surgeon: Otto Flores MD;  Location: [facility] CATH LAB;  Service: Cardiology;  Laterality: N/A;    TREATMENT OF CARDIAC ARRHYTHMIA N/A 3/2/2020    Procedure: CARDIOVERSION;  Surgeon: Mayi Vega MD;  Location: [facility] CATH LAB;  Service: Cardiology;  Laterality: N/A;  830 start per Dr. vega     Review of  patient's allergies indicates:  No Known Allergies  Current Outpatient Medications on File Prior to Visit   Medication Sig Dispense Refill    albuterol (PROVENTIL/VENTOLIN HFA) 90 mcg/actuation inhaler INHALE 2 PUFFS INTO THE LUNGS EVERY 6 (SIX) HOURS AS NEEDED FOR WHEEZING. RESCUE 25.5 g 3    apixaban (ELIQUIS) 5 mg Tab Take 1 tablet (5 mg total) by mouth 2 (two) times daily. 60 tablet 5    atorvastatin (LIPITOR) 20 MG tablet Take 1 tablet (20 mg total) by mouth once daily. 90 tablet 3    busPIRone (BUSPAR) 7.5 MG tablet Take 1 tablet (7.5 mg total) by mouth 3 (three) times daily as needed. 50 tablet 5    cyanocobalamin (VITAMIN B-12) 1000 MCG tablet Take 100 mcg by mouth once daily.      diclofenac sodium (VOLTAREN) 1 % Gel Apply 2 g topically once daily. 50 g 11    fish oil-omega-3 fatty acids 300-1,000 mg capsule Take 2 g by mouth once daily.      glucosam/chond/hyalu/CF borate (MOVE FREE [facility] ORAL) Take 1 tablet by mouth once daily.      ipratropium (ATROVENT) 42 mcg (0.06 %) nasal spray 2 sprays by Each Nostril route 3 (three) times daily as needed for Rhinitis (nasal drip). 15 mL 11    losartan (COZAAR) 25 MG tablet Take 1 tablet (25 mg total) by mouth 2 (two) times a day. 180 tablet 3    metoprolol succinate (TOPROL-XL) 50 MG 24 hr tablet Take 1 tablet (50 mg total) by mouth once daily. 90 tablet 3    MULTIVIT-IRON-MIN-FOLIC ACID 3,500-18-0.4 UNIT-MG-MG ORAL CHEW Take by mouth.      omeprazole (PRILOSEC) 40 MG capsule Take 40 mg by mouth once daily.      ranolazine (RANEXA) 500 MG Tb12 Take 1 tablet (500 mg total) by mouth 2 (two) times daily. 180 tablet 3    vitamin D 1000 units Tab Take 2,000 Units by mouth once daily.      [DISCONTINUED] ipratropium (ATROVENT) 0.02 % nebulizer solution Take 2.5 mLs (500 mcg total) by nebulization 4 (four) times daily. 300 mL 11    [DISCONTINUED] predniSONE (DELTASONE) 20 MG tablet 2 together daily x 3d, then 1 po daily. 9 tablet 0     No current  facility-administered medications on file prior to visit.     Social History     Socioeconomic History    Marital status:    Tobacco Use    Smoking status: Former     Current packs/day: 0.00     Average packs/day: 1.5 packs/day for 22.7 years (34.1 ttl pk-yrs)     Types: Cigarettes     Start date: 1972     Quit date: 1994     Years since quittin.3    Smokeless tobacco: Never   Substance and Sexual Activity    Alcohol use: Not Currently    Drug use: Never    Sexual activity: Not Currently     Birth control/protection: Abstinence     History reviewed. No pertinent family history.    Review of Systems   Constitutional:  Positive for weakness. Negative for fatigue.   Respiratory:  Positive for shortness of breath and dyspnea on extertion. Negative for Paroxysmal Nocturnal Dyspnea.    Cardiovascular:  Positive for leg swelling.   Musculoskeletal:  Positive for arthralgias.       Objective:      /60   Pulse 91   Resp 14   Ht 5' (1.524 m)   Wt 78.2 kg (172 lb 6.4 oz)   SpO2 97%   BMI 33.67 kg/m²   Physical Exam  Vitals and nursing note reviewed.   Constitutional:       Appearance: She is well-developed.   HENT:      Head: Normocephalic.      Nose: Nose normal.   Eyes:      Pupils: Pupils are equal, round, and reactive to light.   Neck:      Vascular: JVD present.   Cardiovascular:      Rate and Rhythm: Normal rate and regular rhythm.      Chest Wall: PMI is displaced.      Heart sounds: Murmur heard.      Systolic murmur is present with a grade of 2/6.   Pulmonary:      Breath sounds: Examination of the right-lower field reveals decreased breath sounds and rales. Examination of the left-lower field reveals decreased breath sounds and rales. Decreased breath sounds and rales present.   Abdominal:      General: Bowel sounds are normal.      Palpations: Abdomen is soft.   Musculoskeletal:         General: Normal range of motion.      Cervical back: Normal range of motion.      Right lower leg:  Edema present.      Left lower leg: Edema present.   Skin:     General: Skin is warm.   Neurological:      Mental Status: She is alert and oriented to person, place, and time.       Personal Diagnostic Review  Chest x-ray: cardiomegaly and interstitial focal fibrosis        2/12/2024    11:29 AM   Pulmonary Studies Review   SpO2 97 %   Height 5' (1.524 m)   Weight 78.2 kg (172 lb 6.4 oz)   BMI (Calculated) 33.7   Predicted Distance 195.48   Predicted Distance Meters (Calculated) 341.31 meters       X-Ray Chest PA And Lateral  Narrative: EXAM:  XR CHEST PA AND LATERAL    CLINICAL HISTORY: SOB; [R05.3]-Chronic cough./[J41.0]-Simple chronic bronchitis.    COMPARISON: 02/20/2023    FINDINGS: This examination consists of frontal and lateral views of the chest.  There is mild cardiomegaly.  There is no evidence of an acute pulmonary process.  There is no pneumothorax or pleural effusion.  Impression:  There has been no significant interval change in appearance.    Finalized on: 2/12/2024 11:49 AM By:  Parviz Lazcano MD  [facility]# [id number]      2024-02-12 11:51:38.564    [facility]      Office Spirometry Results: WNL , improved         2/12/2024    11:29 AM 11/9/2023    11:07 AM 10/31/2023     1:16 PM 10/25/2023    10:14 AM 6/30/2023    10:46 AM 4/19/2023    10:36 AM 4/19/2023     9:00 AM   Pulmonary Function Tests   SpO2 97 % 99 %  99 % 98 % 97 %    Height 5' (1.524 m) 5' (1.524 m) 5' (1.524 m) 5' (1.524 m) 5' (1.524 m) 5' (1.524 m)    Weight 78.2 kg (172 lb 6.4 oz) 77.1 kg (170 lb) 77.1 kg (169 lb 15.6 oz) 76.7 kg (169 lb 1.5 oz) 77.1 kg (170 lb) 77.3 kg (170 lb 6.7 oz) 77.3 kg (170 lb 6.7 oz)   BMI (Calculated) 33.7 33.2 33.2 33 33.2 33.3          2/12/2024    11:29 AM   Pulmonary Studies Review   SpO2 97 %   Height 5' (1.524 m)   Weight 78.2 kg (172 lb 6.4 oz)   BMI (Calculated) 33.7   Predicted Distance 195.48   Predicted Distance Meters (Calculated) 341.31 meters           No results found for this or any previous visit (from  the past 336 hour(s)).    Assessment:       Chronic diastolic congestive heart failure  Patient is identified as having Diastolic (HFpEF) heart failure that is Chronic. CHF is currently uncontrolled due to Continued edema of extremities. Latest ECHO performed and demonstrates- Results for orders placed during the hospital encounter of 04/22/21    Echo Color Flow Doppler? Yes    Interpretation Summary  · Concentric hypertrophy and normal systolic function.  · Severe left atrial enlargement.  · Severe right atrial enlargement.  · With normal right ventricular systolic function.  · The estimated ejection fraction is 55%.  · Grade III left ventricular diastolic dysfunction.  · Normal central venous pressure (3 mmHg).  · The estimated PA systolic pressure is 34 mmHg.  · Mild mitral regurgitation.  · Mild tricuspid regurgitation.  . Continue Beta Blocker and ACE/ARB and monitor clinical status closely. Monitor on telemetry. Patient is off CHF pathway.  Monitor strict Is&Os and daily weights.  Place on fluid restriction of  not discussed . Cardiology has been consulted. Continue to stress to patient importance of self efficacy and  on diet for CHF. Last BNP reviewed- and noted below   @LABRCNTIP(BNP,BNPTRIAGEBLO)@  Start lasix.      Chronic diastolic congestive heart failure    Localized edema  -     furosemide (LASIX) 20 MG tablet; Take 1 tablet (20 mg total) by mouth once daily.  Dispense: 30 tablet; Refill: 11    Simple chronic bronchitis  -     X-Ray Chest PA And Lateral; Future; Expected date: 02/12/2024  -     Spirometry with/without bronchodilator; Future; Expected date: 08/14/2024    Chronic cough  -     ipratropium (ATROVENT) 0.02 % nebulizer solution; Take 2.5 mLs (500 mcg total) by nebulization 4 (four) times daily.  Dispense: 300 mL; Refill: 11  -     X-Ray Chest PA And Lateral; Future; Expected date: 02/12/2024  -     Spirometry with/without bronchodilator; Future; Expected date: 08/14/2024    Atypical  pneumonia - resolved , bilateral    Pulmonary fibrosis, postinflammatory          Outpatient Encounter Medications as of 2/12/2024   Medication Sig Dispense Refill    albuterol (PROVENTIL/VENTOLIN HFA) 90 mcg/actuation inhaler INHALE 2 PUFFS INTO THE LUNGS EVERY 6 (SIX) HOURS AS NEEDED FOR WHEEZING. RESCUE 25.5 g 3    apixaban (ELIQUIS) 5 mg Tab Take 1 tablet (5 mg total) by mouth 2 (two) times daily. 60 tablet 5    atorvastatin (LIPITOR) 20 MG tablet Take 1 tablet (20 mg total) by mouth once daily. 90 tablet 3    busPIRone (BUSPAR) 7.5 MG tablet Take 1 tablet (7.5 mg total) by mouth 3 (three) times daily as needed. 50 tablet 5    cyanocobalamin (VITAMIN B-12) 1000 MCG tablet Take 100 mcg by mouth once daily.      diclofenac sodium (VOLTAREN) 1 % Gel Apply 2 g topically once daily. 50 g 11    fish oil-omega-3 fatty acids 300-1,000 mg capsule Take 2 g by mouth once daily.      glucosam/chond/hyalu/CF borate (MOVE FREE [facility] ORAL) Take 1 tablet by mouth once daily.      ipratropium (ATROVENT) 42 mcg (0.06 %) nasal spray 2 sprays by Each Nostril route 3 (three) times daily as needed for Rhinitis (nasal drip). 15 mL 11    losartan (COZAAR) 25 MG tablet Take 1 tablet (25 mg total) by mouth 2 (two) times a day. 180 tablet 3    metoprolol succinate (TOPROL-XL) 50 MG 24 hr tablet Take 1 tablet (50 mg total) by mouth once daily. 90 tablet 3    MULTIVIT-IRON-MIN-FOLIC ACID 3,500-18-0.4 UNIT-MG-MG ORAL CHEW Take by mouth.      omeprazole (PRILOSEC) 40 MG capsule Take 40 mg by mouth once daily.      ranolazine (RANEXA) 500 MG Tb12 Take 1 tablet (500 mg total) by mouth 2 (two) times daily. 180 tablet 3    vitamin D 1000 units Tab Take 2,000 Units by mouth once daily.      [DISCONTINUED] ipratropium (ATROVENT) 0.02 % nebulizer solution Take 2.5 mLs (500 mcg total) by nebulization 4 (four) times daily. 300 mL 11    furosemide (LASIX) 20 MG tablet Take 1 tablet (20 mg total) by mouth once daily. 30 tablet 11    ipratropium  (ATROVENT) 0.02 % nebulizer solution Take 2.5 mLs (500 mcg total) by nebulization 4 (four) times daily. 300 mL 11    [DISCONTINUED] predniSONE (DELTASONE) 20 MG tablet 2 together daily x 3d, then 1 po daily. 9 tablet 0     No facility-administered encounter medications on file as of 2/12/2024.     Plan:       Requested Prescriptions     Signed Prescriptions Disp Refills    ipratropium (ATROVENT) 0.02 % nebulizer solution 300 mL 11     Sig: Take 2.5 mLs (500 mcg total) by nebulization 4 (four) times daily.    furosemide (LASIX) 20 MG tablet 30 tablet 11     Sig: Take 1 tablet (20 mg total) by mouth once daily.     Problem List Items Addressed This Visit       Atypical pneumonia - resolved , bilateral    Overview     Started around 3/9/2021, slow to resolve on CXR         Chronic diastolic congestive heart failure - Primary    Overview     Keep cardiology clinic appointment         Current Assessment & Plan     Patient is identified as having Diastolic (HFpEF) heart failure that is Chronic. CHF is currently uncontrolled due to Continued edema of extremities. Latest ECHO performed and demonstrates- Results for orders placed during the hospital encounter of 04/22/21    Echo Color Flow Doppler? Yes    Interpretation Summary  · Concentric hypertrophy and normal systolic function.  · Severe left atrial enlargement.  · Severe right atrial enlargement.  · With normal right ventricular systolic function.  · The estimated ejection fraction is 55%.  · Grade III left ventricular diastolic dysfunction.  · Normal central venous pressure (3 mmHg).  · The estimated PA systolic pressure is 34 mmHg.  · Mild mitral regurgitation.  · Mild tricuspid regurgitation.  . Continue Beta Blocker and ACE/ARB and monitor clinical status closely. Monitor on telemetry. Patient is off CHF pathway.  Monitor strict Is&Os and daily weights.  Place on fluid restriction of  not discussed . Cardiology has been consulted. Continue to stress to patient  importance of self efficacy and  on diet for CHF. Last BNP reviewed- and noted below   @LABRCNTIP(BNP,BNPTRIAGEBLO)@  Start lasix.             Pulmonary fibrosis, postinflammatory    Simple chronic bronchitis    Relevant Orders    X-Ray Chest PA And Lateral    Spirometry with/without bronchodilator     Other Visit Diagnoses       Localized edema        Relevant Medications    furosemide (LASIX) 20 MG tablet    Chronic cough        Relevant Medications    ipratropium (ATROVENT) 0.02 % nebulizer solution    Other Relevant Orders    X-Ray Chest PA And Lateral    Spirometry with/without bronchodilator               Follow up in about 1 year (around 2/12/2025) for CXR and [place], Follow up with NP.    MEDICAL DECISION MAKING: Moderate to high complexity.  Overall, the multiple problems listed are of moderate to high severity that may impact quality of life and activities of daily living. Side effects of medications, treatment plan as well as options and alternatives reviewed and discussed with patient. There was counseling of patient concerning these issues.    Total time spent in counseling and coordination of care - 45  minutes of total time spent on the encounter, which includes face to face time and non-face to face time preparing to see the patient (eg, review of tests), Obtaining and/or reviewing separately obtained history, Documenting clinical information in the electronic or other health record, Independently interpreting results (not separately reported) and communicating results to the patient/family/caregiver, or Care coordination (not separately reported).    Time was used in discussion of prognosis, risks, benefits of treatment, instructions and compliance with regimen . Discussion with other physicians and/or health care providers - home health or for use of durable medical equipment (oxygen, nebulizers, CPAP, BiPAP) occurred.

## 2024-02-16 ENCOUNTER — DOCUMENTATION ONLY (OUTPATIENT)
Dept: REHABILITATION | Facility: HOSPITAL | Age: 84
End: 2024-02-16
Payer: MEDICARE

## 2024-02-26 ENCOUNTER — TELEPHONE (OUTPATIENT)
Dept: CARDIOLOGY | Facility: CLINIC | Age: 84
End: 2024-02-26
Payer: MEDICARE

## 2024-02-26 NOTE — TELEPHONE ENCOUNTER
Spoke to Amy and scheduled sooner f/u due to feet swelling----- Message from Sue Victoria sent at 2/26/2024 10:46 AM CST -----  Contact: [facility]al[facility] [employer] [employer]  .Type:  Sooner Apoointment Request    Caller is requesting a sooner appointment, Caller will not accept being placed on the waitlist and is requesting a message be sent to doctor.    Name of Caller: Adalid [employer] [employer]   When is the first available appointment?scheduled 5/15/24  Symptoms: f/u  Would the patient rather a call back or a response via [facility]? Call     Best Call Back Number: [phone]  Additional Information:

## 2024-02-28 DIAGNOSIS — I70.0 ATHEROSCLEROSIS OF AORTA: ICD-10-CM

## 2024-02-28 DIAGNOSIS — I48.21 PERMANENT ATRIAL FIBRILLATION: ICD-10-CM

## 2024-02-28 DIAGNOSIS — I48.91 ATRIAL FIBRILLATION, UNSPECIFIED TYPE: ICD-10-CM

## 2024-02-28 DIAGNOSIS — R00.2 PALPITATION: ICD-10-CM

## 2024-02-28 DIAGNOSIS — I10 ESSENTIAL HYPERTENSION: ICD-10-CM

## 2024-02-28 DIAGNOSIS — G45.9 TIA (TRANSIENT ISCHEMIC ATTACK): Primary | ICD-10-CM

## 2024-02-28 DIAGNOSIS — I48.0 PAROXYSMAL ATRIAL FIBRILLATION: ICD-10-CM

## 2024-03-13 ENCOUNTER — HOSPITAL ENCOUNTER (OUTPATIENT)
Dept: CARDIOLOGY | Facility: HOSPITAL | Age: 84
Discharge: HOME | End: 2024-03-13
Payer: MEDICARE

## 2024-03-13 ENCOUNTER — OFFICE VISIT (OUTPATIENT)
Dept: CARDIOLOGY | Facility: CLINIC | Age: 84
End: 2024-03-13
Payer: MEDICARE

## 2024-03-13 VITALS
SYSTOLIC BLOOD PRESSURE: 110 MMHG | WEIGHT: 172.38 LBS | OXYGEN SATURATION: 97 % | HEART RATE: 87 BPM | DIASTOLIC BLOOD PRESSURE: 64 MMHG | BODY MASS INDEX: 33.67 KG/M2

## 2024-03-13 DIAGNOSIS — E21.3 HYPERPARATHYROIDISM: ICD-10-CM

## 2024-03-13 DIAGNOSIS — R00.2 PALPITATION: ICD-10-CM

## 2024-03-13 DIAGNOSIS — I50.32 CHRONIC DIASTOLIC CONGESTIVE HEART FAILURE: ICD-10-CM

## 2024-03-13 DIAGNOSIS — I70.0 ATHEROSCLEROSIS OF AORTA: ICD-10-CM

## 2024-03-13 DIAGNOSIS — R60.0 LOCALIZED EDEMA: Primary | ICD-10-CM

## 2024-03-13 DIAGNOSIS — I10 ESSENTIAL HYPERTENSION: ICD-10-CM

## 2024-03-13 DIAGNOSIS — I82.592 CHRONIC EMBOLISM AND THROMBOSIS OF OTHER SPECIFIED DEEP VEIN OF LEFT LOWER EXTREMITY: ICD-10-CM

## 2024-03-13 DIAGNOSIS — I48.0 PAROXYSMAL ATRIAL FIBRILLATION: ICD-10-CM

## 2024-03-13 DIAGNOSIS — R94.31 ABNORMAL EKG: ICD-10-CM

## 2024-03-13 DIAGNOSIS — M25.472 EDEMA OF LEFT ANKLE: ICD-10-CM

## 2024-03-13 DIAGNOSIS — G45.9 TIA (TRANSIENT ISCHEMIC ATTACK): ICD-10-CM

## 2024-03-13 DIAGNOSIS — E55.9 VITAMIN D DEFICIENCY: ICD-10-CM

## 2024-03-13 DIAGNOSIS — I48.91 ATRIAL FIBRILLATION, UNSPECIFIED TYPE: ICD-10-CM

## 2024-03-13 DIAGNOSIS — J84.10 PULMONARY FIBROSIS, POSTINFLAMMATORY: ICD-10-CM

## 2024-03-13 DIAGNOSIS — I83.812 VARICOSE VEINS OF LEFT LOWER EXTREMITY WITH PAIN: ICD-10-CM

## 2024-03-13 DIAGNOSIS — I48.20 CHRONIC A-FIB: ICD-10-CM

## 2024-03-13 DIAGNOSIS — I48.21 PERMANENT ATRIAL FIBRILLATION: ICD-10-CM

## 2024-03-13 DIAGNOSIS — E78.49 OTHER HYPERLIPIDEMIA: ICD-10-CM

## 2024-03-13 DIAGNOSIS — Z79.01 CHRONIC ANTICOAGULATION: ICD-10-CM

## 2024-03-13 LAB
OHS QRS DURATION: 78 MS
OHS QTC CALCULATION: 440 MS

## 2024-03-13 PROCEDURE — 99215 OFFICE O/P EST HI 40 MIN: CPT | Mod: S$GLB,,, | Performed by: INTERNAL MEDICINE

## 2024-03-13 PROCEDURE — 93010 ELECTROCARDIOGRAM REPORT: CPT | Mod: ,,, | Performed by: INTERNAL MEDICINE

## 2024-03-13 PROCEDURE — 99999 PR PBB SHADOW E&M-EST. PATIENT-LVL III: CPT | Mod: PBBFAC,,, | Performed by: INTERNAL MEDICINE

## 2024-03-13 PROCEDURE — 93005 ELECTROCARDIOGRAM TRACING: CPT

## 2024-03-13 RX ORDER — METOPROLOL SUCCINATE 25 MG/1
25 TABLET, EXTENDED RELEASE ORAL NIGHTLY
Qty: 30 TABLET | Refills: 11 | Status: SHIPPED | OUTPATIENT
Start: 2024-03-13 | End: 2025-03-13

## 2024-03-13 NOTE — PROGRESS NOTES
Subjective:   Patient ID:  Lorenzo Ospina is a 84 y.o. female who presents for follow up of No chief complaint on file.      HPI  8/28/2020  A 81 yo female with htn afib failed cardioversion was scheduled to have BLATION HAD CANCELED DUE TO FAMILY ISSUES AND NOW COVID. SHE IS NOT COMPLAINING OF CHEST PAIN SHORTNESS OF BREATH TAKES MEDS REGULARILY HAS IMPROVED EATING HABITS AS FAR AS SALT IS CONCERNED.SHE WALKS BETWEEN 15-30 MINUTES DAILY WEATHER PERMITS BP IS IMPROVED WITHE XERCISE. HAS NO LEG SWELLING NO CHF SYMPTOMS. LIPIDS ARE ON TARGET     TODAY IS HERE FOR F/U SHE FEELS WELL SHE IS EXERCISING HAS NOT BEEN COMPLAINING OF CHEST PAIN OR SHORTNESS OF BREATH HAS NO HEADACHE NO BLURRED VISION HAS BEEN ON NOAC NO BLEEDING. SAW DR MORA TODAY STOPPED HER AMIODARONE  AND KEPT HER ON B BLOCKERS. HER BP RECORDING FROM HOME ARE ON TARGET AND WELL CONTROLLED SHE TRIES TO EXERCISE REGULARILY OTHERWISE HER BOP IS ELEVATED.      3/31/2021  Had nocturnal wheezing and has dyspnea on exertion was seen in er resolved with diuretics. This is suggestive of pnd angina decubitus, she continues to have exertional dyspnea has no energy with activity has to take a break when doing house chores. Reviewed her bp readings seems within range. Has no leg swelling after short course of diuretics pnd resolved chest tightness wheezing resolved.      11/12/2021  HERE FROF /U JUST GOT BACK FROM [place] SHE SPENT 4 MONTH THERE ./ SHE HAS BEEN WALKING HAD  DIFFICULTY GOING UPHILL SHE GEST TIRED SHORT OF BREATH HAS TO SIT DOWN. NOW HERE FELT TIRED WALKING. HAS  BEEN COMPLIANT WITH MEDS BP SEEMED CONTROLLED. SHE IS ABLE TO DO ALL HOUSE WORK NOT STOPPING WITH ANY SHORTNESS OF BREATH HAS NO LEG SWELLING  NO ORTHOPNEA PND  NO SYNCOPE. HER CARDIAC W/U NEGATIVE      2/18/2022       here for f/u has been off losartan due to cough not sure if she improved off losartan DR CORONADO THINK HER COUGH IS BETTER. WHILE PATIENT DOES NOT THINK SO. SHE IS  TAKING HER MEDS. HER DIASTOLIC BP IS STAYING ABOVE MID 80S. HAS NO NEW SYMPTOMS   HER SHORTNESS OF BREATH IS IMPROVING SHE HAS NO WHEEZING SHE HAS NO LEG SWELLING SHE IS COMPLIANT WITH DIET.      10/14/2022  Doing well clinically reviewed bp reading for the past 4 months seems well controlled. Has no tia symptoms. Has  no more cough tolerated losartan well her medical regimen adequate . Has no bleeding or bruising. Has fatigue in the morning not related to bp issue. Drinks coffee and eat feels better. She is compliant with salt in  a reasonable fashion.     4/19/2023  Here for had left leg pain swelling. Used compression socks transiently saw ortho had shoes wear had resolution of pain and swelling now back wearing regular shoes. Has no more pain or swelling.  She is on noac no bleeding. Her bp reading are acceptable she can using stricter bp control. Has no chest pan or shortness of breath  has leg pain with ambulation.reviewed bp readings from home . Readings are on target.       10/25/2023  Has fluctuation in bp ok compliance with slat. Some leg swelling on lefrt improved with empiric lasix use. Has daytime fatigue and sleepiness.    3/13/2024   Multiple complaints has leg swelling over the past 4 months.left elg worse than right. Better when she wakes up in the morning gets worse at the end of the day. No pain from swelling. Has left knee pain it gives out on her. He rhtn is controlled has been compliant with salt review of htn readings from home confirms it. She saw pulmonary who gave he rlasix that she takes intermittently. She thinks it helps. Has no shortness of breath until she exercises she gest tired fatigue he runs out of breath thinks she is going to die,. Feels her heart go fast.   Compression socks with zipper help the swelling  Past Medical History:   Diagnosis Date    A-fib 11/5/2019    Breast cancer     right, dx 2008.  Dr. Callaway follows.s/p lumpectomy and arimidex x 5y.    Hyperlipidemia      Hypertension     OP (osteoporosis)     on bisphos x 2y.    TIA (transient ischemic attack)     Toe fracture        Past Surgical History:   Procedure Laterality Date    BREAST LUMPECTOMY      right 2008    TOTAL ABDOMINAL HYSTERECTOMY      no cancer    TRANSESOPHAGEAL ECHOCARDIOGRAPHY N/A 2019    Procedure: ECHOCARDIOGRAM, TRANSESOPHAGEAL;  Surgeon: Otto Flores MD;  Location: [facility] CATH LAB;  Service: Cardiology;  Laterality: N/A;    TREATMENT OF CARDIAC ARRHYTHMIA N/A 2019    Procedure: CARDIOVERSION;  Surgeon: Otto Flores MD;  Location: [facility] CATH LAB;  Service: Cardiology;  Laterality: N/A;    TREATMENT OF CARDIAC ARRHYTHMIA N/A 2019    Procedure: CARDIOVERSION;  Surgeon: Otto Flores MD;  Location: [facility] CATH LAB;  Service: Cardiology;  Laterality: N/A;    TREATMENT OF CARDIAC ARRHYTHMIA N/A 2020    Procedure: CARDIOVERSION;  Surgeon: Otto Flores MD;  Location: [facility] CATH LAB;  Service: Cardiology;  Laterality: N/A;    TREATMENT OF CARDIAC ARRHYTHMIA N/A 3/2/2020    Procedure: CARDIOVERSION;  Surgeon: Mayi Beltran MD;  Location: [facility] CATH LAB;  Service: Cardiology;  Laterality: N/A;  830 start per Dr. beltran       Social History     Tobacco Use    Smoking status: Former     Current packs/day: 0.00     Average packs/day: 1.5 packs/day for 22.7 years (34.1 ttl pk-yrs)     Types: Cigarettes     Start date: 1972     Quit date: 1994     Years since quittin.4    Smokeless tobacco: Never   Substance Use Topics    Alcohol use: Not Currently    Drug use: Never       History reviewed. No pertinent family history.    Current Outpatient Medications   Medication Sig    albuterol (PROVENTIL/VENTOLIN HFA) 90 mcg/actuation inhaler INHALE 2 PUFFS INTO THE LUNGS EVERY 6 (SIX) HOURS AS NEEDED FOR WHEEZING. RESCUE    apixaban (ELIQUIS) 5 mg Tab Take 1 tablet (5 mg total) by mouth 2 (two) times daily.    atorvastatin (LIPITOR) 20 MG tablet Take 1 tablet (20 mg total) by mouth once daily.    busPIRone  (BUSPAR) 7.5 MG tablet Take 1 tablet (7.5 mg total) by mouth 3 (three) times daily as needed.    cyanocobalamin (VITAMIN B-12) 1000 MCG tablet Take 100 mcg by mouth once daily.    diclofenac sodium (VOLTAREN) 1 % Gel Apply 2 g topically once daily.    fish oil-omega-3 fatty acids 300-1,000 mg capsule Take 2 g by mouth once daily.    furosemide (LASIX) 20 MG tablet Take 1 tablet (20 mg total) by mouth once daily.    glucosam/chond/hyalu/CF borate (MOVE FREE [facility] ORAL) Take 1 tablet by mouth once daily.    ipratropium (ATROVENT) 0.02 % nebulizer solution Take 2.5 mLs (500 mcg total) by nebulization 4 (four) times daily.    losartan (COZAAR) 25 MG tablet Take 1 tablet (25 mg total) by mouth 2 (two) times a day.    metoprolol succinate (TOPROL-XL) 50 MG 24 hr tablet Take 1 tablet (50 mg total) by mouth once daily.    MULTIVIT-IRON-MIN-FOLIC ACID 3,500-18-0.4 UNIT-MG-MG ORAL CHEW Take by mouth.    omeprazole (PRILOSEC) 40 MG capsule Take 40 mg by mouth once daily.    ranolazine (RANEXA) 500 MG Tb12 Take 1 tablet (500 mg total) by mouth 2 (two) times daily.    vitamin D 1000 units Tab Take 2,000 Units by mouth once daily.    ipratropium (ATROVENT) 42 mcg (0.06 %) nasal spray 2 sprays by Each Nostril route 3 (three) times daily as needed for Rhinitis (nasal drip). (Patient not taking: Reported on 3/13/2024)     No current facility-administered medications for this visit.     Current Outpatient Medications on File Prior to Visit   Medication Sig    albuterol (PROVENTIL/VENTOLIN HFA) 90 mcg/actuation inhaler INHALE 2 PUFFS INTO THE LUNGS EVERY 6 (SIX) HOURS AS NEEDED FOR WHEEZING. RESCUE    apixaban (ELIQUIS) 5 mg Tab Take 1 tablet (5 mg total) by mouth 2 (two) times daily.    atorvastatin (LIPITOR) 20 MG tablet Take 1 tablet (20 mg total) by mouth once daily.    busPIRone (BUSPAR) 7.5 MG tablet Take 1 tablet (7.5 mg total) by mouth 3 (three) times daily as needed.    cyanocobalamin (VITAMIN B-12) 1000 MCG tablet  Take 100 mcg by mouth once daily.    diclofenac sodium (VOLTAREN) 1 % Gel Apply 2 g topically once daily.    fish oil-omega-3 fatty acids 300-1,000 mg capsule Take 2 g by mouth once daily.    furosemide (LASIX) 20 MG tablet Take 1 tablet (20 mg total) by mouth once daily.    glucosam/chond/hyalu/CF borate (MOVE FREE [facility] ORAL) Take 1 tablet by mouth once daily.    ipratropium (ATROVENT) 0.02 % nebulizer solution Take 2.5 mLs (500 mcg total) by nebulization 4 (four) times daily.    losartan (COZAAR) 25 MG tablet Take 1 tablet (25 mg total) by mouth 2 (two) times a day.    metoprolol succinate (TOPROL-XL) 50 MG 24 hr tablet Take 1 tablet (50 mg total) by mouth once daily.    MULTIVIT-IRON-MIN-FOLIC ACID 3,500-18-0.4 UNIT-MG-MG ORAL CHEW Take by mouth.    omeprazole (PRILOSEC) 40 MG capsule Take 40 mg by mouth once daily.    ranolazine (RANEXA) 500 MG Tb12 Take 1 tablet (500 mg total) by mouth 2 (two) times daily.    vitamin D 1000 units Tab Take 2,000 Units by mouth once daily.    ipratropium (ATROVENT) 42 mcg (0.06 %) nasal spray 2 sprays by Each Nostril route 3 (three) times daily as needed for Rhinitis (nasal drip). (Patient not taking: Reported on 3/13/2024)     No current facility-administered medications on file prior to visit.     Review of patient's allergies indicates:  No Known Allergies   Review of Systems   Constitutional: Positive for malaise/fatigue. Negative for diaphoresis and weight gain.   HENT:  Negative for hoarse voice.    Eyes:  Negative for double vision and visual disturbance.   Cardiovascular:  Positive for dyspnea on exertion and leg swelling. Negative for chest pain, claudication, cyanosis, irregular heartbeat, near-syncope, orthopnea, palpitations, paroxysmal nocturnal dyspnea and syncope.   Respiratory:  Positive for shortness of breath. Negative for cough, hemoptysis and snoring.    Hematologic/Lymphatic: Negative for bleeding problem. Does not bruise/bleed easily.   Skin:   Negative for color change and poor wound healing.   Musculoskeletal:  Negative for muscle cramps, muscle weakness and myalgias.   Gastrointestinal:  Negative for bloating, abdominal pain, change in bowel habit, diarrhea, heartburn, hematemesis, hematochezia, melena and nausea.   Neurological:  Negative for excessive daytime sleepiness, dizziness, headaches, light-headedness, loss of balance, numbness and weakness.   Psychiatric/Behavioral:  Negative for memory loss. The patient does not have insomnia.    Allergic/Immunologic: Negative for hives.       Objective:   Physical Exam  Constitutional:       General: She is not in acute distress.     Appearance: Normal appearance. She is well-developed. She is not ill-appearing.   HENT:      Head: Normocephalic and atraumatic.   Eyes:      General: No scleral icterus.     Pupils: Pupils are equal, round, and reactive to light.   Neck:      Thyroid: No thyromegaly.      Vascular: Normal carotid pulses. No carotid bruit, hepatojugular reflux or JVD.      Trachea: No tracheal deviation.   Cardiovascular:      Rate and Rhythm: Normal rate and regular rhythm.      Pulses: Normal pulses.      Heart sounds: Normal heart sounds. No murmur heard.     No friction rub. No gallop.   Pulmonary:      Effort: Pulmonary effort is normal. No respiratory distress.      Breath sounds: Normal breath sounds. No wheezing, rhonchi or rales.   Chest:      Chest wall: No tenderness.   Abdominal:      General: Bowel sounds are normal. There is no abdominal bruit.      Palpations: Abdomen is soft. There is no hepatomegaly or pulsatile mass.      Tenderness: There is no abdominal tenderness.   Musculoskeletal:      Right shoulder: No deformity.      Cervical back: Normal range of motion and neck supple.      Right lower leg: Edema present.      Left lower leg: Edema present.   Skin:     General: Skin is warm and dry.      Findings: No erythema or rash.      Nails: There is no clubbing.   Neurological:       General: No focal deficit present.      Mental Status: She is alert and oriented to person, place, and time.      Cranial Nerves: No cranial nerve deficit.      Coordination: Coordination normal.   Psychiatric:         Mood and Affect: Mood normal.         Speech: Speech normal.         Behavior: Behavior normal.       Vitals:    03/13/24 1023 03/13/24 1027   BP: 102/68 110/64   Pulse: 87 87   SpO2: 97%    Weight: 78.2 kg (172 lb 6.4 oz)      Lab Results   Component Value Date    CHOL 131 11/09/2023    CHOL 145 10/03/2022    CHOL 153 12/13/2021      Body mass index is 33.67 kg/m².   Lab Results   Component Value Date    HGBA1C 5.3 06/30/2023      BMP  Lab Results   Component Value Date     02/02/2024    K 4.4 02/02/2024     02/02/2024    CO2 24 02/02/2024    BUN 8 02/02/2024    CREATININE 0.7 02/02/2024    CALCIUM 10.8 (H) 02/02/2024    ANIONGAP 11 02/02/2024    EGFRNORACEVR >60.0 02/02/2024      Lab Results   Component Value Date    HDL 54 11/09/2023    HDL 62 10/03/2022    HDL 73 12/13/2021     Lab Results   Component Value Date    LDLCALC 52.6 (L) 11/09/2023    LDLCALC 68.8 10/03/2022    LDLCALC 56.4 (L) 12/13/2021     Lab Results   Component Value Date    TRIG 122 11/09/2023    TRIG 71 10/03/2022    TRIG 118 12/13/2021     Lab Results   Component Value Date    CHOLHDL 41.2 11/09/2023    CHOLHDL 42.8 10/03/2022    CHOLHDL 47.7 12/13/2021       Chemistry        Component Value Date/Time     02/02/2024 0925    K 4.4 02/02/2024 0925     02/02/2024 0925    CO2 24 02/02/2024 0925    BUN 8 02/02/2024 0925    CREATININE 0.7 02/02/2024 0925     02/02/2024 0925        Component Value Date/Time    CALCIUM 10.8 (H) 02/02/2024 0925    ALKPHOS 56 11/09/2023 1148    AST 25 11/09/2023 1148    ALT 14 11/09/2023 1148    BILITOT 0.5 11/09/2023 1148    ESTGFRAFRICA >60.0 12/13/2021 1636    EGFRNONAA >60.0 12/13/2021 1636          Lab Results   Component Value Date    TSH 0.355 (L) 11/09/2023  "    No results found for: "INR", "PROTIME"  Lab Results   Component Value Date    WBC 7.88 03/16/2023    HGB 13.9 03/16/2023    HCT 43.0 03/16/2023    MCV 92 03/16/2023     03/16/2023     BMP  Sodium   Date Value Ref Range Status   02/02/2024 141 136 - 145 mmol/L Final     Potassium   Date Value Ref Range Status   02/02/2024 4.4 3.5 - 5.1 mmol/L Final     Chloride   Date Value Ref Range Status   02/02/2024 106 95 - 110 mmol/L Final     CO2   Date Value Ref Range Status   02/02/2024 24 23 - 29 mmol/L Final     BUN   Date Value Ref Range Status   02/02/2024 8 8 - 23 mg/dL Final     Creatinine   Date Value Ref Range Status   02/02/2024 0.7 0.5 - 1.4 mg/dL Final     Calcium   Date Value Ref Range Status   02/02/2024 10.8 (H) 8.7 - 10.5 mg/dL Final     Anion Gap   Date Value Ref Range Status   02/02/2024 11 8 - 16 mmol/L Final     eGFR if    Date Value Ref Range Status   12/13/2021 >60.0 >60 mL/min/1.73 m^2 Final     eGFR if non    Date Value Ref Range Status   12/13/2021 >60.0 >60 mL/min/1.73 m^2 Final     Comment:     Calculation used to obtain the estimated glomerular filtration  rate (eGFR) is the CKD-EPI equation.        CrCl cannot be calculated (Patient's most recent lab result is older than the maximum 7 days allowed.).    Assessment:     1. Localized edema    2. 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 Paroxysmal atrial fibrillation    4. Atherosclerosis of aorta    5. Other hyperlipidemia    6. TIA (transient ischemic attack)    7. Vitamin D deficiency    8. Hyperparathyroidism    9. Palpitation    10. Chronic diastolic congestive heart failure    11. Edema of left ankle    12. Varicose veins of left lower extremity with pain    13. Pulmonary fibrosis, postinflammatory    14. Chronic anticoagulation    15. Chronic a-fib    16. Abnormal EKG      Her leg swelling seems to be venous insufficiency in nature responding to " compression socks leg elevation will continue same and will obtain bnp   Diastolic chf may be afib contributing to symptoms will increase toprol dose to extra 25 mg at nite for better hr control during exercise will check bnp low salt diet discussed.   Has symptoms of palpitation exertional shortness  of breath will investigate with repeat echo cardiolite and increase b blockers dose.  Afib chronic will increase b blockers for rate  control continue noac stable.  Atherosclerosis aorta on noac statins continue same.   Hlp on target stable contineu same.   Plan:     Bnp    Echo    Cardiolite    Phone review   Increase b blockers dose 25 mg extra at nite.

## 2024-03-14 ENCOUNTER — TELEPHONE (OUTPATIENT)
Dept: CARDIOLOGY | Facility: HOSPITAL | Age: 84
End: 2024-03-14
Payer: MEDICARE

## 2024-03-14 NOTE — TELEPHONE ENCOUNTER
----- Message from Mayi Vega MD sent at 3/14/2024  2:25 PM CDT -----  Fluid marker is mildy elevated  better than before continue taking the fkluid pill dailuy or every other day based on swelling and weight.

## 2024-03-14 NOTE — TELEPHONE ENCOUNTER
Called and spoke with daughter-in law , notified the following per Dr. Vega  Fluid marker is mildy elevated better than before continue taking the fkluid pill dailuy or every other day based on swelling and weight.    Daughter verbalized understanding.

## 2024-03-20 ENCOUNTER — HOSPITAL ENCOUNTER (OUTPATIENT)
Dept: CARDIOLOGY | Facility: HOSPITAL | Age: 84
Discharge: HOME | End: 2024-03-20
Attending: INTERNAL MEDICINE
Payer: MEDICARE

## 2024-03-20 ENCOUNTER — HOSPITAL ENCOUNTER (OUTPATIENT)
Dept: RADIOLOGY | Facility: HOSPITAL | Age: 84
Discharge: HOME | End: 2024-03-20
Attending: INTERNAL MEDICINE
Payer: MEDICARE

## 2024-03-20 VITALS
BODY MASS INDEX: 33.77 KG/M2 | DIASTOLIC BLOOD PRESSURE: 64 MMHG | HEART RATE: 86 BPM | WEIGHT: 172 LBS | HEIGHT: 60 IN | SYSTOLIC BLOOD PRESSURE: 110 MMHG

## 2024-03-20 DIAGNOSIS — I48.20 CHRONIC A-FIB: ICD-10-CM

## 2024-03-20 DIAGNOSIS — R94.31 ABNORMAL EKG: ICD-10-CM

## 2024-03-20 DIAGNOSIS — I50.32 CHRONIC DIASTOLIC CONGESTIVE HEART FAILURE: ICD-10-CM

## 2024-03-20 LAB
AORTIC ROOT ANNULUS: 3.34 CM
ASCENDING AORTA: 3.05 CM
AV INDEX (PROSTH): 0.56
AV MEAN GRADIENT: 4 MMHG
AV PEAK GRADIENT: 7 MMHG
AV VALVE AREA BY VELOCITY RATIO: 1.59 CM²
AV VALVE AREA: 1.73 CM²
AV VELOCITY RATIO: 0.52
BSA FOR ECHO PROCEDURE: 1.82 M2
CV ECHO LV RWT: 0.72 CM
CV STRESS BASE HR: 70 BPM
DIASTOLIC BLOOD PRESSURE: 73 MMHG
DOP CALC AO PEAK VEL: 1.28 M/S
DOP CALC AO VTI: 31.8 CM
DOP CALC LVOT AREA: 3.1 CM2
DOP CALC LVOT DIAMETER: 1.98 CM
DOP CALC LVOT PEAK VEL: 0.66 M/S
DOP CALC LVOT STROKE VOLUME: 55.09 CM3
DOP CALC MV VTI: 27.1 CM
DOP CALC RVOT PEAK VEL: 0.71 M/S
DOP CALC RVOT VTI: 19.6 CM
DOP CALCLVOT PEAK VEL VTI: 17.9 CM
ECHO LV POSTERIOR WALL: 1.54 CM (ref 0.6–1.1)
FRACTIONAL SHORTENING: 37 % (ref 28–44)
INTERVENTRICULAR SEPTUM: 1.07 CM (ref 0.6–1.1)
IVC DIAMETER: 1.37 CM
IVRT: 54.23 MSEC
LA MAJOR: 6.88 CM
LA MINOR: 6.2 CM
LA WIDTH: 4.5 CM
LEFT ATRIUM SIZE: 4.39 CM
LEFT ATRIUM VOLUME INDEX MOD: 46.6 ML/M2
LEFT ATRIUM VOLUME INDEX: 62.6 ML/M2
LEFT ATRIUM VOLUME MOD: 81.52 CM3
LEFT ATRIUM VOLUME: 109.52 CM3
LEFT INTERNAL DIMENSION IN SYSTOLE: 2.68 CM (ref 2.1–4)
LEFT VENTRICLE DIASTOLIC VOLUME INDEX: 46.95 ML/M2
LEFT VENTRICLE DIASTOLIC VOLUME: 82.16 ML
LEFT VENTRICLE MASS INDEX: 119 G/M2
LEFT VENTRICLE SYSTOLIC VOLUME INDEX: 15.1 ML/M2
LEFT VENTRICLE SYSTOLIC VOLUME: 26.4 ML
LEFT VENTRICULAR INTERNAL DIMENSION IN DIASTOLE: 4.28 CM (ref 3.5–6)
LEFT VENTRICULAR MASS: 207.5 G
LVOT MG: 1.15 MMHG
LVOT MV: 0.51 CM/S
MV MEAN GRADIENT: 3 MMHG
MV PEAK GRADIENT: 7 MMHG
MV VALVE AREA BY CONTINUITY EQUATION: 2.03 CM2
NUC REST EJECTION FRACTION: 81
NUC STRESS EJECTION FRACTION: 78 %
OHS CV CPX 85 PERCENT MAX PREDICTED HEART RATE MALE: 117
OHS CV CPX MAX PREDICTED HEART RATE: 138
OHS CV CPX PATIENT IS FEMALE: 1
OHS CV CPX PATIENT IS MALE: 0
OHS CV CPX PEAK DIASTOLIC BLOOD PRESSURE: 70 MMHG
OHS CV CPX PEAK HEAR RATE: 96 BPM
OHS CV CPX PEAK RATE PRESSURE PRODUCT: NORMAL
OHS CV CPX PEAK SYSTOLIC BLOOD PRESSURE: 138 MMHG
OHS CV CPX PERCENT MAX PREDICTED HEART RATE ACHIEVED: 72
OHS CV CPX RATE PRESSURE PRODUCT PRESENTING: NORMAL
PISA TR MAX VEL: 2.52 M/S
PV MEAN GRADIENT: 1 MMHG
PV PEAK GRADIENT: 3 MMHG
PV PEAK VELOCITY: 0.85 M/S
RA MAJOR: 5.67 CM
RA PRESSURE ESTIMATED: 3 MMHG
RA WIDTH: 3.52 CM
RIGHT VENTRICULAR END-DIASTOLIC DIMENSION: 3.6 CM
RV TB RVSP: 6 MMHG
SINUS: 2.9 CM
STJ: 2.9 CM
SYSTOLIC BLOOD PRESSURE: 156 MMHG
TDI LATERAL: 0.1 M/S
TDI SEPTAL: 0.07 M/S
TDI: 0.09 M/S
TR MAX PG: 25 MMHG
TRICUSPID ANNULAR PLANE SYSTOLIC EXCURSION: 2.21 CM
TV REST PULMONARY ARTERY PRESSURE: 28 MMHG
Z-SCORE OF LEFT VENTRICULAR DIMENSION IN END DIASTOLE: -1.23
Z-SCORE OF LEFT VENTRICULAR DIMENSION IN END SYSTOLE: -0.88

## 2024-03-20 PROCEDURE — 78452 HT MUSCLE IMAGE SPECT MULT: CPT

## 2024-03-20 PROCEDURE — 93018 CV STRESS TEST I&R ONLY: CPT | Mod: ,,, | Performed by: INTERNAL MEDICINE

## 2024-03-20 PROCEDURE — 93306 TTE W/DOPPLER COMPLETE: CPT

## 2024-03-20 PROCEDURE — 93306 TTE W/DOPPLER COMPLETE: CPT | Mod: 26,,, | Performed by: INTERNAL MEDICINE

## 2024-03-20 PROCEDURE — 63600175 PHARM REV CODE 636 W HCPCS: Performed by: INTERNAL MEDICINE

## 2024-03-20 PROCEDURE — 93017 CV STRESS TEST TRACING ONLY: CPT

## 2024-03-20 PROCEDURE — 93016 CV STRESS TEST SUPVJ ONLY: CPT | Mod: ,,, | Performed by: INTERNAL MEDICINE

## 2024-03-20 PROCEDURE — A9502 TC99M TETROFOSMIN: HCPCS | Performed by: INTERNAL MEDICINE

## 2024-03-20 PROCEDURE — 78452 HT MUSCLE IMAGE SPECT MULT: CPT | Mod: 26,,, | Performed by: INTERNAL MEDICINE

## 2024-03-20 RX ORDER — REGADENOSON 0.08 MG/ML
0.4 INJECTION, SOLUTION INTRAVENOUS ONCE
Status: COMPLETED | OUTPATIENT
Start: 2024-03-20 | End: 2024-03-20

## 2024-03-20 RX ADMIN — TETROFOSMIN 9 MILLICURIE: 1.38 INJECTION, POWDER, LYOPHILIZED, FOR SOLUTION INTRAVENOUS at 08:03

## 2024-03-20 RX ADMIN — TETROFOSMIN 28.5 MILLICURIE: 1.38 INJECTION, POWDER, LYOPHILIZED, FOR SOLUTION INTRAVENOUS at 12:03

## 2024-03-20 RX ADMIN — REGADENOSON 0.4 MG: 0.08 INJECTION, SOLUTION INTRAVENOUS at 10:03

## 2024-03-21 ENCOUNTER — TELEPHONE (OUTPATIENT)
Dept: CARDIOLOGY | Facility: CLINIC | Age: 84
End: 2024-03-21
Payer: MEDICARE

## 2024-03-21 NOTE — TELEPHONE ENCOUNTER
Called and spoke with daughter and law of patient and informed of the following;  ----- Message from Mayi Vega MD sent at 3/20/2024  7:23 PM CDT -----  Heart function is normal    Daughter-in-law verbalized understanding, stating that's great news.

## 2024-03-22 ENCOUNTER — TELEPHONE (OUTPATIENT)
Dept: CARDIOLOGY | Facility: CLINIC | Age: 84
End: 2024-03-22
Payer: MEDICARE

## 2024-03-22 NOTE — TELEPHONE ENCOUNTER
Daughter-in-law notified of the following  ----- Message from Mayi Vega MD sent at 3/21/2024  7:35 PM CDT -----  STRESS TEST IS NORMAL

## 2024-04-05 NOTE — PROGRESS NOTES
Subjective:      Patient ID: Lorenzo Ospina is a 84 y.o. female.    Chief Complaint: Follow-up (3 month f/um pt also has left leg pain)      HPI  Here for f/u medical problems and preventive exam.  Has left sciatic type pain in the morning lately, resolves after a few minutes.  Also awakens at 3am, worries in her head and cannot return to sleep.  Buspar 7.5 mg each mornig works well.  Breathing well lately.  Exertional dyspnea at baseline.  Morning cough, clears mucous easily.  Nebulizer used BID lately.  No f/c/sw.  No cp/palp.  BMs and urine normal.  Active daily. Leg swelling good with qod lasix 20mg.      HM: 11/22 fluvax/ref now, 2/21 covid vaccines/booster, 6/17 [id number], 11/22 [id number], ref tetanus, 11/21 MMG/no more, 11/23 BMD stable rep 2y,  2012 Cscope saw Gastro in 2017 and says due in 2022.     Review of Systems   Constitutional:  Negative for chills, diaphoresis and fever.   Respiratory:  Negative for cough and shortness of breath.    Cardiovascular:  Negative for chest pain, palpitations and leg swelling.   Gastrointestinal:  Negative for blood in stool, constipation, diarrhea, nausea and vomiting.   Genitourinary:  Negative for dysuria, frequency and hematuria.   Psychiatric/Behavioral:  The patient is not nervous/anxious.          Objective:   /62 (BP Location: Right arm, Patient Position: Sitting)   Pulse 76   Temp 98.3 °F (36.8 °C) (Oral)   Ht 5' (1.524 m)   Wt 77.2 kg (170 lb 1.4 oz)   SpO2 100%   BMI 33.22 kg/m²     Physical Exam  Constitutional:       Appearance: She is well-developed.   Neck:      Thyroid: No thyroid mass.      Vascular: No carotid bruit.   Cardiovascular:      Rate and Rhythm: Normal rate and regular rhythm.      Heart sounds: No murmur heard.     No friction rub. No gallop.   Pulmonary:      Effort: Pulmonary effort is normal.      Breath sounds: Normal breath sounds. No wheezing or rales.   Abdominal:      General: Bowel sounds are normal.      Palpations:  Abdomen is soft. There is no mass.      Tenderness: There is no abdominal tenderness.   Musculoskeletal:      Cervical back: Neck supple.   Lymphadenopathy:      Cervical: No cervical adenopathy.   Neurological:      Mental Status: She is alert and oriented to person, place, and time.      Sensory: Sensation is intact.      Motor: Motor function is intact. Weakness: LE bilaterally.      Deep Tendon Reflexes:      Reflex Scores:       Patellar reflexes are 2+ on the right side and 2+ on the left side.       Latest Reference Range & Units 11/09/23 11:48   Vitamin D 30 - 96 ng/mL 49         Assessment:       1. Essential hypertension    2. Simple chronic bronchitis    3. Age-related osteoporosis without current pathological fracture    4. Chronic a-fib    5. Chronic anticoagulation    6. Atherosclerosis of aorta    7. Pulmonary fibrosis, postinflammatory    8. Situational anxiety    9. B12 deficiency    10. Other hyperlipidemia    11. Hyperparathyroidism    12. Chronic diastolic congestive heart failure    13. 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    Simple chronic bronchitis, Pulmonary fibrosis, postinflammatory- cont meds, stable.    Age-related osteoporosis without current pathological fracture- being followed by Rheum.    Chronic a-fib, Hx DVT, Chronic anticoagulation, richey dysfn- cont meds per Cards.    Situational anxiety- take buspar before bed also.    B12 deficiency- recheck now.  -     Vitamin B12; Future; Expected date: 04/19/2024    Atherosclerosis of aorta, Other hyperlipidemia- cont atorva, check labs now.  -     CBC Auto Differential; Future; Expected date: 04/19/2024  -     Comprehensive Metabolic Panel; Future; Expected date: 04/19/2024  -     Lipid Panel; Future; Expected date: 04/19/2024  -     TSH; Future; Expected date: 04/19/2024    Hyperparathyroidism  -     PTH, Intact; Future; Expected  date: 05/03/2024    RTC 3mo.  Labs now.  2tylenol/1buspar at HS.  Heat to left buttock.

## 2024-04-06 DIAGNOSIS — I48.91 ATRIAL FIBRILLATION, UNSPECIFIED TYPE: ICD-10-CM

## 2024-04-08 RX ORDER — APIXABAN 5 MG/1
5 TABLET, FILM COATED ORAL 2 TIMES DAILY
Qty: 60 TABLET | Refills: 6 | Status: SHIPPED | OUTPATIENT
Start: 2024-04-08

## 2024-04-10 DIAGNOSIS — J41.0 SIMPLE CHRONIC BRONCHITIS: ICD-10-CM

## 2024-04-11 RX ORDER — ALBUTEROL SULFATE 90 UG/1
AEROSOL, METERED RESPIRATORY (INHALATION)
Qty: 25.5 G | Refills: 1 | Status: SHIPPED | OUTPATIENT
Start: 2024-04-11

## 2024-04-11 NOTE — TELEPHONE ENCOUNTER
Refill Decision Note   Lorenzo Ospina  is requesting a refill authorization.  Brief Assessment and Rationale for Refill:  Approve     Medication Therapy Plan:        Comments:     Note composed:12:29 PM 04/11/2024

## 2024-04-19 ENCOUNTER — TELEPHONE (OUTPATIENT)
Dept: PRIMARY CARE CLINIC | Facility: CLINIC | Age: 84
End: 2024-04-19

## 2024-04-19 ENCOUNTER — OFFICE VISIT (OUTPATIENT)
Dept: PRIMARY CARE CLINIC | Facility: CLINIC | Age: 84
End: 2024-04-19
Payer: MEDICARE

## 2024-04-19 VITALS
HEIGHT: 60 IN | SYSTOLIC BLOOD PRESSURE: 118 MMHG | OXYGEN SATURATION: 100 % | BODY MASS INDEX: 33.39 KG/M2 | HEART RATE: 76 BPM | WEIGHT: 170.06 LBS | DIASTOLIC BLOOD PRESSURE: 62 MMHG | TEMPERATURE: 98 F

## 2024-04-19 DIAGNOSIS — E78.49 OTHER HYPERLIPIDEMIA: ICD-10-CM

## 2024-04-19 DIAGNOSIS — I70.0 ATHEROSCLEROSIS OF AORTA: ICD-10-CM

## 2024-04-19 DIAGNOSIS — Z79.01 CHRONIC ANTICOAGULATION: ICD-10-CM

## 2024-04-19 DIAGNOSIS — F41.8 SITUATIONAL ANXIETY: ICD-10-CM

## 2024-04-19 DIAGNOSIS — E21.3 HYPERPARATHYROIDISM: ICD-10-CM

## 2024-04-19 DIAGNOSIS — I10 ESSENTIAL HYPERTENSION: Primary | ICD-10-CM

## 2024-04-19 DIAGNOSIS — I82.592 CHRONIC EMBOLISM AND THROMBOSIS OF OTHER SPECIFIED DEEP VEIN OF LEFT LOWER EXTREMITY: ICD-10-CM

## 2024-04-19 DIAGNOSIS — E53.8 B12 DEFICIENCY: ICD-10-CM

## 2024-04-19 DIAGNOSIS — J84.10 PULMONARY FIBROSIS, POSTINFLAMMATORY: ICD-10-CM

## 2024-04-19 DIAGNOSIS — I50.32 CHRONIC DIASTOLIC CONGESTIVE HEART FAILURE: ICD-10-CM

## 2024-04-19 DIAGNOSIS — M81.0 AGE-RELATED OSTEOPOROSIS WITHOUT CURRENT PATHOLOGICAL FRACTURE: ICD-10-CM

## 2024-04-19 DIAGNOSIS — I48.20 CHRONIC A-FIB: ICD-10-CM

## 2024-04-19 DIAGNOSIS — J41.0 SIMPLE CHRONIC BRONCHITIS: ICD-10-CM

## 2024-04-19 LAB
ALBUMIN SERPL BCP-MCNC: 3.8 G/DL (ref 3.5–5.2)
ALP SERPL-CCNC: 57 U/L (ref 55–135)
ALT SERPL W/O P-5'-P-CCNC: 14 U/L (ref 10–44)
ANION GAP SERPL CALC-SCNC: 8 MMOL/L (ref 8–16)
AST SERPL-CCNC: 21 U/L (ref 10–40)
BASOPHILS # BLD AUTO: 0.03 K/UL (ref 0–0.2)
BASOPHILS NFR BLD: 0.4 % (ref 0–1.9)
BILIRUB SERPL-MCNC: 0.6 MG/DL (ref 0.1–1)
BUN SERPL-MCNC: 16 MG/DL (ref 8–23)
CALCIUM SERPL-MCNC: 10.9 MG/DL (ref 8.7–10.5)
CHLORIDE SERPL-SCNC: 108 MMOL/L (ref 95–110)
CHOLEST SERPL-MCNC: 128 MG/DL (ref 120–199)
CHOLEST/HDLC SERPL: 2.3 {RATIO} (ref 2–5)
CO2 SERPL-SCNC: 26 MMOL/L (ref 23–29)
CREAT SERPL-MCNC: 0.8 MG/DL (ref 0.5–1.4)
DIFFERENTIAL METHOD BLD: ABNORMAL
EOSINOPHIL # BLD AUTO: 0.2 K/UL (ref 0–0.5)
EOSINOPHIL NFR BLD: 2.1 % (ref 0–8)
ERYTHROCYTE [DISTWIDTH] IN BLOOD BY AUTOMATED COUNT: 14 % (ref 11.5–14.5)
EST. GFR  (NO RACE VARIABLE): >60 ML/MIN/1.73 M^2
GLUCOSE SERPL-MCNC: 114 MG/DL (ref 70–110)
HCT VFR BLD AUTO: 42.4 % (ref 37–48.5)
HDLC SERPL-MCNC: 55 MG/DL (ref 40–75)
HDLC SERPL: 43 % (ref 20–50)
HGB BLD-MCNC: 13.4 G/DL (ref 12–16)
IMM GRANULOCYTES # BLD AUTO: 0.03 K/UL (ref 0–0.04)
IMM GRANULOCYTES NFR BLD AUTO: 0.4 % (ref 0–0.5)
LDLC SERPL CALC-MCNC: 57.8 MG/DL (ref 63–159)
LYMPHOCYTES # BLD AUTO: 2 K/UL (ref 1–4.8)
LYMPHOCYTES NFR BLD: 25.3 % (ref 18–48)
MCH RBC QN AUTO: 29.5 PG (ref 27–31)
MCHC RBC AUTO-ENTMCNC: 31.6 G/DL (ref 32–36)
MCV RBC AUTO: 93 FL (ref 82–98)
MONOCYTES # BLD AUTO: 0.8 K/UL (ref 0.3–1)
MONOCYTES NFR BLD: 9.7 % (ref 4–15)
NEUTROPHILS # BLD AUTO: 5 K/UL (ref 1.8–7.7)
NEUTROPHILS NFR BLD: 62.1 % (ref 38–73)
NONHDLC SERPL-MCNC: 73 MG/DL
NRBC BLD-RTO: 0 /100 WBC
PLATELET # BLD AUTO: 199 K/UL (ref 150–450)
PMV BLD AUTO: 11.8 FL (ref 9.2–12.9)
POTASSIUM SERPL-SCNC: 4.9 MMOL/L (ref 3.5–5.1)
PROT SERPL-MCNC: 7 G/DL (ref 6–8.4)
RBC # BLD AUTO: 4.55 M/UL (ref 4–5.4)
SODIUM SERPL-SCNC: 142 MMOL/L (ref 136–145)
TRIGL SERPL-MCNC: 76 MG/DL (ref 30–150)
TSH SERPL DL<=0.005 MIU/L-ACNC: 0.42 UIU/ML (ref 0.4–4)
VIT B12 SERPL-MCNC: 1855 PG/ML (ref 210–950)
WBC # BLD AUTO: 7.96 K/UL (ref 3.9–12.7)

## 2024-04-19 PROCEDURE — 85025 COMPLETE CBC W/AUTO DIFF WBC: CPT | Performed by: INTERNAL MEDICINE

## 2024-04-19 PROCEDURE — 3288F FALL RISK ASSESSMENT DOCD: CPT | Mod: CPTII,S$GLB,, | Performed by: INTERNAL MEDICINE

## 2024-04-19 PROCEDURE — 1101F PT FALLS ASSESS-DOCD LE1/YR: CPT | Mod: CPTII,S$GLB,, | Performed by: INTERNAL MEDICINE

## 2024-04-19 PROCEDURE — 80061 LIPID PANEL: CPT | Performed by: INTERNAL MEDICINE

## 2024-04-19 PROCEDURE — 1157F ADVNC CARE PLAN IN RCRD: CPT | Mod: CPTII,S$GLB,, | Performed by: INTERNAL MEDICINE

## 2024-04-19 PROCEDURE — 1159F MED LIST DOCD IN RCRD: CPT | Mod: CPTII,S$GLB,, | Performed by: INTERNAL MEDICINE

## 2024-04-19 PROCEDURE — 3078F DIAST BP <80 MM HG: CPT | Mod: CPTII,S$GLB,, | Performed by: INTERNAL MEDICINE

## 2024-04-19 PROCEDURE — 99213 OFFICE O/P EST LOW 20 MIN: CPT | Mod: S$GLB,,, | Performed by: INTERNAL MEDICINE

## 2024-04-19 PROCEDURE — 1125F AMNT PAIN NOTED PAIN PRSNT: CPT | Mod: CPTII,S$GLB,, | Performed by: INTERNAL MEDICINE

## 2024-04-19 PROCEDURE — 82607 VITAMIN B-12: CPT | Performed by: INTERNAL MEDICINE

## 2024-04-19 PROCEDURE — 80053 COMPREHEN METABOLIC PANEL: CPT | Performed by: INTERNAL MEDICINE

## 2024-04-19 PROCEDURE — 3074F SYST BP LT 130 MM HG: CPT | Mod: CPTII,S$GLB,, | Performed by: INTERNAL MEDICINE

## 2024-04-19 PROCEDURE — 84443 ASSAY THYROID STIM HORMONE: CPT | Performed by: INTERNAL MEDICINE

## 2024-04-19 PROCEDURE — 99999 PR PBB SHADOW E&M-EST. PATIENT-LVL III: CPT | Mod: PBBFAC,,, | Performed by: INTERNAL MEDICINE

## 2024-04-19 RX ORDER — BUSPIRONE HYDROCHLORIDE 7.5 MG/1
7.5 TABLET ORAL 3 TIMES DAILY PRN
Qty: 180 TABLET | Refills: 5 | Status: SHIPPED | OUTPATIENT
Start: 2024-04-19

## 2024-04-19 RX ORDER — CLOBETASOL PROPIONATE 0.5 MG/G
CREAM TOPICAL
Qty: 30 G | Refills: 1 | Status: SHIPPED | OUTPATIENT
Start: 2024-04-19

## 2024-04-22 ENCOUNTER — TELEPHONE (OUTPATIENT)
Dept: PRIMARY CARE CLINIC | Facility: CLINIC | Age: 84
End: 2024-04-22
Payer: MEDICARE

## 2024-04-22 DIAGNOSIS — M81.0 AGE-RELATED OSTEOPOROSIS WITHOUT CURRENT PATHOLOGICAL FRACTURE: Primary | ICD-10-CM

## 2024-04-22 NOTE — TELEPHONE ENCOUNTER
----- Message from Dereck Johns sent at 4/20/2024  2:21 AM CDT -----  Regarding: Client Services  Contact: [phone]  Good Morning,     My name is Dereck Johns, I work in the Lab Client Services. We had a problem with some lab work on this patient. If someone from your office could call us at [phone] or idl. [id number] that would be great. Anyone in my department can help.      Thank you,

## 2024-04-22 NOTE — TELEPHONE ENCOUNTER
Spoke with lab- PTH- cancelled specimen was spun down greater than 4 hours before lab received it.

## 2024-04-24 ENCOUNTER — TELEPHONE (OUTPATIENT)
Dept: PRIMARY CARE CLINIC | Facility: CLINIC | Age: 84
End: 2024-04-24
Payer: MEDICARE

## 2024-04-26 ENCOUNTER — LAB VISIT (OUTPATIENT)
Dept: LAB | Facility: HOSPITAL | Age: 84
End: 2024-04-26
Attending: FAMILY MEDICINE
Payer: MEDICARE

## 2024-04-26 DIAGNOSIS — M81.0 AGE-RELATED OSTEOPOROSIS WITHOUT CURRENT PATHOLOGICAL FRACTURE: ICD-10-CM

## 2024-04-26 PROCEDURE — 36415 COLL VENOUS BLD VENIPUNCTURE: CPT | Performed by: FAMILY MEDICINE

## 2024-04-26 PROCEDURE — 83970 ASSAY OF PARATHORMONE: CPT | Performed by: FAMILY MEDICINE

## 2024-04-27 LAB — PTH-INTACT SERPL-MCNC: 82.1 PG/ML (ref 9–77)

## 2024-05-08 ENCOUNTER — TELEPHONE (OUTPATIENT)
Dept: PRIMARY CARE CLINIC | Facility: CLINIC | Age: 84
End: 2024-05-08
Payer: MEDICARE

## 2024-05-08 NOTE — TELEPHONE ENCOUNTER
Spoke with Mau about PTH results; reviewed recent Ca and DEXA. She expressed understanding and thanked me for the call.

## 2024-05-08 NOTE — TELEPHONE ENCOUNTER
----- Message from Kati Hou RN sent at 5/7/2024  3:16 PM CDT -----  Contact: Daughter in law/Mau/[phone]  Did you see these results yet?  I didn't see a response.  ----- Message -----  From: Crystal Lechuga  Sent: 5/7/2024   3:12 PM CDT  To: Adebayo BACON Staff    2TESTRESULTS    Type: Test Results    What test was performed?Labs     Who ordered the test?Dr Foreman     When and where were the test performed? 4/26 [facility]     Would you like response via [email or web address]: No , would like a return call     Comments:

## 2024-05-28 ENCOUNTER — TELEPHONE (OUTPATIENT)
Dept: CARDIOLOGY | Facility: CLINIC | Age: 84
End: 2024-05-28
Payer: MEDICARE

## 2024-05-28 NOTE — TELEPHONE ENCOUNTER
----- Message from Lizbet Laird sent at 5/28/2024 12:40 PM CDT -----  Contact: self  Patient requesting a call back regarding metoprolol succinate (TOPROL-XL) 25 MG 24 hr tablet. Pt needs clarification on whether she should continue taking 25 mg in the evening Please call back @ [phone]

## 2024-05-28 NOTE — TELEPHONE ENCOUNTER
Returned call. Spoke with daughter-in-law. Patient was inquiring as to continue metoprolol 25mg at bedtime.  Daughter was asked if patient has voiced concerns with the nightly dosage, lightheadedness, near syncope, hypotension, bradycardia.  Patient without complaints, just inquiring rather to continue nightly dosage.   Patient also not checking blood pressures in the evening prior or after taking nightly dose off metoprolol.  I informed daughter-in-law Dr. Vega is presently out of office until mid June and if she would like for me to send message to Dr. Flores. Patient stated will continue metoprolol as prescribed, will see Dr. Vega in June.  Daughter -in-law was advised to trend a.m and p.m blood pressure to discuss with Dr. Vega at next visit.daughter -in-law verbalized understanding.

## 2024-06-19 ENCOUNTER — OFFICE VISIT (OUTPATIENT)
Dept: CARDIOLOGY | Facility: CLINIC | Age: 84
End: 2024-06-19
Payer: MEDICARE

## 2024-06-19 VITALS
BODY MASS INDEX: 33.63 KG/M2 | DIASTOLIC BLOOD PRESSURE: 68 MMHG | HEIGHT: 60 IN | SYSTOLIC BLOOD PRESSURE: 112 MMHG | HEART RATE: 88 BPM | WEIGHT: 171.31 LBS | OXYGEN SATURATION: 97 %

## 2024-06-19 DIAGNOSIS — E53.8 B12 DEFICIENCY: ICD-10-CM

## 2024-06-19 DIAGNOSIS — R94.31 ABNORMAL EKG: ICD-10-CM

## 2024-06-19 DIAGNOSIS — I10 ESSENTIAL HYPERTENSION: Primary | ICD-10-CM

## 2024-06-19 DIAGNOSIS — I82.592 CHRONIC EMBOLISM AND THROMBOSIS OF OTHER SPECIFIED DEEP VEIN OF LEFT LOWER EXTREMITY: ICD-10-CM

## 2024-06-19 DIAGNOSIS — I50.32 CHRONIC DIASTOLIC CONGESTIVE HEART FAILURE: ICD-10-CM

## 2024-06-19 DIAGNOSIS — E78.49 OTHER HYPERLIPIDEMIA: ICD-10-CM

## 2024-06-19 DIAGNOSIS — I70.0 ATHEROSCLEROSIS OF AORTA: ICD-10-CM

## 2024-06-19 DIAGNOSIS — R00.2 PALPITATION: ICD-10-CM

## 2024-06-19 DIAGNOSIS — I83.812 VARICOSE VEINS OF LEFT LOWER EXTREMITY WITH PAIN: ICD-10-CM

## 2024-06-19 DIAGNOSIS — Z79.01 CHRONIC ANTICOAGULATION: ICD-10-CM

## 2024-06-19 DIAGNOSIS — G45.9 TIA (TRANSIENT ISCHEMIC ATTACK): ICD-10-CM

## 2024-06-19 DIAGNOSIS — I48.20 CHRONIC A-FIB: ICD-10-CM

## 2024-06-19 PROCEDURE — 1157F ADVNC CARE PLAN IN RCRD: CPT | Mod: CPTII,S$GLB,, | Performed by: INTERNAL MEDICINE

## 2024-06-19 PROCEDURE — 3078F DIAST BP <80 MM HG: CPT | Mod: CPTII,S$GLB,, | Performed by: INTERNAL MEDICINE

## 2024-06-19 PROCEDURE — 3288F FALL RISK ASSESSMENT DOCD: CPT | Mod: CPTII,S$GLB,, | Performed by: INTERNAL MEDICINE

## 2024-06-19 PROCEDURE — 1159F MED LIST DOCD IN RCRD: CPT | Mod: CPTII,S$GLB,, | Performed by: INTERNAL MEDICINE

## 2024-06-19 PROCEDURE — 99214 OFFICE O/P EST MOD 30 MIN: CPT | Mod: S$GLB,,, | Performed by: INTERNAL MEDICINE

## 2024-06-19 PROCEDURE — 1126F AMNT PAIN NOTED NONE PRSNT: CPT | Mod: CPTII,S$GLB,, | Performed by: INTERNAL MEDICINE

## 2024-06-19 PROCEDURE — 99999 PR PBB SHADOW E&M-EST. PATIENT-LVL V: CPT | Mod: PBBFAC,,, | Performed by: INTERNAL MEDICINE

## 2024-06-19 PROCEDURE — 1160F RVW MEDS BY RX/DR IN RCRD: CPT | Mod: CPTII,S$GLB,, | Performed by: INTERNAL MEDICINE

## 2024-06-19 PROCEDURE — 1101F PT FALLS ASSESS-DOCD LE1/YR: CPT | Mod: CPTII,S$GLB,, | Performed by: INTERNAL MEDICINE

## 2024-06-19 PROCEDURE — 3074F SYST BP LT 130 MM HG: CPT | Mod: CPTII,S$GLB,, | Performed by: INTERNAL MEDICINE

## 2024-06-19 NOTE — PROGRESS NOTES
Subjective:   Patient ID:  Lorenzo Ospina is a 84 y.o. female who presents for follow up of Shortness of Breath and Heartburn      HPI  8/28/2020  A 79 yo female with htn afib failed cardioversion was scheduled to have BLATION HAD CANCELED DUE TO FAMILY ISSUES AND NOW COVID. SHE IS NOT COMPLAINING OF CHEST PAIN SHORTNESS OF BREATH TAKES MEDS REGULARILY HAS IMPROVED EATING HABITS AS FAR AS SALT IS CONCERNED.SHE WALKS BETWEEN 15-30 MINUTES DAILY WEATHER PERMITS BP IS IMPROVED WITHE XERCISE. HAS NO LEG SWELLING NO CHF SYMPTOMS. LIPIDS ARE ON TARGET     TODAY IS HERE FOR F/U SHE FEELS WELL SHE IS EXERCISING HAS NOT BEEN COMPLAINING OF CHEST PAIN OR SHORTNESS OF BREATH HAS NO HEADACHE NO BLURRED VISION HAS BEEN ON NOAC NO BLEEDING. SAW DR MORA TODAY STOPPED HER AMIODARONE  AND KEPT HER ON B BLOCKERS. HER BP RECORDING FROM HOME ARE ON TARGET AND WELL CONTROLLED SHE TRIES TO EXERCISE REGULARILY OTHERWISE HER BOP IS ELEVATED.      3/31/2021  Had nocturnal wheezing and has dyspnea on exertion was seen in er resolved with diuretics. This is suggestive of pnd angina decubitus, she continues to have exertional dyspnea has no energy with activity has to take a break when doing house chores. Reviewed her bp readings seems within range. Has no leg swelling after short course of diuretics pnd resolved chest tightness wheezing resolved.      11/12/2021  HERE FROF /U JUST GOT BACK FROM [place] SHE SPENT 4 MONTH THERE ./ SHE HAS BEEN WALKING HAD  DIFFICULTY GOING UPHILL SHE GEST TIRED SHORT OF BREATH HAS TO SIT DOWN. NOW HERE FELT TIRED WALKING. HAS  BEEN COMPLIANT WITH MEDS BP SEEMED CONTROLLED. SHE IS ABLE TO DO ALL HOUSE WORK NOT STOPPING WITH ANY SHORTNESS OF BREATH HAS NO LEG SWELLING  NO ORTHOPNEA PND  NO SYNCOPE. HER CARDIAC W/U NEGATIVE      2/18/2022       here for f/u has been off losartan due to cough not sure if she improved off losartan DR CORONADO THINK HER COUGH IS BETTER. WHILE PATIENT DOES NOT THINK SO. SHE  IS TAKING HER MEDS. HER DIASTOLIC BP IS STAYING ABOVE MID 80S. HAS NO NEW SYMPTOMS   HER SHORTNESS OF BREATH IS IMPROVING SHE HAS NO WHEEZING SHE HAS NO LEG SWELLING SHE IS COMPLIANT WITH DIET.      10/14/2022  Doing well clinically reviewed bp reading for the past 4 months seems well controlled. Has no tia symptoms. Has  no more cough tolerated losartan well her medical regimen adequate . Has no bleeding or bruising. Has fatigue in the morning not related to bp issue. Drinks coffee and eat feels better. She is compliant with salt in  a reasonable fashion.     4/19/2023  Here for had left leg pain swelling. Used compression socks transiently saw ortho had shoes wear had resolution of pain and swelling now back wearing regular shoes. Has no more pain or swelling.  She is on noac no bleeding. Her bp reading are acceptable she can using stricter bp control. Has no chest pan or shortness of breath  has leg pain with ambulation.reviewed bp readings from home . Readings are on target.       10/25/2023  Has fluctuation in bp ok compliance with slat. Some leg swelling on lefrt improved with empiric lasix use. Has daytime fatigue and sleepiness.     3/13/2024   Multiple complaints has leg swelling over the past 4 months.left elg worse than right. Better when she wakes up in the morning gets worse at the end of the day. No pain from swelling. Has left knee pain it gives out on her. He rhtn is controlled has been compliant with salt review of htn readings from home confirms it. She saw pulmonary who gave he rlasix that she takes intermittently. She thinks it helps. Has no shortness of breath until she exercises she gest tired fatigue he runs out of breath thinks she is going to die,. Feels her heart go fast.   Compression socks with zipper help the swelling    6/19/2024   Not using compression socks regularily has occasional lightheadedness htn controlled   Bnp 215   Past Medical History:   Diagnosis Date    A-fib 11/5/2019     Breast cancer     right, dx .  Dr. Callaway follows.s/p lumpectomy and arimidex x 5y.    Hyperlipidemia     Hypertension     OP (osteoporosis)     on bisphos x 2y.    TIA (transient ischemic attack)     Toe fracture        Past Surgical History:   Procedure Laterality Date    BREAST LUMPECTOMY      right 2008    TOTAL ABDOMINAL HYSTERECTOMY      no cancer    TRANSESOPHAGEAL ECHOCARDIOGRAPHY N/A 2019    Procedure: ECHOCARDIOGRAM, TRANSESOPHAGEAL;  Surgeon: Otto Flores MD;  Location: [facility] CATH LAB;  Service: Cardiology;  Laterality: N/A;    TREATMENT OF CARDIAC ARRHYTHMIA N/A 2019    Procedure: CARDIOVERSION;  Surgeon: Otto Flores MD;  Location: [facility] CATH LAB;  Service: Cardiology;  Laterality: N/A;    TREATMENT OF CARDIAC ARRHYTHMIA N/A 2019    Procedure: CARDIOVERSION;  Surgeon: Otto Flores MD;  Location: [facility] CATH LAB;  Service: Cardiology;  Laterality: N/A;    TREATMENT OF CARDIAC ARRHYTHMIA N/A 2020    Procedure: CARDIOVERSION;  Surgeon: Otto Flores MD;  Location: [facility] CATH LAB;  Service: Cardiology;  Laterality: N/A;    TREATMENT OF CARDIAC ARRHYTHMIA N/A 3/2/2020    Procedure: CARDIOVERSION;  Surgeon: Mayi Beltran MD;  Location: [facility] CATH LAB;  Service: Cardiology;  Laterality: N/A;  830 start per Dr. beltran       Social History     Tobacco Use    Smoking status: Former     Current packs/day: 0.00     Average packs/day: 1.5 packs/day for 22.7 years (34.1 ttl pk-yrs)     Types: Cigarettes     Start date: 1972     Quit date: 1994     Years since quittin.7    Smokeless tobacco: Never   Substance Use Topics    Alcohol use: Not Currently    Drug use: Never       No family history on file.    Current Outpatient Medications   Medication Sig    albuterol (PROVENTIL/VENTOLIN HFA) 90 mcg/actuation inhaler INHALE 2 PUFFS BY MOUTH (INTO THE LUNGS) EVERY SIX HOURS AS NEEDED FOR WHEEZING, RESCUE    apixaban (ELIQUIS) 5 mg Tab TAKE ONE TABLET BY MOUTH TWICE DAILY    atorvastatin (LIPITOR) 20 MG  tablet Take 1 tablet (20 mg total) by mouth once daily.    busPIRone (BUSPAR) 7.5 MG tablet Take 1 tablet (7.5 mg total) by mouth 3 (three) times daily as needed.    clobetasoL (TEMOVATE) 0.05 % cream Apply twice daily to poison ivy rash.  Do not use on face.  No more than 14d treatment at a time.    cyanocobalamin (VITAMIN B-12) 1000 MCG tablet Take 100 mcg by mouth once daily.    diclofenac sodium (VOLTAREN) 1 % Gel Apply 2 g topically once daily.    fish oil-omega-3 fatty acids 300-1,000 mg capsule Take 2 g by mouth once daily.    furosemide (LASIX) 20 MG tablet Take 1 tablet (20 mg total) by mouth once daily.    glucosam/chond/hyalu/CF borate (MOVE FREE [facility] ORAL) Take 1 tablet by mouth once daily.    ipratropium (ATROVENT) 0.02 % nebulizer solution Take 2.5 mLs (500 mcg total) by nebulization 4 (four) times daily.    losartan (COZAAR) 25 MG tablet Take 1 tablet (25 mg total) by mouth 2 (two) times a day.    metoprolol succinate (TOPROL-XL) 25 MG 24 hr tablet Take 1 tablet (25 mg total) by mouth every evening.    metoprolol succinate (TOPROL-XL) 50 MG 24 hr tablet Take 1 tablet (50 mg total) by mouth once daily.    MULTIVIT-IRON-MIN-FOLIC ACID 3,500-18-0.4 UNIT-MG-MG ORAL CHEW Take by mouth.    ranolazine (RANEXA) 500 MG Tb12 Take 1 tablet (500 mg total) by mouth 2 (two) times daily.    vitamin D 1000 units Tab Take 2,000 Units by mouth once daily.     No current facility-administered medications for this visit.     Current Outpatient Medications on File Prior to Visit   Medication Sig    albuterol (PROVENTIL/VENTOLIN HFA) 90 mcg/actuation inhaler INHALE 2 PUFFS BY MOUTH (INTO THE LUNGS) EVERY SIX HOURS AS NEEDED FOR WHEEZING, RESCUE    apixaban (ELIQUIS) 5 mg Tab TAKE ONE TABLET BY MOUTH TWICE DAILY    atorvastatin (LIPITOR) 20 MG tablet Take 1 tablet (20 mg total) by mouth once daily.    busPIRone (BUSPAR) 7.5 MG tablet Take 1 tablet (7.5 mg total) by mouth 3 (three) times daily as needed.     clobetasoL (TEMOVATE) 0.05 % cream Apply twice daily to poison ivy rash.  Do not use on face.  No more than 14d treatment at a time.    cyanocobalamin (VITAMIN B-12) 1000 MCG tablet Take 100 mcg by mouth once daily.    diclofenac sodium (VOLTAREN) 1 % Gel Apply 2 g topically once daily.    fish oil-omega-3 fatty acids 300-1,000 mg capsule Take 2 g by mouth once daily.    furosemide (LASIX) 20 MG tablet Take 1 tablet (20 mg total) by mouth once daily.    glucosam/chond/hyalu/CF borate (MOVE FREE [facility] ORAL) Take 1 tablet by mouth once daily.    ipratropium (ATROVENT) 0.02 % nebulizer solution Take 2.5 mLs (500 mcg total) by nebulization 4 (four) times daily.    losartan (COZAAR) 25 MG tablet Take 1 tablet (25 mg total) by mouth 2 (two) times a day.    metoprolol succinate (TOPROL-XL) 25 MG 24 hr tablet Take 1 tablet (25 mg total) by mouth every evening.    metoprolol succinate (TOPROL-XL) 50 MG 24 hr tablet Take 1 tablet (50 mg total) by mouth once daily.    MULTIVIT-IRON-MIN-FOLIC ACID 3,500-18-0.4 UNIT-MG-MG ORAL CHEW Take by mouth.    ranolazine (RANEXA) 500 MG Tb12 Take 1 tablet (500 mg total) by mouth 2 (two) times daily.    vitamin D 1000 units Tab Take 2,000 Units by mouth once daily.     No current facility-administered medications on file prior to visit.     Review of patient's allergies indicates:  No Known Allergies   Review of Systems   Constitutional: Negative for malaise/fatigue.   Eyes:  Negative for blurred vision.   Cardiovascular:  Negative for chest pain, claudication, cyanosis, dyspnea on exertion, irregular heartbeat, leg swelling, near-syncope, orthopnea, palpitations and paroxysmal nocturnal dyspnea.   Respiratory:  Negative for cough, hemoptysis and shortness of breath.    Hematologic/Lymphatic: Negative for bleeding problem. Does not bruise/bleed easily.   Skin:  Negative for dry skin and itching.   Musculoskeletal:  Negative for falls, muscle weakness and myalgias.   Gastrointestinal:   Negative for abdominal pain, diarrhea, heartburn, hematemesis, hematochezia and melena.   Genitourinary:  Negative for flank pain and hematuria.   Neurological:  Positive for dizziness and light-headedness. Negative for focal weakness, headaches, numbness, paresthesias, seizures and weakness.   Psychiatric/Behavioral:  Negative for altered mental status and memory loss. The patient is not nervous/anxious.    Allergic/Immunologic: Negative for hives.       Objective:   Physical Exam  Vitals:    06/19/24 1053 06/19/24 1057   BP: 118/70 112/68   BP Location: Left arm Right arm   Patient Position: Sitting Sitting   BP Method: Small (Manual) Small (Manual)   Pulse: 88 88   SpO2: 97% 97%   Weight: 77.7 kg (171 lb 4.8 oz) 77.7 kg (171 lb 4.8 oz)   Height: 5' (1.524 m) 5' (1.524 m)     Lab Results   Component Value Date    CHOL 128 04/19/2024    CHOL 131 11/09/2023    CHOL 145 10/03/2022      Body mass index is 33.45 kg/m².   Lab Results   Component Value Date    HGBA1C 5.3 06/30/2023      BMP  Lab Results   Component Value Date     04/19/2024    K 4.9 04/19/2024     04/19/2024    CO2 26 04/19/2024    BUN 16 04/19/2024    CREATININE 0.8 04/19/2024    CALCIUM 10.9 (H) 04/19/2024    ANIONGAP 8 04/19/2024    EGFRNORACEVR >60.0 04/19/2024      Lab Results   Component Value Date    HDL 55 04/19/2024    HDL 54 11/09/2023    HDL 62 10/03/2022     Lab Results   Component Value Date    LDLCALC 57.8 (L) 04/19/2024    LDLCALC 52.6 (L) 11/09/2023    LDLCALC 68.8 10/03/2022     Lab Results   Component Value Date    TRIG 76 04/19/2024    TRIG 122 11/09/2023    TRIG 71 10/03/2022     Lab Results   Component Value Date    CHOLHDL 43.0 04/19/2024    CHOLHDL 41.2 11/09/2023    CHOLHDL 42.8 10/03/2022       Chemistry        Component Value Date/Time     04/19/2024 1048    K 4.9 04/19/2024 1048     04/19/2024 1048    CO2 26 04/19/2024 1048    BUN 16 04/19/2024 1048    CREATININE 0.8 04/19/2024 1048     (H)  "04/19/2024 1048        Component Value Date/Time    CALCIUM 10.9 (H) 04/19/2024 1048    ALKPHOS 57 04/19/2024 1048    AST 21 04/19/2024 1048    ALT 14 04/19/2024 1048    BILITOT 0.6 04/19/2024 1048    ESTGFRAFRICA >60.0 12/13/2021 1636    EGFRNONAA >60.0 12/13/2021 1636          Lab Results   Component Value Date    TSH 0.419 04/19/2024     No results found for: "INR", "PROTIME"  Lab Results   Component Value Date    WBC 7.96 04/19/2024    HGB 13.4 04/19/2024    HCT 42.4 04/19/2024    MCV 93 04/19/2024     04/19/2024     BMP  Sodium   Date Value Ref Range Status   04/19/2024 142 136 - 145 mmol/L Final     Potassium   Date Value Ref Range Status   04/19/2024 4.9 3.5 - 5.1 mmol/L Final     Chloride   Date Value Ref Range Status   04/19/2024 108 95 - 110 mmol/L Final     CO2   Date Value Ref Range Status   04/19/2024 26 23 - 29 mmol/L Final     BUN   Date Value Ref Range Status   04/19/2024 16 8 - 23 mg/dL Final     Creatinine   Date Value Ref Range Status   04/19/2024 0.8 0.5 - 1.4 mg/dL Final     Calcium   Date Value Ref Range Status   04/19/2024 10.9 (H) 8.7 - 10.5 mg/dL Final     Anion Gap   Date Value Ref Range Status   04/19/2024 8 8 - 16 mmol/L Final     eGFR if    Date Value Ref Range Status   12/13/2021 >60.0 >60 mL/min/1.73 m^2 Final     eGFR if non    Date Value Ref Range Status   12/13/2021 >60.0 >60 mL/min/1.73 m^2 Final     Comment:     Calculation used to obtain the estimated glomerular filtration  rate (eGFR) is the CKD-EPI equation.        CrCl cannot be calculated (Patient's most recent lab result is older than the maximum 7 days allowed.).  Interpretation Summary  Show Result Comparison     Normal myocardial perfusion scan. There is no evidence of myocardial ischemia or infarction.    The gated perfusion images showed an ejection fraction of 81% at rest. The gated perfusion images showed an ejection fraction of 78% post stress.    There is normal wall motion at " rest and post stress.    LV cavity size is normal at rest and normal at stress.    The ECG portion of the study is negative for ischemia.    The patient reported no chest pain during the stress timothy    Summary  Show Result Comparison     Left Ventricle: The left ventricle is normal in size. Normal wall thickness. There is concentric hypertrophy. There is normal systolic function with a visually estimated ejection fraction of 60 - 65%.    Right Ventricle: Normal right ventricular cavity size. Wall thickness is normal. Right ventricle wall motion  is normal. Systolic function is normal.    Left Atrium: Left atrium is mildly dilated.    Aortic Valve: There is mild aortic valve sclerosis.    Pulmonary Artery: The estimated pulmonary artery systolic pressure is 28 mmHg.    IVC/SVC: Normal venous pressure at 3 mmHg.  Assessment:     1. Essential hypertension    2. Other hyperlipidemia    3. TIA (transient ischemic attack)    4. Palpitation    5. Chronic anticoagulation    6. Atherosclerosis of aorta    7. Varicose veins of left lower extremity with pain    8. Chronic diastolic congestive heart failure    9. 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 Chronic a-fib    11. Abnormal EKG    12. B12 deficiency      This is office visit to discuss test result and medication   Reveiwed findings no ischemia normal lvf   Bnp trending down    Htn controlled with some lows in  I think she can switch losartan 25 mg po daily at nite and see how she responds.   Discussed adequate hydration in summer time and hot days to account for sensible loss.   Leg swelling and varicosities discussed compression socks.   Chronic afib tolerated eliquis well continue the same.   Zyrtec making her sleepy will switch to claritin   Plan:   Continue current therapy  Cardiac low salt diet.  Risk factor modification and excercise program.  F/u in 3 months

## 2024-07-17 ENCOUNTER — TELEPHONE (OUTPATIENT)
Dept: CARDIOLOGY | Facility: CLINIC | Age: 84
End: 2024-07-17
Payer: MEDICARE

## 2024-07-17 NOTE — TELEPHONE ENCOUNTER
Outcome  Additional Information Required  This medication or product is on your plan's list of covered drugs. Prior authorization is not required at this time. If your pharmacy has questions regarding the processing of your prescription, please have them call the [facility] pharmacy help desk at [phone]. **Please note: This request was submitted electronically. Formulary lowering, tiering exception, cost reduction and/or pre-benefit determination review (including prospective Medicare hospice reviews) requests cannot be requested using this method of submission. Providers contact us at [phone] for further assistance.  Drug  Metoprolol Succinate ER 50MG er tablets    Form  [facility] Medicare Part D Electronic Prior Authorization Form (2017 NCPDP)

## 2024-09-18 NOTE — PROGRESS NOTES
Subjective:      Patient ID: Lorenzo Ospina is a 84 y.o. female.    Chief Complaint: Follow-up (3 month f/u )      HPI  Here for follow up of medical problems.  Anxiety doing well on bid buspar.  Chronic cough, taking albuterol with ipatropium nebulizer BID.  No cp/sob/palp.  BMs normal.  No f/c.      Updated/ annual due 4/25:  HM: 11/22 fluvax/ref now, 2/21 covid vaccines/booster, 6/17 [id number], 11/22 [id number], ref tetanus, 11/21 MMG/no more, 11/23 BMD stable rep 2y,  2012 Cscope saw Gastro in 2017 and says due in 2022.     Review of Systems   Constitutional:  Negative for chills, diaphoresis and fever.   Respiratory:  Negative for cough and shortness of breath.    Cardiovascular:  Negative for chest pain, palpitations and leg swelling.   Gastrointestinal:  Negative for blood in stool, constipation, diarrhea, nausea and vomiting.   Genitourinary:  Negative for dysuria, frequency and hematuria.   Psychiatric/Behavioral:  The patient is not nervous/anxious.          Objective:   /60 (BP Location: Right arm, Patient Position: Sitting)   Pulse 87   Temp 97 °F (36.1 °C) (Skin)   Ht 5' (1.524 m)   Wt 75.4 kg (166 lb 1.9 oz)   SpO2 96%   BMI 32.44 kg/m²     Physical Exam  Constitutional:       Appearance: She is well-developed.   Neck:      Thyroid: No thyroid mass.      Vascular: No carotid bruit.   Cardiovascular:      Rate and Rhythm: Normal rate and regular rhythm.      Heart sounds: No murmur heard.     No friction rub. No gallop.   Pulmonary:      Effort: Pulmonary effort is normal.      Breath sounds: Normal breath sounds. No wheezing or rales.   Abdominal:      General: Bowel sounds are normal.      Palpations: Abdomen is soft. There is no mass.      Tenderness: There is no abdominal tenderness.   Musculoskeletal:      Cervical back: Neck supple.   Lymphadenopathy:      Cervical: No cervical adenopathy.   Neurological:      Mental Status: She is alert and oriented to person, place, and time.              Assessment:       1. Essential hypertension    2. 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 Chronic anticoagulation    4. Chronic a-fib    5. Other hyperlipidemia    6. Pulmonary fibrosis, postinflammatory    7. Simple chronic bronchitis    8. Chronic diastolic congestive heart failure    9. Situational anxiety          Plan:     1. Essential hypertension- stable, cont rx.  Overview:  Metoprolol, losartan.      2. 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 Other hyperlipidemia- at goal on rx, cont.  Overview:  Atorva 20mg daily.   Latest Reference Range & Units Most Recent   LDL Cholesterol 63.0 - 159.0 mg/dL 57.8 (L)  4/19/24 10:48   (L): Data is abnormally low      6. Pulmonary fibrosis, postinflammatory, Simple chronic bronchitis- doing well on rxs, cont.  Overview:  Ipratropium nebulier BID,  Albuterol MDI BID.      8. Chronic diastolic congestive heart failure- cont meds per Cards.  Overview:  Ranexa 500mg BID.      9. Situational anxiety- doing well, cont rx.  -     busPIRone (BUSPAR) 7.5 MG tablet; Take 1 tablet (7.5 mg total) by mouth 2 (two) times daily as needed (anxiety).  Dispense: 180 tablet; Refill: 3     HAV at pharmacy.

## 2024-09-26 ENCOUNTER — OFFICE VISIT (OUTPATIENT)
Dept: PRIMARY CARE CLINIC | Facility: CLINIC | Age: 84
End: 2024-09-26
Payer: MEDICARE

## 2024-09-26 VITALS
HEIGHT: 60 IN | OXYGEN SATURATION: 96 % | BODY MASS INDEX: 32.62 KG/M2 | TEMPERATURE: 97 F | DIASTOLIC BLOOD PRESSURE: 60 MMHG | HEART RATE: 87 BPM | WEIGHT: 166.13 LBS | SYSTOLIC BLOOD PRESSURE: 122 MMHG

## 2024-09-26 DIAGNOSIS — I50.32 CHRONIC DIASTOLIC CONGESTIVE HEART FAILURE: ICD-10-CM

## 2024-09-26 DIAGNOSIS — E78.49 OTHER HYPERLIPIDEMIA: ICD-10-CM

## 2024-09-26 DIAGNOSIS — I48.20 CHRONIC A-FIB: ICD-10-CM

## 2024-09-26 DIAGNOSIS — F41.8 SITUATIONAL ANXIETY: ICD-10-CM

## 2024-09-26 DIAGNOSIS — J84.10 PULMONARY FIBROSIS, POSTINFLAMMATORY: ICD-10-CM

## 2024-09-26 DIAGNOSIS — I10 ESSENTIAL HYPERTENSION: Primary | ICD-10-CM

## 2024-09-26 DIAGNOSIS — I82.592 CHRONIC EMBOLISM AND THROMBOSIS OF OTHER SPECIFIED DEEP VEIN OF LEFT LOWER EXTREMITY: ICD-10-CM

## 2024-09-26 DIAGNOSIS — J41.0 SIMPLE CHRONIC BRONCHITIS: ICD-10-CM

## 2024-09-26 DIAGNOSIS — Z79.01 CHRONIC ANTICOAGULATION: ICD-10-CM

## 2024-09-26 PROCEDURE — 3078F DIAST BP <80 MM HG: CPT | Mod: CPTII,S$GLB,, | Performed by: INTERNAL MEDICINE

## 2024-09-26 PROCEDURE — 1157F ADVNC CARE PLAN IN RCRD: CPT | Mod: CPTII,S$GLB,, | Performed by: INTERNAL MEDICINE

## 2024-09-26 PROCEDURE — 1126F AMNT PAIN NOTED NONE PRSNT: CPT | Mod: CPTII,S$GLB,, | Performed by: INTERNAL MEDICINE

## 2024-09-26 PROCEDURE — 1101F PT FALLS ASSESS-DOCD LE1/YR: CPT | Mod: CPTII,S$GLB,, | Performed by: INTERNAL MEDICINE

## 2024-09-26 PROCEDURE — 99999 PR PBB SHADOW E&M-EST. PATIENT-LVL III: CPT | Mod: PBBFAC,,, | Performed by: INTERNAL MEDICINE

## 2024-09-26 PROCEDURE — 99214 OFFICE O/P EST MOD 30 MIN: CPT | Mod: S$GLB,,, | Performed by: INTERNAL MEDICINE

## 2024-09-26 PROCEDURE — 3288F FALL RISK ASSESSMENT DOCD: CPT | Mod: CPTII,S$GLB,, | Performed by: INTERNAL MEDICINE

## 2024-09-26 PROCEDURE — 1159F MED LIST DOCD IN RCRD: CPT | Mod: CPTII,S$GLB,, | Performed by: INTERNAL MEDICINE

## 2024-09-26 PROCEDURE — 3074F SYST BP LT 130 MM HG: CPT | Mod: CPTII,S$GLB,, | Performed by: INTERNAL MEDICINE

## 2024-09-26 RX ORDER — BUSPIRONE HYDROCHLORIDE 7.5 MG/1
7.5 TABLET ORAL 2 TIMES DAILY PRN
Qty: 180 TABLET | Refills: 3 | Status: SHIPPED | OUTPATIENT
Start: 2024-09-26

## 2024-10-30 ENCOUNTER — OFFICE VISIT (OUTPATIENT)
Dept: CARDIOLOGY | Facility: CLINIC | Age: 84
End: 2024-10-30
Payer: MEDICARE

## 2024-10-30 VITALS
SYSTOLIC BLOOD PRESSURE: 130 MMHG | BODY MASS INDEX: 31.99 KG/M2 | DIASTOLIC BLOOD PRESSURE: 80 MMHG | OXYGEN SATURATION: 98 % | HEART RATE: 74 BPM | WEIGHT: 163.81 LBS

## 2024-10-30 DIAGNOSIS — R60.0 LOCALIZED EDEMA: ICD-10-CM

## 2024-10-30 DIAGNOSIS — E78.49 OTHER HYPERLIPIDEMIA: ICD-10-CM

## 2024-10-30 DIAGNOSIS — R94.31 ABNORMAL EKG: ICD-10-CM

## 2024-10-30 DIAGNOSIS — I48.20 CHRONIC A-FIB: Primary | ICD-10-CM

## 2024-10-30 DIAGNOSIS — G45.9 TIA (TRANSIENT ISCHEMIC ATTACK): ICD-10-CM

## 2024-10-30 DIAGNOSIS — R00.2 PALPITATION: ICD-10-CM

## 2024-10-30 DIAGNOSIS — Z79.01 CHRONIC ANTICOAGULATION: ICD-10-CM

## 2024-10-30 DIAGNOSIS — I50.32 CHRONIC DIASTOLIC CONGESTIVE HEART FAILURE: ICD-10-CM

## 2024-10-30 DIAGNOSIS — I10 ESSENTIAL HYPERTENSION: ICD-10-CM

## 2024-10-30 PROCEDURE — 1159F MED LIST DOCD IN RCRD: CPT | Mod: CPTII,S$GLB,, | Performed by: INTERNAL MEDICINE

## 2024-10-30 PROCEDURE — 1101F PT FALLS ASSESS-DOCD LE1/YR: CPT | Mod: CPTII,S$GLB,, | Performed by: INTERNAL MEDICINE

## 2024-10-30 PROCEDURE — 1160F RVW MEDS BY RX/DR IN RCRD: CPT | Mod: CPTII,S$GLB,, | Performed by: INTERNAL MEDICINE

## 2024-10-30 PROCEDURE — 3079F DIAST BP 80-89 MM HG: CPT | Mod: CPTII,S$GLB,, | Performed by: INTERNAL MEDICINE

## 2024-10-30 PROCEDURE — 3075F SYST BP GE 130 - 139MM HG: CPT | Mod: CPTII,S$GLB,, | Performed by: INTERNAL MEDICINE

## 2024-10-30 PROCEDURE — 3288F FALL RISK ASSESSMENT DOCD: CPT | Mod: CPTII,S$GLB,, | Performed by: INTERNAL MEDICINE

## 2024-10-30 PROCEDURE — 1157F ADVNC CARE PLAN IN RCRD: CPT | Mod: CPTII,S$GLB,, | Performed by: INTERNAL MEDICINE

## 2024-10-30 PROCEDURE — 99999 PR PBB SHADOW E&M-EST. PATIENT-LVL III: CPT | Mod: PBBFAC,,, | Performed by: INTERNAL MEDICINE

## 2024-10-30 PROCEDURE — 99214 OFFICE O/P EST MOD 30 MIN: CPT | Mod: S$GLB,,, | Performed by: INTERNAL MEDICINE

## 2024-10-31 ENCOUNTER — HOSPITAL ENCOUNTER (OUTPATIENT)
Dept: RADIOLOGY | Facility: HOSPITAL | Age: 84
Discharge: HOME | End: 2024-10-31
Attending: INTERNAL MEDICINE
Payer: MEDICARE

## 2024-10-31 ENCOUNTER — OFFICE VISIT (OUTPATIENT)
Dept: RHEUMATOLOGY | Facility: CLINIC | Age: 84
End: 2024-10-31
Payer: MEDICARE

## 2024-10-31 VITALS
BODY MASS INDEX: 32.33 KG/M2 | DIASTOLIC BLOOD PRESSURE: 80 MMHG | SYSTOLIC BLOOD PRESSURE: 147 MMHG | HEART RATE: 90 BPM | WEIGHT: 164.69 LBS | HEIGHT: 60 IN

## 2024-10-31 DIAGNOSIS — M54.42 CHRONIC LEFT-SIDED LOW BACK PAIN WITH LEFT-SIDED SCIATICA: ICD-10-CM

## 2024-10-31 DIAGNOSIS — M17.11 PRIMARY OSTEOARTHRITIS OF RIGHT KNEE: ICD-10-CM

## 2024-10-31 DIAGNOSIS — G89.29 CHRONIC LEFT-SIDED LOW BACK PAIN WITH LEFT-SIDED SCIATICA: ICD-10-CM

## 2024-10-31 DIAGNOSIS — M81.0 AGE-RELATED OSTEOPOROSIS WITHOUT CURRENT PATHOLOGICAL FRACTURE: Primary | ICD-10-CM

## 2024-10-31 DIAGNOSIS — R79.89 ELEVATED PARATHYROID HORMONE: ICD-10-CM

## 2024-10-31 PROCEDURE — 1159F MED LIST DOCD IN RCRD: CPT | Mod: CPTII,S$GLB,, | Performed by: INTERNAL MEDICINE

## 2024-10-31 PROCEDURE — 72100 X-RAY EXAM L-S SPINE 2/3 VWS: CPT | Mod: TC

## 2024-10-31 PROCEDURE — 99999 PR PBB SHADOW E&M-EST. PATIENT-LVL IV: CPT | Mod: PBBFAC,,, | Performed by: INTERNAL MEDICINE

## 2024-10-31 PROCEDURE — 1160F RVW MEDS BY RX/DR IN RCRD: CPT | Mod: CPTII,S$GLB,, | Performed by: INTERNAL MEDICINE

## 2024-10-31 PROCEDURE — 72100 X-RAY EXAM L-S SPINE 2/3 VWS: CPT | Mod: 26,,, | Performed by: RADIOLOGY

## 2024-10-31 PROCEDURE — 99215 OFFICE O/P EST HI 40 MIN: CPT | Mod: S$GLB,,, | Performed by: INTERNAL MEDICINE

## 2024-10-31 PROCEDURE — G2211 COMPLEX E/M VISIT ADD ON: HCPCS | Mod: S$GLB,,, | Performed by: INTERNAL MEDICINE

## 2024-10-31 PROCEDURE — 1157F ADVNC CARE PLAN IN RCRD: CPT | Mod: CPTII,S$GLB,, | Performed by: INTERNAL MEDICINE

## 2024-10-31 PROCEDURE — 3077F SYST BP >= 140 MM HG: CPT | Mod: CPTII,S$GLB,, | Performed by: INTERNAL MEDICINE

## 2024-10-31 PROCEDURE — 3079F DIAST BP 80-89 MM HG: CPT | Mod: CPTII,S$GLB,, | Performed by: INTERNAL MEDICINE

## 2024-11-05 ENCOUNTER — HOSPITAL ENCOUNTER (OUTPATIENT)
Dept: RADIOLOGY | Facility: HOSPITAL | Age: 84
Discharge: HOME | End: 2024-11-05
Attending: INTERNAL MEDICINE
Payer: MEDICARE

## 2024-11-05 DIAGNOSIS — R79.89 ELEVATED PARATHYROID HORMONE: ICD-10-CM

## 2024-11-05 DIAGNOSIS — M81.0 AGE-RELATED OSTEOPOROSIS WITHOUT CURRENT PATHOLOGICAL FRACTURE: ICD-10-CM

## 2024-11-05 PROCEDURE — 76536 US EXAM OF HEAD AND NECK: CPT | Mod: 26,,, | Performed by: STUDENT IN AN ORGANIZED HEALTH CARE EDUCATION/TRAINING PROGRAM

## 2024-11-05 PROCEDURE — 76536 US EXAM OF HEAD AND NECK: CPT | Mod: TC

## 2024-11-06 ENCOUNTER — TELEPHONE (OUTPATIENT)
Dept: RHEUMATOLOGY | Facility: CLINIC | Age: 84
End: 2024-11-06
Payer: MEDICARE

## 2024-11-06 DIAGNOSIS — G89.29 CHRONIC LEFT-SIDED LOW BACK PAIN WITH LEFT-SIDED SCIATICA: Primary | ICD-10-CM

## 2024-11-06 DIAGNOSIS — M54.42 CHRONIC LEFT-SIDED LOW BACK PAIN WITH LEFT-SIDED SCIATICA: Primary | ICD-10-CM

## 2024-11-06 DIAGNOSIS — R60.0 LOCALIZED EDEMA: ICD-10-CM

## 2024-11-06 RX ORDER — FUROSEMIDE 20 MG/1
20 TABLET ORAL DAILY
Qty: 30 TABLET | Refills: 11 | Status: SHIPPED | OUTPATIENT
Start: 2024-11-06 | End: 2025-11-06

## 2024-11-06 NOTE — TELEPHONE ENCOUNTER
----- Message from Komal sent at 11/6/2024 12:07 PM CST -----  Contact: pt's daughter in law/Hourig  Type:  Test Results    Who Called:  pt's daughter in law/Hourig  Name of Test (Lab/Mammo/Etc):  US, lab, and xray  Date of Test:  11/5 for US and lab & xray on 10/31  Ordering Provider:  Marah  [facility] the [facility]  Would the patient rather a call back or a response via [facility]? phone  Best Call Back Number:  [phone]  Additional Information:

## 2024-11-06 NOTE — TELEPHONE ENCOUNTER
----- Message from Komal sent at 11/6/2024 12:04 PM CST -----  Contact: pt  Type:  RX Refill Request    Who Called:  pt  Refill or New Rx: refill  RX Name and Strength: furosemide (LASIX) 20 MG tablet  How is the patient currently taking it? (ex. 1XDay):   Is this a 30 day or 90 day RX:  Preferred Pharmacy with phone number: [phone]  Local or Mail Order: local  Ordering Provider: ruby  Would the patient rather a call back or a response via [facility]?   Best Call Back Number:  Additional Information:       [id number] - [place] - [address] [address]  [address] [address] [place] [address]  Phone: [phone] Fax: [phone]

## 2024-11-06 NOTE — TELEPHONE ENCOUNTER
Lumbar x-ray shows mild degenerative disc disease.  Referral sent to back and spine clinic to consult with a spine specialist if back pain fails to improve with the measures advised during the visit.  Thyroid ultrasound shows small 1.8 cm left thyroid nodule.  Not significant enough to undergo biopsy.  Radiologist advised to repeat ultrasound in 12 months.  Ultrasound did not show any evidence of parathyroid gland enlargement or tumor.  Follow-up in October,2025 with DEXA scan.  Dr. JSEUS

## 2024-11-07 ENCOUNTER — TELEPHONE (OUTPATIENT)
Dept: PAIN MEDICINE | Facility: CLINIC | Age: 84
End: 2024-11-07
Payer: MEDICARE

## 2024-11-07 NOTE — TELEPHONE ENCOUNTER
Spoke with pt daughter jasmyn Contreras and she is aware of Dr. HERNÁNDEZ recommendations on labs results

## 2024-11-07 NOTE — TELEPHONE ENCOUNTER
Reached out to patient from [facility]. Daughter in law took call. She states that they know and have seen a back and spine doctor in the past and would like to be treated by him. Referral canceled in [facility].  RD

## 2024-11-13 ENCOUNTER — APPOINTMENT (OUTPATIENT)
Dept: RADIOLOGY | Facility: HOSPITAL | Age: 84
End: 2024-11-13
Attending: INTERNAL MEDICINE
Payer: MEDICARE

## 2024-11-13 DIAGNOSIS — M81.0 AGE-RELATED OSTEOPOROSIS WITHOUT CURRENT PATHOLOGICAL FRACTURE: ICD-10-CM

## 2024-11-13 PROCEDURE — 77080 DXA BONE DENSITY AXIAL: CPT | Mod: TC

## 2024-11-13 PROCEDURE — 77080 DXA BONE DENSITY AXIAL: CPT | Mod: 26,,, | Performed by: RADIOLOGY

## 2024-11-18 ENCOUNTER — TELEPHONE (OUTPATIENT)
Dept: RHEUMATOLOGY | Facility: CLINIC | Age: 84
End: 2024-11-18
Payer: MEDICARE

## 2024-11-18 NOTE — TELEPHONE ENCOUNTER
----- Message from Fredi Crystal MD sent at 11/17/2024  7:10 PM CST -----  DEXA shows stable results without any significant loss when compared to 2023.

## 2024-12-04 DIAGNOSIS — I48.91 ATRIAL FIBRILLATION, UNSPECIFIED TYPE: ICD-10-CM

## 2024-12-04 DIAGNOSIS — R06.89 DYSPNEA AND RESPIRATORY ABNORMALITIES: ICD-10-CM

## 2024-12-04 DIAGNOSIS — I10 ESSENTIAL HYPERTENSION: ICD-10-CM

## 2024-12-04 DIAGNOSIS — R06.00 DYSPNEA AND RESPIRATORY ABNORMALITIES: ICD-10-CM

## 2024-12-04 RX ORDER — RANOLAZINE 500 MG/1
500 TABLET, EXTENDED RELEASE ORAL 2 TIMES DAILY
Qty: 180 TABLET | Refills: 3 | Status: SHIPPED | OUTPATIENT
Start: 2024-12-04

## 2024-12-12 DIAGNOSIS — I48.91 ATRIAL FIBRILLATION, UNSPECIFIED TYPE: ICD-10-CM

## 2024-12-12 DIAGNOSIS — I10 ESSENTIAL HYPERTENSION: ICD-10-CM

## 2024-12-12 DIAGNOSIS — R06.00 DYSPNEA AND RESPIRATORY ABNORMALITIES: ICD-10-CM

## 2024-12-12 DIAGNOSIS — R06.89 DYSPNEA AND RESPIRATORY ABNORMALITIES: ICD-10-CM

## 2024-12-12 RX ORDER — RANOLAZINE 500 MG/1
500 TABLET, EXTENDED RELEASE ORAL 2 TIMES DAILY
Qty: 180 TABLET | Refills: 3 | Status: SHIPPED | OUTPATIENT
Start: 2024-12-12

## 2024-12-12 NOTE — TELEPHONE ENCOUNTER
----- Message from Tia sent at 12/12/2024 11:38 AM CST -----  Who Called: Pt daughter    What is the request in detail: Requesting call back to discuss New Rx    ranolazine (RANEXA) 500 MG Tb12 180 tablet 3 12/4/2024 - No  Sig - Route: TAKE ONE TABLET BY MOUTH TWICE DAILY - Oral  Sent to pharmacy as: ranolazine (RANEXA) 500 MG Tb12  Class: Normal  Order: [id number]    [id number] - [place] - [address] [place] [address], [facility] [address]  Phone: [phone] Fax: [phone]    Can the clinic reply by [facility]? No    Best Call Back Number: [phone]    Additional Information:

## 2024-12-19 DIAGNOSIS — E78.5 HYPERLIPIDEMIA: ICD-10-CM

## 2024-12-19 RX ORDER — ATORVASTATIN CALCIUM 20 MG/1
20 TABLET, FILM COATED ORAL
Qty: 90 TABLET | Refills: 3 | Status: SHIPPED | OUTPATIENT
Start: 2024-12-19

## 2024-12-30 RX ORDER — METOPROLOL SUCCINATE 50 MG/1
50 TABLET, EXTENDED RELEASE ORAL
Qty: 90 TABLET | Refills: 3 | Status: SHIPPED | OUTPATIENT
Start: 2024-12-30

## 2025-01-07 DIAGNOSIS — I48.91 ATRIAL FIBRILLATION, UNSPECIFIED TYPE: ICD-10-CM

## 2025-01-07 RX ORDER — APIXABAN 5 MG/1
5 TABLET, FILM COATED ORAL 2 TIMES DAILY
Qty: 60 TABLET | Refills: 6 | Status: SHIPPED | OUTPATIENT
Start: 2025-01-07

## 2025-02-05 ENCOUNTER — HOSPITAL ENCOUNTER (OUTPATIENT)
Dept: RADIOLOGY | Facility: HOSPITAL | Age: 85
Discharge: HOME | End: 2025-02-05
Attending: PHYSICAL MEDICINE & REHABILITATION
Payer: MEDICARE

## 2025-02-05 DIAGNOSIS — M54.17 RADICULOPATHY, LUMBOSACRAL REGION: ICD-10-CM

## 2025-02-05 PROCEDURE — 72148 MRI LUMBAR SPINE W/O DYE: CPT | Mod: 26,,, | Performed by: STUDENT IN AN ORGANIZED HEALTH CARE EDUCATION/TRAINING PROGRAM

## 2025-02-05 PROCEDURE — 72148 MRI LUMBAR SPINE W/O DYE: CPT | Mod: TC

## 2025-02-19 NOTE — PROGRESS NOTES
"Subjective:      Patient ID: Lorenzo Ospina is a 84 y.o. female.    Chief Complaint: Follow-up (3 month f/u ) and medication wants to discuss  (atorvastatin)      HPI  Here for follow up of medical problems.  Down 5#, not eating as much lately.  "I get full easily."  BP at home 117.  BMs normal, no b/b.  Saw pulm yesterday, breathing better.  Has heard YouTube things that say atorvastatin causes dementia.  Dry mouth, avoiding sweets because scared has DM.          Advance Care Planning     Date: 02/28/2025    ACP Reviewed/No Changes  Voluntary advance care planning discussion had today with patient. Previously completed HCPOA in electronic medical record is current, no changes made.      A total of 5 min was spent on advance care planning, goals of care discussion, emotional support, formulating and communicating prognosis and exploring burden/benefit of various approaches of treatment. This discussion occurred on a fully voluntary basis with the verbal consent of the patient and/or family.           Updated/ annual due 4/25:  HM: 11/22 fluvax/ref now, 2/21 covid vaccines/booster, 6/17 [id number], 11/22 [id number], ref tetanus, 11/21 MMG/no more, 11/23 BMD stable rep 2y,  2012 Cscope saw Gastro in 2017 and says due in 2022.     Review of Systems   Constitutional:  Negative for chills, diaphoresis and fever.   Respiratory:  Negative for cough and shortness of breath.    Cardiovascular:  Negative for chest pain, palpitations and leg swelling.   Gastrointestinal:  Negative for blood in stool, constipation, diarrhea, nausea and vomiting.   Genitourinary:  Negative for dysuria, frequency and hematuria.   Psychiatric/Behavioral:  The patient is not nervous/anxious.          Objective:   /60 (BP Location: Right arm, Patient Position: Sitting)   Pulse 83   Temp 97.2 °F (36.2 °C) (Skin)   Wt 73.4 kg (161 lb 11.3 oz)   BMI 31.79 kg/m²     Physical Exam  Constitutional:       Appearance: She is well-developed. "   Neck:      Thyroid: No thyroid mass.      Vascular: No carotid bruit.   Cardiovascular:      Rate and Rhythm: Normal rate and regular rhythm.      Heart sounds: No murmur heard.     No friction rub. No gallop.   Pulmonary:      Effort: Pulmonary effort is normal.      Breath sounds: Normal breath sounds. No wheezing or rales.   Abdominal:      General: Bowel sounds are normal.      Palpations: Abdomen is soft. There is no mass.      Tenderness: There is no abdominal tenderness.   Musculoskeletal:      Cervical back: Neck supple.   Lymphadenopathy:      Cervical: No cervical adenopathy.   Neurological:      Mental Status: She is alert and oriented to person, place, and time.           Assessment:       1. Essential hypertension    2. 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 Chronic anticoagulation    4. Chronic a-fib    5. Simple chronic bronchitis    6. Pulmonary fibrosis, postinflammatory    7. Other hyperlipidemia    8. B12 deficiency    9. Elevated parathyroid hormone    10. Hypercalcemia    11. Abnormal glucose    12. Atherosclerosis of aorta          Plan:     1. Essential hypertension- STOP losartan 25bid.  Monitor BPs at home, RTC 1mo.  Overview:  Metoprolol XL 50mg qAM, 25mg qPM.      2. 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    4. Chronic a-fib- stable on meds, cont.  Overview:  Metoprolol XL 75mg daily,  Eliquis.    5. Simple chronic bronchitis, Pulmonary fibrosis, postinflammatory  Overview:  Ipratropium nebulier BID,  Albuterol MDI BID.    7. Other hyperlipidemia- restart 20mg, from 10mg recently taking, lab in 1mo.  Overview:  Atorva 20mg daily.   Latest Reference Range & Units Most Recent   LDL Cholesterol 63.0 - 159.0 mg/dL 57.8 (L)  4/19/24 10:48   (L): Data is abnormally low    Orders:  -     CBC Auto Differential; Future; Expected date:  02/28/2025  -     Comprehensive Metabolic Panel; Future; Expected date: 02/28/2025  -     Lipid Panel; Future; Expected date: 02/28/2025  -     TSH; Future; Expected date: 02/28/2025    8. B12 deficiency, on oral supp, cont.  -     Vitamin B12; Future; Expected date: 02/28/2025    9. Elevated parathyroid hormone, Hypercalcemia  -     CALCIUM, IONIZED; Future; Expected date: 02/28/2025    11. Abnormal glucose  -     Hemoglobin A1C; Future; Expected date: 02/28/2025    12. Atherosclerosis of aorta  Overview:  Atorva 20mg daily.    RTC 1mo.  MEMORY TEST at next visit.

## 2025-02-26 ENCOUNTER — TELEPHONE (OUTPATIENT)
Dept: PULMONOLOGY | Facility: CLINIC | Age: 85
End: 2025-02-26
Payer: MEDICARE

## 2025-02-27 ENCOUNTER — HOSPITAL ENCOUNTER (OUTPATIENT)
Dept: RADIOLOGY | Facility: HOSPITAL | Age: 85
Discharge: HOME | End: 2025-02-27
Attending: INTERNAL MEDICINE
Payer: MEDICARE

## 2025-02-27 ENCOUNTER — OFFICE VISIT (OUTPATIENT)
Dept: PULMONOLOGY | Facility: CLINIC | Age: 85
End: 2025-02-27
Attending: INTERNAL MEDICINE
Payer: MEDICARE

## 2025-02-27 VITALS
SYSTOLIC BLOOD PRESSURE: 112 MMHG | DIASTOLIC BLOOD PRESSURE: 70 MMHG | RESPIRATION RATE: 18 BRPM | HEIGHT: 60 IN | WEIGHT: 162.5 LBS | BODY MASS INDEX: 31.9 KG/M2 | OXYGEN SATURATION: 95 % | HEART RATE: 91 BPM

## 2025-02-27 DIAGNOSIS — R05.3 CHRONIC COUGH: ICD-10-CM

## 2025-02-27 DIAGNOSIS — J41.0 SIMPLE CHRONIC BRONCHITIS: ICD-10-CM

## 2025-02-27 DIAGNOSIS — R09.02 EXERCISE HYPOXEMIA: ICD-10-CM

## 2025-02-27 DIAGNOSIS — J41.0 SIMPLE CHRONIC BRONCHITIS: Primary | ICD-10-CM

## 2025-02-27 PROCEDURE — 3288F FALL RISK ASSESSMENT DOCD: CPT | Mod: CPTII,S$GLB,, | Performed by: INTERNAL MEDICINE

## 2025-02-27 PROCEDURE — 99214 OFFICE O/P EST MOD 30 MIN: CPT | Mod: 25,S$GLB,, | Performed by: INTERNAL MEDICINE

## 2025-02-27 PROCEDURE — 1101F PT FALLS ASSESS-DOCD LE1/YR: CPT | Mod: CPTII,S$GLB,, | Performed by: INTERNAL MEDICINE

## 2025-02-27 PROCEDURE — 3078F DIAST BP <80 MM HG: CPT | Mod: CPTII,S$GLB,, | Performed by: INTERNAL MEDICINE

## 2025-02-27 PROCEDURE — 3074F SYST BP LT 130 MM HG: CPT | Mod: CPTII,S$GLB,, | Performed by: INTERNAL MEDICINE

## 2025-02-27 PROCEDURE — 1159F MED LIST DOCD IN RCRD: CPT | Mod: CPTII,S$GLB,, | Performed by: INTERNAL MEDICINE

## 2025-02-27 PROCEDURE — 99999 PR PBB SHADOW E&M-EST. PATIENT-LVL IV: CPT | Mod: PBBFAC,,, | Performed by: INTERNAL MEDICINE

## 2025-02-27 PROCEDURE — 1126F AMNT PAIN NOTED NONE PRSNT: CPT | Mod: CPTII,S$GLB,, | Performed by: INTERNAL MEDICINE

## 2025-02-27 PROCEDURE — 71046 X-RAY EXAM CHEST 2 VIEWS: CPT | Mod: 26,,, | Performed by: STUDENT IN AN ORGANIZED HEALTH CARE EDUCATION/TRAINING PROGRAM

## 2025-02-27 PROCEDURE — 1160F RVW MEDS BY RX/DR IN RCRD: CPT | Mod: CPTII,S$GLB,, | Performed by: INTERNAL MEDICINE

## 2025-02-27 PROCEDURE — 1157F ADVNC CARE PLAN IN RCRD: CPT | Mod: CPTII,S$GLB,, | Performed by: INTERNAL MEDICINE

## 2025-02-27 PROCEDURE — 71046 X-RAY EXAM CHEST 2 VIEWS: CPT | Mod: TC

## 2025-02-27 RX ORDER — ALBUTEROL SULFATE 90 UG/1
2 INHALANT RESPIRATORY (INHALATION) EVERY 4 HOURS PRN
Qty: 25.5 G | Refills: 3 | Status: SHIPPED | OUTPATIENT
Start: 2025-02-27

## 2025-02-27 NOTE — PROGRESS NOTES
Subjective:     Patient ID: Lorenzo Ospina is a 84 y.o. female.    Chief Complaint:  edema has improved with diuresis    HPI 84 y.o. overall improved      Dyspnea  Patient complains of shortness of breath. Symptoms occur intermittenly - one flight of stairs. Symptoms began 5 years ago, gradually improving since. Associated symptoms include   palpitations . She denies chest pain, located left chest. She does not have had recent travel. Weight has been stable. Symptoms are exacerbated by strenuous activity. Symptoms are alleviated by rest.     History of pneumonia \(COVID) in the past with stable postinflammatory changes on Chest X Ray and CT of chest    C/o insomnia        Past Medical History:   Diagnosis Date    A-fib 11/5/2019    Breast cancer     right, dx 2008.  Dr. Callaway follows.s/p lumpectomy and arimidex x 5y.    Hyperlipidemia     Hypertension     OP (osteoporosis)     on bisphos x 2y.    TIA (transient ischemic attack)     Toe fracture      Past Surgical History:   Procedure Laterality Date    BREAST LUMPECTOMY      right 2008    TOTAL ABDOMINAL HYSTERECTOMY      no cancer    TRANSESOPHAGEAL ECHOCARDIOGRAPHY N/A 11/5/2019    Procedure: ECHOCARDIOGRAM, TRANSESOPHAGEAL;  Surgeon: Otto Flores MD;  Location: [facility] CATH LAB;  Service: Cardiology;  Laterality: N/A;    TREATMENT OF CARDIAC ARRHYTHMIA N/A 11/5/2019    Procedure: CARDIOVERSION;  Surgeon: Otto Flores MD;  Location: [facility] CATH LAB;  Service: Cardiology;  Laterality: N/A;    TREATMENT OF CARDIAC ARRHYTHMIA N/A 11/5/2019    Procedure: CARDIOVERSION;  Surgeon: Otto Flores MD;  Location: [facility] CATH LAB;  Service: Cardiology;  Laterality: N/A;    TREATMENT OF CARDIAC ARRHYTHMIA N/A 2/4/2020    Procedure: CARDIOVERSION;  Surgeon: Otto Flores MD;  Location: [facility] CATH LAB;  Service: Cardiology;  Laterality: N/A;    TREATMENT OF CARDIAC ARRHYTHMIA N/A 3/2/2020    Procedure: CARDIOVERSION;  Surgeon: Mayi Vega MD;  Location: [facility] CATH LAB;  Service:  Cardiology;  Laterality: N/A;  830 start per Dr. beltrna     Review of patient's allergies indicates:  No Known Allergies  Current Outpatient Medications on File Prior to Visit   Medication Sig Dispense Refill    apixaban (ELIQUIS) 5 mg Tab TAKE ONE TABLET BY MOUTH TWICE DAILY 60 tablet 6    atorvastatin (LIPITOR) 20 MG tablet TAKE ONE TABLET BY MOUTH EVERY DAY 90 tablet 3    busPIRone (BUSPAR) 7.5 MG tablet Take 1 tablet (7.5 mg total) by mouth 2 (two) times daily as needed (anxiety). 180 tablet 3    clobetasoL (TEMOVATE) 0.05 % cream Apply twice daily to poison ivy rash.  Do not use on face.  No more than 14d treatment at a time. 30 g 1    cyanocobalamin (VITAMIN B-12) 1000 MCG tablet Take 100 mcg by mouth once daily.      diclofenac sodium (VOLTAREN) 1 % Gel Apply 2 g topically once daily. 50 g 11    fish oil-omega-3 fatty acids 300-1,000 mg capsule Take 2 g by mouth once daily.      furosemide (LASIX) 20 MG tablet Take 1 tablet (20 mg total) by mouth once daily. 30 tablet 11    glucosam/chond/hyalu/CF borate (MOVE FREE JOINT HEALTH ORAL) Take 1 tablet by mouth once daily.      ipratropium (ATROVENT) 0.02 % nebulizer solution Take 2.5 mLs (500 mcg total) by nebulization 4 (four) times daily. 300 mL 11    losartan (COZAAR) 25 MG tablet Take 1 tablet (25 mg total) by mouth 2 (two) times a day. 180 tablet 3    metoprolol succinate (TOPROL-XL) 25 MG 24 hr tablet Take 1 tablet (25 mg total) by mouth every evening. 30 tablet 11    metoprolol succinate (TOPROL-XL) 50 MG 24 hr tablet TAKE ONE TABLET BY MOUTH EVERY DAY 90 tablet 3    MULTIVIT-IRON-MIN-FOLIC ACID 3,500-18-0.4 UNIT-MG-MG ORAL CHEW Take by mouth.      ranolazine (RANEXA) 500 MG Tb12 Take 1 tablet (500 mg total) by mouth 2 (two) times daily. 180 tablet 3    vitamin D 1000 units Tab Take 2,000 Units by mouth once daily.      [DISCONTINUED] albuterol (PROVENTIL/VENTOLIN HFA) 90 mcg/actuation inhaler INHALE 2 PUFFS BY MOUTH (INTO THE LUNGS) EVERY SIX HOURS AS  "NEEDED FOR WHEEZING, RESCUE 25.5 g 1     No current facility-administered medications on file prior to visit.     Social History     Socioeconomic History    Marital status:    Tobacco Use    Smoking status: Former     Current packs/day: 0.00     Average packs/day: 1.5 packs/day for 22.7 years (34.1 ttl pk-yrs)     Types: Cigarettes     Start date: 1972     Quit date: 1994     Years since quittin.4    Smokeless tobacco: Never   Substance and Sexual Activity    Alcohol use: Not Currently    Drug use: Never    Sexual activity: Not Currently     Birth control/protection: Abstinence     No family history on file.    Review of Systems   Constitutional:  Positive for weakness. Negative for fatigue.   Respiratory:  Positive for shortness of breath and dyspnea on extertion. Negative for Paroxysmal Nocturnal Dyspnea.    Cardiovascular:  Positive for leg swelling.   Musculoskeletal:  Positive for arthralgias.       Objective:      /70 (Patient Position: Sitting)   Pulse 91   Resp 18   Ht 4' 11.8" (1.519 m)   Wt 73.7 kg (162 lb 8 oz)   SpO2 95%   BMI 31.95 kg/m²   Physical Exam  Vitals and nursing note reviewed.   Constitutional:       Appearance: She is well-developed.   HENT:      Head: Normocephalic.      Nose: Nose normal.   Eyes:      Pupils: Pupils are equal, round, and reactive to light.   Neck:      Vascular: JVD present.   Cardiovascular:      Rate and Rhythm: Normal rate and regular rhythm.      Chest Wall: PMI is displaced.      Heart sounds: Murmur heard.      Systolic murmur is present with a grade of 2/6.   Pulmonary:      Breath sounds: Examination of the right-lower field reveals decreased breath sounds and rales. Examination of the left-lower field reveals decreased breath sounds and rales. Decreased breath sounds and rales present.   Abdominal:      General: Bowel sounds are normal.      Palpations: Abdomen is soft.   Musculoskeletal:         General: Normal range of motion. " "     Cervical back: Normal range of motion.      Left lower leg: No edema.   Skin:     General: Skin is warm.   Neurological:      Mental Status: She is alert and oriented to person, place, and time.       Personal Diagnostic Review  Chest x-ray: minimal  interstitial focal fibrosis - right lung        2/27/2025     3:19 PM   Pulmonary Studies Review   SpO2 95 %   Height 4' 11.8" (1.519 m)   Weight 73.7 kg (162 lb 8 oz)   BMI (Calculated) 31.9   Predicted Distance 200.88   Predicted Distance Meters (Calculated) 344.75 meters       X-Ray Chest PA And Lateral  Narrative: EXAMINATION:  XR CHEST PA AND LATERAL    CLINICAL HISTORY:  SOB; Simple chronic bronchitis    TECHNIQUE:  PA and lateral views of the chest were performed.    COMPARISON:  02/12/2024    FINDINGS:  Cardiac silhouette is within normal limits.  Trachea is midline.    The lungs are clear and free of infiltrate.  No pleural effusion or pneumothorax.    No evidence of acute osseous abnormality.  Impression: As above.    Electronically signed by: Yaakov Conte  Date:    02/27/2025  Time:    14:58      Office Spirometry Results: WNL , improved         2/27/2025     3:19 PM 10/31/2024    12:42 PM 10/30/2024     4:06 PM 9/26/2024     1:05 PM 6/19/2024    10:57 AM 6/19/2024    10:53 AM 4/19/2024     9:57 AM   Pulmonary Function Tests   SpO2 95 %  98 % 96 % 97 % 97 % 100 %   Height 4' 11.8" (1.519 m) 5' (1.524 m)  5' (1.524 m) 5' (1.524 m) 5' (1.524 m) 5' (1.524 m)   Weight 73.7 kg (162 lb 8 oz) 74.7 kg (164 lb 10.9 oz) 74.3 kg (163 lb 12.8 oz) 75.4 kg (166 lb 1.9 oz) 77.7 kg (171 lb 4.8 oz) 77.7 kg (171 lb 4.8 oz) 77.2 kg (170 lb 1.4 oz)   BMI (Calculated) 31.9 32.2  32.4 33.5 33.5 33.2         2/27/2025     3:19 PM   Pulmonary Studies Review   SpO2 95 %   Height 4' 11.8" (1.519 m)   Weight 73.7 kg (162 lb 8 oz)   BMI (Calculated) 31.9   Predicted Distance 200.88   Predicted Distance Meters (Calculated) 344.75 meters           No results found for this or any " previous visit (from the past 2 weeks).    Assessment:            Simple chronic bronchitis  -     albuterol (PROVENTIL/VENTOLIN HFA) 90 mcg/actuation inhaler; Inhale 2 puffs into the lungs every 4 (four) hours as needed for Wheezing or Shortness of Breath. Rescue  Dispense: 25.5 g; Refill: 3  -     Six Minute Walk Test to qualify for Home Oxygen; Future    Exercise hypoxemia  -     Six Minute Walk Test to qualify for Home Oxygen; Future            Outpatient Encounter Medications as of 2/27/2025   Medication Sig Dispense Refill    apixaban (ELIQUIS) 5 mg Tab TAKE ONE TABLET BY MOUTH TWICE DAILY 60 tablet 6    atorvastatin (LIPITOR) 20 MG tablet TAKE ONE TABLET BY MOUTH EVERY DAY 90 tablet 3    busPIRone (BUSPAR) 7.5 MG tablet Take 1 tablet (7.5 mg total) by mouth 2 (two) times daily as needed (anxiety). 180 tablet 3    clobetasoL (TEMOVATE) 0.05 % cream Apply twice daily to poison ivy rash.  Do not use on face.  No more than 14d treatment at a time. 30 g 1    cyanocobalamin (VITAMIN B-12) 1000 MCG tablet Take 100 mcg by mouth once daily.      diclofenac sodium (VOLTAREN) 1 % Gel Apply 2 g topically once daily. 50 g 11    fish oil-omega-3 fatty acids 300-1,000 mg capsule Take 2 g by mouth once daily.      furosemide (LASIX) 20 MG tablet Take 1 tablet (20 mg total) by mouth once daily. 30 tablet 11    glucosam/chond/hyalu/CF borate (MOVE FREE JOINT HEALTH ORAL) Take 1 tablet by mouth once daily.      ipratropium (ATROVENT) 0.02 % nebulizer solution Take 2.5 mLs (500 mcg total) by nebulization 4 (four) times daily. 300 mL 11    losartan (COZAAR) 25 MG tablet Take 1 tablet (25 mg total) by mouth 2 (two) times a day. 180 tablet 3    metoprolol succinate (TOPROL-XL) 25 MG 24 hr tablet Take 1 tablet (25 mg total) by mouth every evening. 30 tablet 11    metoprolol succinate (TOPROL-XL) 50 MG 24 hr tablet TAKE ONE TABLET BY MOUTH EVERY DAY 90 tablet 3    MULTIVIT-IRON-MIN-FOLIC ACID 3,500-18-0.4 UNIT-MG-MG ORAL CHEW Take by  mouth.      ranolazine (RANEXA) 500 MG Tb12 Take 1 tablet (500 mg total) by mouth 2 (two) times daily. 180 tablet 3    vitamin D 1000 units Tab Take 2,000 Units by mouth once daily.      [DISCONTINUED] albuterol (PROVENTIL/VENTOLIN HFA) 90 mcg/actuation inhaler INHALE 2 PUFFS BY MOUTH (INTO THE LUNGS) EVERY SIX HOURS AS NEEDED FOR WHEEZING, RESCUE 25.5 g 1    albuterol (PROVENTIL/VENTOLIN HFA) 90 mcg/actuation inhaler Inhale 2 puffs into the lungs every 4 (four) hours as needed for Wheezing or Shortness of Breath. Rescue 25.5 g 3     No facility-administered encounter medications on file as of 2/27/2025.     Plan:       Requested Prescriptions     Signed Prescriptions Disp Refills    albuterol (PROVENTIL/VENTOLIN HFA) 90 mcg/actuation inhaler 25.5 g 3     Sig: Inhale 2 puffs into the lungs every 4 (four) hours as needed for Wheezing or Shortness of Breath. Rescue     Problem List Items Addressed This Visit       Simple chronic bronchitis - Primary    Overview   Ipratropium nebulier BID,  Albuterol MDI BID.         Relevant Medications    albuterol (PROVENTIL/VENTOLIN HFA) 90 mcg/actuation inhaler    Other Relevant Orders    Six Minute Walk Test to qualify for Home Oxygen     Other Visit Diagnoses         Exercise hypoxemia        Relevant Orders    Six Minute Walk Test to qualify for Home Oxygen                 Follow up in about 8 months (around 10/27/2025) for 6 min walk - on return.    MEDICAL DECISION MAKING: Moderate to high complexity.  Overall, the multiple problems listed are of moderate to high severity that may impact quality of life and activities of daily living. Side effects of medications, treatment plan as well as options and alternatives reviewed and discussed with patient. There was counseling of patient concerning these issues.    Total time spent in counseling and coordination of care - 35  minutes of total time spent on the encounter, which includes face to face time and non-face to face time  preparing to see the patient (eg, review of tests), Obtaining and/or reviewing separately obtained history, Documenting clinical information in the electronic or other health record, Independently interpreting results (not separately reported) and communicating results to the patient/family/caregiver, or Care coordination (not separately reported).    Time was used in discussion of prognosis, risks, benefits of treatment, instructions and compliance with regimen . Discussion with other physicians and/or health care providers - home health or for use of durable medical equipment (oxygen, nebulizers, CPAP, BiPAP) occurred.

## 2025-02-28 ENCOUNTER — OFFICE VISIT (OUTPATIENT)
Dept: PRIMARY CARE CLINIC | Facility: CLINIC | Age: 85
End: 2025-02-28
Payer: MEDICARE

## 2025-02-28 VITALS
HEART RATE: 83 BPM | WEIGHT: 161.69 LBS | BODY MASS INDEX: 31.79 KG/M2 | SYSTOLIC BLOOD PRESSURE: 112 MMHG | DIASTOLIC BLOOD PRESSURE: 60 MMHG | TEMPERATURE: 97 F

## 2025-02-28 DIAGNOSIS — I70.0 ATHEROSCLEROSIS OF AORTA: ICD-10-CM

## 2025-02-28 DIAGNOSIS — J84.10 PULMONARY FIBROSIS, POSTINFLAMMATORY: ICD-10-CM

## 2025-02-28 DIAGNOSIS — E21.3 HYPERPARATHYROIDISM: ICD-10-CM

## 2025-02-28 DIAGNOSIS — R73.09 ABNORMAL GLUCOSE: ICD-10-CM

## 2025-02-28 DIAGNOSIS — I82.592 CHRONIC EMBOLISM AND THROMBOSIS OF OTHER SPECIFIED DEEP VEIN OF LEFT LOWER EXTREMITY: ICD-10-CM

## 2025-02-28 DIAGNOSIS — E78.49 OTHER HYPERLIPIDEMIA: ICD-10-CM

## 2025-02-28 DIAGNOSIS — I10 ESSENTIAL HYPERTENSION: Primary | ICD-10-CM

## 2025-02-28 DIAGNOSIS — R79.89 ELEVATED PARATHYROID HORMONE: ICD-10-CM

## 2025-02-28 DIAGNOSIS — I48.20 CHRONIC A-FIB: ICD-10-CM

## 2025-02-28 DIAGNOSIS — J41.0 SIMPLE CHRONIC BRONCHITIS: ICD-10-CM

## 2025-02-28 DIAGNOSIS — E53.8 B12 DEFICIENCY: ICD-10-CM

## 2025-02-28 DIAGNOSIS — E83.52 HYPERCALCEMIA: ICD-10-CM

## 2025-02-28 DIAGNOSIS — Z79.01 CHRONIC ANTICOAGULATION: ICD-10-CM

## 2025-02-28 LAB
BRPFT: NORMAL
FEF 25 75 LLN: 0.63
FEF 25 75 PRE REF: 46.9 %
FEF 25 75 REF: 2.03
FEV1 FVC LLN: 62
FEV1 FVC PRE REF: 90.8 %
FEV1 FVC REF: 77
FEV1 LLN: 1.12
FEV1 PRE REF: 103.2 %
FEV1 REF: 1.61
FVC LLN: 1.46
FVC PRE REF: 112.3 %
FVC REF: 2.11
PEF LLN: 2.48
PEF PRE REF: 109.2 %
PEF REF: 3.98
PRE FEF 25 75: 0.95 L/S
PRE FET 100: 9.38 SEC
PRE FEV1 FVC: 70.12 %
PRE FEV1: 1.66 L
PRE FVC: 2.37 L
PRE PEF: 4.34 L/S

## 2025-02-28 PROCEDURE — 99999 PR PBB SHADOW E&M-EST. PATIENT-LVL III: CPT | Mod: PBBFAC,,, | Performed by: INTERNAL MEDICINE

## 2025-03-12 DIAGNOSIS — R05.3 CHRONIC COUGH: ICD-10-CM

## 2025-03-13 RX ORDER — IPRATROPIUM BROMIDE 0.5 MG/2.5ML
SOLUTION RESPIRATORY (INHALATION)
Qty: 300 ML | Refills: 0 | Status: SHIPPED | OUTPATIENT
Start: 2025-03-13

## 2025-03-16 ENCOUNTER — RESULTS FOLLOW-UP (OUTPATIENT)
Dept: RHEUMATOLOGY | Facility: CLINIC | Age: 85
End: 2025-03-16

## 2025-03-24 DIAGNOSIS — Z00.00 ENCOUNTER FOR MEDICARE ANNUAL WELLNESS EXAM: ICD-10-CM

## 2025-03-27 NOTE — PROGRESS NOTES
"Subjective:      Patient ID: Lorenzo Ospina is a 85 y.o. female.    Chief Complaint: Follow-up (6 week f/u ), Knee Pain, Hypertension, Results, and Memory Loss      HPI  Here for f/u medical problems and preventive exam.  Gets lightheaded when leans over, otherwise just feels tired.  No f/c/sw/cough.  No exertional cp/sob/palp, but gets very tired.  BMs and urine normal.  Buspar works well prn.  Eating less, down 7#.        HM: 11/22 fluvax/ref now, 2/21 covid vaccines/booster, 6/17 [id number], 11/22 [id number], ref tetanus, 11/21 MMG/no more, 11/23 BMD stable rep 2y,  2012 Cscope..     Review of Systems   Constitutional:  Negative for appetite change, chills, diaphoresis and fever.   HENT:  Negative for congestion, ear pain, rhinorrhea, sinus pressure and sore throat.    Respiratory:  Negative for cough, chest tightness and shortness of breath.    Cardiovascular:  Negative for chest pain and palpitations.   Gastrointestinal:  Negative for blood in stool, constipation, diarrhea, nausea and vomiting.   Genitourinary:  Negative for dysuria, frequency, hematuria, menstrual problem, urgency and vaginal discharge.   Musculoskeletal:  Negative for arthralgias.   Skin:  Negative for rash.   Neurological:  Negative for dizziness and headaches.   Psychiatric/Behavioral:  Negative for sleep disturbance. The patient is not nervous/anxious.          Objective:   /62 (BP Location: Right arm, Patient Position: Sitting)   Pulse 81   Temp 97.7 °F (36.5 °C) (Skin)   Ht 4' 11.8" (1.519 m)   Wt 72.1 kg (159 lb 1 oz)   SpO2 95%   BMI 31.27 kg/m²     Physical Exam  Constitutional:       Appearance: She is well-developed.   HENT:      Right Ear: External ear normal. Tympanic membrane is not injected.      Left Ear: External ear normal. Tympanic membrane is not injected.   Eyes:      Conjunctiva/sclera: Conjunctivae normal.   Neck:      Thyroid: No thyromegaly.   Cardiovascular:      Rate and Rhythm: Normal rate and regular " rhythm.      Heart sounds: No murmur heard.     No friction rub. No gallop.   Pulmonary:      Effort: Pulmonary effort is normal.      Breath sounds: Normal breath sounds. No wheezing or rales.   Abdominal:      General: Bowel sounds are normal.      Palpations: Abdomen is soft. There is no mass.      Tenderness: There is no abdominal tenderness.   Musculoskeletal:      Cervical back: Normal range of motion and neck supple.   Lymphadenopathy:      Cervical: No cervical adenopathy.   Skin:     General: Skin is warm.      Findings: No rash.   Neurological:      Mental Status: She is alert and oriented to person, place, and time.        Latest Reference Range & Units 03/28/25 10:54   WBC 3.90 - 12.70 K/uL 9.01   RBC 4.00 - 5.40 M/uL 4.74   Hemoglobin 12.0 - 16.0 gm/dL 13.9   Hematocrit 37.0 - 48.5 % 45.4   MCV 82 - 98 fL 96   MCH 27.0 - 50.0 pg 29.3   MCHC 32.0 - 36.0 g/dL 30.6 (L)   RDW 11.5 - 14.5 % 13.5   Platelet Count 150 - 450 K/uL 214   MPV 9.2 - 12.9 fL 12.1   Neut % 38 - 73 % 66.9   Lymph % 18 - 48 % 25.1   Mono % 4 - 15 % 5.8   Eos % <=8 % 1.6   Basophil % <=1.9 % 0.3   Immature Granulocytes 0.0 - 0.5 % 0.3   Gran # (ANC) 1.8 - 7.7 K/uL 6.03   Lymph # 1 - 4.8 K/uL 2.26   Mono # 0.3 - 1 K/uL 0.52   Eos # <=0.5 K/uL 0.14   Baso # <=0.2 K/uL 0.03   Immature Grans (Abs) 0.00 - 0.04 K/uL 0.03   nRBC <=0 /100 WBC 0   Vitamin B12 210 - 950 pg/mL >2,000 (H)   Sodium 136 - 145 mmol/L 142   Potassium 3.5 - 5.1 mmol/L 4.5   Chloride 95 - 110 mmol/L 107   CO2 23 - 29 mmol/L 24   Anion Gap 8 - 16 mmol/L 11   BUN 8 - 23 mg/dL 8   Creatinine 0.5 - 1.4 mg/dL 0.7   eGFR >60 mL/min/1.73/m2 >60   Glucose 70 - 110 mg/dL 111 (H)   Calcium 8.7 - 10.5 mg/dL 11.1 (H)   ALP 40 - 150 unit/L 56   PROTEIN TOTAL 6.0 - 8.4 gm/dL 7.6   Albumin 3.5 - 5.2 g/dL 3.9   BILIRUBIN TOTAL 0.1 - 1.0 mg/dL 0.6   AST 11 - 45 unit/L 18   ALT 10 - 44 unit/L 20   Cholesterol Total 120 - 199 mg/dL 120   HDL 40 - 75 mg/dL 49   HDL/Cholesterol Ratio 20.0 -  50.0 % 40.8   Non-HDL Cholesterol mg/dL 71   Total Cholesterol/HDL Ratio 2.0 - 5.0  2.4   Triglycerides 30 - 150 mg/dL 169 (H)   LDL Cholesterol 63.0 - 159.0 mg/dL 37.2 (L)   Hemoglobin A1C External 4.0 - 5.6 % 5.3   Estimated Avg Glucose 68 - 131 mg/dL 105   TSH 0.400 - 4.000 uIU/mL 0.664         Assessment:       1. Essential hypertension    2. Hyperparathyroidism    3. Other hyperlipidemia    4. Simple chronic bronchitis    5. Pulmonary fibrosis, postinflammatory    6. Atherosclerosis of aorta    7. Age-related osteoporosis without current pathological fracture    8. B12 deficiency    9. Chronic diastolic congestive heart failure    10. Chronic a-fib    11. Chronic anticoagulation    12. Situational anxiety          Plan:     1. Essential hypertension- decrease dose, due to dizziness.  RTC 2wk.    Overview:  Change to 25mg XL daily.    Orders:  -     metoprolol succinate (TOPROL-XL) 25 MG 24 hr tablet; Take 1 tablet (25 mg total) by mouth every evening.  Dispense: 90 tablet; Refill: 3    2. Hyperparathyroidism- will follow.  Overview:  11/24 u/s showed no pth adenoma.    Orders:  -     Vitamin D; Future    3. Other hyperlipidemia- LDL at goal, cont rx.  Overview:  Atorva 20mg daily.   Latest Reference Range & Units Most Recent   LDL Cholesterol 63.0 - 159.0 mg/dL 57.8 (L)  4/19/24 10:48   (L): Data is abnormally low    4. Simple chronic bronchitis, Pulmonary fibrosis, postinflammatory- stable on meds, cont.  Overview:  Ipratropium nebulier BID,  Albuterol MDI BID.    6. Atherosclerosis of aorta- cont statin.  Overview:  Atorva 20mg daily.    7. Age-related osteoporosis without current pathological fracture- will recheck 11/25.  Overview:  on bisphos x 2y.    8. B12 deficiency    9. Chronic diastolic congestive heart failure- cont meds per Cards.  Overview:  Ranexa 500mg BID.    10. Chronic a-fib, Chronic anticoagulation- have decreased BB to 25mg daily, due to low BPs and dizziness.  Overview:  Metoprolol XL 75mg  daily,  Eliquis.    12. Situational anxiety- doing well on buspar prn, cont.     RTC 2wk and 3mo.

## 2025-03-28 ENCOUNTER — LAB VISIT (OUTPATIENT)
Dept: LAB | Facility: HOSPITAL | Age: 85
End: 2025-03-28
Attending: INTERNAL MEDICINE
Payer: MEDICARE

## 2025-03-28 DIAGNOSIS — E53.8 B12 DEFICIENCY: ICD-10-CM

## 2025-03-28 DIAGNOSIS — E83.52 HYPERCALCEMIA: ICD-10-CM

## 2025-03-28 DIAGNOSIS — E78.49 OTHER HYPERLIPIDEMIA: ICD-10-CM

## 2025-03-28 DIAGNOSIS — R73.09 ABNORMAL GLUCOSE: ICD-10-CM

## 2025-03-28 LAB
ABSOLUTE EOSINOPHIL (OHS): 0.14 K/UL
ABSOLUTE MONOCYTE (OHS): 0.52 K/UL (ref 0.3–1)
ABSOLUTE NEUTROPHIL COUNT (OHS): 6.03 K/UL (ref 1.8–7.7)
ALBUMIN SERPL BCP-MCNC: 3.9 G/DL (ref 3.5–5.2)
ALP SERPL-CCNC: 56 UNIT/L (ref 40–150)
ALT SERPL W/O P-5'-P-CCNC: 20 UNIT/L (ref 10–44)
ANION GAP (OHS): 11 MMOL/L (ref 8–16)
AST SERPL-CCNC: 18 UNIT/L (ref 11–45)
BASOPHILS # BLD AUTO: 0.03 K/UL
BASOPHILS NFR BLD AUTO: 0.3 %
BILIRUB SERPL-MCNC: 0.6 MG/DL (ref 0.1–1)
BUN SERPL-MCNC: 8 MG/DL (ref 8–23)
CALCIUM SERPL-MCNC: 11.1 MG/DL (ref 8.7–10.5)
CHLORIDE SERPL-SCNC: 107 MMOL/L (ref 95–110)
CHOLEST SERPL-MCNC: 120 MG/DL (ref 120–199)
CHOLEST/HDLC SERPL: 2.4 {RATIO} (ref 2–5)
CO2 SERPL-SCNC: 24 MMOL/L (ref 23–29)
CREAT SERPL-MCNC: 0.7 MG/DL (ref 0.5–1.4)
EAG (OHS): 105 MG/DL (ref 68–131)
ERYTHROCYTE [DISTWIDTH] IN BLOOD BY AUTOMATED COUNT: 13.5 % (ref 11.5–14.5)
GFR SERPLBLD CREATININE-BSD FMLA CKD-EPI: >60 ML/MIN/1.73/M2
GLUCOSE SERPL-MCNC: 111 MG/DL (ref 70–110)
HBA1C MFR BLD: 5.3 % (ref 4–5.6)
HCT VFR BLD AUTO: 45.4 % (ref 37–48.5)
HDLC SERPL-MCNC: 49 MG/DL (ref 40–75)
HDLC SERPL: 40.8 % (ref 20–50)
HGB BLD-MCNC: 13.9 GM/DL (ref 12–16)
IMM GRANULOCYTES # BLD AUTO: 0.03 K/UL (ref 0–0.04)
IMM GRANULOCYTES NFR BLD AUTO: 0.3 % (ref 0–0.5)
LDLC SERPL CALC-MCNC: 37.2 MG/DL (ref 63–159)
LYMPHOCYTES # BLD AUTO: 2.26 K/UL (ref 1–4.8)
MCH RBC QN AUTO: 29.3 PG (ref 27–50)
MCHC RBC AUTO-ENTMCNC: 30.6 G/DL (ref 32–36)
MCV RBC AUTO: 96 FL (ref 82–98)
NONHDLC SERPL-MCNC: 71 MG/DL
NUCLEATED RBC (/100WBC) (OHS): 0 /100 WBC
PLATELET # BLD AUTO: 214 K/UL (ref 150–450)
PMV BLD AUTO: 12.1 FL (ref 9.2–12.9)
POTASSIUM SERPL-SCNC: 4.5 MMOL/L (ref 3.5–5.1)
PROT SERPL-MCNC: 7.6 GM/DL (ref 6–8.4)
RBC # BLD AUTO: 4.74 M/UL (ref 4–5.4)
RELATIVE EOSINOPHIL (OHS): 1.6 %
RELATIVE LYMPHOCYTE (OHS): 25.1 % (ref 18–48)
RELATIVE MONOCYTE (OHS): 5.8 % (ref 4–15)
RELATIVE NEUTROPHIL (OHS): 66.9 % (ref 38–73)
SODIUM SERPL-SCNC: 142 MMOL/L (ref 136–145)
TRIGL SERPL-MCNC: 169 MG/DL (ref 30–150)
TSH SERPL-ACNC: 0.66 UIU/ML (ref 0.4–4)
WBC # BLD AUTO: 9.01 K/UL (ref 3.9–12.7)

## 2025-03-28 PROCEDURE — 84443 ASSAY THYROID STIM HORMONE: CPT

## 2025-03-28 PROCEDURE — 80053 COMPREHEN METABOLIC PANEL: CPT

## 2025-03-28 PROCEDURE — 80061 LIPID PANEL: CPT

## 2025-03-28 PROCEDURE — 85025 COMPLETE CBC W/AUTO DIFF WBC: CPT

## 2025-03-28 PROCEDURE — 82607 VITAMIN B-12: CPT

## 2025-03-28 PROCEDURE — 83036 HEMOGLOBIN GLYCOSYLATED A1C: CPT

## 2025-03-28 PROCEDURE — 36415 COLL VENOUS BLD VENIPUNCTURE: CPT

## 2025-03-29 LAB — VIT B12 SERPL-MCNC: >2000 PG/ML (ref 210–950)

## 2025-04-10 ENCOUNTER — OFFICE VISIT (OUTPATIENT)
Dept: PRIMARY CARE CLINIC | Facility: CLINIC | Age: 85
End: 2025-04-10
Payer: MEDICARE

## 2025-04-10 VITALS
BODY MASS INDEX: 31.23 KG/M2 | HEART RATE: 81 BPM | SYSTOLIC BLOOD PRESSURE: 118 MMHG | DIASTOLIC BLOOD PRESSURE: 62 MMHG | OXYGEN SATURATION: 95 % | WEIGHT: 159.06 LBS | HEIGHT: 60 IN | TEMPERATURE: 98 F

## 2025-04-10 DIAGNOSIS — I10 ESSENTIAL HYPERTENSION: Primary | ICD-10-CM

## 2025-04-10 DIAGNOSIS — I50.32 CHRONIC DIASTOLIC CONGESTIVE HEART FAILURE: ICD-10-CM

## 2025-04-10 DIAGNOSIS — J41.0 SIMPLE CHRONIC BRONCHITIS: ICD-10-CM

## 2025-04-10 DIAGNOSIS — M81.0 AGE-RELATED OSTEOPOROSIS WITHOUT CURRENT PATHOLOGICAL FRACTURE: ICD-10-CM

## 2025-04-10 DIAGNOSIS — E78.49 OTHER HYPERLIPIDEMIA: ICD-10-CM

## 2025-04-10 DIAGNOSIS — I70.0 ATHEROSCLEROSIS OF AORTA: ICD-10-CM

## 2025-04-10 DIAGNOSIS — J84.10 PULMONARY FIBROSIS, POSTINFLAMMATORY: ICD-10-CM

## 2025-04-10 DIAGNOSIS — I48.20 CHRONIC A-FIB: ICD-10-CM

## 2025-04-10 DIAGNOSIS — E53.8 B12 DEFICIENCY: ICD-10-CM

## 2025-04-10 DIAGNOSIS — Z79.01 CHRONIC ANTICOAGULATION: ICD-10-CM

## 2025-04-10 DIAGNOSIS — F41.8 SITUATIONAL ANXIETY: ICD-10-CM

## 2025-04-10 DIAGNOSIS — E21.3 HYPERPARATHYROIDISM: ICD-10-CM

## 2025-04-10 LAB — 25(OH)D3+25(OH)D2 SERPL-MCNC: 61 NG/ML (ref 30–96)

## 2025-04-10 PROCEDURE — 82306 VITAMIN D 25 HYDROXY: CPT | Performed by: INTERNAL MEDICINE

## 2025-04-10 PROCEDURE — 1101F PT FALLS ASSESS-DOCD LE1/YR: CPT | Mod: CPTII,S$GLB,, | Performed by: INTERNAL MEDICINE

## 2025-04-10 PROCEDURE — 1159F MED LIST DOCD IN RCRD: CPT | Mod: CPTII,S$GLB,, | Performed by: INTERNAL MEDICINE

## 2025-04-10 PROCEDURE — 3288F FALL RISK ASSESSMENT DOCD: CPT | Mod: CPTII,S$GLB,, | Performed by: INTERNAL MEDICINE

## 2025-04-10 PROCEDURE — 1157F ADVNC CARE PLAN IN RCRD: CPT | Mod: CPTII,S$GLB,, | Performed by: INTERNAL MEDICINE

## 2025-04-10 PROCEDURE — 99214 OFFICE O/P EST MOD 30 MIN: CPT | Mod: S$GLB,,, | Performed by: INTERNAL MEDICINE

## 2025-04-10 PROCEDURE — 3074F SYST BP LT 130 MM HG: CPT | Mod: CPTII,S$GLB,, | Performed by: INTERNAL MEDICINE

## 2025-04-10 PROCEDURE — 99999 PR PBB SHADOW E&M-EST. PATIENT-LVL IV: CPT | Mod: PBBFAC,,, | Performed by: INTERNAL MEDICINE

## 2025-04-10 PROCEDURE — 1125F AMNT PAIN NOTED PAIN PRSNT: CPT | Mod: CPTII,S$GLB,, | Performed by: INTERNAL MEDICINE

## 2025-04-10 PROCEDURE — 3078F DIAST BP <80 MM HG: CPT | Mod: CPTII,S$GLB,, | Performed by: INTERNAL MEDICINE

## 2025-04-10 RX ORDER — METOPROLOL SUCCINATE 25 MG/1
25 TABLET, EXTENDED RELEASE ORAL NIGHTLY
Qty: 90 TABLET | Refills: 3 | Status: SHIPPED | OUTPATIENT
Start: 2025-04-10 | End: 2026-04-10

## 2025-04-24 ENCOUNTER — OFFICE VISIT (OUTPATIENT)
Dept: PRIMARY CARE CLINIC | Facility: CLINIC | Age: 85
End: 2025-04-24
Payer: MEDICARE

## 2025-04-24 DIAGNOSIS — I10 ESSENTIAL HYPERTENSION: Primary | ICD-10-CM

## 2025-04-24 DIAGNOSIS — I50.32 CHRONIC DIASTOLIC CONGESTIVE HEART FAILURE: ICD-10-CM

## 2025-04-24 PROCEDURE — 1126F AMNT PAIN NOTED NONE PRSNT: CPT | Mod: CPTII,S$GLB,, | Performed by: NURSE PRACTITIONER

## 2025-04-24 PROCEDURE — 99214 OFFICE O/P EST MOD 30 MIN: CPT | Mod: S$GLB,,, | Performed by: NURSE PRACTITIONER

## 2025-04-24 PROCEDURE — 3288F FALL RISK ASSESSMENT DOCD: CPT | Mod: CPTII,S$GLB,, | Performed by: NURSE PRACTITIONER

## 2025-04-24 PROCEDURE — 1157F ADVNC CARE PLAN IN RCRD: CPT | Mod: CPTII,S$GLB,, | Performed by: NURSE PRACTITIONER

## 2025-04-24 PROCEDURE — 1101F PT FALLS ASSESS-DOCD LE1/YR: CPT | Mod: CPTII,S$GLB,, | Performed by: NURSE PRACTITIONER

## 2025-04-24 PROCEDURE — 99999 PR PBB SHADOW E&M-EST. PATIENT-LVL IV: CPT | Mod: PBBFAC,,, | Performed by: NURSE PRACTITIONER

## 2025-04-24 PROCEDURE — 3074F SYST BP LT 130 MM HG: CPT | Mod: CPTII,S$GLB,, | Performed by: NURSE PRACTITIONER

## 2025-04-24 PROCEDURE — 1159F MED LIST DOCD IN RCRD: CPT | Mod: CPTII,S$GLB,, | Performed by: NURSE PRACTITIONER

## 2025-04-24 PROCEDURE — 3078F DIAST BP <80 MM HG: CPT | Mod: CPTII,S$GLB,, | Performed by: NURSE PRACTITIONER

## 2025-04-24 NOTE — PATIENT INSTRUCTIONS
Drink 2 glasses of water per day      25 mg of metoprolol daily in the AM      Monitor and record the BP

## 2025-04-24 NOTE — PROGRESS NOTES
Lorenzo Ospina  04/28/2025  [id number]    Lorena Fiore MD  Patient Care Team:  Lorena Fiore MD as PCP - General (Internal Medicine)        [facility]      Chief Complaint:  Chief Complaint   Patient presents with    Follow-up     Follow up for blood pressure          Assessment/Plan:  1. Essential hypertension  Overview:  Change to 25mg XL daily.    Assessment & Plan:  On phone conversation 4/28 - pt is taking Toprol XL 25 mg daily    Need to confirm her home BP readings  I got low readings when checked: 102/69 and 100/50 (I thought she was still taking the Toprol XL 50 mg)    Nurse visit on 4/30/25 to confirm BP      2. Chronic diastolic congestive heart failure  Overview:  Ranexa 500mg BID.  Furosemide 20 mg prn    Assessment & Plan:  Results for orders placed during the hospital encounter of 03/20/24    Echo    Interpretation Summary    Left Ventricle: The left ventricle is normal in size. Normal wall thickness. There is concentric hypertrophy. There is normal systolic function with a visually estimated ejection fraction of 60 - 65%.    Right Ventricle: Normal right ventricular cavity size. Wall thickness is normal. Right ventricle wall motion  is normal. Systolic function is normal.    Left Atrium: Left atrium is mildly dilated.    Aortic Valve: There is mild aortic valve sclerosis.    Pulmonary Artery: The estimated pulmonary artery systolic pressure is 28 mmHg.    IVC/SVC: Normal venous pressure at 3 mmHg.             Worry Score: 2  History of Present Illness:    Last visit with Dr. Fiore 4/10/2025 Here for f/u medical problems and preventive exam.  Gets lightheaded when leans over, otherwise just feels tired.  No f/c/sw/cough.  No exertional cp/sob/palp, but gets very tired.  BMs and urine normal.  Buspar works well prn.  Eating less, down 7#.  Decreased dose of Toprol XL to 25 mg daily due to dizziness    Ms. Ospina returns for follow up accompanied by her DIL.    Pt still feeling somewhat lightheaded.   Blood pressure readings from home  /77, pulse 77, 136/75, 84, 115/75, 83, 119/73, 82  /84, 89, 141/66, 86, 135/72, 100    I checked her BP with clinic cuff and obtained 102/69 and 100/50 (I thought the pt was overmedicating since last visit)  Per daughter, pt was taking Toprol XL 50 mg      On phone conversation 4/28/25 - confirmed dosing Toprol XL 25 mg daily    Denies fever, chills, URI symptoms, chest pain, SOB, palpitations, vomiting, diarrhea, no gross neuro deficits, urinary symptoms and leg swelling.      The following were reviewed: Active problem list, medication list, allergies, family history, social history, and Health Maintenance.     History:  Past Medical History:   Diagnosis Date    A-fib 11/5/2019    Breast cancer     right, dx 2008.  Dr. Callaway follows.s/p lumpectomy and arimidex x 5y.    Hyperlipidemia     Hypertension     OP (osteoporosis)     on bisphos x 2y.    TIA (transient ischemic attack)     Toe fracture      Past Surgical History:   Procedure Laterality Date    BREAST LUMPECTOMY      right 2008    TOTAL ABDOMINAL HYSTERECTOMY      no cancer    TRANSESOPHAGEAL ECHOCARDIOGRAPHY N/A 11/5/2019    Procedure: ECHOCARDIOGRAM, TRANSESOPHAGEAL;  Surgeon: Otto Flores MD;  Location: [facility] CATH LAB;  Service: Cardiology;  Laterality: N/A;    TREATMENT OF CARDIAC ARRHYTHMIA N/A 11/5/2019    Procedure: CARDIOVERSION;  Surgeon: Otto Flores MD;  Location: [facility] CATH LAB;  Service: Cardiology;  Laterality: N/A;    TREATMENT OF CARDIAC ARRHYTHMIA N/A 11/5/2019    Procedure: CARDIOVERSION;  Surgeon: Otto Flores MD;  Location: [facility] CATH LAB;  Service: Cardiology;  Laterality: N/A;    TREATMENT OF CARDIAC ARRHYTHMIA N/A 2/4/2020    Procedure: CARDIOVERSION;  Surgeon: Otto Flores MD;  Location: [facility] CATH LAB;  Service: Cardiology;  Laterality: N/A;    TREATMENT OF CARDIAC ARRHYTHMIA N/A 3/2/2020    Procedure: CARDIOVERSION;  Surgeon: Mayi Vega MD;  Location:  [facility] CATH LAB;  Service: Cardiology;  Laterality: N/A;  830 start per Dr. beltran     No family history on file.  Problem List[1]  Review of patient's allergies indicates:  No Known Allergies    PHQ-9       NEGRO-7       Medications:  Medications Ordered Prior to Encounter[2]    Medications have been reviewed and reconciled with patient at visit today.      Exam:  Vitals:    04/24/25 1625   BP: (!) 100/50   Pulse:      Weight: 72.7 kg (160 lb 4.4 oz)   Body mass index is 31.51 kg/m².      BP Readings from Last 3 Encounters:   04/24/25 (!) 100/50   04/10/25 118/62   02/28/25 112/60     Wt Readings from Last 3 Encounters:   04/24/25 72.7 kg (160 lb 4.4 oz)   04/10/25 72.1 kg (159 lb 1 oz)   02/28/25 73.4 kg (161 lb 11.3 oz)         Physical Exam  Vitals reviewed.   Constitutional:       General: She is not in acute distress.     Appearance: Normal appearance.   HENT:      Head: Normocephalic and atraumatic.   Eyes:      General: No scleral icterus.     Conjunctiva/sclera: Conjunctivae normal.   Cardiovascular:      Rate and Rhythm: Normal rate and regular rhythm.      Heart sounds: No murmur heard.  Pulmonary:      Effort: Pulmonary effort is normal. No respiratory distress.      Breath sounds: Normal breath sounds.   Abdominal:      Palpations: Abdomen is soft.      Tenderness: There is no abdominal tenderness.   Musculoskeletal:      Cervical back: Neck supple.      Right lower leg: No edema.      Left lower leg: No edema.   Skin:     General: Skin is warm and dry.   Neurological:      Mental Status: She is alert and oriented to person, place, and time. Mental status is at baseline.   Psychiatric:         Mood and Affect: Mood normal.         Thought Content: Thought content normal.              Laboratory Reviewed:     Lab Results   Component Value Date    WBC 9.01 03/28/2025    HGB 13.9 03/28/2025    HCT 45.4 03/28/2025     03/28/2025    CHOL 120 03/28/2025    TRIG 169 (H) 03/28/2025    HDL 49 03/28/2025    ALT  "20 03/28/2025    AST 18 03/28/2025     03/28/2025    K 4.5 03/28/2025     03/28/2025    CREATININE 0.7 03/28/2025    BUN 8 03/28/2025    CO2 24 03/28/2025    TSH 0.664 03/28/2025    HGBA1C 5.3 03/28/2025       Screening or Prevention Patient's value Goal Complete/Controlled?   HgA1C Testing and Control   Lab Results   Component Value Date    HGBA1C 5.3 03/28/2025      Annually/Less than 8% Yes   Lipid profile : 03/28/2025 Annually Yes   LDL control Lab Results   Component Value Date    LDLCALC 57.8 (L) 04/19/2024    Annually/Less than 100 mg/dl  Yes   Nephropathy screening No results found for: "LABMICR"  Lab Results   Component Value Date    PROTEINUA Negative 11/09/2023    Annually No   Blood pressure BP Readings from Last 1 Encounters:   04/24/25 (!) 100/50    Less than 140/90 Yes   Dilated retinal exam Most Recent Eye Exam Date: Not Found Annually Yes   Foot exam   Most Recent Foot Exam Date: Not Found Annually Yes       Health Maintenance  Health Maintenance Topics with due status: Not Due       Topic Last Completion Date    DEXA Scan 11/13/2024    Lipid Panel 03/28/2025     Health Maintenance Due   Topic Date Due    TETANUS VACCINE  Never done    RSV Vaccine (Age 60+ and Pregnant patients) (1 - 1-dose 75+ series) Never done    Shingles Vaccine (2 of 2) 03/14/2022    Influenza Vaccine (1) 09/01/2024    COVID-19 Vaccine (4 - 2024-25 season) 09/01/2024               -Patient's lab results were reviewed and discussed with patient  -Treatment options and alternatives were discussed with the patient. Patient expressed understanding. Patient was given the opportunity to ask questions and be an active participant in their medical care. Patient had no further questions or concerns at this time.         Future Appointments   Date Time Provider Department Center   7/9/2025 10:00 AM Liset Sotelo NP [facility]   8/29/2025  8:50 AM LABORATORY, [facility] [place]   9/4/2025 11:20 AM " Mayi Vega MD [facility]   9/29/2025 10:00 AM PULMONARY LAB 2, [facility]   9/29/2025 10:40 AM Damion Carbone MD [facility]   11/5/2025  9:15 AM Fredi Crystal MD [facility] R[facility]          After visit summary printed and given to patient upon discharge.  Patient goals and care plan are included in After visit summary.      The following issues were discussed: The primary encounter diagnosis was Essential hypertension. A diagnosis of Chronic diastolic congestive heart failure was also pertinent to this visit.    Health maintenance needs, recent test results and goals of care discussed with pt and questions answered.           NISHI Herrera, NP-C  [facility] [address] [place] [address]          [1]   Patient Active Problem List  Diagnosis    Age-related osteoporosis without current pathological fracture    Other hyperlipidemia    History of right breast cancer    TIA (transient ischemic attack)    Essential hypertension    Vitamin D deficiency    Hyperparathyroidism    Palpitation    Hypercalcemia    Chronic anticoagulation    Atypical pneumonia - resolved , bilateral    Pulmonary fibrosis, postinflammatory    Atherosclerosis of aorta    Simple chronic bronchitis    Injection site reaction    Varicose veins of left lower extremity with pain    Right medial knee pain    Edema of left ankle    Popliteal cyst, left    Chronic diastolic congestive heart failure    Chronic embolism and thrombosis of other specified deep vein of left lower extremity    Localized edema    Chronic a-fib    Abnormal EKG    Situational anxiety    B12 deficiency    Chronic left-sided low back pain with left-sided sciatica    Primary osteoarthritis of right knee    Elevated parathyroid hormone   [2]   Current Outpatient Medications on File Prior to Visit   Medication Sig Dispense Refill    albuterol (PROVENTIL/VENTOLIN HFA) 90 mcg/actuation  inhaler Inhale 2 puffs into the lungs every 4 (four) hours as needed for Wheezing or Shortness of Breath. Rescue 25.5 g 3    apixaban (ELIQUIS) 5 mg Tab TAKE ONE TABLET BY MOUTH TWICE DAILY 60 tablet 6    atorvastatin (LIPITOR) 20 MG tablet TAKE ONE TABLET BY MOUTH EVERY DAY 90 tablet 3    busPIRone (BUSPAR) 7.5 MG tablet Take 1 tablet (7.5 mg total) by mouth 2 (two) times daily as needed (anxiety). 180 tablet 3    clobetasoL (TEMOVATE) 0.05 % cream Apply twice daily to poison ivy rash.  Do not use on face.  No more than 14d treatment at a time. 30 g 1    cyanocobalamin (VITAMIN B-12) 1000 MCG tablet Take 1,000 mcg by mouth every other day.      diclofenac sodium (VOLTAREN) 1 % Gel Apply 2 g topically once daily. 50 g 11    fish oil-omega-3 fatty acids 300-1,000 mg capsule Take 2 g by mouth once daily.      furosemide (LASIX) 20 MG tablet Take 1 tablet (20 mg total) by mouth once daily. 30 tablet 11    glucosam/chond/hyalu/CF borate (MOVE FREE JOINT HEALTH ORAL) Take 1 tablet by mouth once daily.      ipratropium (ATROVENT) 0.02 % nebulizer solution USE ONE VIAL (2.5ML) BY NEBULIZATION FOUR TIMES DAILY 300 mL 0    metoprolol succinate (TOPROL-XL) 25 MG 24 hr tablet Take 1 tablet (25 mg total) by mouth every evening. 90 tablet 3    MULTIVIT-IRON-MIN-FOLIC ACID 3,500-18-0.4 UNIT-MG-MG ORAL CHEW Take by mouth.      ranolazine (RANEXA) 500 MG Tb12 Take 1 tablet (500 mg total) by mouth 2 (two) times daily. 180 tablet 3    vitamin D 1000 units Tab Take 2,000 Units by mouth once daily.       No current facility-administered medications on file prior to visit.

## 2025-04-25 ENCOUNTER — TELEPHONE (OUTPATIENT)
Dept: PRIMARY CARE CLINIC | Facility: CLINIC | Age: 85
End: 2025-04-25
Payer: MEDICARE

## 2025-04-25 NOTE — TELEPHONE ENCOUNTER
Pt's daughter in law is concerned about pt's BP and the metoprolol. Phone call transferred to .          ----- Message from Med Assistant Aguero sent at 4/25/2025  1:28 PM CDT -----  Contact: [phone]    ----- Message -----  From: Kaylen Centeno  Sent: 4/25/2025  12:07 PM CDT  To: Deepak Haywood    .1MEDICALADVICE Patient is calling for Medical Advice regarding: Pt daughter in law called and stated that the pt is taking the metoprolol succinate (TOPROL-XL) 25 MG 24 hr tablet so would you like her to completely stop the medication How long has patient had these symptoms:Pharmacy name and phone#:Patient wants a call back or thru [facility]: Call Back Comments:Please advise patient replies from provider may take up to 48 hours.

## 2025-04-28 VITALS
HEART RATE: 77 BPM | WEIGHT: 160.25 LBS | OXYGEN SATURATION: 97 % | DIASTOLIC BLOOD PRESSURE: 50 MMHG | BODY MASS INDEX: 31.51 KG/M2 | SYSTOLIC BLOOD PRESSURE: 100 MMHG

## 2025-04-28 NOTE — ASSESSMENT & PLAN NOTE
On phone conversation 4/28 - pt is taking Toprol XL 25 mg daily    Need to confirm her home BP readings  I got low readings when checked: 102/69 and 100/50 (I thought she was still taking the Toprol XL 50 mg)    Nurse visit on 4/30/25 to confirm BP

## 2025-04-28 NOTE — ASSESSMENT & PLAN NOTE
Results for orders placed during the hospital encounter of 03/20/24    Echo    Interpretation Summary    Left Ventricle: The left ventricle is normal in size. Normal wall thickness. There is concentric hypertrophy. There is normal systolic function with a visually estimated ejection fraction of 60 - 65%.    Right Ventricle: Normal right ventricular cavity size. Wall thickness is normal. Right ventricle wall motion  is normal. Systolic function is normal.    Left Atrium: Left atrium is mildly dilated.    Aortic Valve: There is mild aortic valve sclerosis.    Pulmonary Artery: The estimated pulmonary artery systolic pressure is 28 mmHg.    IVC/SVC: Normal venous pressure at 3 mmHg.

## 2025-04-30 ENCOUNTER — CLINICAL SUPPORT (OUTPATIENT)
Dept: PRIMARY CARE CLINIC | Facility: CLINIC | Age: 85
End: 2025-04-30
Payer: MEDICARE

## 2025-04-30 VITALS — DIASTOLIC BLOOD PRESSURE: 80 MMHG | SYSTOLIC BLOOD PRESSURE: 132 MMHG

## 2025-04-30 DIAGNOSIS — I10 ESSENTIAL HYPERTENSION: Primary | ICD-10-CM

## 2025-05-21 DIAGNOSIS — R05.3 CHRONIC COUGH: ICD-10-CM

## 2025-05-21 RX ORDER — IPRATROPIUM BROMIDE 0.5 MG/2.5ML
SOLUTION RESPIRATORY (INHALATION)
Qty: 300 ML | Refills: 11 | Status: SHIPPED | OUTPATIENT
Start: 2025-05-21

## 2025-06-02 DIAGNOSIS — I48.91 ATRIAL FIBRILLATION, UNSPECIFIED TYPE: ICD-10-CM

## 2025-06-03 RX ORDER — APIXABAN 5 MG/1
5 TABLET, FILM COATED ORAL 2 TIMES DAILY
Qty: 60 TABLET | Refills: 6 | Status: SHIPPED | OUTPATIENT
Start: 2025-06-03

## 2025-07-10 ENCOUNTER — TELEPHONE (OUTPATIENT)
Dept: CARDIOLOGY | Facility: CLINIC | Age: 85
End: 2025-07-10
Payer: MEDICARE

## 2025-07-10 DIAGNOSIS — E78.5 HYPERLIPIDEMIA, UNSPECIFIED HYPERLIPIDEMIA TYPE: Primary | ICD-10-CM

## 2025-07-10 DIAGNOSIS — I10 ESSENTIAL HYPERTENSION: ICD-10-CM

## 2025-07-10 NOTE — TELEPHONE ENCOUNTER
Copied from CRM #[id number]. Topic: Appointments - Amb Referral  >> Jul 10, 2025 12:00 PM Flaquita wrote:  Type:  Needs Medical Advice    Who Called: daughter in law   Symptoms (please be specific): lab orders    Would the patient rather a call back or a response via [facility]? Call back   Best Call Back Number: [phone]  Additional Information: requesting more another lab order to be placed , pt appt was rescheduled for nov but orders will  by then wont let me schedule pass

## 2025-07-24 DIAGNOSIS — I48.91 ATRIAL FIBRILLATION, UNSPECIFIED TYPE: ICD-10-CM

## 2025-07-24 NOTE — TELEPHONE ENCOUNTER
Copied from CRM #[id number]. Topic: Medications - Medication Refill  >> Jul 24, 2025  9:08 AM Jenni wrote:  Type:  RX Refill Request    Who Called: Lorenzo Ospina  Refill or New Rx:refill  RX Name and Strength:apixaban (ELIQUIS) 5 mg Tab  How is the patient currently taking it? (ex. 1XDay):2 x day  Is this a 30 day or 90 day RX:60  Preferred Pharmacy with phone number:  [id number] - [place], [place] - [address] [address], [address], [facility] [address]  Phone: [phone] Fax: [phone]    Local or Mail Order:local  Ordering Provider:ruby  Would the patient rather a call back or a response via [facility]? Call back  Best Call Back Number:[phone]  Additional Information: n/a